# Patient Record
Sex: MALE | Race: OTHER | NOT HISPANIC OR LATINO
[De-identification: names, ages, dates, MRNs, and addresses within clinical notes are randomized per-mention and may not be internally consistent; named-entity substitution may affect disease eponyms.]

---

## 2017-06-14 ENCOUNTER — APPOINTMENT (OUTPATIENT)
Dept: NEPHROLOGY | Facility: CLINIC | Age: 82
End: 2017-06-14

## 2017-06-14 VITALS — HEART RATE: 80 BPM | DIASTOLIC BLOOD PRESSURE: 66 MMHG | SYSTOLIC BLOOD PRESSURE: 104 MMHG

## 2017-06-14 RX ORDER — OMEPRAZOLE 20 MG/1
20 CAPSULE, DELAYED RELEASE ORAL
Refills: 0 | Status: ACTIVE | COMMUNITY

## 2017-12-13 ENCOUNTER — APPOINTMENT (OUTPATIENT)
Dept: NEPHROLOGY | Facility: CLINIC | Age: 82
End: 2017-12-13
Payer: MEDICARE

## 2017-12-13 VITALS — DIASTOLIC BLOOD PRESSURE: 80 MMHG | HEART RATE: 80 BPM | SYSTOLIC BLOOD PRESSURE: 120 MMHG

## 2017-12-13 DIAGNOSIS — C61 MALIGNANT NEOPLASM OF PROSTATE: ICD-10-CM

## 2017-12-13 PROCEDURE — 99214 OFFICE O/P EST MOD 30 MIN: CPT

## 2018-01-30 ENCOUNTER — INPATIENT (INPATIENT)
Facility: HOSPITAL | Age: 83
LOS: 0 days | Discharge: HOME | DRG: 291 | End: 2018-01-31
Attending: SPECIALIST | Admitting: SPECIALIST
Payer: MEDICARE

## 2018-01-30 VITALS
WEIGHT: 151.9 LBS | HEIGHT: 63 IN | HEART RATE: 82 BPM | SYSTOLIC BLOOD PRESSURE: 142 MMHG | DIASTOLIC BLOOD PRESSURE: 80 MMHG | TEMPERATURE: 98 F | RESPIRATION RATE: 22 BRPM | OXYGEN SATURATION: 94 %

## 2018-01-30 DIAGNOSIS — Z79.01 LONG TERM (CURRENT) USE OF ANTICOAGULANTS: ICD-10-CM

## 2018-01-30 DIAGNOSIS — K64.8 OTHER HEMORRHOIDS: ICD-10-CM

## 2018-01-30 DIAGNOSIS — K45.8 OTHER SPECIFIED ABDOMINAL HERNIA WITHOUT OBSTRUCTION OR GANGRENE: ICD-10-CM

## 2018-01-30 DIAGNOSIS — I50.9 HEART FAILURE, UNSPECIFIED: ICD-10-CM

## 2018-01-30 DIAGNOSIS — Z98.890 OTHER SPECIFIED POSTPROCEDURAL STATES: Chronic | ICD-10-CM

## 2018-01-30 DIAGNOSIS — C61 MALIGNANT NEOPLASM OF PROSTATE: ICD-10-CM

## 2018-01-30 DIAGNOSIS — I13.0 HYPERTENSIVE HEART AND CHRONIC KIDNEY DISEASE WITH HEART FAILURE AND STAGE 1 THROUGH STAGE 4 CHRONIC KIDNEY DISEASE, OR UNSPECIFIED CHRONIC KIDNEY DISEASE: ICD-10-CM

## 2018-01-30 DIAGNOSIS — I48.91 UNSPECIFIED ATRIAL FIBRILLATION: ICD-10-CM

## 2018-01-30 DIAGNOSIS — E78.5 HYPERLIPIDEMIA, UNSPECIFIED: ICD-10-CM

## 2018-01-30 DIAGNOSIS — I50.33 ACUTE ON CHRONIC DIASTOLIC (CONGESTIVE) HEART FAILURE: ICD-10-CM

## 2018-01-30 DIAGNOSIS — Z45.018 ENCOUNTER FOR ADJUSTMENT AND MANAGEMENT OF OTHER PART OF CARDIAC PACEMAKER: ICD-10-CM

## 2018-01-30 DIAGNOSIS — I25.10 ATHEROSCLEROTIC HEART DISEASE OF NATIVE CORONARY ARTERY WITHOUT ANGINA PECTORIS: ICD-10-CM

## 2018-01-30 DIAGNOSIS — F03.90 UNSPECIFIED DEMENTIA WITHOUT BEHAVIORAL DISTURBANCE: ICD-10-CM

## 2018-01-30 DIAGNOSIS — Z98.89 OTHER SPECIFIED POSTPROCEDURAL STATES: Chronic | ICD-10-CM

## 2018-01-30 DIAGNOSIS — D50.9 IRON DEFICIENCY ANEMIA, UNSPECIFIED: ICD-10-CM

## 2018-01-30 DIAGNOSIS — D64.9 ANEMIA, UNSPECIFIED: ICD-10-CM

## 2018-01-30 DIAGNOSIS — R06.02 SHORTNESS OF BREATH: ICD-10-CM

## 2018-01-30 DIAGNOSIS — I11.9 HYPERTENSIVE HEART DISEASE WITHOUT HEART FAILURE: ICD-10-CM

## 2018-01-30 DIAGNOSIS — Z95.0 PRESENCE OF CARDIAC PACEMAKER: Chronic | ICD-10-CM

## 2018-01-30 DIAGNOSIS — N18.4 CHRONIC KIDNEY DISEASE, STAGE 4 (SEVERE): ICD-10-CM

## 2018-01-30 DIAGNOSIS — Z85.038 PERSONAL HISTORY OF OTHER MALIGNANT NEOPLASM OF LARGE INTESTINE: ICD-10-CM

## 2018-01-30 DIAGNOSIS — Z95.2 PRESENCE OF PROSTHETIC HEART VALVE: Chronic | ICD-10-CM

## 2018-01-30 DIAGNOSIS — K59.09 OTHER CONSTIPATION: ICD-10-CM

## 2018-01-30 DIAGNOSIS — F32.9 MAJOR DEPRESSIVE DISORDER, SINGLE EPISODE, UNSPECIFIED: ICD-10-CM

## 2018-01-30 LAB
ANION GAP SERPL CALC-SCNC: 16 MMOL/L — SIGNIFICANT CHANGE UP (ref 5–17)
APTT BLD: 43.6 SEC — HIGH (ref 27.5–37.4)
BASOPHILS NFR BLD AUTO: 0.4 % — SIGNIFICANT CHANGE UP (ref 0–2)
BUN SERPL-MCNC: 37 MG/DL — HIGH (ref 7–23)
CALCIUM SERPL-MCNC: 9.1 MG/DL — SIGNIFICANT CHANGE UP (ref 8.4–10.5)
CHLORIDE SERPL-SCNC: 104 MMOL/L — SIGNIFICANT CHANGE UP (ref 96–108)
CK MB CFR SERPL CALC: 2.7 NG/ML — SIGNIFICANT CHANGE UP (ref 0–6.7)
CK SERPL-CCNC: 49 U/L — SIGNIFICANT CHANGE UP (ref 30–200)
CO2 SERPL-SCNC: 24 MMOL/L — SIGNIFICANT CHANGE UP (ref 22–31)
CREAT SERPL-MCNC: 2.05 MG/DL — HIGH (ref 0.5–1.3)
EOSINOPHIL NFR BLD AUTO: 1.2 % — SIGNIFICANT CHANGE UP (ref 0–6)
GLUCOSE SERPL-MCNC: 198 MG/DL — HIGH (ref 70–99)
HCT VFR BLD CALC: 37.8 % — LOW (ref 39–50)
HGB BLD-MCNC: 11.9 G/DL — LOW (ref 13–17)
INR BLD: 3.8 — HIGH (ref 0.88–1.16)
LYMPHOCYTES # BLD AUTO: 13.7 % — SIGNIFICANT CHANGE UP (ref 13–44)
MCHC RBC-ENTMCNC: 31.5 G/DL — LOW (ref 32–36)
MCHC RBC-ENTMCNC: 31.8 PG — SIGNIFICANT CHANGE UP (ref 27–34)
MCV RBC AUTO: 101.1 FL — HIGH (ref 80–100)
MONOCYTES NFR BLD AUTO: 8.3 % — SIGNIFICANT CHANGE UP (ref 2–14)
NEUTROPHILS NFR BLD AUTO: 76.4 % — SIGNIFICANT CHANGE UP (ref 43–77)
NT-PROBNP SERPL-SCNC: HIGH PG/ML (ref 0–300)
PLATELET # BLD AUTO: 150 K/UL — SIGNIFICANT CHANGE UP (ref 150–400)
POTASSIUM SERPL-MCNC: 4.2 MMOL/L — SIGNIFICANT CHANGE UP (ref 3.5–5.3)
POTASSIUM SERPL-SCNC: 4.2 MMOL/L — SIGNIFICANT CHANGE UP (ref 3.5–5.3)
PROTHROM AB SERPL-ACNC: 43.4 SEC — HIGH (ref 9.8–12.7)
RAPID RVP RESULT: SIGNIFICANT CHANGE UP
RBC # BLD: 3.74 M/UL — LOW (ref 4.2–5.8)
RBC # FLD: 13.2 % — SIGNIFICANT CHANGE UP (ref 10.3–16.9)
SODIUM SERPL-SCNC: 144 MMOL/L — SIGNIFICANT CHANGE UP (ref 135–145)
TROPONIN T SERPL-MCNC: <0.01 NG/ML — SIGNIFICANT CHANGE UP (ref 0–0.01)
WBC # BLD: 5.6 K/UL — SIGNIFICANT CHANGE UP (ref 3.8–10.5)
WBC # FLD AUTO: 5.6 K/UL — SIGNIFICANT CHANGE UP (ref 3.8–10.5)

## 2018-01-30 PROCEDURE — 71046 X-RAY EXAM CHEST 2 VIEWS: CPT | Mod: 26

## 2018-01-30 PROCEDURE — 99285 EMERGENCY DEPT VISIT HI MDM: CPT | Mod: 25

## 2018-01-30 PROCEDURE — 93010 ELECTROCARDIOGRAM REPORT: CPT

## 2018-01-30 RX ORDER — FUROSEMIDE 40 MG
40 TABLET ORAL ONCE
Qty: 0 | Refills: 0 | Status: COMPLETED | OUTPATIENT
Start: 2018-01-30 | End: 2018-01-30

## 2018-01-30 RX ORDER — NITROGLYCERIN 6.5 MG
1 CAPSULE, EXTENDED RELEASE ORAL ONCE
Qty: 0 | Refills: 0 | Status: COMPLETED | OUTPATIENT
Start: 2018-01-30 | End: 2018-01-30

## 2018-01-30 RX ORDER — ALBUTEROL 90 UG/1
2.5 AEROSOL, METERED ORAL ONCE
Qty: 0 | Refills: 0 | Status: COMPLETED | OUTPATIENT
Start: 2018-01-30 | End: 2018-01-30

## 2018-01-30 RX ADMIN — Medication 1 INCH(S): at 23:20

## 2018-01-30 RX ADMIN — ALBUTEROL 2.5 MILLIGRAM(S): 90 AEROSOL, METERED ORAL at 22:05

## 2018-01-30 RX ADMIN — Medication 40 MILLIGRAM(S): at 23:20

## 2018-01-30 NOTE — ED PROVIDER NOTE - DIAGNOSTIC INTERPRETATION
ER Physician:  Cassidy Director  CHEST XRAY INTERPRETATION: mild chf, cardiomegaly similar to prev, clips R shoulder, pacer L chest, bony structures intact

## 2018-01-30 NOTE — ED ADULT NURSE NOTE - PMH
Aortic valve replaced    Atrial fibrillation    CAD (coronary artery disease)    Carotid arterial disease    Colon cancer    Constipation    Diverticulosis    High cholesterol    HTN (hypertension)    Internal hemorrhoids    Iron deficiency anemia    PUD (peptic ulcer disease) Aortic valve replaced    Atrial fibrillation    CAD (coronary artery disease)    Carotid arterial disease    Colon cancer    Colon cancer    Constipation    Diverticulosis    Endocarditis    High cholesterol    HTN (hypertension)    Internal hemorrhoids    Iron deficiency anemia    PUD (peptic ulcer disease)

## 2018-01-30 NOTE — ED PROVIDER NOTE - CONSTITUTIONAL, MLM
normal... Well appearing, well nourished, awake, alert, oriented to person, place, time/situation and mild tachypnea, speaking full sentences

## 2018-01-30 NOTE — ED ADULT NURSE NOTE - PSH
Cardiac pacemaker  3yrs ago  H/O inguinal hernia repair    History of bowel resection    S/P AVR  2013 @ [facility]  by Dr. Young

## 2018-01-30 NOTE — ED PROVIDER NOTE - PROGRESS NOTE DETAILS
Hgb stable, trop neg, bnp elevated, inr therapeutic, cr similar to prev, cxr w mild chf - lasix ordered.  PMD paged to discuss admit. Discussed w Dr Saxena - will admit to him on tele svc.  RVP neg.

## 2018-01-30 NOTE — ED ADULT TRIAGE NOTE - ARRIVAL INFO ADDITIONAL COMMENTS
Pt c/o sob since Sunday and scant rectal bleeding for 1 week. Pt states blood noticed when he wiped stool only. Pt coughing in triage. Pt denies cp.

## 2018-01-30 NOTE — ED ADULT NURSE NOTE - OBJECTIVE STATEMENT
88y M, A&Ox3, presents to ED c/o sob x1 week. Pt reports "I was taking the trash out dragging it and I get short of breath" Pt noted to take breaths to complete sentences. Denies cp, n/v, fever, chills, leg swelling. Pt reports intermittent rhinorrhea and dry cough. EKG performed, paced rhythm noted. Lungs clear bilateral, no jvd, no edema. PT also reports episode of elena red blood after cleaning after bm this week. Will continue to monitor.

## 2018-01-30 NOTE — ED PROVIDER NOTE - OBJECTIVE STATEMENT
87 yo male h/o avr complicated by endocarditis w revision, cad, s/p pacer, thoracic aneurysm, afib on coumadin, anemia, colon ca s/p resection, hemorrhoids, cri, hld, htn, gout, prostate ca, copd c/o sob x 1 d.  + mild, non productive cough x 1-2 d, rhinorrhea x several wks.  No fever, other uri sx.  No cp, palpitations, le edema, orthopnea (sleeps on hospital bed w/o change in position), + sanford w minimal exertion, no exertional cp.  No h/o pe/dvt.  Pt noted bright red blood on tissue w/o abd pain, rectal pain x several days.  No other bleeding.

## 2018-01-30 NOTE — ED PROVIDER NOTE - PSH
Cardiac pacemaker  3yrs ago  H/O inguinal hernia repair    History of bowel resection    S/P AVR  2013 @ [facility]  by Dr. Young

## 2018-01-30 NOTE — ED PROVIDER NOTE - MEDICAL DECISION MAKING DETAILS
Pt c/o sob.  Suspect cardiac > pulm etiology.  ? acs, chf, valve d/o, pna, irma, no sig concern for pe. Pt notes several days of minimal rectal bleeding w h/o hemorrhoids - doubt anemia or severe lgi bleed.  Plan labs, cxr, rvp, reassess.  Poss admit for further eval/rx.

## 2018-01-30 NOTE — ED PROVIDER NOTE - PMH
Aortic valve replaced    Atrial fibrillation    CAD (coronary artery disease)    Carotid arterial disease    Colon cancer    Constipation    Diverticulosis    High cholesterol    HTN (hypertension)    Internal hemorrhoids    Iron deficiency anemia    PUD (peptic ulcer disease)

## 2018-01-30 NOTE — ED ADULT NURSE NOTE - NSSISCREENINGQ1_ED_A_ED
Notification of Approved Cardiac Clearance    A surgical clearance request was initiated for this patient for the following procedure: Total Knee    After considering the patient's currently state of cardiac health, this patient has been Risk to move forward with the surgical procedure indicated. Please note a letter has been forwarded to the office of [address] [place] [address] [place] [address] [place] [address] to make them aware. No

## 2018-01-31 ENCOUNTER — TRANSCRIPTION ENCOUNTER (OUTPATIENT)
Age: 83
End: 2018-01-31

## 2018-01-31 VITALS — TEMPERATURE: 98 F

## 2018-01-31 DIAGNOSIS — I25.10 ATHEROSCLEROTIC HEART DISEASE OF NATIVE CORONARY ARTERY WITHOUT ANGINA PECTORIS: ICD-10-CM

## 2018-01-31 DIAGNOSIS — I50.9 HEART FAILURE, UNSPECIFIED: ICD-10-CM

## 2018-01-31 DIAGNOSIS — R63.8 OTHER SYMPTOMS AND SIGNS CONCERNING FOOD AND FLUID INTAKE: ICD-10-CM

## 2018-01-31 DIAGNOSIS — Z95.2 PRESENCE OF PROSTHETIC HEART VALVE: ICD-10-CM

## 2018-01-31 DIAGNOSIS — N18.4 CHRONIC KIDNEY DISEASE, STAGE 4 (SEVERE): ICD-10-CM

## 2018-01-31 DIAGNOSIS — I10 ESSENTIAL (PRIMARY) HYPERTENSION: ICD-10-CM

## 2018-01-31 DIAGNOSIS — I48.91 UNSPECIFIED ATRIAL FIBRILLATION: ICD-10-CM

## 2018-01-31 DIAGNOSIS — D64.9 ANEMIA, UNSPECIFIED: ICD-10-CM

## 2018-01-31 DIAGNOSIS — E03.9 HYPOTHYROIDISM, UNSPECIFIED: ICD-10-CM

## 2018-01-31 LAB
ANION GAP SERPL CALC-SCNC: 13 MMOL/L — SIGNIFICANT CHANGE UP (ref 5–17)
APPEARANCE UR: CLEAR — SIGNIFICANT CHANGE UP
BILIRUB UR-MCNC: NEGATIVE — SIGNIFICANT CHANGE UP
BUN SERPL-MCNC: 37 MG/DL — HIGH (ref 7–23)
CALCIUM SERPL-MCNC: 9 MG/DL — SIGNIFICANT CHANGE UP (ref 8.4–10.5)
CHLORIDE SERPL-SCNC: 104 MMOL/L — SIGNIFICANT CHANGE UP (ref 96–108)
CHOLEST SERPL-MCNC: 135 MG/DL — SIGNIFICANT CHANGE UP (ref 10–199)
CK MB CFR SERPL CALC: 2.8 NG/ML — SIGNIFICANT CHANGE UP (ref 0–6.7)
CK MB CFR SERPL CALC: 2.9 NG/ML — SIGNIFICANT CHANGE UP (ref 0–6.7)
CK SERPL-CCNC: 51 U/L — SIGNIFICANT CHANGE UP (ref 30–200)
CK SERPL-CCNC: 52 U/L — SIGNIFICANT CHANGE UP (ref 30–200)
CO2 SERPL-SCNC: 27 MMOL/L — SIGNIFICANT CHANGE UP (ref 22–31)
COLOR SPEC: YELLOW — SIGNIFICANT CHANGE UP
CREAT SERPL-MCNC: 1.96 MG/DL — HIGH (ref 0.5–1.3)
DIFF PNL FLD: NEGATIVE — SIGNIFICANT CHANGE UP
FOLATE SERPL-MCNC: >20 NG/ML — SIGNIFICANT CHANGE UP (ref 4.8–24.2)
GLUCOSE SERPL-MCNC: 104 MG/DL — HIGH (ref 70–99)
GLUCOSE UR QL: NEGATIVE — SIGNIFICANT CHANGE UP
HBA1C BLD-MCNC: 5.4 % — SIGNIFICANT CHANGE UP (ref 4–5.6)
HCT VFR BLD CALC: 36.9 % — LOW (ref 39–50)
HDLC SERPL-MCNC: 45 MG/DL — SIGNIFICANT CHANGE UP (ref 40–125)
HGB BLD-MCNC: 11.4 G/DL — LOW (ref 13–17)
INR BLD: 3.25 — HIGH (ref 0.88–1.16)
KETONES UR-MCNC: NEGATIVE — SIGNIFICANT CHANGE UP
LEUKOCYTE ESTERASE UR-ACNC: NEGATIVE — SIGNIFICANT CHANGE UP
LIPID PNL WITH DIRECT LDL SERPL: 66 MG/DL — SIGNIFICANT CHANGE UP
MAGNESIUM SERPL-MCNC: 1.8 MG/DL — SIGNIFICANT CHANGE UP (ref 1.6–2.6)
MCHC RBC-ENTMCNC: 30.9 G/DL — LOW (ref 32–36)
MCHC RBC-ENTMCNC: 31.3 PG — SIGNIFICANT CHANGE UP (ref 27–34)
MCV RBC AUTO: 101.4 FL — HIGH (ref 80–100)
NITRITE UR-MCNC: NEGATIVE — SIGNIFICANT CHANGE UP
PH UR: 5.5 — SIGNIFICANT CHANGE UP (ref 5–8)
PLATELET # BLD AUTO: 144 K/UL — LOW (ref 150–400)
POTASSIUM SERPL-MCNC: 4 MMOL/L — SIGNIFICANT CHANGE UP (ref 3.5–5.3)
POTASSIUM SERPL-SCNC: 4 MMOL/L — SIGNIFICANT CHANGE UP (ref 3.5–5.3)
PROT UR-MCNC: NEGATIVE MG/DL — SIGNIFICANT CHANGE UP
PROTHROM AB SERPL-ACNC: 37 SEC — HIGH (ref 9.8–12.7)
RBC # BLD: 3.64 M/UL — LOW (ref 4.2–5.8)
RBC # FLD: 13.4 % — SIGNIFICANT CHANGE UP (ref 10.3–16.9)
SODIUM SERPL-SCNC: 144 MMOL/L — SIGNIFICANT CHANGE UP (ref 135–145)
SP GR SPEC: 1.01 — SIGNIFICANT CHANGE UP (ref 1–1.03)
TOTAL CHOLESTEROL/HDL RATIO MEASUREMENT: 3 RATIO — LOW (ref 3.4–9.6)
TRIGL SERPL-MCNC: 122 MG/DL — SIGNIFICANT CHANGE UP (ref 10–149)
TROPONIN T SERPL-MCNC: 0.01 NG/ML — SIGNIFICANT CHANGE UP (ref 0–0.01)
TROPONIN T SERPL-MCNC: <0.01 NG/ML — SIGNIFICANT CHANGE UP (ref 0–0.01)
TSH SERPL-MCNC: 3.08 UIU/ML — SIGNIFICANT CHANGE UP (ref 0.35–4.94)
UROBILINOGEN FLD QL: 0.2 E.U./DL — SIGNIFICANT CHANGE UP
VIT B12 SERPL-MCNC: 590 PG/ML — SIGNIFICANT CHANGE UP (ref 232–1245)
WBC # BLD: 5.6 K/UL — SIGNIFICANT CHANGE UP (ref 3.8–10.5)
WBC # FLD AUTO: 5.6 K/UL — SIGNIFICANT CHANGE UP (ref 3.8–10.5)

## 2018-01-31 PROCEDURE — 71046 X-RAY EXAM CHEST 2 VIEWS: CPT

## 2018-01-31 PROCEDURE — 36415 COLL VENOUS BLD VENIPUNCTURE: CPT

## 2018-01-31 PROCEDURE — 87633 RESP VIRUS 12-25 TARGETS: CPT

## 2018-01-31 PROCEDURE — 85027 COMPLETE CBC AUTOMATED: CPT

## 2018-01-31 PROCEDURE — 82607 VITAMIN B-12: CPT

## 2018-01-31 PROCEDURE — 93306 TTE W/DOPPLER COMPLETE: CPT | Mod: 26

## 2018-01-31 PROCEDURE — 83036 HEMOGLOBIN GLYCOSYLATED A1C: CPT

## 2018-01-31 PROCEDURE — 84443 ASSAY THYROID STIM HORMONE: CPT

## 2018-01-31 PROCEDURE — 82553 CREATINE MB FRACTION: CPT

## 2018-01-31 PROCEDURE — 93005 ELECTROCARDIOGRAM TRACING: CPT

## 2018-01-31 PROCEDURE — 84484 ASSAY OF TROPONIN QUANT: CPT

## 2018-01-31 PROCEDURE — 93280 PM DEVICE PROGR EVAL DUAL: CPT | Mod: 26

## 2018-01-31 PROCEDURE — 87798 DETECT AGENT NOS DNA AMP: CPT

## 2018-01-31 PROCEDURE — 81003 URINALYSIS AUTO W/O SCOPE: CPT

## 2018-01-31 PROCEDURE — 85025 COMPLETE CBC W/AUTO DIFF WBC: CPT

## 2018-01-31 PROCEDURE — 85610 PROTHROMBIN TIME: CPT

## 2018-01-31 PROCEDURE — 87581 M.PNEUMON DNA AMP PROBE: CPT

## 2018-01-31 PROCEDURE — 82550 ASSAY OF CK (CPK): CPT

## 2018-01-31 PROCEDURE — 87486 CHLMYD PNEUM DNA AMP PROBE: CPT

## 2018-01-31 PROCEDURE — 83735 ASSAY OF MAGNESIUM: CPT

## 2018-01-31 PROCEDURE — 82746 ASSAY OF FOLIC ACID SERUM: CPT

## 2018-01-31 PROCEDURE — 85730 THROMBOPLASTIN TIME PARTIAL: CPT

## 2018-01-31 PROCEDURE — 93306 TTE W/DOPPLER COMPLETE: CPT

## 2018-01-31 PROCEDURE — 80048 BASIC METABOLIC PNL TOTAL CA: CPT

## 2018-01-31 PROCEDURE — 80061 LIPID PANEL: CPT

## 2018-01-31 PROCEDURE — 94640 AIRWAY INHALATION TREATMENT: CPT

## 2018-01-31 PROCEDURE — 96374 THER/PROPH/DIAG INJ IV PUSH: CPT

## 2018-01-31 PROCEDURE — 99285 EMERGENCY DEPT VISIT HI MDM: CPT | Mod: 25

## 2018-01-31 PROCEDURE — 83880 ASSAY OF NATRIURETIC PEPTIDE: CPT

## 2018-01-31 RX ORDER — ALLOPURINOL 300 MG
100 TABLET ORAL DAILY
Qty: 0 | Refills: 0 | Status: DISCONTINUED | OUTPATIENT
Start: 2018-01-31 | End: 2018-01-31

## 2018-01-31 RX ORDER — DOCUSATE SODIUM 100 MG
100 CAPSULE ORAL
Qty: 0 | Refills: 0 | Status: DISCONTINUED | OUTPATIENT
Start: 2018-01-31 | End: 2018-01-31

## 2018-01-31 RX ORDER — TRAZODONE HCL 50 MG
0 TABLET ORAL
Qty: 0 | Refills: 0 | COMMUNITY

## 2018-01-31 RX ORDER — TRAZODONE HCL 50 MG
25 TABLET ORAL DAILY
Qty: 0 | Refills: 0 | Status: DISCONTINUED | OUTPATIENT
Start: 2018-01-31 | End: 2018-01-31

## 2018-01-31 RX ORDER — LANOLIN ALCOHOL/MO/W.PET/CERES
3 CREAM (GRAM) TOPICAL AT BEDTIME
Qty: 0 | Refills: 0 | Status: DISCONTINUED | OUTPATIENT
Start: 2018-01-31 | End: 2018-01-31

## 2018-01-31 RX ORDER — ATORVASTATIN CALCIUM 80 MG/1
20 TABLET, FILM COATED ORAL AT BEDTIME
Qty: 0 | Refills: 0 | Status: DISCONTINUED | OUTPATIENT
Start: 2018-01-31 | End: 2018-01-31

## 2018-01-31 RX ORDER — METHYLCELLULOSE 500 MG
0 TABLET ORAL
Qty: 0 | Refills: 0 | COMMUNITY

## 2018-01-31 RX ORDER — MAGNESIUM SULFATE 500 MG/ML
1 VIAL (ML) INJECTION ONCE
Qty: 0 | Refills: 0 | Status: COMPLETED | OUTPATIENT
Start: 2018-01-31 | End: 2018-01-31

## 2018-01-31 RX ORDER — FOLIC ACID 0.8 MG
1 TABLET ORAL DAILY
Qty: 0 | Refills: 0 | Status: DISCONTINUED | OUTPATIENT
Start: 2018-01-31 | End: 2018-01-31

## 2018-01-31 RX ORDER — FUROSEMIDE 40 MG
40 TABLET ORAL DAILY
Qty: 0 | Refills: 0 | Status: DISCONTINUED | OUTPATIENT
Start: 2018-01-31 | End: 2018-01-31

## 2018-01-31 RX ORDER — OMEPRAZOLE 10 MG/1
0 CAPSULE, DELAYED RELEASE ORAL
Qty: 0 | Refills: 0 | COMMUNITY

## 2018-01-31 RX ORDER — METOPROLOL TARTRATE 50 MG
50 TABLET ORAL DAILY
Qty: 0 | Refills: 0 | Status: DISCONTINUED | OUTPATIENT
Start: 2018-01-31 | End: 2018-01-31

## 2018-01-31 RX ORDER — ASPIRIN/CALCIUM CARB/MAGNESIUM 324 MG
81 TABLET ORAL DAILY
Qty: 0 | Refills: 0 | Status: DISCONTINUED | OUTPATIENT
Start: 2018-01-31 | End: 2018-01-31

## 2018-01-31 RX ORDER — WARFARIN SODIUM 2.5 MG/1
1 TABLET ORAL
Qty: 0 | Refills: 0 | COMMUNITY

## 2018-01-31 RX ORDER — LEVOTHYROXINE SODIUM 125 MCG
25 TABLET ORAL DAILY
Qty: 0 | Refills: 0 | Status: DISCONTINUED | OUTPATIENT
Start: 2018-01-31 | End: 2018-01-31

## 2018-01-31 RX ORDER — ATORVASTATIN CALCIUM 80 MG/1
1 TABLET, FILM COATED ORAL
Qty: 0 | Refills: 0 | COMMUNITY

## 2018-01-31 RX ORDER — FUROSEMIDE 40 MG
1 TABLET ORAL
Qty: 30 | Refills: 0
Start: 2018-01-31 | End: 2018-03-01

## 2018-01-31 RX ORDER — PANTOPRAZOLE SODIUM 20 MG/1
40 TABLET, DELAYED RELEASE ORAL
Qty: 0 | Refills: 0 | Status: DISCONTINUED | OUTPATIENT
Start: 2018-01-31 | End: 2018-01-31

## 2018-01-31 RX ORDER — DONEPEZIL HYDROCHLORIDE 10 MG/1
5 TABLET, FILM COATED ORAL AT BEDTIME
Qty: 0 | Refills: 0 | Status: DISCONTINUED | OUTPATIENT
Start: 2018-01-31 | End: 2018-01-31

## 2018-01-31 RX ORDER — TAMSULOSIN HYDROCHLORIDE 0.4 MG/1
0.4 CAPSULE ORAL
Qty: 0 | Refills: 0 | Status: DISCONTINUED | OUTPATIENT
Start: 2018-01-31 | End: 2018-01-31

## 2018-01-31 RX ORDER — HEPARIN SODIUM 5000 [USP'U]/ML
5000 INJECTION INTRAVENOUS; SUBCUTANEOUS EVERY 8 HOURS
Qty: 0 | Refills: 0 | Status: DISCONTINUED | OUTPATIENT
Start: 2018-01-31 | End: 2018-01-31

## 2018-01-31 RX ADMIN — Medication 100 MILLIGRAM(S): at 10:04

## 2018-01-31 RX ADMIN — Medication 100 GRAM(S): at 10:03

## 2018-01-31 RX ADMIN — TAMSULOSIN HYDROCHLORIDE 0.4 MILLIGRAM(S): 0.4 CAPSULE ORAL at 06:28

## 2018-01-31 RX ADMIN — Medication 25 MILLIGRAM(S): at 10:03

## 2018-01-31 RX ADMIN — Medication 81 MILLIGRAM(S): at 10:04

## 2018-01-31 RX ADMIN — Medication 1 TABLET(S): at 10:03

## 2018-01-31 RX ADMIN — Medication 40 MILLIGRAM(S): at 14:00

## 2018-01-31 RX ADMIN — PANTOPRAZOLE SODIUM 40 MILLIGRAM(S): 20 TABLET, DELAYED RELEASE ORAL at 06:28

## 2018-01-31 RX ADMIN — Medication 50 MILLIGRAM(S): at 08:51

## 2018-01-31 RX ADMIN — Medication 25 MICROGRAM(S): at 06:28

## 2018-01-31 RX ADMIN — Medication 100 MILLIGRAM(S): at 06:28

## 2018-01-31 RX ADMIN — Medication 3 MILLIGRAM(S): at 02:30

## 2018-01-31 RX ADMIN — Medication 1 MILLIGRAM(S): at 10:04

## 2018-01-31 RX ADMIN — Medication 1 INCH(S): at 10:06

## 2018-01-31 NOTE — H&P ADULT - PMH
Aortic valve replaced    Atrial fibrillation    CAD (coronary artery disease)    Carotid arterial disease    Colon cancer    Constipation    Diverticulosis    Endocarditis    High cholesterol    HTN (hypertension)    Internal hemorrhoids    Iron deficiency anemia    PUD (peptic ulcer disease) Aortic valve replaced    Atrial fibrillation    CAD (coronary artery disease)    Carotid arterial disease    Colon cancer    Constipation    Diverticulosis    Endocarditis    High cholesterol    HTN (hypertension)    Hypothyroidism    Internal hemorrhoids    Iron deficiency anemia    PUD (peptic ulcer disease)

## 2018-01-31 NOTE — DISCHARGE NOTE ADULT - CARE PROVIDER_API CALL
Jaskaran Saxena), Cardiovascular Disease; Internal Medicine  [address] [place] [address]  Phone: [phone]  Fax: [phone]

## 2018-01-31 NOTE — DISCHARGE NOTE ADULT - MEDICATION SUMMARY - MEDICATIONS TO TAKE
I will START or STAY ON the medications listed below when I get home from the hospital:    aspirin 81 mg oral tablet  -- 1 tab(s) by mouth once a day  -- Indication: For CAD (coronary artery disease)    tamsulosin 0.4 mg oral capsule  -- 1 tab(s) by mouth 2 times a day  -- Indication: For BPH    traZODone 50 mg oral tablet  -- 0.5 tab(s) by mouth once a day  -- Indication: For Insomnia    allopurinol 100 mg oral tablet  -- 1 tab(s) by mouth once a day  -- Indication: For GOUT    atorvastatin 20 mg oral tablet  -- 1 tab(s) by mouth once a day (at bedtime)  -- Indication: For Hyperlipidemia    metoprolol succinate 50 mg oral tablet, extended release  -- 1 tab(s) by mouth once a day  -- Indication: For Atrial fibrillation    donepezil 5 mg oral tablet  -- 1 tab(s) by mouth once a day (at bedtime)  -- Indication: For Dementia    Lasix 40 mg oral tablet  -- 1 tab(s) by mouth once a day  -- Indication: For CHF (congestive heart failure)    ferrous sulfate 325 mg (65 mg elemental iron) oral tablet  -- 1 tab(s) by mouth once a day  -- Indication: For Iron deficiency anemia    docusate sodium 100 mg oral capsule  -- 1 cap(s) by mouth 2 times a day  -- Indication: For CONstipation    Citrucel 2 g/19 g oral powder for reconstitution  -- 1 packet(s) by mouth once a day  -- Indication: For CONstipation    omeprazole 40 mg oral delayed release capsule  -- 1 cap(s) by mouth once a day  -- Indication: For GERD    levothyroxine 25 mcg (0.025 mg) oral tablet  -- 1 tab(s) by mouth once a day  -- Indication: For Hypothyroidism    B Complex 50 oral tablet, extended release  -- 1 tab(s) by mouth once a day  -- Indication: For Vitamin supplementation    Vitamin D3 1000 intl units oral tablet  -- 1 tab(s) by mouth once a day  -- Indication: For Vitamin supplementation    folic acid 1 mg oral tablet  -- 1 tab(s) by mouth once a day  -- Indication: For Vitamin supplementation

## 2018-01-31 NOTE — H&P ADULT - PROBLEM SELECTOR PLAN 4
Hx of CAD with PCI and stent placement to RCA in 2007. Currently, without chest pain.   - continue home ASA 81mg QD and Lipitor 20mg QHS

## 2018-01-31 NOTE — PHYSICAL THERAPY INITIAL EVALUATION ADULT - ADDITIONAL COMMENTS
Patient reports that he lives in a 2 level home with a chair lift. Reports being functionally independent prior to admission, including, dressing, showering, meal prep.

## 2018-01-31 NOTE — H&P ADULT - NSHPPHYSICALEXAM_GEN_ALL_CORE
Vital Signs Last 24 Hrs  T(C): 36.4 (31 Jan 2018 05:18), Max: 36.7 (30 Jan 2018 21:07)  T(F): 97.6 (31 Jan 2018 05:18), Max: 98.1 (30 Jan 2018 23:29)  HR: 80 (31 Jan 2018 05:22) (80 - 82)  BP: 136/72 (31 Jan 2018 05:22) (136/72 - 169/60)  BP(mean): 96 (31 Jan 2018 05:22) (92 - 101)  RR: 18 (31 Jan 2018 01:28) (18 - 22)  SpO2: 98% (31 Jan 2018 05:22) (92% - 99%)    PHYSICAL EXAM:  Constitutional: elderly male, WDWN, NAD   HEENT: NCAT, EOMI, PERRL, MMM  Neck: supple, no JVD, no LAD   Pulm: clear to auscultation bilaterally, no wheezing/rales/rhonchi  CV: RRR, +S1S2, no murmurs appreciated   Abdomen: soft, NTND, +BS   Ext: WWP, no LE edema, DP 2+ bilaterally   Neuro: AAOx3, CNII-XII grossly intact, no focal neuro deficits

## 2018-01-31 NOTE — H&P ADULT - PROBLEM SELECTOR PLAN 2
Patient reports BPs in the 110s-120s/80s in outpatient setting. On arrival to ED, patient hypertensive with BPs in the 160s, normalized without intervention.   - restart home Toprol XL 50mg QD   - consider ACEi/ARB vs Norvasc if pressures become elevated

## 2018-01-31 NOTE — DISCHARGE NOTE ADULT - ADDITIONAL INSTRUCTIONS
Dr. Christian - 2/8@ 10:00 AM  Please have you INR checked with your PCP Dr. Christian - 2/8@ 10:00 AM  Dr. Barroso - Please have you INR checked tomorrow in your PCP's office Dr. Saxena - 2/8/18@ 10:00 AM  Dr. Barroso - Please have you INR checked tomorrow in your PCP's office.  You have a previously scheduled appointment on 2/8/18, please attend this appointment

## 2018-01-31 NOTE — H&P ADULT - PROBLEM SELECTOR PLAN 5
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   - no active issues

## 2018-01-31 NOTE — DISCHARGE NOTE ADULT - CARE PLAN
Principal Discharge DX:	CHF (congestive heart failure)  Secondary Diagnosis:	Atrial fibrillation Principal Discharge DX:	CHF (congestive heart failure)  Goal:	.  Assessment and plan of treatment:	You came to the hospital with shortness of breath while walking, and your presentation was consistent with congestive heart failure.  You received IV medications to remove excess fluid from your lungs, and you received an echocardiogram, which demonstrated ***.  When you leave the hospital, please continue taking Furosemide 40mg PO daily to facilitate further excess fluid removal, and please follow-up with your outpatient cardiologist for further work-up and management of your  Secondary Diagnosis:	Atrial fibrillation Principal Discharge DX:	CHF (congestive heart failure)  Goal:	.  Assessment and plan of treatment:	You came to the hospital with shortness of breath while walking, and your presentation was consistent with congestive heart failure.  You received IV medications to remove excess fluid from your lungs, and you received an echocardiogram, which demonstrated ***.  When you leave the hospital, please continue taking Furosemide 40mg PO daily to facilitate further excess fluid removal, and please follow-up with your outpatient cardiologist for further work-up and management of your  Secondary Diagnosis:	Atrial fibrillation  Assessment and plan of treatment:	Your heart rate was well controlled with your home Toprol XL.  Your INR on admission was elevated to 3.80, and your Coumadin was held.  Your INR was checked on the day of your discharge, and was ***.  Please go to your primary care doctor's office tomorrow, 2/1/18, to have your INR checked again. Principal Discharge DX:	CHF (congestive heart failure)  Goal:	.  Assessment and plan of treatment:	You came to the hospital with shortness of breath while walking, and your presentation was consistent with congestive heart failure.  You received IV medications to remove excess fluid from your lungs, and you received an echocardiogram, which demonstrated ***.  When you leave the hospital, please continue taking Furosemide 40mg PO daily to facilitate further excess fluid removal, and please follow-up with your outpatient cardiologist for further work-up and management of your  Secondary Diagnosis:	Atrial fibrillation  Assessment and plan of treatment:	Your heart rate was well controlled with your home Toprol XL.  Your INR on admission was elevated to 3.80, and your Coumadin was held.  Your INR was checked on the day of your discharge, and was 3.25.  Please do NOT take your home Coumadin tonight.  Please go to your primary care doctor's office tomorrow, 2/1/18, to have your INR checked again. Principal Discharge DX:	CHF (congestive heart failure)  Goal:	.  Assessment and plan of treatment:	You came to the hospital with shortness of breath while walking, and your presentation was consistent with congestive heart failure.  You received IV medications to remove excess fluid from your lungs, and you received an echocardiogram, which demonstrated reduced pumping function of your heart, with an LVEF of 35-40%.  When you leave the hospital, please continue taking Furosemide 40mg PO daily to facilitate further excess fluid removal, and please follow-up with your outpatient cardiologist for further work-up and management of your  Secondary Diagnosis:	Atrial fibrillation  Assessment and plan of treatment:	Your heart rate was well controlled with your home Toprol XL.  Your INR on admission was elevated to 3.80, and your Coumadin was held.  Your INR was checked on the day of your discharge, and was 3.25.  Please do NOT take your home Coumadin tonight.  Please go to your primary care doctor's office tomorrow, 2/1/18, to have your INR checked again.

## 2018-01-31 NOTE — H&P ADULT - PROBLEM SELECTOR PLAN 6
Follows with Dr. Cueto outpatient. Baseline creatinine 1.7-2, currently at baseline.   - continue to monitor Cr   - renal consult in AM    #Hyperuricemia: - continue home Allopurinol 100mg QD

## 2018-01-31 NOTE — H&P ADULT - PROBLEM SELECTOR PLAN 8
- continue home Synthroid 25mcg QD   - f/u TSH - continue home Synthroid 25mcg QD   - f/u TSH    #Prostate cancer: Active, on Lupron Q3mo.  - continue home Flomax 0.4mg BID     #Depression/Dementia:  - continue home Trazodone 25mg QD and Donepezil 5mg QD

## 2018-01-31 NOTE — H&P ADULT - NSHPLABSRESULTS_GEN_ALL_CORE
11.9   5.6   )-----------( 150      ( 30 Jan 2018 22:03 )             37.8     144  |  104  |  37<H>  ----------------------------<  198<H>  4.2   |  24  |  2.05<H>    Ca    9.1          Troponin T, Serum (01.31.18 @ 02:42)    Troponin T, Serum: 0.01: Reference interval for troponin T is </= 0.01 ng/mL which includes the  99th percentile of a healthy population. Troponin T results are not  interchangeable with troponin I results. ng/mL    Troponin T, Serum (01.30.18 @ 22:03)    Troponin T, Serum: <0.01: Reference interval for troponin T is </= 0.01 ng/mL which includes the  99th percentile of a healthy population. Troponin T results are not  interchangeable with troponin I results. ng/mL      EKG: V paced     CXR: mild congestion

## 2018-01-31 NOTE — H&P ADULT - PROBLEM SELECTOR PLAN 3
Hx of Afib with PPM placement in 2013. On Coumadin at home, alternates 1mg T/Th/Sun and 1.5mg M/W/F/Sat. On admission, INR supratherapeutic to 3.8.   - f/u AM INR   - dose Coumadin as indicated, goal INR 2-3  - EP consult in AM for interrogation

## 2018-01-31 NOTE — H&P ADULT - PROBLEM SELECTOR PLAN 7
Macrocytic anemia. Outpatient records document Hgb >10.5 at baseline. Currently, Hgb 11.9.  - continue to monitor H/H  - continue home MV QD and Folic acid 1mg QD

## 2018-01-31 NOTE — H&P ADULT - HISTORY OF PRESENT ILLNESS
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 Per history, patient was taking out his garbage and had a difficult time catching his breath after rolling the trash can to the corner. At baseline, patient reports being able to ambulate without assistance for 2-3 blocks. Patient denies any recent weight gain. No new orthopnea (sleeps with 1 pillow under head every night). Endorses medication compliance. Last outpatient echo reported a few months ago with Dr. Saxena, reportedly "unremarkable".   On ROS, patient denies recent fevers, chills, lightheadedness/dizziness, URI symptoms, CP, palpitations, abdominal pain, nausea/vomiting, diarrhea, dysuria. No recent travel. No sick contacts.     In the ED, patient afebrile, HR 80-82, -167/65-80, RR 22, SpO2 94% on RA --> 98% on NC2L. EKG Vpaced. CXR with mild pulmonary congestion, no gross infiltrates. Labs remarkable for elevated BNP (82081). Trops neg x1. Patient given Lasix 40mg IVP x1 and Nitro ointment. Admitted for management of CHF exacerbation. 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 Per history, patient was taking out his garbage and had a difficult time catching his breath after rolling the trash can to the corner. At baseline, patient reports being able to ambulate without assistance for 2-3 blocks. Patient denies any recent weight gain. No new orthopnea (sleeps with 1 pillow under head every night). Endorses medication compliance. Last outpatient echo reported a few months ago with Dr. Saxena, reportedly "unremarkable".   On ROS, patient denies recent fevers, chills, lightheadedness/dizziness, URI symptoms, CP, palpitations, abdominal pain, nausea/vomiting, diarrhea, dysuria. No recent travel. No sick contacts.     In the ED, patient afebrile, HR 80-82, -167/65-80, RR 22, SpO2 94% on RA --> 98% on NC2L. EKG Vpaced. CXR with mild pulmonary congestion, no gross infiltrates. Labs remarkable for elevated BNP (61301). Trops neg x1. Patient given Lasix 40mg IVP x1 and Nitro ointment. Admitted for management of CHF exacerbation.

## 2018-01-31 NOTE — DISCHARGE NOTE ADULT - PATIENT PORTAL LINK FT
“You can access the [facility] Patient Portal, offered by [facility], by registering with the following website: [email or web address]”

## 2018-01-31 NOTE — H&P ADULT - NSHPOUTPATIENTPROVIDERS_GEN_ALL_CORE
Dr. Saxena (cardiology), Dr. Cueto (renal), Dr. Bowman (PMD), Dr. Waters (PMD) Dr. Saxena (cardiology), Dr. Cueto (renal), Dr. Bowman (PMD), Dr. Everett Barroso (PMD)

## 2018-01-31 NOTE — PHYSICAL THERAPY INITIAL EVALUATION ADULT - IMPAIRMENTS CONTRIBUTING TO GAIT DEVIATIONS, PT EVAL
SP02 assessed throughout ambulation on room air. At baseline/rest on room air: 95%. After ambulating 50 feet 94%. After ambulating an additional 50 feet: 92%. Reapplied 2L NC 02, SP02 increased to 95% in ~10 seconds. Minimal to no SOB noted throughout session./decreased strength

## 2018-01-31 NOTE — DISCHARGE NOTE ADULT - MEDICATION SUMMARY - MEDICATIONS TO STOP TAKING
I will STOP taking the medications listed below when I get home from the hospital:    warfarin 1 mg oral tablet  -- 1 tab(s) by mouth once a day

## 2018-01-31 NOTE — DISCHARGE NOTE ADULT - HOSPITAL COURSE
Wilber Kyle is an 87yo M with PMHx of HTN, HLD, prostate cancer (on Lupron Q3mo), colon cancer s/p R hemicolectomy (2016), hx of AS s/p AVR (with porcine valve in 2012) complicated by prosthetic valve endocarditis and revision/replacement, Afib (on Coumadin) s/p PPM (2013), CAD s/p PCI with RCA stent placement in 2007, CKDIV (baseline Cr 1.7-2) with anemia and hyperuricemia, hypothyroidism who presents with complaints of dyspnea on exertion x 2d, admitted for management of CHF exacerbation.  He was treated with IV Lasix 40mg, with appropriate diuresis, and his symptoms improved.  He received an echocardiogram to evaluate this new diagnosis of CHF, which demonstrated ***.  His EKG and cardiac enzymes were not consistent with an ischemic cause of his new CHF.  He will follow-up with his cardiologist Dr. Saxena for further work-up.  His INR was elevated to 3.80 on admission, and his Coumadin was held.  His INR was measured at *** on day of discharge, and he will follow-up at his PCP's office on 2/1/18 for an INR check.  He was medically optimized, and discharged home with instructions to follow-up with his cardiologist and PCP. Wilber Kyle is an 87yo M with PMHx of HTN, HLD, prostate cancer (on Lupron Q3mo), colon cancer s/p R hemicolectomy (2016), hx of AS s/p AVR (with porcine valve in 2012) complicated by prosthetic valve endocarditis and revision/replacement, Afib (on Coumadin) s/p PPM (2013), CAD s/p PCI with RCA stent placement in 2007, CKDIV (baseline Cr 1.7-2) with anemia and hyperuricemia, hypothyroidism who presents with complaints of dyspnea on exertion x 2d, admitted for management of CHF exacerbation.  He was treated with IV Lasix 40mg, with appropriate diuresis, and his symptoms improved.  He received an echocardiogram to evaluate this new diagnosis of CHF, which demonstrated ***.  His EKG and cardiac enzymes were not consistent with an ischemic cause of his new CHF.  He will follow-up with his cardiologist Dr. Saxena for further work-up.  His INR was elevated to 3.80 on admission, and his Coumadin was held.  His INR was measured at 3.25 on day of discharge, he was instructed to not take his Coumadin that night, and he will follow-up at his PCP's office on 2/1/18 for an INR check.  He was medically optimized, and discharged home with instructions to follow-up with his cardiologist and PCP. Wilber Kyle is an 87yo M with PMHx of HTN, HLD, prostate cancer (on Lupron Q3mo), colon cancer s/p R hemicolectomy (2016), hx of AS s/p AVR (with porcine valve in 2012) complicated by prosthetic valve endocarditis and revision/replacement, Afib (on Coumadin) s/p PPM (2013), CAD s/p PCI with RCA stent placement in 2007, CKDIV (baseline Cr 1.7-2) with anemia and hyperuricemia, hypothyroidism who presents with complaints of dyspnea on exertion x 2d, admitted for management of CHF exacerbation.  He was treated with IV Lasix 40mg, with appropriate diuresis, and his symptoms improved.  He received an echocardiogram to evaluate this new diagnosis of CHF, which demonstrated ***.  His EKG and cardiac enzymes were not consistent with an ischemic cause of his new CHF.  His pacemaker was interrogated and was found to be functioning normally, with increased frequency and length of atrial fibrillation episodes since January.  He will follow-up with his cardiologist Dr. Saxena for further work-up.  His INR was elevated to 3.80 on admission, and his Coumadin was held.  His INR was measured at 3.25 on day of discharge, he was instructed to not take his Coumadin that night, and he will follow-up at his PCP's office on 2/1/18 for an INR check.  He was medically optimized, and discharged home with instructions to follow-up with his cardiologist and PCP. Wilber Kyle is an 89yo M with PMHx of HTN, HLD, prostate cancer (on Lupron Q3mo), colon cancer s/p R hemicolectomy (2016), hx of AS s/p AVR (with porcine valve in 2012) complicated by prosthetic valve endocarditis and revision/replacement, Afib (on Coumadin) s/p PPM (2013), CAD s/p PCI with RCA stent placement in 2007, CKDIV (baseline Cr 1.7-2) with anemia and hyperuricemia, hypothyroidism who presents with complaints of dyspnea on exertion x 2d, admitted for management of CHF exacerbation.  He was treated with IV Lasix 40mg, with appropriate diuresis, and his symptoms improved.  He received an echocardiogram to evaluate this new diagnosis of CHF, which demonstrated LVEF 35-40%, basal inferior wall akinesis and apical septal wall hypokinesis.  His EKG and cardiac enzymes were not consistent with an ischemic cause of his new CHF.  His pacemaker was interrogated and was found to be functioning normally, with increased frequency and length of atrial fibrillation episodes since January.  He will follow-up with his cardiologist Dr. Saxena for further work-up.  His INR was elevated to 3.80 on admission, and his Coumadin was held.  His INR was measured at 3.25 on day of discharge, he was instructed to not take his Coumadin that night, and he will follow-up at his PCP's office on 2/1/18 for an INR check.  He was medically optimized, and discharged home with instructions to follow-up with his cardiologist and PCP. Wilber Kyle is an 89yo M with PMHx of HTN, HLD, prostate cancer (on Lupron Q3mo), colon cancer s/p R hemicolectomy (2016), hx of AS s/p AVR (with porcine valve in 2012) complicated by prosthetic valve endocarditis and revision/replacement, Afib (on Coumadin) s/p PPM (2013), CAD s/p PCI with RCA stent placement in 2007, CKDIV (baseline Cr 1.7-2) with anemia and hyperuricemia, hypothyroidism who presents with complaints of dyspnea on exertion x 2d, admitted for management of CHF exacerbation.  He was treated with IV Lasix 40mg, with appropriate diuresis, and his symptoms improved.  He received an echocardiogram to evaluate this new diagnosis of CHF, which demonstrated LVEF 35-40%, basal inferior wall akinesis and apical septal wall hypokinesis.  There was an echogenicity in his right atrium that likely represented his pacemaker leads, but could have represented a vegetation.  Considering that the patient was non-toxic, afebrile, and not clinically infected, this finding will be followed up as an outpatient.  His EKG and cardiac enzymes were not consistent with an ischemic cause of his new CHF.  His pacemaker was interrogated and was found to be functioning normally, with increased frequency and length of atrial fibrillation episodes since January.  He will follow-up with his cardiologist Dr. Saxena for further work-up.  His INR was elevated to 3.80 on admission, and his Coumadin was held.  His INR was measured at 3.25 on day of discharge, he was instructed to not take his Coumadin that night, and he will follow-up at his PCP's office on 2/1/18 for an INR check.  He was medically optimized, and discharged home with instructions to follow-up with his cardiologist and PCP.

## 2018-01-31 NOTE — PHYSICAL THERAPY INITIAL EVALUATION ADULT - MODALITIES TREATMENT COMMENTS
Demonstrated functional independence with all mobility including bed mobility, transfers, and ambulation. Patient is CLEARED from PT. Recommend home, no skilled PT needs upon discharge.

## 2018-01-31 NOTE — H&P ADULT - ASSESSMENT
86M with PMHx of HTN, HLD, prostate cancer (on Lupron Q3mo), colon cancer s/p R hemicolectomy (2016), hx of AS s/p AVR (with porcine valve in 2012) complicated by prosthetic valve endocarditis and revision/replacement, Afib (on Coumadin) s/p PPM (2013), CAD s/p PCI with RCA stent placement in 2007, CKDIV (baseline Cr 1.7-2) with anemia and hyperuricemia, hypothyroidism who presents with complaints of dyspnea on exertion x 2d, admitted for management of CHF exacerbation.

## 2018-01-31 NOTE — H&P ADULT - PSH
Cardiac pacemaker  3yrs ago  H/O inguinal hernia repair    History of bowel resection    S/P AVR  2013 @ [facility]  by Dr. Young

## 2018-01-31 NOTE — PROCEDURE NOTE - ADDITIONAL PROCEDURE DETAILS
Mr. Kyle is in atrial fibrillation with V-pacing at 80 bpm. He is A-pacing 97.3% '    He is atrial fibrillation 12% of the time. His frequency and length of episodes have increased since January. During his episodes of Afib, he is V-pacing 99.9% of the time. His average rate during these Mr. Kyle is in atrial fibrillation with V-pacing at 80 bpm. He is A-pacing 97.3% and V-pacing 99.9% of the time. He has a ventricular escape at around 30 bpm. His device check was normal. His measured atrial sensing threshold is small relative to the set sensitivity, but because the patient is in paroxysmal Afib, and the atrial sensitivity is set to 0.25mV, it was not lowered any further to avoid oversensing when the patient is in sinus.       He is in atrial fibrillation 12% of the time. His frequency and length of episodes have increased since January. During his episodes of Afib, he is V-pacing 99.9% of the time. His average rate during these episodes is 80 bpm. Long term histogram of ventricular rates show that the patient's max ventricular rate during Afib is above 100 bpm (sensing vs pacing) <5% of the time.

## 2018-01-31 NOTE — DISCHARGE NOTE ADULT - PLAN OF CARE
. You came to the hospital with shortness of breath while walking, and your presentation was consistent with congestive heart failure.  You received IV medications to remove excess fluid from your lungs, and you received an echocardiogram, which demonstrated ***.  When you leave the hospital, please continue taking Furosemide 40mg PO daily to facilitate further excess fluid removal, and please follow-up with your outpatient cardiologist for further work-up and management of your Your heart rate was well controlled with your home Toprol XL.  Your INR on admission was elevated to 3.80, and your Coumadin was held.  Your INR was checked on the day of your discharge, and was ***.  Please go to your primary care doctor's office tomorrow, 2/1/18, to have your INR checked again. Your heart rate was well controlled with your home Toprol XL.  Your INR on admission was elevated to 3.80, and your Coumadin was held.  Your INR was checked on the day of your discharge, and was 3.25.  Please do NOT take your home Coumadin tonight.  Please go to your primary care doctor's office tomorrow, 2/1/18, to have your INR checked again. You came to the hospital with shortness of breath while walking, and your presentation was consistent with congestive heart failure.  You received IV medications to remove excess fluid from your lungs, and you received an echocardiogram, which demonstrated reduced pumping function of your heart, with an LVEF of 35-40%.  When you leave the hospital, please continue taking Furosemide 40mg PO daily to facilitate further excess fluid removal, and please follow-up with your outpatient cardiologist for further work-up and management of your

## 2018-01-31 NOTE — H&P ADULT - PROBLEM SELECTOR PLAN 1
Patient presents with complaints of RIOS x2d. Reports no previous hx of CHF. On admission, BNP elevated to >01360 and CXR consistent with mild pulmonary congetion. Etiology unclear, but includes hypertensive cardiomyopathy (as patient additionally hypertensive in the ED) vs arrythmia-induced. Less likely infectious as no fever, no leukocytosis Patient presents with complaints of RIOS x2d. Reports no previous hx of CHF. On admission, BNP elevated to >20387 and CXR consistent with mild pulmonary congetion. Etiology unclear, but includes hypertensive cardiomyopathy (as patient additionally hypertensive in the ED) vs arrythmia-induced. Less likely infectious as no fever, no leukocytosis, RVP negative, UA negative, CXR without infiltrate. Less likely ACS as no chest pain, trops neg x2, EKG without ischemic changes.   - s/p Lasix 40mg IVP x1 in ED, will continue Lasix 40mg IVP QD   - monitor strict I/Os, reliable to use urinal so will hold on placing jerez   - monitor daily weights   - blood pressure control as below  - f/u echo  - EP consult in AM for PPM interrogation

## 2018-06-13 ENCOUNTER — APPOINTMENT (OUTPATIENT)
Dept: NEPHROLOGY | Facility: CLINIC | Age: 83
End: 2018-06-13
Payer: MEDICARE

## 2018-06-13 VITALS — HEART RATE: 80 BPM | SYSTOLIC BLOOD PRESSURE: 116 MMHG | DIASTOLIC BLOOD PRESSURE: 60 MMHG

## 2018-06-13 DIAGNOSIS — C18.9 MALIGNANT NEOPLASM OF COLON, UNSPECIFIED: ICD-10-CM

## 2018-06-13 PROBLEM — I38 ENDOCARDITIS, VALVE UNSPECIFIED: Chronic | Status: ACTIVE | Noted: 2018-01-30

## 2018-06-13 PROCEDURE — 99214 OFFICE O/P EST MOD 30 MIN: CPT

## 2018-10-09 ENCOUNTER — APPOINTMENT (OUTPATIENT)
Dept: NEPHROLOGY | Facility: CLINIC | Age: 83
End: 2018-10-09
Payer: MEDICARE

## 2018-10-09 VITALS — DIASTOLIC BLOOD PRESSURE: 82 MMHG | HEART RATE: 80 BPM | SYSTOLIC BLOOD PRESSURE: 120 MMHG

## 2018-10-09 DIAGNOSIS — R06.00 DYSPNEA, UNSPECIFIED: ICD-10-CM

## 2018-10-09 PROBLEM — E03.9 HYPOTHYROIDISM, UNSPECIFIED: Chronic | Status: ACTIVE | Noted: 2018-01-31

## 2018-10-09 PROCEDURE — 99214 OFFICE O/P EST MOD 30 MIN: CPT

## 2019-01-21 NOTE — PATIENT PROFILE ADULT. - HARM RISK FACTORS
Physical Therapy Evaluation Appointment Late Cancel   Evaluation late canceled due to not having paper order at time of evaluation. Patient to reschedule upon paper ordering being received. No therapy services provided this date.    yes

## 2019-03-26 ENCOUNTER — APPOINTMENT (OUTPATIENT)
Dept: NEPHROLOGY | Facility: CLINIC | Age: 84
End: 2019-03-26
Payer: MEDICARE

## 2019-03-26 VITALS
DIASTOLIC BLOOD PRESSURE: 80 MMHG | HEART RATE: 70 BPM | SYSTOLIC BLOOD PRESSURE: 126 MMHG | BODY MASS INDEX: 22.24 KG/M2 | WEIGHT: 142 LBS

## 2019-03-26 PROCEDURE — 99214 OFFICE O/P EST MOD 30 MIN: CPT

## 2019-03-26 RX ORDER — METHYLCELLULOSE 2 G/10.2G
POWDER, FOR SOLUTION ORAL
Refills: 0 | Status: ACTIVE | COMMUNITY

## 2019-03-26 RX ORDER — BIFIDOBACTERIUM LONGUM 10MM CELL
CAPSULE ORAL
Refills: 0 | Status: ACTIVE | COMMUNITY

## 2019-03-26 RX ORDER — MEMANTINE HYDROCHLORIDE 5 MG/1
5 TABLET, FILM COATED ORAL
Refills: 0 | Status: ACTIVE | COMMUNITY

## 2019-03-26 RX ORDER — FUROSEMIDE 20 MG/1
20 TABLET ORAL
Refills: 0 | Status: ACTIVE | COMMUNITY

## 2019-03-26 NOTE — ASSESSMENT
[FreeTextEntry1] : all lab data was reviewed with patient in detail from 3/12/2019\par 89 year-old man with CKD 4, HTN anemia, AFib\par -CKD 4: creat 2.32 stable-  electrolytes- WNL;\par  Not uremic- appetite, energy are good-\par - would probably not proceed with RRT- if necessity arose.\par --HTN- BP  good-  c/w metoprolol- \par -CHF- volume status good- c/w lasix\par AFib- rate controlled with metoprolol- continues on Coumadin-\par -Anemia: Hgb stable - no indication for SHASHA\par -Secondary hyperparathyroidism- needs repeat PTH- to d/w PCP \par -hyperuricemia: good- c/w allopurinol.\par hyperlipidemia: stable c/w statin\par  coronary artery disease and aortic valve replacement stable \par \par f/u 4-6 months-sooner as needed- seeing PCP every 2-3 months- \par

## 2019-03-26 NOTE — HISTORY OF PRESENT ILLNESS
[FreeTextEntry1] : 89-year-old man here for f/u evaluation of CKD 4, hypertension, anemia and hyperuricemia \par feels okay today- no new medical history\par difficulty walking DJD\par continues on low lasix 20 mg\par denies dysuria, flank pain, hematuria or frothy urine. \par \par h/o  atrial fibrillation, aortic valve replacement in 2012- then infected and replaced again in 2013\par coronary artery disease s/p 3 stents in 2007\par s/p partial colon resection for colon cancer ( 2016)\par + glucose intolerance- diet controlled\par \par

## 2019-03-26 NOTE — REASON FOR VISIT
[Follow-Up] : a follow-up visit [Other: _____] : [unfilled] [FreeTextEntry1] : for CKD 4, HTN, anemia and hyperuricemia

## 2019-03-26 NOTE — PHYSICAL EXAM
[General Appearance - Alert] : alert [General Appearance - In No Acute Distress] : in no acute distress [Sclera] : the sclera and conjunctiva were normal [Extraocular Movements] : extraocular movements were intact [Outer Ear] : the ears and nose were normal in appearance [Examination Of The Oral Cavity] : the lips and gums were normal [Neck Appearance] : the appearance of the neck was normal [Neck Cervical Mass (___cm)] : no neck mass was observed [Jugular Venous Distention Increased] : there was no jugular-venous distention [] : no respiratory distress [Auscultation Breath Sounds / Voice Sounds] : lungs were clear to auscultation bilaterally [Heart Rate And Rhythm] : heart rate was normal and rhythm regular [Heart Sounds] : normal S1 and S2 [Heart Sounds Gallop] : no gallops [Murmurs] : no murmurs [Heart Sounds Pericardial Friction Rub] : no pericardial rub [Edema] : there was no peripheral edema [Cervical Lymph Nodes Enlarged Anterior Bilaterally] : anterior cervical [Supraclavicular Lymph Nodes Enlarged Bilaterally] : supraclavicular [No CVA Tenderness] : no ~M costovertebral angle tenderness [No Spinal Tenderness] : no spinal tenderness [Abnormal Walk] : normal gait [Oriented To Time, Place, And Person] : oriented to person, place, and time [Impaired Insight] : insight and judgment were intact [Affect] : the affect was normal [FreeTextEntry1] : ecchymoses both arms

## 2019-10-30 NOTE — ED PROVIDER NOTE - DATE/TIME 2
Vaccine Information Statement(s) was given today. This has been reviewed, questions answered, and verbal consent given by Patient for injection(s) and administration of Influenza (Inactivated).    1. Does the patient have a moderate to severe fever?  No  2. Has the patient had a serious reaction to a flu shot before?   No  3. Has the patient ever had [place] Syndrome within 6 weeks of a previous flu shot?  No  4. Is the patient less than 6 months of age?  No    Patient is eligible to receive the vaccine based on all questions being answered as 'No'.    Patient tolerated without incident. See immunization grid for documentation.         30-Jan-2018 23:23

## 2019-11-05 ENCOUNTER — APPOINTMENT (OUTPATIENT)
Dept: NEPHROLOGY | Facility: CLINIC | Age: 84
End: 2019-11-05

## 2019-12-02 ENCOUNTER — INPATIENT (INPATIENT)
Facility: HOSPITAL | Age: 84
LOS: 1 days | Discharge: HOME | DRG: 378 | End: 2019-12-04
Attending: STUDENT IN AN ORGANIZED HEALTH CARE EDUCATION/TRAINING PROGRAM | Admitting: STUDENT IN AN ORGANIZED HEALTH CARE EDUCATION/TRAINING PROGRAM
Payer: MEDICARE

## 2019-12-02 VITALS
TEMPERATURE: 98 F | OXYGEN SATURATION: 100 % | WEIGHT: 141.98 LBS | HEIGHT: 63 IN | RESPIRATION RATE: 16 BRPM | HEART RATE: 83 BPM | SYSTOLIC BLOOD PRESSURE: 117 MMHG | DIASTOLIC BLOOD PRESSURE: 73 MMHG

## 2019-12-02 DIAGNOSIS — I25.10 ATHEROSCLEROTIC HEART DISEASE OF NATIVE CORONARY ARTERY WITHOUT ANGINA PECTORIS: ICD-10-CM

## 2019-12-02 DIAGNOSIS — R79.1 ABNORMAL COAGULATION PROFILE: ICD-10-CM

## 2019-12-02 DIAGNOSIS — K92.1 MELENA: ICD-10-CM

## 2019-12-02 DIAGNOSIS — Z98.89 OTHER SPECIFIED POSTPROCEDURAL STATES: Chronic | ICD-10-CM

## 2019-12-02 DIAGNOSIS — D50.9 IRON DEFICIENCY ANEMIA, UNSPECIFIED: ICD-10-CM

## 2019-12-02 DIAGNOSIS — Z95.0 PRESENCE OF CARDIAC PACEMAKER: Chronic | ICD-10-CM

## 2019-12-02 DIAGNOSIS — R63.8 OTHER SYMPTOMS AND SIGNS CONCERNING FOOD AND FLUID INTAKE: ICD-10-CM

## 2019-12-02 DIAGNOSIS — K27.9 PEPTIC ULCER, SITE UNSPECIFIED, UNSPECIFIED AS ACUTE OR CHRONIC, WITHOUT HEMORRHAGE OR PERFORATION: ICD-10-CM

## 2019-12-02 DIAGNOSIS — E78.5 HYPERLIPIDEMIA, UNSPECIFIED: ICD-10-CM

## 2019-12-02 DIAGNOSIS — I10 ESSENTIAL (PRIMARY) HYPERTENSION: ICD-10-CM

## 2019-12-02 DIAGNOSIS — I48.91 UNSPECIFIED ATRIAL FIBRILLATION: ICD-10-CM

## 2019-12-02 DIAGNOSIS — Z95.2 PRESENCE OF PROSTHETIC HEART VALVE: Chronic | ICD-10-CM

## 2019-12-02 DIAGNOSIS — E03.9 HYPOTHYROIDISM, UNSPECIFIED: ICD-10-CM

## 2019-12-02 DIAGNOSIS — Z98.890 OTHER SPECIFIED POSTPROCEDURAL STATES: Chronic | ICD-10-CM

## 2019-12-02 LAB
ALBUMIN SERPL ELPH-MCNC: 4.2 G/DL — SIGNIFICANT CHANGE UP (ref 3.3–5)
ALP SERPL-CCNC: 70 U/L — SIGNIFICANT CHANGE UP (ref 40–120)
ALT FLD-CCNC: 18 U/L — SIGNIFICANT CHANGE UP (ref 10–45)
ANION GAP SERPL CALC-SCNC: 11 MMOL/L — SIGNIFICANT CHANGE UP (ref 5–17)
APTT BLD: 39.5 SEC — HIGH (ref 27.5–36.3)
AST SERPL-CCNC: 26 U/L — SIGNIFICANT CHANGE UP (ref 10–40)
BASOPHILS # BLD AUTO: 0.02 K/UL — SIGNIFICANT CHANGE UP (ref 0–0.2)
BASOPHILS NFR BLD AUTO: 0.3 % — SIGNIFICANT CHANGE UP (ref 0–2)
BILIRUB SERPL-MCNC: 0.5 MG/DL — SIGNIFICANT CHANGE UP (ref 0.2–1.2)
BLD GP AB SCN SERPL QL: NEGATIVE — SIGNIFICANT CHANGE UP
BUN SERPL-MCNC: 49 MG/DL — HIGH (ref 7–23)
CALCIUM SERPL-MCNC: 9.3 MG/DL — SIGNIFICANT CHANGE UP (ref 8.4–10.5)
CHLORIDE SERPL-SCNC: 108 MMOL/L — SIGNIFICANT CHANGE UP (ref 96–108)
CO2 SERPL-SCNC: 21 MMOL/L — LOW (ref 22–31)
CREAT SERPL-MCNC: 2.2 MG/DL — HIGH (ref 0.5–1.3)
EOSINOPHIL # BLD AUTO: 0.28 K/UL — SIGNIFICANT CHANGE UP (ref 0–0.5)
EOSINOPHIL NFR BLD AUTO: 4.7 % — SIGNIFICANT CHANGE UP (ref 0–6)
GLUCOSE SERPL-MCNC: 104 MG/DL — HIGH (ref 70–99)
HCT VFR BLD CALC: 41.2 % — SIGNIFICANT CHANGE UP (ref 39–50)
HGB BLD-MCNC: 12.4 G/DL — LOW (ref 13–17)
IMM GRANULOCYTES NFR BLD AUTO: 0.2 % — SIGNIFICANT CHANGE UP (ref 0–1.5)
INR BLD: 4.52 — HIGH (ref 0.88–1.16)
LYMPHOCYTES # BLD AUTO: 1.41 K/UL — SIGNIFICANT CHANGE UP (ref 1–3.3)
LYMPHOCYTES # BLD AUTO: 23.5 % — SIGNIFICANT CHANGE UP (ref 13–44)
MCHC RBC-ENTMCNC: 30.1 GM/DL — LOW (ref 32–36)
MCHC RBC-ENTMCNC: 32.3 PG — SIGNIFICANT CHANGE UP (ref 27–34)
MCV RBC AUTO: 107.3 FL — HIGH (ref 80–100)
MONOCYTES # BLD AUTO: 0.51 K/UL — SIGNIFICANT CHANGE UP (ref 0–0.9)
MONOCYTES NFR BLD AUTO: 8.5 % — SIGNIFICANT CHANGE UP (ref 2–14)
NEUTROPHILS # BLD AUTO: 3.76 K/UL — SIGNIFICANT CHANGE UP (ref 1.8–7.4)
NEUTROPHILS NFR BLD AUTO: 62.8 % — SIGNIFICANT CHANGE UP (ref 43–77)
NRBC # BLD: 0 /100 WBCS — SIGNIFICANT CHANGE UP (ref 0–0)
PLATELET # BLD AUTO: 132 K/UL — LOW (ref 150–400)
POTASSIUM SERPL-MCNC: 4.6 MMOL/L — SIGNIFICANT CHANGE UP (ref 3.5–5.3)
POTASSIUM SERPL-SCNC: 4.6 MMOL/L — SIGNIFICANT CHANGE UP (ref 3.5–5.3)
PROT SERPL-MCNC: 6.6 G/DL — SIGNIFICANT CHANGE UP (ref 6–8.3)
PROTHROM AB SERPL-ACNC: 53.5 SEC — HIGH (ref 10–12.9)
RBC # BLD: 3.84 M/UL — LOW (ref 4.2–5.8)
RBC # FLD: 13.5 % — SIGNIFICANT CHANGE UP (ref 10.3–14.5)
RH IG SCN BLD-IMP: POSITIVE — SIGNIFICANT CHANGE UP
SODIUM SERPL-SCNC: 140 MMOL/L — SIGNIFICANT CHANGE UP (ref 135–145)
WBC # BLD: 5.99 K/UL — SIGNIFICANT CHANGE UP (ref 3.8–10.5)
WBC # FLD AUTO: 5.99 K/UL — SIGNIFICANT CHANGE UP (ref 3.8–10.5)

## 2019-12-02 PROCEDURE — 99223 1ST HOSP IP/OBS HIGH 75: CPT | Mod: GC

## 2019-12-02 PROCEDURE — 93010 ELECTROCARDIOGRAM REPORT: CPT

## 2019-12-02 PROCEDURE — 99285 EMERGENCY DEPT VISIT HI MDM: CPT

## 2019-12-02 PROCEDURE — 99222 1ST HOSP IP/OBS MODERATE 55: CPT

## 2019-12-02 RX ORDER — TAMSULOSIN HYDROCHLORIDE 0.4 MG/1
0.4 CAPSULE ORAL AT BEDTIME
Refills: 0 | Status: DISCONTINUED | OUTPATIENT
Start: 2019-12-02 | End: 2019-12-04

## 2019-12-02 RX ORDER — DONEPEZIL HYDROCHLORIDE 10 MG/1
5 TABLET, FILM COATED ORAL AT BEDTIME
Refills: 0 | Status: DISCONTINUED | OUTPATIENT
Start: 2019-12-02 | End: 2019-12-04

## 2019-12-02 RX ORDER — ATORVASTATIN CALCIUM 80 MG/1
20 TABLET, FILM COATED ORAL AT BEDTIME
Refills: 0 | Status: DISCONTINUED | OUTPATIENT
Start: 2019-12-02 | End: 2019-12-04

## 2019-12-02 RX ORDER — OMEPRAZOLE 10 MG/1
1 CAPSULE, DELAYED RELEASE ORAL
Qty: 0 | Refills: 0 | DISCHARGE

## 2019-12-02 RX ORDER — MEMANTINE HYDROCHLORIDE 10 MG/1
5 TABLET ORAL DAILY
Refills: 0 | Status: DISCONTINUED | OUTPATIENT
Start: 2019-12-02 | End: 2019-12-04

## 2019-12-02 RX ORDER — LANOLIN ALCOHOL/MO/W.PET/CERES
3 CREAM (GRAM) TOPICAL ONCE
Refills: 0 | Status: COMPLETED | OUTPATIENT
Start: 2019-12-02 | End: 2019-12-02

## 2019-12-02 RX ORDER — PHYTONADIONE (VIT K1) 5 MG
5 TABLET ORAL ONCE
Refills: 0 | Status: COMPLETED | OUTPATIENT
Start: 2019-12-02 | End: 2019-12-02

## 2019-12-02 RX ORDER — PANTOPRAZOLE SODIUM 20 MG/1
80 TABLET, DELAYED RELEASE ORAL ONCE
Refills: 0 | Status: COMPLETED | OUTPATIENT
Start: 2019-12-02 | End: 2019-12-02

## 2019-12-02 RX ORDER — PANTOPRAZOLE SODIUM 20 MG/1
40 TABLET, DELAYED RELEASE ORAL EVERY 12 HOURS
Refills: 0 | Status: DISCONTINUED | OUTPATIENT
Start: 2019-12-03 | End: 2019-12-04

## 2019-12-02 RX ORDER — ALLOPURINOL 300 MG
100 TABLET ORAL DAILY
Refills: 0 | Status: DISCONTINUED | OUTPATIENT
Start: 2019-12-02 | End: 2019-12-04

## 2019-12-02 RX ORDER — LEVOTHYROXINE SODIUM 125 MCG
25 TABLET ORAL DAILY
Refills: 0 | Status: DISCONTINUED | OUTPATIENT
Start: 2019-12-02 | End: 2019-12-04

## 2019-12-02 RX ADMIN — TAMSULOSIN HYDROCHLORIDE 0.4 MILLIGRAM(S): 0.4 CAPSULE ORAL at 22:05

## 2019-12-02 RX ADMIN — Medication 1 TABLET(S): at 15:34

## 2019-12-02 RX ADMIN — Medication 25 MICROGRAM(S): at 15:34

## 2019-12-02 RX ADMIN — Medication 101 MILLIGRAM(S): at 15:06

## 2019-12-02 RX ADMIN — DONEPEZIL HYDROCHLORIDE 5 MILLIGRAM(S): 10 TABLET, FILM COATED ORAL at 22:05

## 2019-12-02 RX ADMIN — ATORVASTATIN CALCIUM 20 MILLIGRAM(S): 80 TABLET, FILM COATED ORAL at 22:05

## 2019-12-02 RX ADMIN — MEMANTINE HYDROCHLORIDE 5 MILLIGRAM(S): 10 TABLET ORAL at 15:34

## 2019-12-02 RX ADMIN — PANTOPRAZOLE SODIUM 80 MILLIGRAM(S): 20 TABLET, DELAYED RELEASE ORAL at 13:26

## 2019-12-02 RX ADMIN — Medication 3 MILLIGRAM(S): at 23:07

## 2019-12-02 RX ADMIN — Medication 100 MILLIGRAM(S): at 15:34

## 2019-12-02 NOTE — H&P ADULT - NSICDXPASTSURGICALHX_GEN_ALL_CORE_FT
PAST SURGICAL HISTORY:  Cardiac pacemaker 3yrs ago    H/O inguinal hernia repair     History of bowel resection     S/P AVR 2013 @ [facility]  by Dr. Young

## 2019-12-02 NOTE — H&P ADULT - PROBLEM SELECTOR PLAN 6
- Pt with known hx HTN  - Currently normotensive  - Hold home med lasix 20mg, metoprolol 50mg in the setting of suspected GIB  - Restart home meds as appropriate

## 2019-12-02 NOTE — H&P ADULT - ASSESSMENT
Pt is a 89 yo M with PMH HTN, HLD, prostate cancer, hx colon cancer, GIB, PUD, aortic stenosis, a-fib (coumadin), pacemaker, CAD, CKD4, ATIF, and hypothyroidism who presents with complaints of black loose stools x4 d. Admitted to [facility] for further observation and management.

## 2019-12-02 NOTE — ED PROVIDER NOTE - CLINICAL SUMMARY MEDICAL DECISION MAKING FREE TEXT BOX
89 y/o M with likely resolving upper GI bleed. Suspect earlier in week, pt likely had INR even higher than today as pt reports skipping a dosage over last few days. INR today 4.5. Will hold Coumadin dose, give protonics, and likely admit for observation and GI evaluation. Will discuss with GI team. Pt is HDS, and does not appear to be actively bleeding today. Does not warrant telemetry monitored admission at this time.

## 2019-12-02 NOTE — H&P ADULT - NSHPPHYSICALEXAM_GEN_ALL_CORE
VITAL SIGNS:  T(C): 36.6 (12-02-19 @ 10:57), Max: 36.6 (12-02-19 @ 10:57)  T(F): 97.8 (12-02-19 @ 10:57), Max: 97.8 (12-02-19 @ 10:57)  HR: 80 (12-02-19 @ 13:44) (80 - 83)  BP: 109/66 (12-02-19 @ 13:44) (109/66 - 117/73)  BP(mean): --  RR: 16 (12-02-19 @ 13:44) (16 - 16)  SpO2: 94% (12-02-19 @ 13:44) (94% - 100%)  Wt(kg): --    PHYSICAL EXAM:  Constitutional: WDWN resting comfortably in bed; NAD  Head: NC/AT  Eyes: PERRL, EOMI, anicteric sclera  ENT: no nasal discharge; MMM  Neck: supple; no JVD  Respiratory: CTA B/L; no W/R/R, no retractions  Cardiac: +S1/S2; RRR; no M/R/G  Gastrointestinal: soft, NT/ND; no rebound or guarding; +BSx4  Extremities: WWP, no clubbing or cyanosis; no peripheral edema  Vascular: 2+ radial, PT pulses B/L  Dermatologic: skin warm, dry and intact; multiple bruises BL UE  Neurologic: AAOx3; CNII-XII grossly intact; no focal deficits; mm strength 5/5 UE LE BL  Psychiatric: affect and characteristics of appearance, verbalizations, behaviors are appropriate

## 2019-12-02 NOTE — H&P ADULT - PROBLEM SELECTOR PLAN 5
- Pt with known hx ATIF  - On arrival, Hb 12.4 (baseline 11-12)  - .3 likely mixed picture with B12/folate  - f/u AM B12/folate

## 2019-12-02 NOTE — H&P ADULT - PROBLEM SELECTOR PLAN 3
- Pt with known hx a-fib s/p pacemaker 2013  - Currently rate controlled  - Holding metoprolol 50mg in the setting of suspected GIB, restart as appropriate - Pt with known hx a-fib s/p pacemaker 2013  - Currently rate controlled  - Holding metoprolol 50mg in the setting of suspected GIB, restart as appropriate  - Home med coumadin 1mg daily, holding in the setting of elevated INR and suspected GIB  - Will restart coumadin as appropriate    #CKD4  - On arrival Cr 2.2, baseline 2  - Follows with Dr. Cueto   - Continue to monitor and avoid nephrotoxic agents

## 2019-12-02 NOTE — CONSULT NOTE ADULT - ASSESSMENT
89 yo M with hx of hx colon cancer s/p R hemicolectomy '16, GIB, PUD, HTN, HLD, prostate cancer, aortic stenosis (s/p porcine valve replacement 2012 c/b prosthetic valve endocarditis and replaced with bovine valve 2016), afib on coumadin, s/p pacemaker '13, CAD s/p PCI '07, CKD4, ATIF, and hypothyroidism p/w 4d of melena.    #Melena  Suspect patient with UGIB in the setting of supratherapeutic INR.  Likely from PUD given hx of finding on endoscopy.  Low suspicion for active bleeding and hgb otherwise stable. Would further clarify outpatient cardiac workup in the setting of TTE 1/19 with RWMA and significant cardiac hx.   -Keep NPO at MN  -Trend CBC and maintain active T&S  -Transfusion goal per primary team  -Tentative plan for EGD tomorrow if patient is optimized for procedure  -Please obtain collateral from outpatient cardiology office  -Please discuss with outpatient cardiology regarding the risk/benefit of A/C in the setting of supratherapeutic INR in multiple prior lab    Recommendation d/w primary team  Case d/w svc attg 89 yo M with hx of hx colon cancer s/p R hemicolectomy '16, GIB, PUD, HTN, HLD, prostate cancer, aortic stenosis (s/p porcine valve replacement 2012 c/b prosthetic valve endocarditis and replaced with bovine valve 2016), afib on coumadin, s/p pacemaker '13, CAD s/p PCI '07, CKD4, ATIF, and hypothyroidism p/w 4d of melena.    #Melena  Suspect patient with UGIB in the setting of supratherapeutic INR.  Likely from PUD given hx of finding on endoscopy.  Low suspicion for active bleeding and hgb otherwise stable. Would further clarify outpatient cardiac workup in the setting of TTE 1/18 with RWMA and significant cardiac hx.   -Keep NPO at MN  -Trend CBC and maintain active T&S  -Transfusion goal per primary team  -Tentative plan for EGD tomorrow if patient is optimized for procedure  -Please obtain collateral from outpatient cardiology office  -Please discuss with outpatient cardiology regarding the risk/benefit of A/C in the setting of supratherapeutic INR in multiple prior lab    Recommendation d/w primary team  Case d/w svc attg

## 2019-12-02 NOTE — H&P ADULT - PROBLEM SELECTOR PLAN 10
- F: none, euvolemic and tolerating PO  - E: replete K<4, Mg<2  - N: DASH/TLC  - D: none, GIB suspected  - GI ppx: protonix 40mg daily    Dispo: [facility]  Code: full - F: none, euvolemic and tolerating PO  - E: replete K<4, Mg<2  - N: DASH/TLC  - D: none, GIB suspected  - GI ppx: protonix 40mg BID    Dispo: [facility]  Code: full

## 2019-12-02 NOTE — ED PROVIDER NOTE - QUALITY
Principal Discharge DX:	Complete heart block  Goal:	remain in normal rhythm  Instructions for follow-up, activity and diet:	avoid foods high in potassium  Secondary Diagnosis:	Essential hypertension  Instructions for follow-up, activity and diet:	Low salt diet  Activity as tolerated.  Take all medication as prescribed.  Follow up with your medical doctor for routine blood pressure monitoring at your next visit.  Notify your doctor if you have any of the following symptoms:   Dizziness, Lightheadedness, Blurry vision, Headache, Chest pain, Shortness of breath  Your medications changed for blood pressure - you were started on hydralazine, c/w hydralazine. reji Principal Discharge DX:	Complete heart block  Goal:	remain in normal rhythm  Instructions for follow-up, activity and diet:	avoid foods high in potassium  Secondary Diagnosis:	Essential hypertension  Instructions for follow-up, activity and diet:	Low salt diet. Activity as tolerated. Take all medication as prescribed.  Follow up with your medical doctor for routine blood pressure monitoring at your next visit.  Notify your doctor if you have any of the following symptoms:   Dizziness, Lightheadedness, Blurry vision, Headache, Chest pain, Shortness of breath  Your medications changed for blood pressure - you were started on hydralazine, c/w hydralazine.  Secondary Diagnosis:	ESRD (end stage renal disease) on dialysis  Instructions for follow-up, activity and diet:	Followup with your Kidney doctor  Secondary Diagnosis:	Leukocytosis  Instructions for follow-up, activity and diet:	Followup with your doctor for repeat cbc next week Principal Discharge DX:	Complete heart block  Goal:	remain in normal rhythm  Instructions for follow-up, activity and diet:	avoid foods high in potassium  Secondary Diagnosis:	Essential hypertension  Instructions for follow-up, activity and diet:	Low salt diet. Activity as tolerated. Take all medication as prescribed.  Follow up with your medical doctor for routine blood pressure monitoring at your next visit.  Notify your doctor if you have any of the following symptoms:   Dizziness, Lightheadedness, Blurry vision, Headache, Chest pain, Shortness of breath  Your medications changed for blood pressure - you were started on hydralazine, c/w hydralazine.  Secondary Diagnosis:	ESRD (end stage renal disease) on dialysis  Instructions for follow-up, activity and diet:	Followup with your Kidney doctor  Last dialysis session was on 7/20. Your dialysis days will be on Tues/Thur/Sat  Secondary Diagnosis:	Leukocytosis  Instructions for follow-up, activity and diet:	Followup with your doctor for repeat cbc next week Principal Discharge DX:	Complete heart block  Goal:	remain in normal rhythm  Instructions for follow-up, activity and diet:	avoid foods high in potassium  Had pacemaker placed on 7/19  Secondary Diagnosis:	Essential hypertension  Instructions for follow-up, activity and diet:	Low salt diet. Activity as tolerated. Take all medication as prescribed.  Follow up with your medical doctor for routine blood pressure monitoring at your next visit.  Notify your doctor if you have any of the following symptoms:   Dizziness, Lightheadedness, Blurry vision, Headache, Chest pain, Shortness of breath  Your medications changed for blood pressure - you were started on hydralazine, c/w hydralazine.  Secondary Diagnosis:	ESRD (end stage renal disease) on dialysis  Instructions for follow-up, activity and diet:	Followup with your Kidney doctor  Last dialysis session was on 7/20. Your dialysis days will be on Tues/Thur/Sat  Secondary Diagnosis:	Leukocytosis  Instructions for follow-up, activity and diet:	Followup with your doctor for repeat cbc next week

## 2019-12-02 NOTE — H&P ADULT - NSHPLABSRESULTS_GEN_ALL_CORE
LABS:                        12.4   5.99  )-----------( 132      ( 02 Dec 2019 11:43 )             41.2     12-02    140  |  108  |  49<H>  ----------------------------<  104<H>  4.6   |  21<L>  |  2.20<H>    Ca    9.3      02 Dec 2019 11:43    TPro  6.6  /  Alb  4.2  /  TBili  0.5  /  DBili  x   /  AST  26  /  ALT  18  /  AlkPhos  70  12-02    PT/INR - ( 02 Dec 2019 11:43 )   PT: 53.5 sec;   INR: 4.52          PTT - ( 02 Dec 2019 11:43 )  PTT:39.5 sec    CAPILLARY BLOOD GLUCOSE          RADIOLOGY & ADDITIONAL TESTS: Reviewed.

## 2019-12-02 NOTE — H&P ADULT - PROBLEM SELECTOR PLAN 9
- Pt with known hx hypothyroidism  - Home med synthroid 25mcg  - Continue home med    #Gout  - Pt with known hx gout  - Home med allopurinol 100mg daily  - Continue home med

## 2019-12-02 NOTE — ED PROVIDER NOTE - OBJECTIVE STATEMENT
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 Pt reports that on Thursday, he sustained 3 episodes of black emesis and 3 episodes of black diarrhea. Pt has had several stool episodes a day since, but denies persistent vomiting. Pt has not had a BM today, nausea or vomiting. Pt was seen at [facility] yesterday where he had a grossly positive rectal exam and was told to come into the ER, but waited until today. Denies any alcohol abuse or usage, abdominal pain or fever. 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 ho of prostate CA current, remote colon CA w resection, Pt reports that on Thursday, he sustained 3 episodes of black emesis and 3 episodes of black diarrhea. Pt has had several stool episodes a day since, but denies persistent vomiting. Pt has not had a BM today, nausea or vomiting. skipped a few coumadin doses over the last few days, last dose last night,  Pt was seen at [facility] yesterday where he had a grossly positive rectal exam and was told to come into the ER, but waited until today. Denies any alcohol abuse or usage, abdominal pain or fever.

## 2019-12-02 NOTE — ED ADULT NURSE NOTE - OBJECTIVE STATEMENT
Pt is a 89 y/o male who came in for evaluation of "black stool" Pt reports taking coumadin. Pt denies any SOB, chest pain or weakness. Pt reports last INR was "Normal"

## 2019-12-02 NOTE — ED ADULT NURSE NOTE - PSH
Cardiac pacemaker  3yrs ago  H/O inguinal hernia repair    History of bowel resection    S/P AVR  2013 @ [facility]  by Dr. Young

## 2019-12-02 NOTE — ED ADULT NURSE NOTE - NS ED NURSE PATIENT LEFT UNIT TIME
Returned call to pt.   Informed her rx sent to [facility] on [address] in [place].   Pt verbalized understanding and stated she would call them again.         ----- Message from Iona Last sent at 2/21/2017  3:41 PM CST -----  Patient is calling about her antibiotics and Lasix medication. Says it should be called to [facility]. She can be reached at [phone].    
15:19

## 2019-12-02 NOTE — H&P ADULT - PROBLEM SELECTOR PLAN 4
- Pt with known hx PUD and GIB in the past  - Outpt home med omeprazole 20mg daily  - s/p IV protonix 80mg today  - Will restart protonix 40mg daily tomorrow - Pt with known hx PUD and GIB in the past  - Outpt home med omeprazole 20mg daily  - s/p IV protonix 80mg today  - Will restart protonix 40mg daily tomorrow    #Hx Colon Cancer  - Pt with known hx colon cancer s/p R hemicolectomy 2016  - Per pt, has not had colonoscopy "in many years"    #Prostate cancer  - Pt with current active prostate cancer  - Home med bicalutamide 50mg daily  - Notified heme-onc fellow of medication requirement - Pt with known hx PUD and GIB in the past  - Outpt home med omeprazole 20mg daily  - s/p IV protonix 80mg today  - Will restart protonix 40mg BID tomorrow    #Hx Colon Cancer  - Pt with known hx colon cancer s/p R hemicolectomy 2016  - Per pt, has not had colonoscopy "in many years"    #Prostate cancer  - Pt with current active prostate cancer  - Home med bicalutamide 50mg daily  - Notified heme-onc fellow of medication requirement

## 2019-12-02 NOTE — H&P ADULT - PROBLEM SELECTOR PLAN 2
- On arrival, INR 4.52  - Will give 5mg vit K today   - Re-dose vit K based on AM coags with goal INR >2

## 2019-12-02 NOTE — H&P ADULT - PROBLEM SELECTOR PLAN 8
- Pt with known hx CAD s/p PCI and stent to RCA 2007  - Home meds ASA 81 and lipitor 20mg daily  - Hold ASA in the setting of suspected GIB, restart as appropriate

## 2019-12-02 NOTE — H&P ADULT - PROBLEM SELECTOR PLAN 1
- Pt with 4 days black loose stools on daily coumadin for a-fib and decreased PO intake 2/2 N/V  - Last BM 24h ago and currently asymptomatic  - On arrival, PT/INR 53.5/4.52  - Last dose coumadin 12/1 evening (1mg)  - Rectal exam in ED neg for melena  - GI consulted in ED, plan to take pt for EGD 12/3, will repeat coags and T&S in AM, will give 5mg vit K today and will dose tomorrow based on AM coags   - Keep active T&S with goal Hb > 7  - f/u AM coags, goal INR >2  - NPO midnight - Pt with 4 days black loose stools on daily coumadin for a-fib and decreased PO intake 2/2 N/V  - Last BM 24h ago and currently asymptomatic  - On arrival, PT/INR 53.5/4.52  - Last dose coumadin 12/1 evening (1mg)  - Rectal exam in ED neg for melena   - In the setting of known hx GERD/PUD and hx GIB, suspect upper GI bleed  - GI consulted in ED, plan to take pt for EGD 12/3, will repeat coags and T&S in AM, will give 5mg vit K today and will dose tomorrow based on AM coags   - Keep active T&S with goal Hb > 7  - f/u AM coags, goal INR >2  - NPO midnight

## 2019-12-02 NOTE — ED PROVIDER NOTE - PSH
Cardiac pacemaker  3yrs ago  H/O inguinal hernia repair    History of bowel resection    S/P AVR  2013 @ [facility]  by Dr. Young

## 2019-12-02 NOTE — ED ADULT NURSE NOTE - PMH
Aortic valve replaced    Atrial fibrillation    CAD (coronary artery disease)    Carotid arterial disease    Colon cancer    Constipation    Diverticulosis    Endocarditis    High cholesterol    HTN (hypertension)    Hypothyroidism    Internal hemorrhoids    Iron deficiency anemia    PUD (peptic ulcer disease)

## 2019-12-02 NOTE — H&P ADULT - NSICDXPASTMEDICALHX_GEN_ALL_CORE_FT
PAST MEDICAL HISTORY:  Aortic valve replaced     Atrial fibrillation     CAD (coronary artery disease)     Carotid arterial disease     Colon cancer     Constipation     Diverticulosis     Endocarditis     High cholesterol     HTN (hypertension)     Hypothyroidism     Internal hemorrhoids     Iron deficiency anemia     PUD (peptic ulcer disease)

## 2019-12-02 NOTE — CONSULT NOTE ADULT - SUBJECTIVE AND OBJECTIVE BOX
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 Pt started feeling nauseous 11/28 evening and had 3x episodes NB/NB emesis and loose black stool.  Continues to have black stool for the next 2 days, but noted lightening of the stool color. No further melena since 11/30/19 and started having less formed brown stool, last one today.  Was evaluated at urgent care and found to have +FOBT and referred to the ED the day befoe admission.  Patient reports that he had been on coumadin and asa.  Takes coumadin daily without any issue.  Reports feels well otherwise and denies f/c, cp, sob, abd pain, n/v/d.  No change in exercise tolerance and report possible recent TTE and evaluation by his cardiologist.  INR at presentation 4.5.      Patient is on ASA and coumadin, denies use of other NSAID or AC.  Prior hx of CRC s/p right hemicolectomy but denies fam hx of stomach ca.  Patient reports no recent endoscopic evaluation following right hemicolectomy.  Last EGD in endopro in 2003 with PUD.    Allergies    No Known Allergies    Intolerances      Home Medications:  Align 4 mg oral capsule: 1 cap(s) orally once a day (02 Dec 2019 15:01)  allopurinol 100 mg oral tablet: 1 tab(s) orally once a day (31 Jan 2018 02:21)  aspirin 81 mg oral tablet: 1 tab(s) orally once a day (31 Jan 2018 02:21)  atorvastatin 20 mg oral tablet: 1 tab(s) orally once a day (at bedtime) (31 Jan 2018 02:21)  B Complex 50 oral tablet, extended release: 1 tab(s) orally once a day (31 Jan 2018 02:21)  bicalutamide 50 mg oral tablet: 1 tab(s) orally every 24 hours (02 Dec 2019 15:02)  Citrucel 2 g/19 g oral powder for reconstitution: 1 packet(s) orally once a day (31 Jan 2018 05:22)  CoQ10 300 mg oral capsule: 1 cap(s) orally once a day (02 Dec 2019 15:01)  docusate sodium 100 mg oral capsule: 1 cap(s) orally 2 times a day (31 Jan 2018 02:21)  donepezil 5 mg oral tablet: 1 tab(s) orally once a day (at bedtime) (31 Jan 2018 02:21)  ferrous sulfate 325 mg (65 mg elemental iron) oral tablet: 1 tab(s) orally once a day (31 Jan 2018 05:25)  folic acid 1 mg oral tablet: 1 tab(s) orally once a day (31 Jan 2018 02:21)  Lasix 20 mg oral tablet: 1 tab(s) orally once a day (02 Dec 2019 14:57)  levothyroxine 25 mcg (0.025 mg) oral tablet: 1 tab(s) orally once a day (31 Jan 2018 02:21)  memantine 5 mg oral tablet: 1 tab(s) orally once a day (02 Dec 2019 15:03)  metoprolol succinate 50 mg oral tablet, extended release: 1 tab(s) orally once a day (31 Jan 2018 02:21)  omeprazole 20 mg oral delayed release capsule: 1 cap(s) orally once a day (02 Dec 2019 14:59)  tamsulosin 0.4 mg oral capsule: 1 tab(s) orally 2 times a day (31 Jan 2018 02:21)  traZODone 50 mg oral tablet: 0.5 tab(s) orally once a day (31 Jan 2018 05:19)  Vitamin D3 1000 intl units oral tablet: 1 tab(s) orally once a day (31 Jan 2018 02:21)  warfarin 1 mg oral tablet: 1 tab(s) orally once a day (02 Dec 2019 15:01)    MEDICATIONS:  MEDICATIONS  (STANDING):  allopurinol 100 milliGRAM(s) Oral daily  atorvastatin 20 milliGRAM(s) Oral at bedtime  donepezil 5 milliGRAM(s) Oral at bedtime  levothyroxine 25 MICROGram(s) Oral daily  memantine 5 milliGRAM(s) Oral daily  multivitamin 1 Tablet(s) Oral daily  tamsulosin 0.4 milliGRAM(s) Oral at bedtime    MEDICATIONS  (PRN):    PAST MEDICAL & SURGICAL HISTORY:  Hypothyroidism  Colon cancer  Endocarditis  Internal hemorrhoids  Carotid arterial disease  High cholesterol  Diverticulosis  Iron deficiency anemia  Constipation  PUD (peptic ulcer disease)  HTN (hypertension)  CAD (coronary artery disease)  Atrial fibrillation  Aortic valve replaced  History of bowel resection  H/O inguinal hernia repair  Cardiac pacemaker: 3yrs ago  S/P AVR: 2013 @ [facility]  by Dr. Young    FAMILY HISTORY:  No pertinent family history in first degree relatives  denies fam hx of stomach ca    SOCIAL HISTORY:  Tobacco: denies  Alcohol:denies  Illicit Drugs:denies    REVIEW OF SYSTEMS:  All other 10 review of systems is negative except as indicated in HPI    Vital Signs Last 24 Hrs  T(C): 36.6 (02 Dec 2019 19:38), Max: 36.8 (02 Dec 2019 17:42)  T(F): 97.9 (02 Dec 2019 19:38), Max: 98.2 (02 Dec 2019 17:42)  HR: 80 (02 Dec 2019 19:38) (80 - 83)  BP: 111/71 (02 Dec 2019 19:38) (109/66 - 117/73)  BP(mean): --  RR: 18 (02 Dec 2019 19:38) (16 - 18)  SpO2: 95% (02 Dec 2019 19:38) (94% - 100%)      PHYSICAL EXAM:    General: Well developed; well nourished; in no acute distress  Eyes: moist conjunctivae, sclerae anicteric  HENT: Moist mucous membranes, tongue midline  Neck: Trachea midline, supple  Lungs: Normal respiratory effort and no intercostal retractions  Cardiovascular: RRR, S1S2  Abdomen: Soft, non-tender non-distended; well healed midline scar, Normal bowel sounds; No rebound or guarding  Rectal Exam: External hemorrhoid, normal rectal tone, speck of yellow fecal matter in rectal vault, no evidence of melena or hematochezia  Extremities: Normal range of motion, no pitting edema  Neurological: Alert and oriented x3, nonfocal exam  Skin: Warm and dry. No obvious rash    LABS:                        12.4   5.99  )-----------( 132      ( 02 Dec 2019 11:43 )             41.2     12-02    140  |  108  |  49<H>  ----------------------------<  104<H>  4.6   |  21<L>  |  2.20<H>    Ca    9.3      02 Dec 2019 11:43    TPro  6.6  /  Alb  4.2  /  TBili  0.5  /  DBili  x   /  AST  26  /  ALT  18  /  AlkPhos  70  12-02        PT/INR - ( 02 Dec 2019 11:43 )   PT: 53.5 sec;   INR: 4.52          PTT - ( 02 Dec 2019 11:43 )  PTT:39.5 sec

## 2019-12-02 NOTE — H&P ADULT - NSHPREVIEWOFSYSTEMS_GEN_ALL_CORE
REVIEW OF SYSTEMS:    CONSTITUTIONAL: No weakness, fevers or chills.   EYES/ENT: No visual changes;  No vertigo or throat pain   NECK: No pain or stiffness  RESPIRATORY: No cough, wheezing, hemoptysis; No shortness of breath  CARDIOVASCULAR: No chest pain or palpitations  GASTROINTESTINAL: No abdominal or epigastric pain. No nausea, vomiting, or hematemesis; No diarrhea or constipation. No melena or hematochezia.  GENITOURINARY: No dysuria, frequency or hematuria  NEUROLOGICAL: No numbness or weakness  SKIN: No itching, burning, rashes, or lesions   All other review of systems is negative unless indicated above. CONSTITUTIONAL: No weakness, fevers or chills.   EYES/ENT: No visual changes;  No vertigo or throat pain   NECK: No pain or stiffness  RESPIRATORY: No cough, wheezing, hemoptysis; No shortness of breath  CARDIOVASCULAR: No chest pain or palpitations  GASTROINTESTINAL: No abdominal or epigastric pain. No nausea, vomiting, or hematemesis; No diarrhea or constipation. +melena last BM 24h ago  GENITOURINARY: No dysuria, frequency or hematuria  NEUROLOGICAL: No numbness or weakness  SKIN: No itching, burning, rashes, or lesions   All other review of systems is negative unless indicated above.

## 2019-12-02 NOTE — H&P ADULT - HISTORY OF PRESENT ILLNESS
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 Pt started feeling nauseous 11/28 evening and had 3x episodes NB/NB emesis and 3x black loose stools, never experienced this before. Since then has had decreased appetite and has not eaten much for fear of further episodes of N/V/D. Has not had a BM in 24h. Pt was seen by city MD 12/1 and was told that his guaiac test was positive and to go to the ED. He did not want to go yesterday so came today. No longer feels N/V and is now hungry. Takes coumadin 1mg daily for a-fib but skipped a few doses in the last few days, last dose was 12/1 evening. Denies dizziness, changes to vision, N/V, URI symptoms, CP, palpitations, SOB, abd pain, hematuria or dysuria. In the ED, pt afebrile, HR 83, /73, RR 16 O2sat 100% on RA. Labs with Hb 12.4 (baseline 11-12), .3, platelets 132, BUN 49, Cr 2.2 (baseline 2), PT 53.5, INR 4.52, PTT 39.5. Rectal exam neg. Pt given protonix 80mg IV x1 and GI consulted. GI recs vit K for INR reversal and plans to take pt for EGD 12/3. Pt admitted to [facility] for further observation and management.

## 2019-12-03 ENCOUNTER — TRANSCRIPTION ENCOUNTER (OUTPATIENT)
Age: 84
End: 2019-12-03

## 2019-12-03 DIAGNOSIS — N18.4 CHRONIC KIDNEY DISEASE, STAGE 4 (SEVERE): ICD-10-CM

## 2019-12-03 DIAGNOSIS — Z29.9 ENCOUNTER FOR PROPHYLACTIC MEASURES, UNSPECIFIED: ICD-10-CM

## 2019-12-03 DIAGNOSIS — K92.2 GASTROINTESTINAL HEMORRHAGE, UNSPECIFIED: ICD-10-CM

## 2019-12-03 DIAGNOSIS — Z91.89 OTHER SPECIFIED PERSONAL RISK FACTORS, NOT ELSEWHERE CLASSIFIED: ICD-10-CM

## 2019-12-03 LAB
ANION GAP SERPL CALC-SCNC: 13 MMOL/L — SIGNIFICANT CHANGE UP (ref 5–17)
APTT BLD: 30.8 SEC — SIGNIFICANT CHANGE UP (ref 27.5–36.3)
BUN SERPL-MCNC: 42 MG/DL — HIGH (ref 7–23)
CALCIUM SERPL-MCNC: 9.3 MG/DL — SIGNIFICANT CHANGE UP (ref 8.4–10.5)
CHLORIDE SERPL-SCNC: 110 MMOL/L — HIGH (ref 96–108)
CO2 SERPL-SCNC: 20 MMOL/L — LOW (ref 22–31)
CREAT SERPL-MCNC: 1.99 MG/DL — HIGH (ref 0.5–1.3)
FOLATE SERPL-MCNC: >20 NG/ML — SIGNIFICANT CHANGE UP
GLUCOSE SERPL-MCNC: 104 MG/DL — HIGH (ref 70–99)
HCT VFR BLD CALC: 37.3 % — LOW (ref 39–50)
HGB BLD-MCNC: 11.5 G/DL — LOW (ref 13–17)
INR BLD: 1.35 — HIGH (ref 0.88–1.16)
MAGNESIUM SERPL-MCNC: 2 MG/DL — SIGNIFICANT CHANGE UP (ref 1.6–2.6)
MCHC RBC-ENTMCNC: 30.8 GM/DL — LOW (ref 32–36)
MCHC RBC-ENTMCNC: 32.5 PG — SIGNIFICANT CHANGE UP (ref 27–34)
MCV RBC AUTO: 105.4 FL — HIGH (ref 80–100)
NRBC # BLD: 0 /100 WBCS — SIGNIFICANT CHANGE UP (ref 0–0)
PHOSPHATE SERPL-MCNC: 3.8 MG/DL — SIGNIFICANT CHANGE UP (ref 2.5–4.5)
PLATELET # BLD AUTO: 117 K/UL — LOW (ref 150–400)
POTASSIUM SERPL-MCNC: 4.3 MMOL/L — SIGNIFICANT CHANGE UP (ref 3.5–5.3)
POTASSIUM SERPL-SCNC: 4.3 MMOL/L — SIGNIFICANT CHANGE UP (ref 3.5–5.3)
PROTHROM AB SERPL-ACNC: 15.4 SEC — HIGH (ref 10–12.9)
RBC # BLD: 3.54 M/UL — LOW (ref 4.2–5.8)
RBC # FLD: 13.4 % — SIGNIFICANT CHANGE UP (ref 10.3–14.5)
SODIUM SERPL-SCNC: 143 MMOL/L — SIGNIFICANT CHANGE UP (ref 135–145)
VIT B12 SERPL-MCNC: 765 PG/ML — SIGNIFICANT CHANGE UP (ref 232–1245)
WBC # BLD: 6.4 K/UL — SIGNIFICANT CHANGE UP (ref 3.8–10.5)
WBC # FLD AUTO: 6.4 K/UL — SIGNIFICANT CHANGE UP (ref 3.8–10.5)

## 2019-12-03 PROCEDURE — 99232 SBSQ HOSP IP/OBS MODERATE 35: CPT

## 2019-12-03 PROCEDURE — 99232 SBSQ HOSP IP/OBS MODERATE 35: CPT | Mod: GC

## 2019-12-03 PROCEDURE — 99233 SBSQ HOSP IP/OBS HIGH 50: CPT | Mod: GC

## 2019-12-03 RX ADMIN — TAMSULOSIN HYDROCHLORIDE 0.4 MILLIGRAM(S): 0.4 CAPSULE ORAL at 21:34

## 2019-12-03 RX ADMIN — DONEPEZIL HYDROCHLORIDE 5 MILLIGRAM(S): 10 TABLET, FILM COATED ORAL at 21:34

## 2019-12-03 RX ADMIN — PANTOPRAZOLE SODIUM 40 MILLIGRAM(S): 20 TABLET, DELAYED RELEASE ORAL at 18:00

## 2019-12-03 RX ADMIN — ATORVASTATIN CALCIUM 20 MILLIGRAM(S): 80 TABLET, FILM COATED ORAL at 21:34

## 2019-12-03 RX ADMIN — Medication 25 MICROGRAM(S): at 06:03

## 2019-12-03 RX ADMIN — PANTOPRAZOLE SODIUM 40 MILLIGRAM(S): 20 TABLET, DELAYED RELEASE ORAL at 06:02

## 2019-12-03 NOTE — CONSULT NOTE ADULT - SUBJECTIVE AND OBJECTIVE BOX
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 Pt started feeling nauseous 11/28 evening and had 3x episodes NB/NB emesis and 3x black loose stools, never experienced this before. Since then has had decreased appetite and has not eaten much for fear of further episodes of N/V/D. Has not had a BM in 24h. Pt was seen by city MD 12/1 and was told that his guaiac test was positive and to go to the ED. He did not want to go yesterday so came today. No longer feels N/V and is now hungry. Takes coumadin 1mg daily for a-fib but skipped a few doses in the last few days, last dose was 12/1 evening. Denies dizziness, changes to vision, N/V, URI symptoms, CP, palpitations, SOB, abd pain, hematuria or dysuria. In the ED, pt afebrile, HR 83, /73, RR 16 O2sat 100% on RA. Labs with Hb 12.4 (baseline 11-12), .3, platelets 132, BUN 49, Cr 2.2 (baseline 2), PT 53.5, INR 4.52, PTT 39.5. Rectal exam neg. Pt given protonix 80mg IV x1 and GI consulted. GI recs vit K for INR reversal and plans to take pt for EGD 12/3. Pt admitted to [facility] for further observation and management.     Per records, patient has known depressed EF. Patient    PAST MEDICAL & SURGICAL HISTORY:  Hypothyroidism  Colon cancer  Endocarditis  Internal hemorrhoids  Carotid arterial disease  High cholesterol  Diverticulosis  Iron deficiency anemia  Constipation  PUD (peptic ulcer disease)  HTN (hypertension)  CAD (coronary artery disease)  Atrial fibrillation  Aortic valve replaced  History of bowel resection  H/O inguinal hernia repair  Cardiac pacemaker: 3yrs ago  S/P AVR: 2013 @ [facility]  by Dr. Parikh      SOCIAL HISTORY:  FAMILY HISTORY:  No pertinent family history in first degree relatives      ALLERGIES: 	  No Known Allergies            MEDICATIONS:  allopurinol 100 milliGRAM(s) Oral daily  atorvastatin 20 milliGRAM(s) Oral at bedtime  donepezil 5 milliGRAM(s) Oral at bedtime  levothyroxine 25 MICROGram(s) Oral daily  memantine 5 milliGRAM(s) Oral daily  multivitamin 1 Tablet(s) Oral daily  pantoprazole  Injectable 40 milliGRAM(s) IV Push every 12 hours  tamsulosin 0.4 milliGRAM(s) Oral at bedtime      PHYSICAL EXAM:  T(C): 36.3 (12-03-19 @ 08:45), Max: 36.8 (12-02-19 @ 17:42)  HR: 81 (12-03-19 @ 08:45) (79 - 81)  BP: 111/67 (12-03-19 @ 08:45) (107/56 - 115/73)  RR: 18 (12-03-19 @ 08:45) (16 - 18)  SpO2: 99% (12-03-19 @ 08:45) (94% - 99%)  Wt(kg): --    GEN: Awake, comfortable. NAD.   HEENT: NCAT, PERRL, EOMI. Mucosa moist. No JVD.   RESP: CTA b/l  CV: RRR, normal s1/s2. No m/r/g.  ABD: Soft, NTND. BS+  EXT: Warm. No edema, clubbing, or cyanosis.   NEURO: AAOx3. No focal deficits.    I&O's Summary      	  LABS:	 	    CARDIAC MARKERS:                                  11.5   6.40  )-----------( 117      ( 03 Dec 2019 07:32 )             37.3     12-03    143  |  110<H>  |  42<H>  ----------------------------<  104<H>  4.3   |  20<L>  |  1.99<H>    Ca    9.3      03 Dec 2019 07:32  Phos  3.8     12-03  Mg     2.0     12-03    TPro  6.6  /  Alb  4.2  /  TBili  0.5  /  DBili  x   /  AST  26  /  ALT  18  /  AlkPhos  70  12-02    proBNP:   Lipid Profile:   HgA1c:   TSH:     TELEMETRY: 	    ECG:  	  RADIOLOGY:   ECHO:  STRESS:  CATH: HPI:  89 yo M with PMH HTN, HLD, prostate cancer, hx colon cancer (s/p R hemicolectomy 2016), GIB, PUD, aortic stenosis (s/p 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 Pt started feeling nauseous 11/28 evening and had 3x episodes NB/NB emesis and 3x black loose stools, never experienced this before. Since then has had decreased appetite and has not eaten much for fear of further episodes of N/V/D. Has not had a BM in 24h. Pt was seen by city MD 12/1 and was told that his guaiac test was positive and to go to the ED. He did not want to go yesterday so came today. No longer feels N/V and is now hungry. Takes coumadin 1mg daily for a-fib but skipped a few doses in the last few days, last dose was 12/1 evening. Denies dizziness, changes to vision, N/V, URI symptoms, CP, palpitations, SOB, abd pain, hematuria or dysuria. In the ED, pt afebrile, HR 83, /73, RR 16 O2sat 100% on RA. Labs with Hb 12.4 (baseline 11-12), .3, platelets 132, BUN 49, Cr 2.2 (baseline 2), PT 53.5, INR 4.52, PTT 39.5. Rectal exam neg. Pt given protonix 80mg IV x1 and GI consulted. GI recs vit K for INR reversal and plans to take pt for EGD 12/3. Pt admitted to [facility] for further observation and management.     Per records, patient has known depressed EF. Patient denies any CP, SOB, palpitations, abdominal pain, BRBPR, orthopnea, PND, LE edema. Patient says he is able to walk about 2 blocks without any symptoms. He follows with Dr. Saxena who checks his PPM and follows cardiac issues.     PAST MEDICAL & SURGICAL HISTORY:  Hypothyroidism  Colon cancer  Endocarditis  Internal hemorrhoids  Carotid arterial disease  High cholesterol  Diverticulosis  Iron deficiency anemia  Constipation  PUD (peptic ulcer disease)  HTN (hypertension)  CAD (coronary artery disease)  Atrial fibrillation  Aortic valve replaced  History of bowel resection  H/O inguinal hernia repair  Cardiac pacemaker: 3yrs ago  S/P AVR: 2013 @ [facility]  by Dr. Parikh      SOCIAL HISTORY:  FAMILY HISTORY:  No pertinent family history in first degree relatives      ALLERGIES: 	  No Known Allergies            MEDICATIONS:  allopurinol 100 milliGRAM(s) Oral daily  atorvastatin 20 milliGRAM(s) Oral at bedtime  donepezil 5 milliGRAM(s) Oral at bedtime  levothyroxine 25 MICROGram(s) Oral daily  memantine 5 milliGRAM(s) Oral daily  multivitamin 1 Tablet(s) Oral daily  pantoprazole  Injectable 40 milliGRAM(s) IV Push every 12 hours  tamsulosin 0.4 milliGRAM(s) Oral at bedtime      PHYSICAL EXAM:  T(C): 36.3 (12-03-19 @ 08:45), Max: 36.8 (12-02-19 @ 17:42)  HR: 81 (12-03-19 @ 08:45) (79 - 81)  BP: 111/67 (12-03-19 @ 08:45) (107/56 - 115/73)  RR: 18 (12-03-19 @ 08:45) (16 - 18)  SpO2: 99% (12-03-19 @ 08:45) (94% - 99%)  Wt(kg): --    GEN: Awake, comfortable. NAD.   HEENT: NCAT, PERRL, EOMI. Mucosa moist. No JVD.   RESP: CTA b/l  CV: RRR, normal s1/s2. No m/r/g.  ABD: Soft, NTND. BS+  EXT: Warm. No edema, clubbing, or cyanosis.   NEURO: AAOx3. No focal deficits.    I&O's Summary      	  LABS:	 	    CARDIAC MARKERS:                                  11.5   6.40  )-----------( 117      ( 03 Dec 2019 07:32 )             37.3     12-03    143  |  110<H>  |  42<H>  ----------------------------<  104<H>  4.3   |  20<L>  |  1.99<H>    Ca    9.3      03 Dec 2019 07:32  Phos  3.8     12-03  Mg     2.0     12-03    TPro  6.6  /  Alb  4.2  /  TBili  0.5  /  DBili  x   /  AST  26  /  ALT  18  /  AlkPhos  70  12-02    proBNP:   Lipid Profile:   HgA1c:   TSH:     TELEMETRY: 	    ECG:  Paced in 80s.   RADIOLOGY:   ECHO: Outpatient 9/2019-Mild LVH. EF 30-35% with paradoxical septal motion. Apical septum akinetic. Mild aortic sclerosis with trace AI. Mild to mod MR. Mild to mod TR. Mild to mod OH.

## 2019-12-03 NOTE — DISCHARGE NOTE PROVIDER - NSDCFUADDAPPT_GEN_ALL_CORE_FT
Attached is the contact information for Dr. Kirsty Lott, the gastroenterologist who performed the endoscopy during your hospital stay. Please schedule an appointment with her, or call your insurance company to find a GI doctor near [facility] in [place].    In addition, please follow up with your primary care doctor to reevaluate your PT/INR within 1 week of your discharge.

## 2019-12-03 NOTE — DISCHARGE NOTE PROVIDER - HOSPITAL COURSE
89M with past medical history of HTN, HLD, prostate ca, colon ca (s/p r. hemicolectomy in 2016), GIB, PUD, aortic stenosis (bovine valve replacment 2016), atrial fibrilation on coumadin, pacemaker (2013), HFrEF (EF 30-35%), CAD (s/p PCI w. RCA stent in 2007), CKD4 (baseline Cr 2) and hypothyroidism.    Presented with dark stool, found to have upper gastrointestinal bleeding    Problem List/Main Diagnoses (system-based):     1. UGIB - Patient was admitted following 2 days of black stool which ceased 2 days prior to admission. At time of admission, the patient was hemodynamically stable with stable hemoglobin. A rectal exam was performed which revealed no blood in the rectal vault. GI was consulted and an EGD was performed.        2. HFrEF - Cardiology consulted due to poor cardiac health in the pre operative setting. As per recs, warfarin and ASA were held for the procedure, and were then restarted prior to discharge.         New medications: None    Labs to be followed outpatient: None    Exam to be followed outpatient: None 89M with past medical history of HTN, HLD, prostate ca, colon ca (s/p r. hemicolectomy in 2016), GIB, PUD, aortic stenosis (bovine valve replacment 2016), atrial fibrilation on coumadin, pacemaker (2013), HFrEF (EF 30-35%), CAD (s/p PCI w. RCA stent in 2007), CKD4 (baseline Cr 2) and hypothyroidism.    Presented with dark stool, found to have upper gastrointestinal bleeding    Problem List/Main Diagnoses (system-based):     1. UGIB - Patient was admitted following 2 days of black stool which ceased 2 days prior to admission. At time of admission, the patient was hemodynamically stable with stable hemoglobin. A rectal exam was performed which revealed no blood in the rectal vault. GI was consulted and an EGD was performed which revealed fundic and body polyposis with biopsies were taken. As per GI recs, patient is to follow up as an outpatient        2. HFrEF - Cardiology consulted due to poor cardiac health in the pre operative setting. As per recs, warfarin and ASA were held for the procedure, and were then restarted prior to discharge.         New medications: None    Labs to be followed outpatient: INR within 1 week    Exam to be followed outpatient: None

## 2019-12-03 NOTE — PROGRESS NOTE ADULT - SUBJECTIVE AND OBJECTIVE BOX
Pt seen and examined at bedside.  NPO overnight for procedure.  However, patient had desaturation overnight and was on 2L NC.  Denies fever, chill, chest pain, shortness of breath, abdominal or epigastric pain, nausea, vomiting, hematemesis.    Allergies    No Known Allergies    Intolerances      MEDICATIONS:  MEDICATIONS  (STANDING):  allopurinol 100 milliGRAM(s) Oral daily  atorvastatin 20 milliGRAM(s) Oral at bedtime  donepezil 5 milliGRAM(s) Oral at bedtime  levothyroxine 25 MICROGram(s) Oral daily  memantine 5 milliGRAM(s) Oral daily  multivitamin 1 Tablet(s) Oral daily  pantoprazole  Injectable 40 milliGRAM(s) IV Push every 12 hours  tamsulosin 0.4 milliGRAM(s) Oral at bedtime    MEDICATIONS  (PRN):    Vital Signs Last 24 Hrs  T(C): 36.5 (03 Dec 2019 16:04), Max: 36.8 (02 Dec 2019 17:42)  T(F): 97.7 (03 Dec 2019 16:04), Max: 98.2 (02 Dec 2019 17:42)  HR: 81 (03 Dec 2019 16:04) (79 - 81)  BP: 134/69 (03 Dec 2019 16:04) (107/56 - 134/69)  BP(mean): --  RR: 18 (03 Dec 2019 16:04) (16 - 18)  SpO2: 100% (03 Dec 2019 16:04) (95% - 100%)    PHYSICAL EXAM:    General: Well developed; well nourished; in no acute distress  HEENT: MMM, conjunctiva and sclera clear  Abdomen: Soft, non-tender non-distended; well healed midline scar, Normal bowel sounds; No rebound or guarding  Skin: Warm and dry. No obvious rash    LABS:                        11.5   6.40  )-----------( 117      ( 03 Dec 2019 07:32 )             37.3     12-03    143  |  110<H>  |  42<H>  ----------------------------<  104<H>  4.3   |  20<L>  |  1.99<H>    Ca    9.3      03 Dec 2019 07:32  Phos  3.8     12-03  Mg     2.0     12-03    TPro  6.6  /  Alb  4.2  /  TBili  0.5  /  DBili  x   /  AST  26  /  ALT  18  /  AlkPhos  70  12-02    PT/INR - ( 03 Dec 2019 07:32 )   PT: 15.4 sec;   INR: 1.35          PTT - ( 03 Dec 2019 07:32 )  PTT:30.8 sec

## 2019-12-03 NOTE — PROGRESS NOTE ADULT - ASSESSMENT
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    #Melena  Suspect patient with UGIB in the setting of supratherapeutic INR.  Likely from PUD given duodenal ulcer in prior endoscopy.  Low suspicion for active bleeding and hgb otherwise stable. Patient with signficant prior hx but now evaluated by cardiology and ready to proceed.    -Appreciate cardiology input  -Keep NPO at MN  -Trend CBC and maintain active T&S  -Transfusion goal per primary team  -Tentative plan for EGD tomorrow    Recommendation d/w primary team  Case d/w svc attg

## 2019-12-03 NOTE — PROGRESS NOTE ADULT - PROBLEM SELECTOR PLAN 10
1) PCP Contacted on Admission: (Y) --> Dr. Saxena [phone]  2) Date of Contact with PCP: 12/3  3) PCP Contacted at Discharge: TBD  4) Summary of Handoff Given to PCP: TBD   5) Post-Discharge Appointment Date: TBD

## 2019-12-03 NOTE — DISCHARGE NOTE PROVIDER - NSDCCPCAREPLAN_GEN_ALL_CORE_FT
PRINCIPAL DISCHARGE DIAGNOSIS  Diagnosis: GI bleed  Assessment and Plan of Treatment: At the time of admission, you were found to have an upper GI bleed. In this condition, a small lesion in your esophagus, stomach, or the upper part of your small intestine bleeds. This may result in stool which is darker than normal, and often has an abnormal consistancy, which is most commonly described as tarry. At home, you take a medication called pantoprazole. This is a proton pump inhibitor or PPI, which decreases the acid production in your stomach. This medication is often benificial in individuals who have GI bleeds, and was continued in your case. The gastroenterology team was consulted, and they performed an EGD, a procedure in which a small camera is inserted into your mouth to look at your esophagus and stomach.   If you have additional episodes of dark stools or diarrhea, have vomiting of blood or material which looks like coffee grounds,, feel weak or faint, or lose consiousness at any point, please return to the emergency room at once for further evaluation and treatment, as this may indicate a recurrance or worsening of your gastrointestinal bleeding. PRINCIPAL DISCHARGE DIAGNOSIS  Diagnosis: GI bleed  Assessment and Plan of Treatment: At the time of admission, you were found to have an upper GI bleed. In this condition, a small lesion in your esophagus, stomach, or the upper part of your small intestine bleeds. This may result in stool which is darker than normal, and often has an abnormal consistancy, which is most commonly described as tarry. At home, you take a medication called pantoprazole. This is a proton pump inhibitor or PPI, which decreases the acid production in your stomach. This medication is often benificial in individuals who have GI bleeds, and was continued in your case. The gastroenterology team was consulted, and they performed an EGD, a procedure in which a small camera is inserted into your mouth to look at your esophagus and stomach. This revealed multiple growths of the stomach, known as polyps. Multiple samples were taken from your stomach and were sent to the lab for further evaluation. Please follow up with your gastroenterologist or with the [facility] gastroenterologoy team for further evaluation and treatment.  If you have additional episodes of dark stools or diarrhea, have vomiting of blood or material which looks like coffee grounds,, feel weak or faint, or lose consiousness at any point, please return to the emergency room at once for further evaluation and treatment, as this may indicate a recurrance or worsening of your gastrointestinal bleeding.

## 2019-12-03 NOTE — PROGRESS NOTE ADULT - ASSESSMENT
89M with PMH of HTN, HLD, prostate CA, colon CA, prior GIB, PUD, aortic stenosis, afib (on Coumadin), pacemaker, CAD, CDK4, ATIF, and hypothyroidism presenting with complaints of black loose stools x 4 days. Admitted to [facility] for further observation and management. GI consulted for possible EGD.

## 2019-12-03 NOTE — PROGRESS NOTE ADULT - PROBLEM SELECTOR PLAN 3
Pt with known hx of a-fib on Coumadin, s/p pacemaker placement 2013. CHADSVASc score 4.  - currently rate-controlled, holding metoprolol in setting of suspected GIB, restart after EGD.  - Home med Coumadin 1mg daily, holding in the setting of suspected GIB, restart as condition improves

## 2019-12-03 NOTE — PROGRESS NOTE ADULT - SUBJECTIVE AND OBJECTIVE BOX
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 Pt states he started feeling nauseous 11/28 evening and had 3 episodes of black loose stools. Pt reports he continued to have black loose stools for 2 days, which gradually became lighter and more brown in color. Pt last had a BM 2 days ago. Pt avoided eating much over the past 4 days due to fear of n/v, though he had no vomiting since day of onset. Pt admits to missing 2-3 days of Coumadin, last dose taken 12/1 evening. Pt denies any recent weight changes, dizziness, vision changes, URI symptoms, CP, palpitations, SOB, abd pain, hematuria, or dysuria. No recent travel. Pt denies any hx of blood transfusions in the past. In the ED, pt was afebrile, vital signs stable. Labs showed Hgb of 12.4 (baseline 11-12), .3, platelets 132, BUN 49, Cr 2.2 (baseline 2), INR 4.52, PT 53.5, aPTT 39.5. Rectal exam negative. Pt given Protonix 80mg IV x 1 and GI consulted. Pt admitted for further observation and management. Vit K 5mg given for elevated INR, goal INR 2. GI to take pt for EGD 12/3.    SUBJECTIVE / INTERVAL HPI:Pt was admitted overnight. He was seen and examined at bedside this morning. Pt denies any complaints today. He denies any n/v, abd pain, CP, or SOB.    VITAL SIGNS:  Vital Signs Last 24 Hrs  T(C): 36.3 (03 Dec 2019 08:45), Max: 36.8 (02 Dec 2019 17:42)  T(F): 97.3 (03 Dec 2019 08:45), Max: 98.2 (02 Dec 2019 17:42)  HR: 81 (03 Dec 2019 08:45) (79 - 81)  BP: 111/67 (03 Dec 2019 08:45) (107/56 - 115/73)  RR: 18 (03 Dec 2019 08:45) (16 - 18)  SpO2: 99% (03 Dec 2019 08:45) (94% - 99%)    Physical examination:  General: WDWN male appearing his stated age, resting comfortably in bed. In NAD.  HEENT: EOMI, sclera anicteric. Conjunctiva pink. No nasal discharge. MMM. No cervical or submandibular lymphadenopathy.  Neck: supple, no JVD  Cardiovascular: RRR, significant systolic murmur noted  Respiratory: Breath sounds CTAB, no w/r/r.  Abdomen: no rashes, no signs of trauma, no bruising. Soft, NT, ND. Tympanic to percussion. Bowel sounds present x4. Rectal examination negative for gross hematochezia   Extremities: bruises in various stages of healing noted to BL UE. No BLE edema.  Skin: No bruising to abd or back.  Neuro: alert and oriented x3, CN II-XII grossly intact.  Psychiatric: pleasant, calm, cooperative. Affect, speech, and behavior are appropriate.    MEDICATIONS:  MEDICATIONS  (STANDING):  allopurinol 100 milliGRAM(s) Oral daily  atorvastatin 20 milliGRAM(s) Oral at bedtime  donepezil 5 milliGRAM(s) Oral at bedtime  levothyroxine 25 MICROGram(s) Oral daily  memantine 5 milliGRAM(s) Oral daily  multivitamin 1 Tablet(s) Oral daily  pantoprazole  Injectable 40 milliGRAM(s) IV Push every 12 hours  tamsulosin 0.4 milliGRAM(s) Oral at bedtime    ALLERGIES: NKDA    LABS:             11.5   6.40  )-----------( 117      ( 03 Dec 2019 07:32 )             37.3     143  |  110<H>  |  42<H>  ----------------------------<  104<H>  4.3   |  20<L>  |  1.99<H>    Ca    9.3      03 Dec 2019 07:32  Phos  3.8     12-03  Mg     2.0     12-03    TPro  6.6  /  Alb  4.2  /  TBili  0.5  /  DBili  x   /  AST  26  /  ALT  18  /  AlkPhos  70  12-02  PT/INR - ( 03 Dec 2019 07:32 )   PT: 15.4 sec;   INR: 1.35     PTT - ( 03 Dec 2019 07:32 )  PTT:30.8 sec    RADIOLOGY & ADDITIONAL TESTS: Reviewed.

## 2019-12-03 NOTE — PROGRESS NOTE ADULT - PROBLEM SELECTOR PLAN 8
DVT ppx - admitted with supratheraputic INR, holding AC in the presence of suspected GIB  GI ppx - pantoprazole 40 mg IV BID

## 2019-12-03 NOTE — CONSULT NOTE ADULT - ATTENDING COMMENTS
I have personally seen, examined, and participated in the care of this patient. I have reviewed all pertinent clinical information, including history, physical exam, plan and the resident's note and agree with the above.    -Assessment and plan discussed and formulated as noted.

## 2019-12-03 NOTE — PROGRESS NOTE ADULT - PROBLEM SELECTOR PLAN 5
On arrival, Cr 2.2, baseline 2.  - follows with Dr. Cueto as an outpatient, no intervention at this time  - continue to monitor, avoid nephrotoxic agents

## 2019-12-03 NOTE — PROGRESS NOTE ADULT - PROBLEM SELECTOR PLAN 2
On arrival, INR 4.52. Given 5mg vit K on admission. Target INR 2.   - INR today 1.35, may consider restarting warfarin, however holding for now given suspected GIB

## 2019-12-03 NOTE — CONSULT NOTE ADULT - ASSESSMENT
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 Patient without active ACS symptoms. METS... RCRI Class IV risk with 15% 30 day MACE. Faith score 6.1% risk of MI or cardiac arrest in 30 day periop period. Patient is high risk for low risk procedure.     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 Patient without active ACS symptoms. METS >4. RCRI Class IV risk with 15% 30 day MACE. Faith score 6.1% risk of MI or cardiac arrest in 30 day periop period. Patient is high risk for low risk procedure.     PreOp Cardiac Risk assessment: Patient with multiple cardiac comorbidities but currently without ACS. See above for risk stratification.  -No further cardiac testing prior to endoscopy.  -Be cautious with IVF and blood products in setting of depressed EF.  -Can diurese PRN.  -Hold ASA and Warfarin.  -Restart when appropriate.   -Can hold BB if patient becomes HD unstable.   -Continue with statin.     CAD: Pt without current ACS.  -Restart ASA when stable.  -Continue with Atorvastatin and BB.     AFib: Rate controlled and paced.  -Hold Coumadin now.  -May consider NOAC with elevated bleeding risk. But patient now with 2 bleeding events.   -Metoprolol for rate control.     Discussed with attending and team.

## 2019-12-03 NOTE — DISCHARGE NOTE PROVIDER - NSDCMRMEDTOKEN_GEN_ALL_CORE_FT
Align 4 mg oral capsule: 1 cap(s) orally once a day  allopurinol 100 mg oral tablet: 1 tab(s) orally once a day  aspirin 81 mg oral tablet: 1 tab(s) orally once a day  atorvastatin 20 mg oral tablet: 1 tab(s) orally once a day (at bedtime)  B Complex 50 oral tablet, extended release: 1 tab(s) orally once a day  bicalutamide 50 mg oral tablet: 1 tab(s) orally every 24 hours  Citrucel 2 g/19 g oral powder for reconstitution: 1 packet(s) orally once a day  CoQ10 300 mg oral capsule: 1 cap(s) orally once a day  docusate sodium 100 mg oral capsule: 1 cap(s) orally 2 times a day  donepezil 5 mg oral tablet: 1 tab(s) orally once a day (at bedtime)  ferrous sulfate 325 mg (65 mg elemental iron) oral tablet: 1 tab(s) orally once a day  folic acid 1 mg oral tablet: 1 tab(s) orally once a day  Lasix 20 mg oral tablet: 1 tab(s) orally once a day  levothyroxine 25 mcg (0.025 mg) oral tablet: 1 tab(s) orally once a day  memantine 5 mg oral tablet: 1 tab(s) orally once a day  metoprolol succinate 50 mg oral tablet, extended release: 1 tab(s) orally once a day  omeprazole 20 mg oral delayed release capsule: 1 cap(s) orally once a day  tamsulosin 0.4 mg oral capsule: 1 tab(s) orally 2 times a day  traZODone 50 mg oral tablet: 0.5 tab(s) orally once a day  Vitamin D3 1000 intl units oral tablet: 1 tab(s) orally once a day  warfarin 1 mg oral tablet: 1 tab(s) orally once a day Align 4 mg oral capsule: 1 cap(s) orally once a day  aspirin 81 mg oral tablet: 1 tab(s) orally once a day  atorvastatin 20 mg oral tablet: 1 tab(s) orally once a day (at bedtime)  B Complex 50 oral tablet, extended release: 1 tab(s) orally once a day  bicalutamide 50 mg oral tablet: 1 tab(s) orally every 24 hours  Citrucel 2 g/19 g oral powder for reconstitution: 1 packet(s) orally once a day  CoQ10 300 mg oral capsule: 1 cap(s) orally once a day  docusate sodium 100 mg oral capsule: 1 cap(s) orally 2 times a day  donepezil 5 mg oral tablet: 1 tab(s) orally once a day (at bedtime)  ferrous sulfate 325 mg (65 mg elemental iron) oral tablet: 1 tab(s) orally once a day  folic acid 1 mg oral tablet: 1 tab(s) orally once a day  Lasix 20 mg oral tablet: 1 tab(s) orally once a day  levothyroxine 25 mcg (0.025 mg) oral tablet: 1 tab(s) orally once a day  memantine 5 mg oral tablet: 1 tab(s) orally once a day  metoprolol succinate 50 mg oral tablet, extended release: 1 tab(s) orally once a day  omeprazole 20 mg oral delayed release capsule: 1 cap(s) orally once a day  tamsulosin 0.4 mg oral capsule: 1 tab(s) orally 2 times a day  traZODone 50 mg oral tablet: 0.5 tab(s) orally once a day  Vitamin D3 1000 intl units oral tablet: 1 tab(s) orally once a day  warfarin 1 mg oral tablet: 1 tab(s) orally once a day

## 2019-12-03 NOTE — PROGRESS NOTE ADULT - PROBLEM SELECTOR PLAN 6
Pt with known history of ATIF, on ferrous sulfate home meds.  - On arrival Hgb 12.4 (baseline 11-12), subsequent reduction in Hgb is concordant with noted reduction in all cell lines, suggesting dilation rather than active bleeding  - .3 likely due to B12/folate deficiency  - B12/folate levels pending, supplement as needed, will consider adding MMA.

## 2019-12-03 NOTE — DISCHARGE NOTE PROVIDER - CARE PROVIDER_API CALL
Kirsty Lott (DO)  Internal Medicine; Preventive Medicine  [address], [address] [place] [address]  Phone: [phone]  Fax: [phone]  Follow Up Time: 2 weeks

## 2019-12-03 NOTE — PROGRESS NOTE ADULT - PROBLEM SELECTOR PLAN 1
Pt with 4 days of black loose stools on daily coumadin for a-fib and decreased PO intake 2/2 n/v. Last BM 2 days ago, currently asymptomatic. Elevated INR on admission of 4.52, PT of 53.5. In the setting of known hx of prior GIB and PUD, suspect upper GI bleed.  - rectal examination negative for gross blood in ED and on floor  - GI consulted in ED, plan to take pt for EGD after better cardiac collateral obtained  - Keep active type and screen, transfuse if Hgb < 7  - C/w PPI

## 2019-12-03 NOTE — PROGRESS NOTE ADULT - PROBLEM SELECTOR PLAN 9
F - not indicated  E - Replete if K < 4 or Mg < 2  N - NPO for possible EGD, then DASH/TLC diet  A - RMF

## 2019-12-03 NOTE — PROGRESS NOTE ADULT - ATTENDING COMMENTS
Pt seen and examined by me with HS this AM at bedside. Agree with above with additions,   As per GI, needs cardiac clearance for EGD. hgb currently stable, last melanotic stool 3 days ago.   coumadin held, s/p Vitk 5mg yesterday, now INR 1.35; ASA held.

## 2019-12-04 ENCOUNTER — RESULT REVIEW (OUTPATIENT)
Age: 84
End: 2019-12-04

## 2019-12-04 ENCOUNTER — TRANSCRIPTION ENCOUNTER (OUTPATIENT)
Age: 84
End: 2019-12-04

## 2019-12-04 VITALS
DIASTOLIC BLOOD PRESSURE: 71 MMHG | RESPIRATION RATE: 18 BRPM | TEMPERATURE: 97 F | SYSTOLIC BLOOD PRESSURE: 111 MMHG | OXYGEN SATURATION: 97 % | HEART RATE: 79 BPM

## 2019-12-04 LAB
ANION GAP SERPL CALC-SCNC: 12 MMOL/L — SIGNIFICANT CHANGE UP (ref 5–17)
BUN SERPL-MCNC: 38 MG/DL — HIGH (ref 7–23)
CALCIUM SERPL-MCNC: 8.9 MG/DL — SIGNIFICANT CHANGE UP (ref 8.4–10.5)
CHLORIDE SERPL-SCNC: 108 MMOL/L — SIGNIFICANT CHANGE UP (ref 96–108)
CO2 SERPL-SCNC: 19 MMOL/L — LOW (ref 22–31)
CREAT SERPL-MCNC: 1.84 MG/DL — HIGH (ref 0.5–1.3)
GLUCOSE SERPL-MCNC: 113 MG/DL — HIGH (ref 70–99)
HCT VFR BLD CALC: 34.8 % — LOW (ref 39–50)
HGB BLD-MCNC: 10.9 G/DL — LOW (ref 13–17)
MAGNESIUM SERPL-MCNC: 2 MG/DL — SIGNIFICANT CHANGE UP (ref 1.6–2.6)
MCHC RBC-ENTMCNC: 31.3 GM/DL — LOW (ref 32–36)
MCHC RBC-ENTMCNC: 32.9 PG — SIGNIFICANT CHANGE UP (ref 27–34)
MCV RBC AUTO: 105.1 FL — HIGH (ref 80–100)
NRBC # BLD: 0 /100 WBCS — SIGNIFICANT CHANGE UP (ref 0–0)
PLATELET # BLD AUTO: 113 K/UL — LOW (ref 150–400)
POTASSIUM SERPL-MCNC: 4.7 MMOL/L — SIGNIFICANT CHANGE UP (ref 3.5–5.3)
POTASSIUM SERPL-SCNC: 4.7 MMOL/L — SIGNIFICANT CHANGE UP (ref 3.5–5.3)
RBC # BLD: 3.31 M/UL — LOW (ref 4.2–5.8)
RBC # FLD: 13.2 % — SIGNIFICANT CHANGE UP (ref 10.3–14.5)
SODIUM SERPL-SCNC: 139 MMOL/L — SIGNIFICANT CHANGE UP (ref 135–145)
WBC # BLD: 4.64 K/UL — SIGNIFICANT CHANGE UP (ref 3.8–10.5)
WBC # FLD AUTO: 4.64 K/UL — SIGNIFICANT CHANGE UP (ref 3.8–10.5)

## 2019-12-04 PROCEDURE — 99239 HOSP IP/OBS DSCHRG MGMT >30: CPT

## 2019-12-04 PROCEDURE — 85610 PROTHROMBIN TIME: CPT

## 2019-12-04 PROCEDURE — 86901 BLOOD TYPING SEROLOGIC RH(D): CPT

## 2019-12-04 PROCEDURE — 88305 TISSUE EXAM BY PATHOLOGIST: CPT

## 2019-12-04 PROCEDURE — 43239 EGD BIOPSY SINGLE/MULTIPLE: CPT

## 2019-12-04 PROCEDURE — 96374 THER/PROPH/DIAG INJ IV PUSH: CPT

## 2019-12-04 PROCEDURE — 82607 VITAMIN B-12: CPT

## 2019-12-04 PROCEDURE — 85025 COMPLETE CBC W/AUTO DIFF WBC: CPT

## 2019-12-04 PROCEDURE — 85730 THROMBOPLASTIN TIME PARTIAL: CPT

## 2019-12-04 PROCEDURE — 83735 ASSAY OF MAGNESIUM: CPT

## 2019-12-04 PROCEDURE — 84100 ASSAY OF PHOSPHORUS: CPT

## 2019-12-04 PROCEDURE — 88305 TISSUE EXAM BY PATHOLOGIST: CPT | Mod: 26

## 2019-12-04 PROCEDURE — 99285 EMERGENCY DEPT VISIT HI MDM: CPT | Mod: 25

## 2019-12-04 PROCEDURE — 85027 COMPLETE CBC AUTOMATED: CPT

## 2019-12-04 PROCEDURE — 80053 COMPREHEN METABOLIC PANEL: CPT

## 2019-12-04 PROCEDURE — 80048 BASIC METABOLIC PNL TOTAL CA: CPT

## 2019-12-04 PROCEDURE — 82746 ASSAY OF FOLIC ACID SERUM: CPT

## 2019-12-04 PROCEDURE — 86900 BLOOD TYPING SEROLOGIC ABO: CPT

## 2019-12-04 PROCEDURE — 86850 RBC ANTIBODY SCREEN: CPT

## 2019-12-04 PROCEDURE — 36415 COLL VENOUS BLD VENIPUNCTURE: CPT

## 2019-12-04 PROCEDURE — 83921 ORGANIC ACID SINGLE QUANT: CPT

## 2019-12-04 PROCEDURE — 93005 ELECTROCARDIOGRAM TRACING: CPT

## 2019-12-04 RX ORDER — IOHEXOL 300 MG/ML
30 INJECTION, SOLUTION INTRAVENOUS ONCE
Refills: 0 | Status: DISCONTINUED | OUTPATIENT
Start: 2019-12-04 | End: 2019-12-04

## 2019-12-04 RX ADMIN — Medication 1 TABLET(S): at 12:33

## 2019-12-04 RX ADMIN — Medication 100 MILLIGRAM(S): at 12:33

## 2019-12-04 RX ADMIN — PANTOPRAZOLE SODIUM 40 MILLIGRAM(S): 20 TABLET, DELAYED RELEASE ORAL at 06:34

## 2019-12-04 RX ADMIN — MEMANTINE HYDROCHLORIDE 5 MILLIGRAM(S): 10 TABLET ORAL at 12:33

## 2019-12-04 RX ADMIN — Medication 25 MICROGRAM(S): at 06:34

## 2019-12-04 NOTE — PROGRESS NOTE ADULT - SUBJECTIVE AND OBJECTIVE BOX
OVERNIGHT EVENTS: No acute events reported overnight.    SUBJECTIVE / INTERVAL HPI: Patient seen and examined at bedside in no acute distress. Denies acute events overnight. Denies nausea or vomiting. Denies constipation or diarrhea. Reports he had one bowel movement yesterday, well formed, of normal consistency not melanotic. Denies chest pain, shortness of breath or dyspnea on exertion.    VITAL SIGNS:  Vital Signs Last 24 Hrs  T(C): 36.8 (04 Dec 2019 05:02), Max: 36.8 (04 Dec 2019 00:03)  T(F): 98.2 (04 Dec 2019 05:02), Max: 98.3 (04 Dec 2019 00:03)  HR: 59 (04 Dec 2019 05:02) (59 - 81)  BP: 114/69 (04 Dec 2019 05:02) (99/62 - 134/69)  RR: 17 (04 Dec 2019 05:02) (17 - 18)  SpO2: 98% (04 Dec 2019 05:02) (98% - 100%)    PHYSICAL EXAM:    General: WDWN, NAD  HEENT: NC/AT; PERRL, MMM, EOMI  Neck: supple, no JVD  Cardiovascular: Significant systolic murmur consistant with AS  Respiratory: CTA B/L; no W/R/R, no accessory muscle use  Gastrointestinal: soft, nontender, nondistended, no masses appreciated, normal bowel sounds in all fields  Extremities: WWP; no edema or cyanosis noted  Vascular: 2+ radial pulses  Neurological: AAOx3, CN2-12 grossly intact    MEDICATIONS:  MEDICATIONS  (STANDING):  allopurinol 100 milliGRAM(s) Oral daily  atorvastatin 20 milliGRAM(s) Oral at bedtime  donepezil 5 milliGRAM(s) Oral at bedtime  levothyroxine 25 MICROGram(s) Oral daily  memantine 5 milliGRAM(s) Oral daily  multivitamin 1 Tablet(s) Oral daily  pantoprazole  Injectable 40 milliGRAM(s) IV Push every 12 hours  tamsulosin 0.4 milliGRAM(s) Oral at bedtime    ALLERGIES: NKDA    LABS:                      11.5   6.40  )-----------( 117      ( 03 Dec 2019 07:32 )             37.3     143  |  110<H>  |  42<H>  ----------------------------<  104<H>  4.3   |  20<L>  |  1.99<H>    Ca    9.3      03 Dec 2019 07:32  Phos  3.8     12-03  Mg     2.0     12-03    TPro  6.6  /  Alb  4.2  /  TBili  0.5  /  DBili  x   /  AST  26  /  ALT  18  /  AlkPhos  70  12-02    PT/INR - ( 03 Dec 2019 07:32 )   PT: 15.4 sec;   INR: 1.35     PTT - ( 03 Dec 2019 07:32 )  PTT:30.8 sec    RADIOLOGY & ADDITIONAL TESTS: Reviewed.

## 2019-12-04 NOTE — PROGRESS NOTE ADULT - PROBLEM SELECTOR PLAN 6
Pt with known history of ATIF, on ferrous sulfate home meds.  - On arrival Hgb 12.4 (baseline 11-12), subsequent reduction in Hgb is concordant with noted reduction in all cell lines, suggesting dilation rather than active bleeding  - .3 likely due to B12/folate deficiency  - B12/folate levels pending, supplement as needed, will add MMA level to todays labs

## 2019-12-04 NOTE — PROGRESS NOTE ADULT - PROBLEM SELECTOR PLAN 3
Pt with known hx of a-fib on Coumadin, s/p pacemaker placement 2013. CHADSVASc score 4.  - currently rate-controlled, holding metoprolol in setting of suspected GIB, restart after EGD, currently hemodynamically stable  - Home med Coumadin 1mg daily, holding in the setting of suspected GIB

## 2019-12-04 NOTE — PROGRESS NOTE ADULT - PROBLEM SELECTOR PLAN 7
Normotensive at time of exam  - hold home med Lasix 20mg, metoprolol 50mg in setting of suspected GIB  - restart home meds as appropriate  #HLD  - continue home med Lipitor 20mg daily  #CAD  - home meds ASA 81, Lipitor 20mg daily.  - hold ASA in the setting of suspected GIB, restart as appropriate

## 2019-12-04 NOTE — DISCHARGE NOTE NURSING/CASE MANAGEMENT/SOCIAL WORK - PATIENT PORTAL LINK FT
You can access the FollowMyHealth Patient Portal offered by [facility] by registering at the following website: [email or web address]. By joining [facility]’s FollowMyHealth portal, you will also be able to view your health information using other applications (apps) compatible with our system.

## 2019-12-04 NOTE — DISCHARGE NOTE NURSING/CASE MANAGEMENT/SOCIAL WORK - NSDCFUADDAPPT_GEN_ALL_CORE_FT
Attached is the contact information for Dr. Kirsty Lott, the gastroenterologist who performed the endoscopy during your hospital stay. Please schedule an appointment with her, or call your insurance company to find a GI doctor near [facility] in [place].    In addition, please follow up with your primary care doctor to reevaluate your PT/INR within 1 week of your discharge.

## 2019-12-04 NOTE — PROGRESS NOTE ADULT - PROBLEM SELECTOR PLAN 10
1) PCP Contacted on Admission: (Y) --> Dr. Saxena [phone]  2) Date of Contact with PCP: 12/3  3) PCP Contacted at Discharge: TBD  4) Summary of Handoff Given to PCP: TBD   5) Post-Discharge Appointment Date: TBD

## 2019-12-04 NOTE — PROGRESS NOTE ADULT - PROBLEM SELECTOR PLAN 1
Pt with 4 days of black loose stools on daily coumadin for a-fib and decreased PO intake 2/2 n/v. Last BM 2 days ago, currently asymptomatic. Elevated INR on admission of 4.52, PT of 53.5. In the setting of known hx of prior GIB and PUD, suspect upper GI bleed.  - rectal examination negative for gross blood in ED and on floor yesterday. Pt defecating normal consistency stool without blood or melanotic stool noted  - NPO for EGD today at 2 PM  - Keep active type and screen, transfuse if Hgb < 7  - C/w PPI Pt with 4 days of black loose stools on daily coumadin for a-fib and decreased PO intake 2/2 n/v. Last BM 2 days ago, currently asymptomatic. Elevated INR on admission of 4.52, PT of 53.5. In the setting of known hx of prior GIB and PUD, suspect upper GI bleed. As per EGD report, fundic and body polyposis s/p biopsy, 9 cm hiatal hernia, and acute gastritis. Will follow up as an outpatient.  - rectal examination negative for gross blood in ED and on floor yesterday. Pt defecating normal consistency stool without blood or melanotic stool noted  - Keep active type and screen, transfuse if Hgb < 7  - C/w PPI as an outpatient

## 2019-12-04 NOTE — PROGRESS NOTE ADULT - PROBLEM SELECTOR PLAN 2
On arrival, INR 4.52. Given 5mg vit K on admission. Target INR 2.   - Cardiology consulted, holding warfarin for the time being, will follow up with INR with AM labs On arrival, INR 4.52. Given 5mg vit K on admission. Target INR 2.   - Cardiology consulted, EGD performed, plan to discharge warfarin as an outpatient

## 2019-12-04 NOTE — PROGRESS NOTE ADULT - ASSESSMENT
89M with PMH of HTN, HLD, prostate CA, colon CA, prior GIB, PUD, aortic stenosis, afib (on Coumadin), pacemaker, CAD, CDK4, ATIF, and hypothyroidism presenting with complaints of black loose stools x 4 days. Admitted to [facility] for further observation and management. NPO today EGD scheduled for 2 PM today.

## 2019-12-05 LAB — SURGICAL PATHOLOGY STUDY: SIGNIFICANT CHANGE UP

## 2019-12-06 NOTE — CHART NOTE - NSCHARTNOTEFT_GEN_A_CORE
Called patient to discuss pathology results of fundic gland polyp on biopsy.  Suspect this may be fundic gland polyposis but would continue PPI at this time in the setting of recent GI bleeding.  No hx of GI malignancy in the family.  Discuss outpatient followup for evaluation of hiatal hernia.  Patient to follow up with his gastroenterologist.  All questions answered.    Surgical Pathology Report (12.04.19 @ 09:55)    Surgical Pathology Report:   ACCESSION No:  [id number]    CONNIE FREEMAN                        1        Surgical Final Report          Final Diagnosis    Gastric body polyp, excision:  Fundic gland polyp    Verified by: Lillie Hernandez M.D.  (Electronic Signature)  Reported on: 12/05/19 18:08 [facility], [facility] [address] [place] [address]  Phone: [phone]   Fax: [phone]  _________________________________________________________________    Clinical History  H/O melena    Specimen(s) Submitted  1     Body polyps    Gross Description  The specimen is received in formalin, labeled with the patient's  identification and "body polyp."  It consists of one irregular  fragment of tan-pink soft tissue measuring 0.2 cm in greatest  dimension.  The specimen is entirely submitted in one cassette,  1A.    MALINA Abernathy ASCP 12/04/2019 14:52

## 2019-12-09 ENCOUNTER — MOBILE ON CALL (OUTPATIENT)
Age: 84
End: 2019-12-09

## 2019-12-09 LAB — METHYLMALONATE SERPL-SCNC: 325 NMOL/L — SIGNIFICANT CHANGE UP (ref 0–378)

## 2019-12-10 DIAGNOSIS — K29.70 GASTRITIS, UNSPECIFIED, WITHOUT BLEEDING: ICD-10-CM

## 2019-12-10 DIAGNOSIS — I25.10 ATHEROSCLEROTIC HEART DISEASE OF NATIVE CORONARY ARTERY WITHOUT ANGINA PECTORIS: ICD-10-CM

## 2019-12-10 DIAGNOSIS — I48.91 UNSPECIFIED ATRIAL FIBRILLATION: ICD-10-CM

## 2019-12-10 DIAGNOSIS — K31.7 POLYP OF STOMACH AND DUODENUM: ICD-10-CM

## 2019-12-10 DIAGNOSIS — Z85.46 PERSONAL HISTORY OF MALIGNANT NEOPLASM OF PROSTATE: ICD-10-CM

## 2019-12-10 DIAGNOSIS — E78.5 HYPERLIPIDEMIA, UNSPECIFIED: ICD-10-CM

## 2019-12-10 DIAGNOSIS — E03.9 HYPOTHYROIDISM, UNSPECIFIED: ICD-10-CM

## 2019-12-10 DIAGNOSIS — Z92.3 PERSONAL HISTORY OF IRRADIATION: ICD-10-CM

## 2019-12-10 DIAGNOSIS — I50.22 CHRONIC SYSTOLIC (CONGESTIVE) HEART FAILURE: ICD-10-CM

## 2019-12-10 DIAGNOSIS — K92.2 GASTROINTESTINAL HEMORRHAGE, UNSPECIFIED: ICD-10-CM

## 2019-12-10 DIAGNOSIS — Z95.3 PRESENCE OF XENOGENIC HEART VALVE: ICD-10-CM

## 2019-12-10 DIAGNOSIS — I13.0 HYPERTENSIVE HEART AND CHRONIC KIDNEY DISEASE WITH HEART FAILURE AND STAGE 1 THROUGH STAGE 4 CHRONIC KIDNEY DISEASE, OR UNSPECIFIED CHRONIC KIDNEY DISEASE: ICD-10-CM

## 2019-12-10 DIAGNOSIS — K44.9 DIAPHRAGMATIC HERNIA WITHOUT OBSTRUCTION OR GANGRENE: ICD-10-CM

## 2019-12-10 DIAGNOSIS — D50.9 IRON DEFICIENCY ANEMIA, UNSPECIFIED: ICD-10-CM

## 2019-12-10 DIAGNOSIS — N18.4 CHRONIC KIDNEY DISEASE, STAGE 4 (SEVERE): ICD-10-CM

## 2019-12-18 RX ORDER — DOCUSATE SODIUM 100 MG/1
CAPSULE ORAL TWICE DAILY
Refills: 0 | Status: ACTIVE | COMMUNITY

## 2019-12-18 RX ORDER — TAMSULOSIN HYDROCHLORIDE 0.4 MG/1
0.4 CAPSULE ORAL
Refills: 0 | Status: ACTIVE | COMMUNITY

## 2019-12-18 RX ORDER — VITAMIN B COMPLEX
CAPSULE ORAL
Refills: 0 | Status: ACTIVE | COMMUNITY

## 2019-12-18 RX ORDER — MEMANTINE HYDROCHLORIDE 28 MG/1
28 CAPSULE, EXTENDED RELEASE ORAL
Refills: 0 | Status: DISCONTINUED | COMMUNITY
End: 2019-12-18

## 2019-12-18 RX ORDER — TRAZODONE HYDROCHLORIDE 50 MG/1
50 TABLET ORAL
Refills: 0 | Status: ACTIVE | COMMUNITY

## 2019-12-18 RX ORDER — GINSENG 100 MG
CAPSULE ORAL
Refills: 0 | Status: ACTIVE | COMMUNITY

## 2019-12-18 RX ORDER — CHOLECALCIFEROL (VITAMIN D3) 25 MCG
TABLET ORAL
Refills: 0 | Status: ACTIVE | COMMUNITY

## 2019-12-18 RX ORDER — BICALUTAMIDE 50 MG/1
50 TABLET ORAL
Refills: 0 | Status: ACTIVE | COMMUNITY

## 2020-06-11 NOTE — ED ADULT TRIAGE NOTE - CCCP TRG CHIEF CMPLNT
Ramon Carlos was seen for a 2 week hearing aid follow up.  He reported great sound quality except when he is connected to his iphone via bluetooth.  He reported that the left hearing aid has a rattling sound quality when listening to music and on a phone call.  He also complained of pain in the left ear and feels the  is going too deep into his left ear.  I changed his  on both hearing aids to a length 1 and he reported the pain is no longer there and the sound quality for the bluetooth was better but not 100%.  I spoke with [facility] and she suggested switching from fixed to adaptive bandwidth because he has an iPhone 8, change the connection from the right to the left hearing aid, and reboot the iPhone.  I did all suggestions and he reported the rattle was still there, slightly better but still present.  I explained that it might be the quality of the phone and he will pair his hearing aids to other phones he has at home and let me know if the sound quality changes.     rectal bleeding

## 2020-09-11 ENCOUNTER — APPOINTMENT (OUTPATIENT)
Dept: NEPHROLOGY | Facility: CLINIC | Age: 85
End: 2020-09-11
Payer: MEDICARE

## 2020-09-11 VITALS — SYSTOLIC BLOOD PRESSURE: 110 MMHG | DIASTOLIC BLOOD PRESSURE: 60 MMHG | HEART RATE: 84 BPM

## 2020-09-11 DIAGNOSIS — N18.4 CHRONIC KIDNEY DISEASE, STAGE 4 (SEVERE): ICD-10-CM

## 2020-09-11 PROCEDURE — 99214 OFFICE O/P EST MOD 30 MIN: CPT

## 2020-09-11 RX ORDER — ALLOPURINOL 100 MG/1
100 TABLET ORAL
Refills: 0 | Status: ACTIVE | COMMUNITY

## 2020-09-11 RX ORDER — ATORVASTATIN CALCIUM 20 MG/1
20 TABLET, FILM COATED ORAL
Refills: 0 | Status: ACTIVE | COMMUNITY

## 2020-09-11 NOTE — ED ADULT NURSE NOTE - RESPIRATORY RATE (BREATHS/MIN)
Christine is a 50 year old  female who presents for annual exam.     Besides routine health maintenance, she has no other health concerns today .    HPI:  Here today for yearly exam --doing well.  S/p LASH in  with Dr. Crandall.  No vb/spotting.  Had L/S BSO with me in July for left ovarian cyst --had some incisional drainage until last week --never any redness or signs of infection.  Now feeling well --no more drainage or pain.  Has noted occ hot flush/night sweat --tolerable.  +SA --no issues.  Denies bowel/bladder issues.  Had AP repair and sling in  --denies leakage or incontinence issues.    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 Conde --follows bloodwork, meds, etc.  Also manages thyroid --no longer seeing Dr. Ayala  -has first colonoscopy set up with [facility] in November  -agrees to flu shot today      GYNECOLOGIC HISTORY:    Patient's last menstrual period was 2014.    Her current contraception method is: hysterectomy.  She  reports that she has never smoked. She has never used smokeless tobacco.    Patient is sexually active.  STD testing offered?  Declined  Last PHQ-9 score on record =   PHQ-9 SCORE 2020   PHQ-9 Total Score -   PHQ-9 Total Score 5     Last GAD7 score on record =   JAYDE-7 SCORE 2020   Total Score -   Total Score 0     Alcohol Score = 1    HEALTH MAINTENANCE:  Cholesterol:   Cholesterol   Date Value Ref Range Status   2019 199 <200 mg/dL Final   2018 211 (H) <200 mg/dL Final     Comment:     Desirable:       <200 mg/dl      Last Mammo: 2020, Result: Normal, Next Mammo: Next year   Pap:   Lab Results   Component Value  Date    PAP NIL 2017 WNL HPV (-)neg  Colonoscopy:  Scheduled in November at [facility], Result: Not applicable  Dexa:  NA    Health maintenance updated:  yes    HISTORY:  OB History    Para Term  AB Living   5 4 3 1 1 4   SAB TAB Ectopic Multiple Live Births   1 0 0 0 4      # Outcome Date GA Lbr Amol/2nd Weight Sex Delivery Anes PTL Lv   5 Term 07 37w0d  3.118 kg (6 lb 14 oz) M    SHAN      Birth Comments: was on tegretol during preg for seizure disorder      Name: Garrick   4 Term 02 37w0d  2.892 kg (6 lb 6 oz) M    SHAN      Name: Tone   3 Term 99 40w0d  3.714 kg (8 lb 3 oz) F    SHAN   2  97 35w0d   M -SEC   SHAN      Birth Comments: breech      Name: Giancarlo Vizcarra SAB                Patient Active Problem List   Diagnosis     CARDIOVASCULAR SCREENING; LDL GOAL LESS THAN 160     Hypertension goal BP (blood pressure) < 130/80     Migraine     Osteoarthritis     Knee joint replacement by other means     Abnormal gait     Low back pain     Synovial cyst     History of arthroplasty of left knee     Posttraumatic stress disorder     Hypothyroidism, unspecified type     Arthritis of right hand     Arthralgia of right acromioclavicular joint     Morbid obesity (H)     Anxiety     Left ovarian cyst     Cyst of left ovary     Past Surgical History:   Procedure Laterality Date     ARTHROPLASTY KNEE UNICOMPARTMENT Right 2015    Procedure: ARTHROPLASTY KNEE UNICOMPARTMENT;  Surgeon: Pablo Lara MD;  Location:  OR     ARTHROPLASTY KNEE UNICOMPARTMENT Left 10/27/2015    Procedure: ARTHROPLASTY KNEE UNICOMPARTMENT;  Surgeon: Pablo Lara MD;  Location:  OR      SECTION  1997    for breech     CYCTOCELE REPAIR       CYSTOSCOPY  2014    Procedure: CYSTOSCOPY;;  Surgeon: Candie Crandall MD;  Location: [facility]     DECOMPRESSION LUMBAR ONE LEVEL Right 2015    Procedure: DECOMPRESSION LUMBAR ONE LEVEL;  Surgeon:  Elio Starr MD;  Location:  OR     DILATION AND CURETTAGE, OPERATIVE HYSTEROSCOPY, COMBINED  3/8/2012    Procedure:COMBINED DILATION AND CURETTAGE, OPERATIVE HYSTEROSCOPY; OPERATIVE HYSTEROSCOPY, DILATION AND CURETTAGE, POSSIBLE RESECTION OF POLYP; Surgeon:CANDIE CRANDALL; Location:[facility]     ENT SURGERY      TONSILLECTOMY     GENITOURINARY SURGERY      BLADDER SLING     LAPAROSCOPIC HYSTERECTOMY SUPRACERVICAL  4/9/2014    Procedure: LAPAROSCOPIC HYSTERECTOMY SUPRACERVICAL;  LAPAROSCOPIC ASSISTED SUPRACERVICAL HYSTERECTOMY, cystoscopy;  Surgeon: Candie Crandall MD;  Location: [facility]     LAPAROSCOPIC OOPHORECTOMY Bilateral 7/23/2020    Procedure: LAPAROSCOPIC BILATERAL OOPHORECTOMY;  Surgeon: Christiana Andersen MD;  Location:  OR     STRIP VEIN  11/2010     TONSILLECTOMY  1974      Social History     Tobacco Use     Smoking status: Never Smoker     Smokeless tobacco: Never Used   Substance Use Topics     Alcohol use: Yes     Alcohol/week: 0.0 standard drinks     Comment: Socially      Problem (# of Occurrences) Relation (Name,Age of Onset)    Cerebrovascular Disease (1) Paternal Grandmother: TIA    Diabetes (4) Maternal Grandmother (Amy), Maternal Grandfather (Willis), Paternal Grandmother, Paternal Grandfather    Fractures (1) Maternal Grandmother (Amy): Hip    Hyperlipidemia (2) Mother (Raiza): high triglycerides, Father (Remy): not on meds    Hypertension (4) Mother (Raiza), Father (Remy), Maternal Grandmother (Amy), Paternal Grandmother    Hypothyroidism (1) Daughter    Lung Cancer (1) Maternal Grandfather (Willis, 68): smoker    No Known Problems (3) Son, Son, Son    Osteoporosis (1) Maternal Grandmother (Amy)    Stomach Cancer (1) Paternal Grandfather (68)    Thyroid Disease (1) Mother (Raiza)       Negative family history of: Coronary Artery Disease, Breast Cancer, Colon Cancer            Current Outpatient Medications   Medication Sig      "Calcium-Magnesium-Zinc 333-133-5 MG TABS Take 1 tablet by mouth At Bedtime     escitalopram (LEXAPRO) 20 MG tablet TAKE 1/2 TO 1 TABLET(10 TO 20 MG) BY MOUTH EVERY EVENING     IRON-FOLIC ACID PO      KRILL OIL PO Take 1 capsule by mouth every evening      levothyroxine (SYNTHROID/LEVOTHROID) 137 MCG tablet TAKE 1 TABLET BY MOUTH DAILY     lisinopril (ZESTRIL) 10 MG tablet TAKE 1 TABLET BY MOUTH DAILY     meloxicam (MOBIC) 15 MG tablet      NIFEdipine ER OSMOTIC (PROCARDIA XL) 60 MG 24 hr tablet TAKE 1 TABLET BY MOUTH DAILY     olopatadine (PATANOL) 0.1 % ophthalmic solution PLACE 1 DROP INTO BOTH EYES TWICE DAILY     Pediatric Multivit-Minerals-C (MULTIVITAMIN GUMMIES CHILDRENS PO) Take 2 chew tab by mouth daily as needed     No current facility-administered medications for this visit.      Allergies   Allergen Reactions     Imitrex [Sumatriptan] Other (See Comments)     Throat tightness       Past medical, surgical, social and family histories were reviewed and updated in EPIC.    ROS:   12 point review of systems negative other than symptoms noted below or in the HPI.  No urinary frequency or dysuria, bladder or kidney problems    EXAM:  /86   Ht 1.676 m (5' 6\")   Wt 107 kg (236 lb)   LMP 03/17/2014   BMI 38.09 kg/m     BMI: Body mass index is 38.09 kg/m .    PHYSICAL EXAM:  Constitutional:   Appearance: Well nourished, well developed, alert, in no acute distress  Neck:  Lymph Nodes:  No lymphadenopathy present    Thyroid:  Gland size normal, nontender, no nodules or masses present  on palpation  Chest:  Respiratory Effort:  Breathing unlabored  Cardiovascular:    Heart: Auscultation:  Regular rate, normal rhythm, no murmurs present  Breasts: Inspection of Breasts:  No lymphadenopathy present., Palpation of Breasts and Axillae:  No masses present on palpation, no breast tenderness., Axillary Lymph Nodes:  No lymphadenopathy present. and No nodularity, asymmetry or nipple discharge " bilaterally.  Gastrointestinal:   Abdominal Examination:  Abdomen nontender to palpation, tone normal without rigidity or guarding, no masses present, umbilicus without lesions   Liver and Spleen:  No hepatomegaly present, liver nontender to palpation    Hernias:  No hernias present  Lymphatic: Lymph Nodes:  No other lymphadenopathy present  Skin:  General Inspection:  No rashes present, no lesions present, no areas of  discoloration  Neurologic:    Mental Status:  Oriented X3.  Normal strength and tone, sensory exam                grossly normal, mentation intact and speech normal.    Psychiatric:   Mentation appears normal and affect normal/bright.         Pelvic Exam:  External Genitalia:     Normal appearance for age, no discharge present, no tenderness present, no inflammatory lesions present, color normal  Vagina:     Normal vaginal vault without central or paravaginal defects, no discharge present, no inflammatory lesions present, no masses present  Bladder:     Nontender to palpation  Urethra:   Urethral Body:  Urethra palpation normal, urethra structural support normal   Urethral Meatus:  No erythema or lesions present  Cervix:    Appearance healthy, no lesions present, nontender to palpation, no bleeding present  Uterus:     Surgically absent  Adnexa:     Surgically absent  Perineum:     Perineum within normal limits, no evidence of trauma, no rashes or skin lesions present  Anus:     Anus within normal limits, no hemorrhoids present  Inguinal Lymph Nodes:     No lymphadenopathy present  Pubic Hair:     Normal pubic hair distribution for age  Genitalia and Groin:     No rashes present, no lesions present, no areas of discoloration, no masses present      COUNSELING:   Reviewed preventive health counseling, as reflected in patient instructions  Special attention given to:        Regular exercise       Healthy diet/nutrition       Colon cancer screening       (Amanda)menopause management    BMI: Body mass  index is 38.09 kg/m .  Weight management plan: Discussed healthy diet and exercise guidelines Patient was referred to their PCP to discuss a diet and exercise plan.    ASSESSMENT:  50 year old female with satisfactory annual exam.    ICD-10-CM    1. Encounter for gynecological examination without abnormal finding  Z01.419        PLAN:  Patient Instructions   Follow up with your primary care provider for your other medical problems.  Continue self breast exam.  Increase physical activity and exercise.  Usual safety and preventative measures counseling done.  BMI >25  Weight loss encouraged.  Flu Shot today.  Last pap smear (2017) was normal and negative for the DNA of high risk HPV subtypes.  No pap was obtained this year.  This was discussed with the patient and she agrees with the plan.  Will have first colonoscopy results sent to our office in November.      Christiana Andersen MD   22

## 2020-09-13 PROBLEM — N18.4 STAGE 4 CHRONIC KIDNEY DISEASE: Status: ACTIVE | Noted: 2020-09-13

## 2020-09-13 NOTE — HISTORY OF PRESENT ILLNESS
[FreeTextEntry1] : 89 year-old man with CKD 4, hypertension, anemia and hyperuricemia, here for f/u evaluation.\par son passed away several months ago after sustaining head trauma from a fall- \par Reports that 1 month ago, was acutely SOB-  PCP increased furosemide to 2 tabs daily from 1/2 tab daily\par feeling much better. \par also since last visit, allopurinol added for elevated uric acid\par and now on atorvastatin.\par denies dysuria, flank pain, hematuria or frothy urine. 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

## 2020-09-13 NOTE — PHYSICAL EXAM
[General Appearance - Alert] : alert [Extraocular Movements] : extraocular movements were intact [Sclera] : the sclera and conjunctiva were normal [General Appearance - In No Acute Distress] : in no acute distress [Outer Ear] : the ears and nose were normal in appearance [Examination Of The Oral Cavity] : the lips and gums were normal [Neck Appearance] : the appearance of the neck was normal [Jugular Venous Distention Increased] : there was no jugular-venous distention [Neck Cervical Mass (___cm)] : no neck mass was observed [] : no respiratory distress [Auscultation Breath Sounds / Voice Sounds] : lungs were clear to auscultation bilaterally [Heart Rate And Rhythm] : heart rate was normal and rhythm regular [Heart Sounds] : normal S1 and S2 [Heart Sounds Gallop] : no gallops [Murmurs] : no murmurs [Heart Sounds Pericardial Friction Rub] : no pericardial rub [Edema] : there was no peripheral edema [Cervical Lymph Nodes Enlarged Anterior Bilaterally] : anterior cervical [Supraclavicular Lymph Nodes Enlarged Bilaterally] : supraclavicular [No CVA Tenderness] : no ~M costovertebral angle tenderness [Abnormal Walk] : normal gait [No Spinal Tenderness] : no spinal tenderness [Oriented To Time, Place, And Person] : oriented to person, place, and time [Affect] : the affect was normal [Impaired Insight] : insight and judgment were intact [FreeTextEntry1] : ecchymoses both arms

## 2020-09-13 NOTE — ASSESSMENT
[FreeTextEntry1] : all lab data was reviewed with patient in detail from  \par 89 year-old man with CKD 4, HTN anemia, AFib\par -CKD 4: creat  good- 1.93;  electrolytes normal range\par egfr 28-30\par -verbalizes that he probably would  not proceed with RRT if necessity arose, especially now that son - "no one left"\par --HTN- BP controlled c/w present regimen \par -CHF- s/p recent exacerbation- now compensated, c/w present  lasix\par AFib- rate controlled with metoprolol- continues on Coumadin- stable renal function- afib can increase progression of CKD\par -Anemia: Hgb within range - no indication for SHASHA\par -Secondary hyperparathyroidism- reminded that we need to follow PTH- to d/w PCP \par -hyperuricemia: good- c/w allopurinol.\par hyperlipidemia: stable c/w statin\par \par \par f/u 6 months-sooner as needed- seeing PCP every 2-3 months- \par

## 2021-01-01 ENCOUNTER — TRANSCRIPTION ENCOUNTER (OUTPATIENT)
Age: 86
End: 2021-01-01

## 2021-01-01 ENCOUNTER — INPATIENT (INPATIENT)
Facility: HOSPITAL | Age: 86
LOS: 11 days | Discharge: NURSING, REHAB OR LONG-TERM CARE | DRG: 813 | End: 2022-01-05
Attending: INTERNAL MEDICINE | Admitting: STUDENT IN AN ORGANIZED HEALTH CARE EDUCATION/TRAINING PROGRAM
Payer: MEDICARE

## 2021-01-01 ENCOUNTER — APPOINTMENT (OUTPATIENT)
Dept: NEPHROLOGY | Facility: CLINIC | Age: 86
End: 2021-01-01

## 2021-01-01 VITALS
OXYGEN SATURATION: 96 % | HEIGHT: 63 IN | WEIGHT: 138.01 LBS | HEART RATE: 81 BPM | DIASTOLIC BLOOD PRESSURE: 45 MMHG | SYSTOLIC BLOOD PRESSURE: 111 MMHG | TEMPERATURE: 98 F | RESPIRATION RATE: 18 BRPM

## 2021-01-01 DIAGNOSIS — Z98.890 OTHER SPECIFIED POSTPROCEDURAL STATES: Chronic | ICD-10-CM

## 2021-01-01 DIAGNOSIS — Z90.49 ACQUIRED ABSENCE OF OTHER SPECIFIED PARTS OF DIGESTIVE TRACT: Chronic | ICD-10-CM

## 2021-01-01 DIAGNOSIS — Z95.0 PRESENCE OF CARDIAC PACEMAKER: Chronic | ICD-10-CM

## 2021-01-01 DIAGNOSIS — Z98.89 OTHER SPECIFIED POSTPROCEDURAL STATES: Chronic | ICD-10-CM

## 2021-01-01 DIAGNOSIS — Z95.2 PRESENCE OF PROSTHETIC HEART VALVE: Chronic | ICD-10-CM

## 2021-01-01 LAB
A1C WITH ESTIMATED AVERAGE GLUCOSE RESULT: 5.6 % — SIGNIFICANT CHANGE UP (ref 4–5.6)
ALBUMIN SERPL ELPH-MCNC: 3.2 G/DL — LOW (ref 3.3–5)
ALBUMIN SERPL ELPH-MCNC: 3.3 G/DL — SIGNIFICANT CHANGE UP (ref 3.3–5)
ALBUMIN SERPL ELPH-MCNC: 3.4 G/DL — SIGNIFICANT CHANGE UP (ref 3.3–5)
ALBUMIN SERPL ELPH-MCNC: 3.4 G/DL — SIGNIFICANT CHANGE UP (ref 3.3–5)
ALP SERPL-CCNC: 54 U/L — SIGNIFICANT CHANGE UP (ref 40–120)
ALP SERPL-CCNC: 55 U/L — SIGNIFICANT CHANGE UP (ref 40–120)
ALP SERPL-CCNC: 56 U/L — SIGNIFICANT CHANGE UP (ref 40–120)
ALP SERPL-CCNC: 57 U/L — SIGNIFICANT CHANGE UP (ref 40–120)
ALP SERPL-CCNC: 59 U/L — SIGNIFICANT CHANGE UP (ref 40–120)
ALP SERPL-CCNC: 59 U/L — SIGNIFICANT CHANGE UP (ref 40–120)
ALT FLD-CCNC: 11 U/L — SIGNIFICANT CHANGE UP (ref 10–45)
ALT FLD-CCNC: 11 U/L — SIGNIFICANT CHANGE UP (ref 10–45)
ALT FLD-CCNC: 12 U/L — SIGNIFICANT CHANGE UP (ref 10–45)
ALT FLD-CCNC: 12 U/L — SIGNIFICANT CHANGE UP (ref 10–45)
ALT FLD-CCNC: 14 U/L — SIGNIFICANT CHANGE UP (ref 10–45)
ALT FLD-CCNC: 31 U/L — SIGNIFICANT CHANGE UP (ref 10–45)
ANION GAP SERPL CALC-SCNC: 10 MMOL/L — SIGNIFICANT CHANGE UP (ref 5–17)
ANION GAP SERPL CALC-SCNC: 11 MMOL/L — SIGNIFICANT CHANGE UP (ref 5–17)
ANION GAP SERPL CALC-SCNC: 8 MMOL/L — SIGNIFICANT CHANGE UP (ref 5–17)
ANION GAP SERPL CALC-SCNC: 8 MMOL/L — SIGNIFICANT CHANGE UP (ref 5–17)
ANISOCYTOSIS BLD QL: SLIGHT — SIGNIFICANT CHANGE UP
APTT 50/50 2HOUR INCUB: 70.2 SEC — HIGH (ref 27.5–36.5)
APTT 50/50 MIX COMMENT: SIGNIFICANT CHANGE UP
APTT BLD: 38.5 SECS — HIGH (ref 27.5–36.5)
APTT BLD: 57.1 SEC — HIGH (ref 27.5–35.5)
APTT BLD: 65.8 SEC — HIGH (ref 27.5–35.5)
APTT BLD: 69 SEC — HIGH (ref 27.5–35.5)
APTT BLD: 70.2 SEC — HIGH (ref 27.5–35.5)
APTT BLD: 71.2 SEC — HIGH (ref 27.5–35.5)
APTT BLD: 72.3 SEC — HIGH (ref 27.5–35.5)
APTT BLD: 73.6 SEC — HIGH (ref 27.5–35.5)
APTT BLD: 78.1 SEC — HIGH (ref 27.5–35.5)
APTT BLD: 80.9 SEC — HIGH (ref 27.5–35.5)
APTT BLD: 83.1 SEC — HIGH (ref 27.5–35.5)
AST SERPL-CCNC: 18 U/L — SIGNIFICANT CHANGE UP (ref 10–40)
AST SERPL-CCNC: 18 U/L — SIGNIFICANT CHANGE UP (ref 10–40)
AST SERPL-CCNC: 20 U/L — SIGNIFICANT CHANGE UP (ref 10–40)
AST SERPL-CCNC: 21 U/L — SIGNIFICANT CHANGE UP (ref 10–40)
AST SERPL-CCNC: 22 U/L — SIGNIFICANT CHANGE UP (ref 10–40)
AST SERPL-CCNC: 29 U/L — SIGNIFICANT CHANGE UP (ref 10–40)
BASOPHILS # BLD AUTO: 0 K/UL — SIGNIFICANT CHANGE UP (ref 0–0.2)
BASOPHILS # BLD AUTO: 0.01 K/UL — SIGNIFICANT CHANGE UP (ref 0–0.2)
BASOPHILS # BLD AUTO: 0.02 K/UL — SIGNIFICANT CHANGE UP (ref 0–0.2)
BASOPHILS # BLD AUTO: 0.02 K/UL — SIGNIFICANT CHANGE UP (ref 0–0.2)
BASOPHILS NFR BLD AUTO: 0 % — SIGNIFICANT CHANGE UP (ref 0–2)
BASOPHILS NFR BLD AUTO: 0.1 % — SIGNIFICANT CHANGE UP (ref 0–2)
BASOPHILS NFR BLD AUTO: 0.2 % — SIGNIFICANT CHANGE UP (ref 0–2)
BASOPHILS NFR BLD AUTO: 0.2 % — SIGNIFICANT CHANGE UP (ref 0–2)
BASOPHILS NFR BLD AUTO: 0.3 % — SIGNIFICANT CHANGE UP (ref 0–2)
BASOPHILS NFR BLD AUTO: 0.3 % — SIGNIFICANT CHANGE UP (ref 0–2)
BILIRUB DIRECT SERPL-MCNC: 0.3 MG/DL — SIGNIFICANT CHANGE UP (ref 0–0.3)
BILIRUB INDIRECT FLD-MCNC: 0.7 MG/DL — SIGNIFICANT CHANGE UP (ref 0.2–1)
BILIRUB SERPL-MCNC: 0.8 MG/DL — SIGNIFICANT CHANGE UP (ref 0.2–1.2)
BILIRUB SERPL-MCNC: 0.9 MG/DL — SIGNIFICANT CHANGE UP (ref 0.2–1.2)
BILIRUB SERPL-MCNC: 0.9 MG/DL — SIGNIFICANT CHANGE UP (ref 0.2–1.2)
BILIRUB SERPL-MCNC: 1 MG/DL — SIGNIFICANT CHANGE UP (ref 0.2–1.2)
BLD GP AB SCN SERPL QL: NEGATIVE — SIGNIFICANT CHANGE UP
BUN SERPL-MCNC: 34 MG/DL — HIGH (ref 7–23)
BUN SERPL-MCNC: 37 MG/DL — HIGH (ref 7–23)
BUN SERPL-MCNC: 41 MG/DL — HIGH (ref 7–23)
BUN SERPL-MCNC: 41 MG/DL — HIGH (ref 7–23)
BUN SERPL-MCNC: 43 MG/DL — HIGH (ref 7–23)
BUN SERPL-MCNC: 49 MG/DL — HIGH (ref 7–23)
BUN SERPL-MCNC: 66 MG/DL — HIGH (ref 7–23)
BUN SERPL-MCNC: 70 MG/DL — HIGH (ref 7–23)
BUN SERPL-MCNC: 72 MG/DL — HIGH (ref 7–23)
BUN SERPL-MCNC: 73 MG/DL — HIGH (ref 7–23)
CALCIUM SERPL-MCNC: 8 MG/DL — LOW (ref 8.4–10.5)
CALCIUM SERPL-MCNC: 8.2 MG/DL — LOW (ref 8.4–10.5)
CALCIUM SERPL-MCNC: 8.4 MG/DL — SIGNIFICANT CHANGE UP (ref 8.4–10.5)
CALCIUM SERPL-MCNC: 8.5 MG/DL — SIGNIFICANT CHANGE UP (ref 8.4–10.5)
CALCIUM SERPL-MCNC: 8.6 MG/DL — SIGNIFICANT CHANGE UP (ref 8.4–10.5)
CALCIUM SERPL-MCNC: 8.6 MG/DL — SIGNIFICANT CHANGE UP (ref 8.4–10.5)
CALCIUM SERPL-MCNC: 8.7 MG/DL — SIGNIFICANT CHANGE UP (ref 8.4–10.5)
CALCIUM SERPL-MCNC: 8.8 MG/DL — SIGNIFICANT CHANGE UP (ref 8.4–10.5)
CHLORIDE SERPL-SCNC: 100 MMOL/L — SIGNIFICANT CHANGE UP (ref 96–108)
CHLORIDE SERPL-SCNC: 100 MMOL/L — SIGNIFICANT CHANGE UP (ref 96–108)
CHLORIDE SERPL-SCNC: 101 MMOL/L — SIGNIFICANT CHANGE UP (ref 96–108)
CHLORIDE SERPL-SCNC: 102 MMOL/L — SIGNIFICANT CHANGE UP (ref 96–108)
CHLORIDE SERPL-SCNC: 102 MMOL/L — SIGNIFICANT CHANGE UP (ref 96–108)
CHLORIDE SERPL-SCNC: 103 MMOL/L — SIGNIFICANT CHANGE UP (ref 96–108)
CHLORIDE SERPL-SCNC: 104 MMOL/L — SIGNIFICANT CHANGE UP (ref 96–108)
CHLORIDE SERPL-SCNC: 106 MMOL/L — SIGNIFICANT CHANGE UP (ref 96–108)
CHLORIDE SERPL-SCNC: 108 MMOL/L — SIGNIFICANT CHANGE UP (ref 96–108)
CHLORIDE SERPL-SCNC: 108 MMOL/L — SIGNIFICANT CHANGE UP (ref 96–108)
CO2 SERPL-SCNC: 23 MMOL/L — SIGNIFICANT CHANGE UP (ref 22–31)
CO2 SERPL-SCNC: 24 MMOL/L — SIGNIFICANT CHANGE UP (ref 22–31)
CO2 SERPL-SCNC: 26 MMOL/L — SIGNIFICANT CHANGE UP (ref 22–31)
CREAT SERPL-MCNC: 2.02 MG/DL — HIGH (ref 0.5–1.3)
CREAT SERPL-MCNC: 2.13 MG/DL — HIGH (ref 0.5–1.3)
CREAT SERPL-MCNC: 2.2 MG/DL — HIGH (ref 0.5–1.3)
CREAT SERPL-MCNC: 2.24 MG/DL — HIGH (ref 0.5–1.3)
CREAT SERPL-MCNC: 2.24 MG/DL — HIGH (ref 0.5–1.3)
CREAT SERPL-MCNC: 2.25 MG/DL — HIGH (ref 0.5–1.3)
CREAT SERPL-MCNC: 2.29 MG/DL — HIGH (ref 0.5–1.3)
CREAT SERPL-MCNC: 2.42 MG/DL — HIGH (ref 0.5–1.3)
CREAT SERPL-MCNC: 2.56 MG/DL — HIGH (ref 0.5–1.3)
CREAT SERPL-MCNC: 2.56 MG/DL — HIGH (ref 0.5–1.3)
D DIMER BLD IA.RAPID-MCNC: 375 NG/ML DDU — HIGH
EOSINOPHIL # BLD AUTO: 0 K/UL — SIGNIFICANT CHANGE UP (ref 0–0.5)
EOSINOPHIL # BLD AUTO: 0.25 K/UL — SIGNIFICANT CHANGE UP (ref 0–0.5)
EOSINOPHIL # BLD AUTO: 0.27 K/UL — SIGNIFICANT CHANGE UP (ref 0–0.5)
EOSINOPHIL # BLD AUTO: 0.35 K/UL — SIGNIFICANT CHANGE UP (ref 0–0.5)
EOSINOPHIL NFR BLD AUTO: 0 % — SIGNIFICANT CHANGE UP (ref 0–6)
EOSINOPHIL NFR BLD AUTO: 3.7 % — SIGNIFICANT CHANGE UP (ref 0–6)
EOSINOPHIL NFR BLD AUTO: 3.8 % — SIGNIFICANT CHANGE UP (ref 0–6)
EOSINOPHIL NFR BLD AUTO: 4.5 % — SIGNIFICANT CHANGE UP (ref 0–6)
ESTIMATED AVERAGE GLUCOSE: 114 MG/DL — SIGNIFICANT CHANGE UP (ref 68–114)
FACT II INHIB PPP-ACNC: 85 % — SIGNIFICANT CHANGE UP (ref 74–142)
FACT IX PPP CHRO-ACNC: 109 % — SIGNIFICANT CHANGE UP (ref 69–167)
FACT V ACT/NOR PPP: 102 % — SIGNIFICANT CHANGE UP (ref 70–143)
FACT VII ACT/NOR PPP: 49 % — LOW (ref 53–149)
FACT VIII ACT/NOR PPP: 1 % — LOW (ref 51–138)
FACT VIII ACT/NOR PPP: 17 BU — HIGH (ref 0–0.5)
FACT X ACT/NOR PPP: 71 % — SIGNIFICANT CHANGE UP (ref 68–149)
FACT XII ACT/NOR PPP: 87 % — SIGNIFICANT CHANGE UP (ref 73–148)
FACT XIIA PPP-ACNC: 63 % — SIGNIFICANT CHANGE UP (ref 51–180)
FACTOR TESTED: SIGNIFICANT CHANGE UP
FERRITIN SERPL-MCNC: 163 NG/ML — SIGNIFICANT CHANGE UP (ref 30–400)
FIBRINOGEN PPP-MCNC: 387 MG/DL — SIGNIFICANT CHANGE UP (ref 258–438)
FIBRINOGEN PPP-MCNC: 478 MG/DL — HIGH (ref 258–438)
FOLATE SERPL-MCNC: >20 NG/ML — SIGNIFICANT CHANGE UP
GIANT PLATELETS BLD QL SMEAR: PRESENT — SIGNIFICANT CHANGE UP
GLUCOSE BLDC GLUCOMTR-MCNC: 113 MG/DL — HIGH (ref 70–99)
GLUCOSE BLDC GLUCOMTR-MCNC: 125 MG/DL — HIGH (ref 70–99)
GLUCOSE BLDC GLUCOMTR-MCNC: 127 MG/DL — HIGH (ref 70–99)
GLUCOSE BLDC GLUCOMTR-MCNC: 133 MG/DL — HIGH (ref 70–99)
GLUCOSE BLDC GLUCOMTR-MCNC: 143 MG/DL — HIGH (ref 70–99)
GLUCOSE BLDC GLUCOMTR-MCNC: 159 MG/DL — HIGH (ref 70–99)
GLUCOSE BLDC GLUCOMTR-MCNC: 173 MG/DL — HIGH (ref 70–99)
GLUCOSE BLDC GLUCOMTR-MCNC: 173 MG/DL — HIGH (ref 70–99)
GLUCOSE BLDC GLUCOMTR-MCNC: 182 MG/DL — HIGH (ref 70–99)
GLUCOSE BLDC GLUCOMTR-MCNC: 199 MG/DL — HIGH (ref 70–99)
GLUCOSE BLDC GLUCOMTR-MCNC: 200 MG/DL — HIGH (ref 70–99)
GLUCOSE BLDC GLUCOMTR-MCNC: 265 MG/DL — HIGH (ref 70–99)
GLUCOSE BLDC GLUCOMTR-MCNC: 318 MG/DL — HIGH (ref 70–99)
GLUCOSE SERPL-MCNC: 107 MG/DL — HIGH (ref 70–99)
GLUCOSE SERPL-MCNC: 114 MG/DL — HIGH (ref 70–99)
GLUCOSE SERPL-MCNC: 116 MG/DL — HIGH (ref 70–99)
GLUCOSE SERPL-MCNC: 132 MG/DL — HIGH (ref 70–99)
GLUCOSE SERPL-MCNC: 143 MG/DL — HIGH (ref 70–99)
GLUCOSE SERPL-MCNC: 143 MG/DL — HIGH (ref 70–99)
GLUCOSE SERPL-MCNC: 148 MG/DL — HIGH (ref 70–99)
GLUCOSE SERPL-MCNC: 151 MG/DL — HIGH (ref 70–99)
GLUCOSE SERPL-MCNC: 168 MG/DL — HIGH (ref 70–99)
GLUCOSE SERPL-MCNC: 200 MG/DL — HIGH (ref 70–99)
HAPTOGLOB SERPL-MCNC: 187 MG/DL — SIGNIFICANT CHANGE UP (ref 34–200)
HAV IGM SER-ACNC: SIGNIFICANT CHANGE UP
HBV CORE IGM SER-ACNC: SIGNIFICANT CHANGE UP
HBV SURFACE AG SER-ACNC: SIGNIFICANT CHANGE UP
HCT VFR BLD CALC: 20.6 % — CRITICAL LOW (ref 39–50)
HCT VFR BLD CALC: 21.1 % — LOW (ref 39–50)
HCT VFR BLD CALC: 21.8 % — LOW (ref 39–50)
HCT VFR BLD CALC: 23.5 % — LOW (ref 39–50)
HCT VFR BLD CALC: 24.5 % — LOW (ref 39–50)
HCT VFR BLD CALC: 24.5 % — LOW (ref 39–50)
HCT VFR BLD CALC: 24.9 % — LOW (ref 39–50)
HCT VFR BLD CALC: 25.1 % — LOW (ref 39–50)
HCT VFR BLD CALC: 25.1 % — LOW (ref 39–50)
HCT VFR BLD CALC: 25.2 % — LOW (ref 39–50)
HCT VFR BLD CALC: 25.4 % — LOW (ref 39–50)
HCT VFR BLD CALC: 25.8 % — LOW (ref 39–50)
HCT VFR BLD CALC: 25.9 % — LOW (ref 39–50)
HCT VFR BLD CALC: 26.7 % — LOW (ref 39–50)
HCT VFR BLD CALC: 27.1 % — LOW (ref 39–50)
HCT VFR BLD CALC: 29.2 % — LOW (ref 39–50)
HCV AB S/CO SERPL IA: 0.05 S/CO — SIGNIFICANT CHANGE UP
HCV AB SERPL-IMP: SIGNIFICANT CHANGE UP
HGB BLD-MCNC: 6.2 G/DL — CRITICAL LOW (ref 13–17)
HGB BLD-MCNC: 6.3 G/DL — CRITICAL LOW (ref 13–17)
HGB BLD-MCNC: 6.5 G/DL — CRITICAL LOW (ref 13–17)
HGB BLD-MCNC: 7 G/DL — CRITICAL LOW (ref 13–17)
HGB BLD-MCNC: 7.4 G/DL — LOW (ref 13–17)
HGB BLD-MCNC: 7.6 G/DL — LOW (ref 13–17)
HGB BLD-MCNC: 7.7 G/DL — LOW (ref 13–17)
HGB BLD-MCNC: 7.8 G/DL — LOW (ref 13–17)
HGB BLD-MCNC: 8 G/DL — LOW (ref 13–17)
HGB BLD-MCNC: 8 G/DL — LOW (ref 13–17)
HGB BLD-MCNC: 8.1 G/DL — LOW (ref 13–17)
HGB BLD-MCNC: 8.8 G/DL — LOW (ref 13–17)
HIV 1+2 AB+HIV1 P24 AG SERPL QL IA: SIGNIFICANT CHANGE UP
HYPOCHROMIA BLD QL: SLIGHT — SIGNIFICANT CHANGE UP
IMM GRANULOCYTES NFR BLD AUTO: 0.3 % — SIGNIFICANT CHANGE UP (ref 0–1.5)
IMM GRANULOCYTES NFR BLD AUTO: 0.4 % — SIGNIFICANT CHANGE UP (ref 0–1.5)
IMM GRANULOCYTES NFR BLD AUTO: 0.5 % — SIGNIFICANT CHANGE UP (ref 0–1.5)
IMM GRANULOCYTES NFR BLD AUTO: 0.5 % — SIGNIFICANT CHANGE UP (ref 0–1.5)
IMM GRANULOCYTES NFR BLD AUTO: 0.6 % — SIGNIFICANT CHANGE UP (ref 0–1.5)
IMM GRANULOCYTES NFR BLD AUTO: 0.6 % — SIGNIFICANT CHANGE UP (ref 0–1.5)
IMM GRANULOCYTES NFR BLD AUTO: 0.8 % — SIGNIFICANT CHANGE UP (ref 0–1.5)
IMM GRANULOCYTES NFR BLD AUTO: 1 % — SIGNIFICANT CHANGE UP (ref 0–1.5)
INR BLD: 1.09 — SIGNIFICANT CHANGE UP (ref 0.88–1.16)
INR BLD: 1.12 — SIGNIFICANT CHANGE UP (ref 0.88–1.16)
INR BLD: 1.14 — SIGNIFICANT CHANGE UP (ref 0.88–1.16)
INR BLD: 1.18 — HIGH (ref 0.88–1.16)
INR BLD: 1.19 — HIGH (ref 0.88–1.16)
INR BLD: 1.26 — HIGH (ref 0.88–1.16)
IRON SATN MFR SERPL: 239 UG/DL — HIGH (ref 45–165)
IRON SATN MFR SERPL: 97 % — HIGH (ref 16–55)
LDH SERPL L TO P-CCNC: 211 U/L — SIGNIFICANT CHANGE UP (ref 50–242)
LYMPHOCYTES # BLD AUTO: 0.67 K/UL — LOW (ref 1–3.3)
LYMPHOCYTES # BLD AUTO: 0.77 K/UL — LOW (ref 1–3.3)
LYMPHOCYTES # BLD AUTO: 0.87 K/UL — LOW (ref 1–3.3)
LYMPHOCYTES # BLD AUTO: 0.88 K/UL — LOW (ref 1–3.3)
LYMPHOCYTES # BLD AUTO: 0.89 K/UL — LOW (ref 1–3.3)
LYMPHOCYTES # BLD AUTO: 0.94 K/UL — LOW (ref 1–3.3)
LYMPHOCYTES # BLD AUTO: 0.99 K/UL — LOW (ref 1–3.3)
LYMPHOCYTES # BLD AUTO: 1.21 K/UL — SIGNIFICANT CHANGE UP (ref 1–3.3)
LYMPHOCYTES # BLD AUTO: 1.77 K/UL — SIGNIFICANT CHANGE UP (ref 1–3.3)
LYMPHOCYTES # BLD AUTO: 10.3 % — LOW (ref 13–44)
LYMPHOCYTES # BLD AUTO: 11.9 % — LOW (ref 13–44)
LYMPHOCYTES # BLD AUTO: 12.6 % — LOW (ref 13–44)
LYMPHOCYTES # BLD AUTO: 12.7 % — LOW (ref 13–44)
LYMPHOCYTES # BLD AUTO: 14.1 % — SIGNIFICANT CHANGE UP (ref 13–44)
LYMPHOCYTES # BLD AUTO: 14.8 % — SIGNIFICANT CHANGE UP (ref 13–44)
LYMPHOCYTES # BLD AUTO: 15.5 % — SIGNIFICANT CHANGE UP (ref 13–44)
LYMPHOCYTES # BLD AUTO: 15.6 % — SIGNIFICANT CHANGE UP (ref 13–44)
LYMPHOCYTES # BLD AUTO: 21.9 % — SIGNIFICANT CHANGE UP (ref 13–44)
MACROCYTES BLD QL: SLIGHT — SIGNIFICANT CHANGE UP
MAGNESIUM SERPL-MCNC: 2 MG/DL — SIGNIFICANT CHANGE UP (ref 1.6–2.6)
MAGNESIUM SERPL-MCNC: 2.1 MG/DL — SIGNIFICANT CHANGE UP (ref 1.6–2.6)
MAGNESIUM SERPL-MCNC: 2.1 MG/DL — SIGNIFICANT CHANGE UP (ref 1.6–2.6)
MAGNESIUM SERPL-MCNC: 2.2 MG/DL — SIGNIFICANT CHANGE UP (ref 1.6–2.6)
MAGNESIUM SERPL-MCNC: 2.2 MG/DL — SIGNIFICANT CHANGE UP (ref 1.6–2.6)
MAGNESIUM SERPL-MCNC: 2.3 MG/DL — SIGNIFICANT CHANGE UP (ref 1.6–2.6)
MAGNESIUM SERPL-MCNC: 2.3 MG/DL — SIGNIFICANT CHANGE UP (ref 1.6–2.6)
MAGNESIUM SERPL-MCNC: 2.4 MG/DL — SIGNIFICANT CHANGE UP (ref 1.6–2.6)
MANUAL SMEAR VERIFICATION: SIGNIFICANT CHANGE UP
MCHC RBC-ENTMCNC: 29.5 GM/DL — LOW (ref 32–36)
MCHC RBC-ENTMCNC: 29.8 GM/DL — LOW (ref 32–36)
MCHC RBC-ENTMCNC: 29.9 GM/DL — LOW (ref 32–36)
MCHC RBC-ENTMCNC: 30 GM/DL — LOW (ref 32–36)
MCHC RBC-ENTMCNC: 30.1 GM/DL — LOW (ref 32–36)
MCHC RBC-ENTMCNC: 30.1 GM/DL — LOW (ref 32–36)
MCHC RBC-ENTMCNC: 30.1 PG — SIGNIFICANT CHANGE UP (ref 27–34)
MCHC RBC-ENTMCNC: 30.2 GM/DL — LOW (ref 32–36)
MCHC RBC-ENTMCNC: 30.2 GM/DL — LOW (ref 32–36)
MCHC RBC-ENTMCNC: 30.3 GM/DL — LOW (ref 32–36)
MCHC RBC-ENTMCNC: 30.3 PG — SIGNIFICANT CHANGE UP (ref 27–34)
MCHC RBC-ENTMCNC: 30.3 PG — SIGNIFICANT CHANGE UP (ref 27–34)
MCHC RBC-ENTMCNC: 30.4 PG — SIGNIFICANT CHANGE UP (ref 27–34)
MCHC RBC-ENTMCNC: 30.5 GM/DL — LOW (ref 32–36)
MCHC RBC-ENTMCNC: 30.6 PG — SIGNIFICANT CHANGE UP (ref 27–34)
MCHC RBC-ENTMCNC: 30.6 PG — SIGNIFICANT CHANGE UP (ref 27–34)
MCHC RBC-ENTMCNC: 30.7 GM/DL — LOW (ref 32–36)
MCHC RBC-ENTMCNC: 30.7 PG — SIGNIFICANT CHANGE UP (ref 27–34)
MCHC RBC-ENTMCNC: 30.8 PG — SIGNIFICANT CHANGE UP (ref 27–34)
MCHC RBC-ENTMCNC: 30.9 PG — SIGNIFICANT CHANGE UP (ref 27–34)
MCHC RBC-ENTMCNC: 31 GM/DL — LOW (ref 32–36)
MCHC RBC-ENTMCNC: 31.1 GM/DL — LOW (ref 32–36)
MCHC RBC-ENTMCNC: 31.3 GM/DL — LOW (ref 32–36)
MCHC RBC-ENTMCNC: 31.4 PG — SIGNIFICANT CHANGE UP (ref 27–34)
MCHC RBC-ENTMCNC: 31.4 PG — SIGNIFICANT CHANGE UP (ref 27–34)
MCHC RBC-ENTMCNC: 31.5 PG — SIGNIFICANT CHANGE UP (ref 27–34)
MCHC RBC-ENTMCNC: 31.5 PG — SIGNIFICANT CHANGE UP (ref 27–34)
MCHC RBC-ENTMCNC: 31.7 PG — SIGNIFICANT CHANGE UP (ref 27–34)
MCHC RBC-ENTMCNC: 31.7 PG — SIGNIFICANT CHANGE UP (ref 27–34)
MCHC RBC-ENTMCNC: 32.6 PG — SIGNIFICANT CHANGE UP (ref 27–34)
MCV RBC AUTO: 100 FL — SIGNIFICANT CHANGE UP (ref 80–100)
MCV RBC AUTO: 100.4 FL — HIGH (ref 80–100)
MCV RBC AUTO: 101.2 FL — HIGH (ref 80–100)
MCV RBC AUTO: 101.2 FL — HIGH (ref 80–100)
MCV RBC AUTO: 101.5 FL — HIGH (ref 80–100)
MCV RBC AUTO: 101.6 FL — HIGH (ref 80–100)
MCV RBC AUTO: 101.7 FL — HIGH (ref 80–100)
MCV RBC AUTO: 101.9 FL — HIGH (ref 80–100)
MCV RBC AUTO: 102 FL — HIGH (ref 80–100)
MCV RBC AUTO: 102.5 FL — HIGH (ref 80–100)
MCV RBC AUTO: 102.6 FL — HIGH (ref 80–100)
MCV RBC AUTO: 103.7 FL — HIGH (ref 80–100)
MCV RBC AUTO: 106 FL — HIGH (ref 80–100)
MCV RBC AUTO: 106.3 FL — HIGH (ref 80–100)
MCV RBC AUTO: 108.4 FL — HIGH (ref 80–100)
MCV RBC AUTO: 99.2 FL — SIGNIFICANT CHANGE UP (ref 80–100)
MICROCYTES BLD QL: SLIGHT — SIGNIFICANT CHANGE UP
MONOCYTES # BLD AUTO: 0.11 K/UL — SIGNIFICANT CHANGE UP (ref 0–0.9)
MONOCYTES # BLD AUTO: 0.25 K/UL — SIGNIFICANT CHANGE UP (ref 0–0.9)
MONOCYTES # BLD AUTO: 0.43 K/UL — SIGNIFICANT CHANGE UP (ref 0–0.9)
MONOCYTES # BLD AUTO: 0.47 K/UL — SIGNIFICANT CHANGE UP (ref 0–0.9)
MONOCYTES # BLD AUTO: 0.57 K/UL — SIGNIFICANT CHANGE UP (ref 0–0.9)
MONOCYTES # BLD AUTO: 0.58 K/UL — SIGNIFICANT CHANGE UP (ref 0–0.9)
MONOCYTES # BLD AUTO: 0.58 K/UL — SIGNIFICANT CHANGE UP (ref 0–0.9)
MONOCYTES # BLD AUTO: 0.75 K/UL — SIGNIFICANT CHANGE UP (ref 0–0.9)
MONOCYTES # BLD AUTO: 0.91 K/UL — HIGH (ref 0–0.9)
MONOCYTES NFR BLD AUTO: 11.7 % — SIGNIFICANT CHANGE UP (ref 2–14)
MONOCYTES NFR BLD AUTO: 2.1 % — SIGNIFICANT CHANGE UP (ref 2–14)
MONOCYTES NFR BLD AUTO: 5 % — SIGNIFICANT CHANGE UP (ref 2–14)
MONOCYTES NFR BLD AUTO: 5.3 % — SIGNIFICANT CHANGE UP (ref 2–14)
MONOCYTES NFR BLD AUTO: 7.1 % — SIGNIFICANT CHANGE UP (ref 2–14)
MONOCYTES NFR BLD AUTO: 7.9 % — SIGNIFICANT CHANGE UP (ref 2–14)
MONOCYTES NFR BLD AUTO: 8.2 % — SIGNIFICANT CHANGE UP (ref 2–14)
MONOCYTES NFR BLD AUTO: 8.5 % — SIGNIFICANT CHANGE UP (ref 2–14)
MONOCYTES NFR BLD AUTO: 8.8 % — SIGNIFICANT CHANGE UP (ref 2–14)
NEUTROPHILS # BLD AUTO: 3.89 K/UL — SIGNIFICANT CHANGE UP (ref 1.8–7.4)
NEUTROPHILS # BLD AUTO: 4.45 K/UL — SIGNIFICANT CHANGE UP (ref 1.8–7.4)
NEUTROPHILS # BLD AUTO: 4.83 K/UL — SIGNIFICANT CHANGE UP (ref 1.8–7.4)
NEUTROPHILS # BLD AUTO: 5.2 K/UL — SIGNIFICANT CHANGE UP (ref 1.8–7.4)
NEUTROPHILS # BLD AUTO: 5.26 K/UL — SIGNIFICANT CHANGE UP (ref 1.8–7.4)
NEUTROPHILS # BLD AUTO: 5.27 K/UL — SIGNIFICANT CHANGE UP (ref 1.8–7.4)
NEUTROPHILS # BLD AUTO: 5.84 K/UL — SIGNIFICANT CHANGE UP (ref 1.8–7.4)
NEUTROPHILS # BLD AUTO: 5.87 K/UL — SIGNIFICANT CHANGE UP (ref 1.8–7.4)
NEUTROPHILS # BLD AUTO: 6.87 K/UL — SIGNIFICANT CHANGE UP (ref 1.8–7.4)
NEUTROPHILS NFR BLD AUTO: 67.5 % — SIGNIFICANT CHANGE UP (ref 43–77)
NEUTROPHILS NFR BLD AUTO: 72.6 % — SIGNIFICANT CHANGE UP (ref 43–77)
NEUTROPHILS NFR BLD AUTO: 72.8 % — SIGNIFICANT CHANGE UP (ref 43–77)
NEUTROPHILS NFR BLD AUTO: 74.3 % — SIGNIFICANT CHANGE UP (ref 43–77)
NEUTROPHILS NFR BLD AUTO: 78.2 % — HIGH (ref 43–77)
NEUTROPHILS NFR BLD AUTO: 78.3 % — HIGH (ref 43–77)
NEUTROPHILS NFR BLD AUTO: 79.7 % — HIGH (ref 43–77)
NEUTROPHILS NFR BLD AUTO: 80.4 % — HIGH (ref 43–77)
NEUTROPHILS NFR BLD AUTO: 84.4 % — HIGH (ref 43–77)
NRBC # BLD: 0 /100 WBCS — SIGNIFICANT CHANGE UP (ref 0–0)
OB PNL STL: NEGATIVE — SIGNIFICANT CHANGE UP
OVALOCYTES BLD QL SMEAR: SLIGHT — SIGNIFICANT CHANGE UP
PHOSPHATE SERPL-MCNC: 3.7 MG/DL — SIGNIFICANT CHANGE UP (ref 2.5–4.5)
PHOSPHATE SERPL-MCNC: 4 MG/DL — SIGNIFICANT CHANGE UP (ref 2.5–4.5)
PHOSPHATE SERPL-MCNC: 4.3 MG/DL — SIGNIFICANT CHANGE UP (ref 2.5–4.5)
PHOSPHATE SERPL-MCNC: 4.4 MG/DL — SIGNIFICANT CHANGE UP (ref 2.5–4.5)
PHOSPHATE SERPL-MCNC: 4.5 MG/DL — SIGNIFICANT CHANGE UP (ref 2.5–4.5)
PHOSPHATE SERPL-MCNC: 5.2 MG/DL — HIGH (ref 2.5–4.5)
PLAT MORPH BLD: ABNORMAL
PLATELET # BLD AUTO: 134 K/UL — LOW (ref 150–400)
PLATELET # BLD AUTO: 135 K/UL — LOW (ref 150–400)
PLATELET # BLD AUTO: 138 K/UL — LOW (ref 150–400)
PLATELET # BLD AUTO: 144 K/UL — LOW (ref 150–400)
PLATELET # BLD AUTO: 145 K/UL — LOW (ref 150–400)
PLATELET # BLD AUTO: 146 K/UL — LOW (ref 150–400)
PLATELET # BLD AUTO: 148 K/UL — LOW (ref 150–400)
PLATELET # BLD AUTO: 154 K/UL — SIGNIFICANT CHANGE UP (ref 150–400)
PLATELET # BLD AUTO: 159 K/UL — SIGNIFICANT CHANGE UP (ref 150–400)
PLATELET # BLD AUTO: 175 K/UL — SIGNIFICANT CHANGE UP (ref 150–400)
PLATELET # BLD AUTO: 188 K/UL — SIGNIFICANT CHANGE UP (ref 150–400)
PLATELET # BLD AUTO: 189 K/UL — SIGNIFICANT CHANGE UP (ref 150–400)
PLATELET # BLD AUTO: 194 K/UL — SIGNIFICANT CHANGE UP (ref 150–400)
PLATELET # BLD AUTO: 194 K/UL — SIGNIFICANT CHANGE UP (ref 150–400)
PLATELET # BLD AUTO: 200 K/UL — SIGNIFICANT CHANGE UP (ref 150–400)
PLATELET # BLD AUTO: 227 K/UL — SIGNIFICANT CHANGE UP (ref 150–400)
POIKILOCYTOSIS BLD QL AUTO: SLIGHT — SIGNIFICANT CHANGE UP
POLYCHROMASIA BLD QL SMEAR: SLIGHT — SIGNIFICANT CHANGE UP
POTASSIUM SERPL-MCNC: 4.6 MMOL/L — SIGNIFICANT CHANGE UP (ref 3.5–5.3)
POTASSIUM SERPL-MCNC: 4.8 MMOL/L — SIGNIFICANT CHANGE UP (ref 3.5–5.3)
POTASSIUM SERPL-MCNC: 4.9 MMOL/L — SIGNIFICANT CHANGE UP (ref 3.5–5.3)
POTASSIUM SERPL-MCNC: 5 MMOL/L — SIGNIFICANT CHANGE UP (ref 3.5–5.3)
POTASSIUM SERPL-MCNC: 5.1 MMOL/L — SIGNIFICANT CHANGE UP (ref 3.5–5.3)
POTASSIUM SERPL-MCNC: 5.1 MMOL/L — SIGNIFICANT CHANGE UP (ref 3.5–5.3)
POTASSIUM SERPL-MCNC: 5.2 MMOL/L — SIGNIFICANT CHANGE UP (ref 3.5–5.3)
POTASSIUM SERPL-MCNC: 5.3 MMOL/L — SIGNIFICANT CHANGE UP (ref 3.5–5.3)
POTASSIUM SERPL-MCNC: 5.5 MMOL/L — HIGH (ref 3.5–5.3)
POTASSIUM SERPL-MCNC: 5.7 MMOL/L — HIGH (ref 3.5–5.3)
POTASSIUM SERPL-SCNC: 4.6 MMOL/L — SIGNIFICANT CHANGE UP (ref 3.5–5.3)
POTASSIUM SERPL-SCNC: 4.8 MMOL/L — SIGNIFICANT CHANGE UP (ref 3.5–5.3)
POTASSIUM SERPL-SCNC: 4.9 MMOL/L — SIGNIFICANT CHANGE UP (ref 3.5–5.3)
POTASSIUM SERPL-SCNC: 5 MMOL/L — SIGNIFICANT CHANGE UP (ref 3.5–5.3)
POTASSIUM SERPL-SCNC: 5.1 MMOL/L — SIGNIFICANT CHANGE UP (ref 3.5–5.3)
POTASSIUM SERPL-SCNC: 5.1 MMOL/L — SIGNIFICANT CHANGE UP (ref 3.5–5.3)
POTASSIUM SERPL-SCNC: 5.2 MMOL/L — SIGNIFICANT CHANGE UP (ref 3.5–5.3)
POTASSIUM SERPL-SCNC: 5.3 MMOL/L — SIGNIFICANT CHANGE UP (ref 3.5–5.3)
POTASSIUM SERPL-SCNC: 5.5 MMOL/L — HIGH (ref 3.5–5.3)
POTASSIUM SERPL-SCNC: 5.7 MMOL/L — HIGH (ref 3.5–5.3)
PROT SERPL-MCNC: 5.4 G/DL — LOW (ref 6–8.3)
PROT SERPL-MCNC: 5.4 G/DL — LOW (ref 6–8.3)
PROT SERPL-MCNC: 5.5 G/DL — LOW (ref 6–8.3)
PROT SERPL-MCNC: 5.6 G/DL — LOW (ref 6–8.3)
PROT SERPL-MCNC: 5.6 G/DL — LOW (ref 6–8.3)
PROT SERPL-MCNC: 5.7 G/DL — LOW (ref 6–8.3)
PROTHROM AB SERPL-ACNC: 13 SEC — SIGNIFICANT CHANGE UP (ref 10.6–13.6)
PROTHROM AB SERPL-ACNC: 13.4 SEC — SIGNIFICANT CHANGE UP (ref 10.6–13.6)
PROTHROM AB SERPL-ACNC: 13.6 SEC — SIGNIFICANT CHANGE UP (ref 10.6–13.6)
PROTHROM AB SERPL-ACNC: 14.1 SEC — HIGH (ref 10.6–13.6)
PROTHROM AB SERPL-ACNC: 14.2 SEC — HIGH (ref 10.6–13.6)
PROTHROM AB SERPL-ACNC: 14.9 SEC — HIGH (ref 10.6–13.6)
PT 50/50: 12.9 SECS — SIGNIFICANT CHANGE UP (ref 10.6–14.7)
RBC # BLD: 1.9 M/UL — LOW (ref 4.2–5.8)
RBC # BLD: 1.99 M/UL — LOW (ref 4.2–5.8)
RBC # BLD: 2.05 M/UL — LOW (ref 4.2–5.8)
RBC # BLD: 2.29 M/UL — LOW (ref 4.2–5.8)
RBC # BLD: 2.41 M/UL — LOW (ref 4.2–5.8)
RBC # BLD: 2.42 M/UL — LOW (ref 4.2–5.8)
RBC # BLD: 2.45 M/UL — LOW (ref 4.2–5.8)
RBC # BLD: 2.47 M/UL — LOW (ref 4.2–5.8)
RBC # BLD: 2.48 M/UL — LOW (ref 4.2–5.8)
RBC # BLD: 2.51 M/UL — LOW (ref 4.2–5.8)
RBC # BLD: 2.51 M/UL — LOW (ref 4.2–5.8)
RBC # BLD: 2.54 M/UL — LOW (ref 4.2–5.8)
RBC # BLD: 2.58 M/UL — LOW (ref 4.2–5.8)
RBC # BLD: 2.58 M/UL — LOW (ref 4.2–5.8)
RBC # BLD: 2.63 M/UL — LOW (ref 4.2–5.8)
RBC # BLD: 2.66 M/UL — LOW (ref 4.2–5.8)
RBC # BLD: 2.85 M/UL — LOW (ref 4.2–5.8)
RBC # FLD: 15.1 % — HIGH (ref 10.3–14.5)
RBC # FLD: 16.2 % — HIGH (ref 10.3–14.5)
RBC # FLD: 16.3 % — HIGH (ref 10.3–14.5)
RBC # FLD: 16.3 % — HIGH (ref 10.3–14.5)
RBC # FLD: 16.5 % — HIGH (ref 10.3–14.5)
RBC # FLD: 16.8 % — HIGH (ref 10.3–14.5)
RBC # FLD: 17.1 % — HIGH (ref 10.3–14.5)
RBC # FLD: 17.3 % — HIGH (ref 10.3–14.5)
RBC # FLD: 17.4 % — HIGH (ref 10.3–14.5)
RBC # FLD: 17.8 % — HIGH (ref 10.3–14.5)
RBC # FLD: 18.2 % — HIGH (ref 10.3–14.5)
RBC # FLD: 18.4 % — HIGH (ref 10.3–14.5)
RBC # FLD: 18.6 % — HIGH (ref 10.3–14.5)
RBC # FLD: 18.7 % — HIGH (ref 10.3–14.5)
RBC # FLD: 18.9 % — HIGH (ref 10.3–14.5)
RBC # FLD: 19.1 % — HIGH (ref 10.3–14.5)
RBC BLD AUTO: ABNORMAL
RETICS #: 79.5 K/UL — SIGNIFICANT CHANGE UP (ref 25–125)
RETICS/RBC NFR: 3.1 % — HIGH (ref 0.5–2.5)
RH IG SCN BLD-IMP: POSITIVE — SIGNIFICANT CHANGE UP
SARS-COV-2 RNA SPEC QL NAA+PROBE: NEGATIVE — SIGNIFICANT CHANGE UP
SODIUM SERPL-SCNC: 134 MMOL/L — LOW (ref 135–145)
SODIUM SERPL-SCNC: 135 MMOL/L — SIGNIFICANT CHANGE UP (ref 135–145)
SODIUM SERPL-SCNC: 137 MMOL/L — SIGNIFICANT CHANGE UP (ref 135–145)
SODIUM SERPL-SCNC: 137 MMOL/L — SIGNIFICANT CHANGE UP (ref 135–145)
SODIUM SERPL-SCNC: 138 MMOL/L — SIGNIFICANT CHANGE UP (ref 135–145)
SODIUM SERPL-SCNC: 140 MMOL/L — SIGNIFICANT CHANGE UP (ref 135–145)
SODIUM SERPL-SCNC: 142 MMOL/L — SIGNIFICANT CHANGE UP (ref 135–145)
SODIUM SERPL-SCNC: 142 MMOL/L — SIGNIFICANT CHANGE UP (ref 135–145)
THROMBIN TIME: 22.3 SEC — SIGNIFICANT CHANGE UP (ref 17.6–24)
TIBC SERPL-MCNC: 246 UG/DL — SIGNIFICANT CHANGE UP (ref 220–430)
TRANSFERRIN SERPL-MCNC: 193 MG/DL — LOW (ref 200–360)
VIT B12 SERPL-MCNC: 694 PG/ML — SIGNIFICANT CHANGE UP (ref 232–1245)
VWF:RCO ACT/NOR PPP PL AGG: 291 % — HIGH (ref 45–133)
WBC # BLD: 4.97 K/UL — SIGNIFICANT CHANGE UP (ref 3.8–10.5)
WBC # BLD: 5.27 K/UL — SIGNIFICANT CHANGE UP (ref 3.8–10.5)
WBC # BLD: 6.65 K/UL — SIGNIFICANT CHANGE UP (ref 3.8–10.5)
WBC # BLD: 6.67 K/UL — SIGNIFICANT CHANGE UP (ref 3.8–10.5)
WBC # BLD: 6.77 K/UL — SIGNIFICANT CHANGE UP (ref 3.8–10.5)
WBC # BLD: 7.09 K/UL — SIGNIFICANT CHANGE UP (ref 3.8–10.5)
WBC # BLD: 7.33 K/UL — SIGNIFICANT CHANGE UP (ref 3.8–10.5)
WBC # BLD: 7.78 K/UL — SIGNIFICANT CHANGE UP (ref 3.8–10.5)
WBC # BLD: 7.85 K/UL — SIGNIFICANT CHANGE UP (ref 3.8–10.5)
WBC # BLD: 7.88 K/UL — SIGNIFICANT CHANGE UP (ref 3.8–10.5)
WBC # BLD: 8.06 K/UL — SIGNIFICANT CHANGE UP (ref 3.8–10.5)
WBC # BLD: 8.09 K/UL — SIGNIFICANT CHANGE UP (ref 3.8–10.5)
WBC # BLD: 8.27 K/UL — SIGNIFICANT CHANGE UP (ref 3.8–10.5)
WBC # BLD: 8.54 K/UL — SIGNIFICANT CHANGE UP (ref 3.8–10.5)
WBC # BLD: 8.66 K/UL — SIGNIFICANT CHANGE UP (ref 3.8–10.5)
WBC # BLD: 8.75 K/UL — SIGNIFICANT CHANGE UP (ref 3.8–10.5)
WBC # FLD AUTO: 4.97 K/UL — SIGNIFICANT CHANGE UP (ref 3.8–10.5)
WBC # FLD AUTO: 5.27 K/UL — SIGNIFICANT CHANGE UP (ref 3.8–10.5)
WBC # FLD AUTO: 6.65 K/UL — SIGNIFICANT CHANGE UP (ref 3.8–10.5)
WBC # FLD AUTO: 6.67 K/UL — SIGNIFICANT CHANGE UP (ref 3.8–10.5)
WBC # FLD AUTO: 6.77 K/UL — SIGNIFICANT CHANGE UP (ref 3.8–10.5)
WBC # FLD AUTO: 7.09 K/UL — SIGNIFICANT CHANGE UP (ref 3.8–10.5)
WBC # FLD AUTO: 7.33 K/UL — SIGNIFICANT CHANGE UP (ref 3.8–10.5)
WBC # FLD AUTO: 7.78 K/UL — SIGNIFICANT CHANGE UP (ref 3.8–10.5)
WBC # FLD AUTO: 7.85 K/UL — SIGNIFICANT CHANGE UP (ref 3.8–10.5)
WBC # FLD AUTO: 7.88 K/UL — SIGNIFICANT CHANGE UP (ref 3.8–10.5)
WBC # FLD AUTO: 8.06 K/UL — SIGNIFICANT CHANGE UP (ref 3.8–10.5)
WBC # FLD AUTO: 8.09 K/UL — SIGNIFICANT CHANGE UP (ref 3.8–10.5)
WBC # FLD AUTO: 8.27 K/UL — SIGNIFICANT CHANGE UP (ref 3.8–10.5)
WBC # FLD AUTO: 8.54 K/UL — SIGNIFICANT CHANGE UP (ref 3.8–10.5)
WBC # FLD AUTO: 8.66 K/UL — SIGNIFICANT CHANGE UP (ref 3.8–10.5)
WBC # FLD AUTO: 8.75 K/UL — SIGNIFICANT CHANGE UP (ref 3.8–10.5)

## 2021-01-01 PROCEDURE — 99233 SBSQ HOSP IP/OBS HIGH 50: CPT | Mod: GC

## 2021-01-01 PROCEDURE — 73706 CT ANGIO LWR EXTR W/O&W/DYE: CPT | Mod: 26,LT,MA

## 2021-01-01 PROCEDURE — 93971 EXTREMITY STUDY: CPT | Mod: 26,LT

## 2021-01-01 PROCEDURE — 74176 CT ABD & PELVIS W/O CONTRAST: CPT | Mod: 26

## 2021-01-01 PROCEDURE — 99233 SBSQ HOSP IP/OBS HIGH 50: CPT

## 2021-01-01 PROCEDURE — 71045 X-RAY EXAM CHEST 1 VIEW: CPT | Mod: 26

## 2021-01-01 PROCEDURE — 99285 EMERGENCY DEPT VISIT HI MDM: CPT

## 2021-01-01 PROCEDURE — 99223 1ST HOSP IP/OBS HIGH 75: CPT

## 2021-01-01 PROCEDURE — 93306 TTE W/DOPPLER COMPLETE: CPT | Mod: 26

## 2021-01-01 RX ORDER — ACETAMINOPHEN 500 MG
1000 TABLET ORAL ONCE
Refills: 0 | Status: DISCONTINUED | OUTPATIENT
Start: 2021-01-01 | End: 2021-01-01

## 2021-01-01 RX ORDER — DEXTROSE 50 % IN WATER 50 %
12.5 SYRINGE (ML) INTRAVENOUS ONCE
Refills: 0 | Status: DISCONTINUED | OUTPATIENT
Start: 2021-01-01 | End: 2021-01-01

## 2021-01-01 RX ORDER — POLYETHYLENE GLYCOL 3350 17 G/17G
17 POWDER, FOR SOLUTION ORAL DAILY
Refills: 0 | Status: DISCONTINUED | OUTPATIENT
Start: 2021-01-01 | End: 2022-01-01

## 2021-01-01 RX ORDER — DEXTROSE 50 % IN WATER 50 %
12.5 SYRINGE (ML) INTRAVENOUS ONCE
Refills: 0 | Status: DISCONTINUED | OUTPATIENT
Start: 2021-01-01 | End: 2022-01-01

## 2021-01-01 RX ORDER — SODIUM CHLORIDE 9 MG/ML
250 INJECTION, SOLUTION INTRAVENOUS ONCE
Refills: 0 | Status: COMPLETED | OUTPATIENT
Start: 2021-01-01 | End: 2021-01-01

## 2021-01-01 RX ORDER — FUROSEMIDE 40 MG
20 TABLET ORAL ONCE
Refills: 0 | Status: COMPLETED | OUTPATIENT
Start: 2021-01-01 | End: 2021-01-01

## 2021-01-01 RX ORDER — TAMSULOSIN HYDROCHLORIDE 0.4 MG/1
0.4 CAPSULE ORAL AT BEDTIME
Refills: 0 | Status: DISCONTINUED | OUTPATIENT
Start: 2021-01-01 | End: 2022-01-01

## 2021-01-01 RX ORDER — CHOLECALCIFEROL (VITAMIN D3) 125 MCG
1000 CAPSULE ORAL DAILY
Refills: 0 | Status: DISCONTINUED | OUTPATIENT
Start: 2021-01-01 | End: 2022-01-01

## 2021-01-01 RX ORDER — FERROUS SULFATE 325(65) MG
1 TABLET ORAL
Qty: 0 | Refills: 0 | DISCHARGE

## 2021-01-01 RX ORDER — OXYCODONE HYDROCHLORIDE 5 MG/1
10 TABLET ORAL EVERY 6 HOURS
Refills: 0 | Status: DISCONTINUED | OUTPATIENT
Start: 2021-01-01 | End: 2022-01-01

## 2021-01-01 RX ORDER — ATORVASTATIN CALCIUM 80 MG/1
20 TABLET, FILM COATED ORAL AT BEDTIME
Refills: 0 | Status: DISCONTINUED | OUTPATIENT
Start: 2021-01-01 | End: 2022-01-01

## 2021-01-01 RX ORDER — OXYCODONE AND ACETAMINOPHEN 5; 325 MG/1; MG/1
1 TABLET ORAL ONCE
Refills: 0 | Status: DISCONTINUED | OUTPATIENT
Start: 2021-01-01 | End: 2021-01-01

## 2021-01-01 RX ORDER — SODIUM CHLORIDE 9 MG/ML
1000 INJECTION, SOLUTION INTRAVENOUS
Refills: 0 | Status: DISCONTINUED | OUTPATIENT
Start: 2021-01-01 | End: 2022-01-01

## 2021-01-01 RX ORDER — OXYCODONE AND ACETAMINOPHEN 5; 325 MG/1; MG/1
1 TABLET ORAL EVERY 6 HOURS
Refills: 0 | Status: DISCONTINUED | OUTPATIENT
Start: 2021-01-01 | End: 2021-01-01

## 2021-01-01 RX ORDER — INSULIN LISPRO 100/ML
VIAL (ML) SUBCUTANEOUS
Refills: 0 | Status: DISCONTINUED | OUTPATIENT
Start: 2021-01-01 | End: 2021-01-01

## 2021-01-01 RX ORDER — TRAMADOL HYDROCHLORIDE 50 MG/1
25 TABLET ORAL EVERY 6 HOURS
Refills: 0 | Status: DISCONTINUED | OUTPATIENT
Start: 2021-01-01 | End: 2022-01-01

## 2021-01-01 RX ORDER — DEXTROSE 50 % IN WATER 50 %
25 SYRINGE (ML) INTRAVENOUS ONCE
Refills: 0 | Status: DISCONTINUED | OUTPATIENT
Start: 2021-01-01 | End: 2022-01-01

## 2021-01-01 RX ORDER — FOLIC ACID 0.8 MG
1 TABLET ORAL DAILY
Refills: 0 | Status: DISCONTINUED | OUTPATIENT
Start: 2021-01-01 | End: 2022-01-01

## 2021-01-01 RX ORDER — TRAMADOL HYDROCHLORIDE 50 MG/1
25 TABLET ORAL EVERY 6 HOURS
Refills: 0 | Status: DISCONTINUED | OUTPATIENT
Start: 2021-01-01 | End: 2021-01-01

## 2021-01-01 RX ORDER — ACETAMINOPHEN 500 MG
650 TABLET ORAL ONCE
Refills: 0 | Status: COMPLETED | OUTPATIENT
Start: 2021-01-01 | End: 2021-01-01

## 2021-01-01 RX ORDER — DEXTROSE 50 % IN WATER 50 %
25 SYRINGE (ML) INTRAVENOUS ONCE
Refills: 0 | Status: DISCONTINUED | OUTPATIENT
Start: 2021-01-01 | End: 2021-01-01

## 2021-01-01 RX ORDER — SODIUM POLYSTYRENE SULFONATE 4.1 MEQ/G
30 POWDER, FOR SUSPENSION ORAL ONCE
Refills: 0 | Status: DISCONTINUED | OUTPATIENT
Start: 2021-01-01 | End: 2021-01-01

## 2021-01-01 RX ORDER — ACETAMINOPHEN 500 MG
650 TABLET ORAL EVERY 6 HOURS
Refills: 0 | Status: DISCONTINUED | OUTPATIENT
Start: 2021-01-01 | End: 2022-01-01

## 2021-01-01 RX ORDER — INSULIN LISPRO 100/ML
VIAL (ML) SUBCUTANEOUS
Refills: 0 | Status: DISCONTINUED | OUTPATIENT
Start: 2021-01-01 | End: 2022-01-01

## 2021-01-01 RX ORDER — MEMANTINE HYDROCHLORIDE 10 MG/1
5 TABLET ORAL DAILY
Refills: 0 | Status: DISCONTINUED | OUTPATIENT
Start: 2021-01-01 | End: 2022-01-01

## 2021-01-01 RX ORDER — CYCLOPHOSPHAMIDE 100 MG
75 VIAL (EA) INTRAVENOUS DAILY
Refills: 0 | Status: DISCONTINUED | OUTPATIENT
Start: 2021-01-01 | End: 2022-01-01

## 2021-01-01 RX ORDER — SENNA PLUS 8.6 MG/1
1 TABLET ORAL ONCE
Refills: 0 | Status: COMPLETED | OUTPATIENT
Start: 2021-01-01 | End: 2021-01-01

## 2021-01-01 RX ORDER — DEXTROSE 50 % IN WATER 50 %
15 SYRINGE (ML) INTRAVENOUS ONCE
Refills: 0 | Status: DISCONTINUED | OUTPATIENT
Start: 2021-01-01 | End: 2021-01-01

## 2021-01-01 RX ORDER — SENNA PLUS 8.6 MG/1
2 TABLET ORAL ONCE
Refills: 0 | Status: COMPLETED | OUTPATIENT
Start: 2021-01-01 | End: 2021-01-01

## 2021-01-01 RX ORDER — DONEPEZIL HYDROCHLORIDE 10 MG/1
5 TABLET, FILM COATED ORAL AT BEDTIME
Refills: 0 | Status: DISCONTINUED | OUTPATIENT
Start: 2021-01-01 | End: 2022-01-01

## 2021-01-01 RX ORDER — HEPARIN SODIUM 5000 [USP'U]/ML
5000 INJECTION INTRAVENOUS; SUBCUTANEOUS EVERY 12 HOURS
Refills: 0 | Status: DISCONTINUED | OUTPATIENT
Start: 2021-01-01 | End: 2021-01-01

## 2021-01-01 RX ORDER — CYCLOPHOSPHAMIDE 100 MG
50 VIAL (EA) INTRAVENOUS ONCE
Refills: 0 | Status: COMPLETED | OUTPATIENT
Start: 2021-01-01 | End: 2021-01-01

## 2021-01-01 RX ORDER — ONDANSETRON 8 MG/1
12 TABLET, FILM COATED ORAL DAILY
Refills: 0 | Status: DISCONTINUED | OUTPATIENT
Start: 2021-01-01 | End: 2022-01-01

## 2021-01-01 RX ORDER — HEPARIN SODIUM 5000 [USP'U]/ML
5000 INJECTION INTRAVENOUS; SUBCUTANEOUS EVERY 8 HOURS
Refills: 0 | Status: DISCONTINUED | OUTPATIENT
Start: 2021-01-01 | End: 2021-01-01

## 2021-01-01 RX ORDER — GLUCAGON INJECTION, SOLUTION 0.5 MG/.1ML
1 INJECTION, SOLUTION SUBCUTANEOUS ONCE
Refills: 0 | Status: DISCONTINUED | OUTPATIENT
Start: 2021-01-01 | End: 2022-01-01

## 2021-01-01 RX ORDER — PANTOPRAZOLE SODIUM 20 MG/1
40 TABLET, DELAYED RELEASE ORAL
Refills: 0 | Status: DISCONTINUED | OUTPATIENT
Start: 2021-01-01 | End: 2022-01-01

## 2021-01-01 RX ORDER — TRAZODONE HCL 50 MG
25 TABLET ORAL AT BEDTIME
Refills: 0 | Status: DISCONTINUED | OUTPATIENT
Start: 2021-01-01 | End: 2022-01-01

## 2021-01-01 RX ORDER — BICALUTAMIDE 50 MG/1
1 TABLET, FILM COATED ORAL
Qty: 0 | Refills: 0 | DISCHARGE

## 2021-01-01 RX ORDER — SODIUM CHLORIDE 9 MG/ML
1000 INJECTION, SOLUTION INTRAVENOUS
Refills: 0 | Status: DISCONTINUED | OUTPATIENT
Start: 2021-01-01 | End: 2021-01-01

## 2021-01-01 RX ORDER — LEVOTHYROXINE SODIUM 125 MCG
25 TABLET ORAL DAILY
Refills: 0 | Status: DISCONTINUED | OUTPATIENT
Start: 2021-01-01 | End: 2022-01-01

## 2021-01-01 RX ORDER — SODIUM ZIRCONIUM CYCLOSILICATE 10 G/10G
10 POWDER, FOR SUSPENSION ORAL ONCE
Refills: 0 | Status: COMPLETED | OUTPATIENT
Start: 2021-01-01 | End: 2021-01-01

## 2021-01-01 RX ORDER — FUROSEMIDE 40 MG
1 TABLET ORAL
Qty: 0 | Refills: 0 | DISCHARGE

## 2021-01-01 RX ORDER — DEXTROSE 50 % IN WATER 50 %
15 SYRINGE (ML) INTRAVENOUS ONCE
Refills: 0 | Status: DISCONTINUED | OUTPATIENT
Start: 2021-01-01 | End: 2022-01-01

## 2021-01-01 RX ORDER — SODIUM ZIRCONIUM CYCLOSILICATE 10 G/10G
5 POWDER, FOR SUSPENSION ORAL ONCE
Refills: 0 | Status: COMPLETED | OUTPATIENT
Start: 2021-01-01 | End: 2021-01-01

## 2021-01-01 RX ORDER — ASPIRIN/CALCIUM CARB/MAGNESIUM 324 MG
81 TABLET ORAL DAILY
Refills: 0 | Status: DISCONTINUED | OUTPATIENT
Start: 2021-01-01 | End: 2021-01-01

## 2021-01-01 RX ORDER — METOPROLOL TARTRATE 50 MG
25 TABLET ORAL EVERY 12 HOURS
Refills: 0 | Status: DISCONTINUED | OUTPATIENT
Start: 2021-01-01 | End: 2022-01-01

## 2021-01-01 RX ORDER — DEXTROSE 50 % IN WATER 50 %
50 SYRINGE (ML) INTRAVENOUS ONCE
Refills: 0 | Status: COMPLETED | OUTPATIENT
Start: 2021-01-01 | End: 2021-01-01

## 2021-01-01 RX ORDER — METOPROLOL TARTRATE 50 MG
50 TABLET ORAL DAILY
Refills: 0 | Status: DISCONTINUED | OUTPATIENT
Start: 2021-01-01 | End: 2021-01-01

## 2021-01-01 RX ORDER — INSULIN HUMAN 100 [IU]/ML
10 INJECTION, SOLUTION SUBCUTANEOUS ONCE
Refills: 0 | Status: COMPLETED | OUTPATIENT
Start: 2021-01-01 | End: 2021-01-01

## 2021-01-01 RX ORDER — WARFARIN SODIUM 2.5 MG/1
1 TABLET ORAL
Qty: 0 | Refills: 0 | DISCHARGE

## 2021-01-01 RX ORDER — OXYCODONE AND ACETAMINOPHEN 5; 325 MG/1; MG/1
2 TABLET ORAL EVERY 6 HOURS
Refills: 0 | Status: DISCONTINUED | OUTPATIENT
Start: 2021-01-01 | End: 2021-01-01

## 2021-01-01 RX ORDER — SENNA PLUS 8.6 MG/1
1 TABLET ORAL AT BEDTIME
Refills: 0 | Status: DISCONTINUED | OUTPATIENT
Start: 2021-01-01 | End: 2022-01-01

## 2021-01-01 RX ADMIN — DONEPEZIL HYDROCHLORIDE 5 MILLIGRAM(S): 10 TABLET, FILM COATED ORAL at 22:50

## 2021-01-01 RX ADMIN — SODIUM CHLORIDE 125 MILLILITER(S): 9 INJECTION, SOLUTION INTRAVENOUS at 18:20

## 2021-01-01 RX ADMIN — HEPARIN SODIUM 5000 UNIT(S): 5000 INJECTION INTRAVENOUS; SUBCUTANEOUS at 22:05

## 2021-01-01 RX ADMIN — Medication 650 MILLIGRAM(S): at 06:33

## 2021-01-01 RX ADMIN — Medication 1 MILLIGRAM(S): at 11:08

## 2021-01-01 RX ADMIN — Medication 1 MILLIGRAM(S): at 11:59

## 2021-01-01 RX ADMIN — Medication 75 MILLIGRAM(S): at 13:45

## 2021-01-01 RX ADMIN — Medication 1 TABLET(S): at 11:08

## 2021-01-01 RX ADMIN — Medication 1000 UNIT(S): at 13:15

## 2021-01-01 RX ADMIN — Medication 60 MILLIGRAM(S): at 13:25

## 2021-01-01 RX ADMIN — TRAMADOL HYDROCHLORIDE 25 MILLIGRAM(S): 50 TABLET ORAL at 19:00

## 2021-01-01 RX ADMIN — TAMSULOSIN HYDROCHLORIDE 0.4 MILLIGRAM(S): 0.4 CAPSULE ORAL at 21:32

## 2021-01-01 RX ADMIN — Medication 25 MICROGRAM(S): at 06:53

## 2021-01-01 RX ADMIN — ATORVASTATIN CALCIUM 20 MILLIGRAM(S): 80 TABLET, FILM COATED ORAL at 21:33

## 2021-01-01 RX ADMIN — MEMANTINE HYDROCHLORIDE 5 MILLIGRAM(S): 10 TABLET ORAL at 13:15

## 2021-01-01 RX ADMIN — OXYCODONE HYDROCHLORIDE 10 MILLIGRAM(S): 5 TABLET ORAL at 08:41

## 2021-01-01 RX ADMIN — Medication 1000 UNIT(S): at 13:25

## 2021-01-01 RX ADMIN — Medication 2: at 21:47

## 2021-01-01 RX ADMIN — OXYCODONE HYDROCHLORIDE 10 MILLIGRAM(S): 5 TABLET ORAL at 09:46

## 2021-01-01 RX ADMIN — PANTOPRAZOLE SODIUM 40 MILLIGRAM(S): 20 TABLET, DELAYED RELEASE ORAL at 05:49

## 2021-01-01 RX ADMIN — Medication 1 TABLET(S): at 11:44

## 2021-01-01 RX ADMIN — ONDANSETRON 224 MILLIGRAM(S): 8 TABLET, FILM COATED ORAL at 12:36

## 2021-01-01 RX ADMIN — Medication 50 MILLILITER(S): at 11:22

## 2021-01-01 RX ADMIN — ATORVASTATIN CALCIUM 20 MILLIGRAM(S): 80 TABLET, FILM COATED ORAL at 21:06

## 2021-01-01 RX ADMIN — Medication 2: at 22:20

## 2021-01-01 RX ADMIN — Medication 2: at 17:41

## 2021-01-01 RX ADMIN — DONEPEZIL HYDROCHLORIDE 5 MILLIGRAM(S): 10 TABLET, FILM COATED ORAL at 21:49

## 2021-01-01 RX ADMIN — Medication 8: at 12:56

## 2021-01-01 RX ADMIN — Medication 75 MILLIGRAM(S): at 13:31

## 2021-01-01 RX ADMIN — TRAMADOL HYDROCHLORIDE 25 MILLIGRAM(S): 50 TABLET ORAL at 09:26

## 2021-01-01 RX ADMIN — TAMSULOSIN HYDROCHLORIDE 0.4 MILLIGRAM(S): 0.4 CAPSULE ORAL at 22:47

## 2021-01-01 RX ADMIN — ATORVASTATIN CALCIUM 20 MILLIGRAM(S): 80 TABLET, FILM COATED ORAL at 22:52

## 2021-01-01 RX ADMIN — POLYETHYLENE GLYCOL 3350 17 GRAM(S): 17 POWDER, FOR SOLUTION ORAL at 13:27

## 2021-01-01 RX ADMIN — PANTOPRAZOLE SODIUM 40 MILLIGRAM(S): 20 TABLET, DELAYED RELEASE ORAL at 06:46

## 2021-01-01 RX ADMIN — SENNA PLUS 1 TABLET(S): 8.6 TABLET ORAL at 13:24

## 2021-01-01 RX ADMIN — TAMSULOSIN HYDROCHLORIDE 0.4 MILLIGRAM(S): 0.4 CAPSULE ORAL at 22:35

## 2021-01-01 RX ADMIN — Medication 25 MILLIGRAM(S): at 17:43

## 2021-01-01 RX ADMIN — TRAMADOL HYDROCHLORIDE 25 MILLIGRAM(S): 50 TABLET ORAL at 23:53

## 2021-01-01 RX ADMIN — MEMANTINE HYDROCHLORIDE 5 MILLIGRAM(S): 10 TABLET ORAL at 11:25

## 2021-01-01 RX ADMIN — PANTOPRAZOLE SODIUM 40 MILLIGRAM(S): 20 TABLET, DELAYED RELEASE ORAL at 06:53

## 2021-01-01 RX ADMIN — Medication 1000 UNIT(S): at 12:20

## 2021-01-01 RX ADMIN — HEPARIN SODIUM 5000 UNIT(S): 5000 INJECTION INTRAVENOUS; SUBCUTANEOUS at 06:24

## 2021-01-01 RX ADMIN — POLYETHYLENE GLYCOL 3350 17 GRAM(S): 17 POWDER, FOR SOLUTION ORAL at 11:45

## 2021-01-01 RX ADMIN — MEMANTINE HYDROCHLORIDE 5 MILLIGRAM(S): 10 TABLET ORAL at 11:08

## 2021-01-01 RX ADMIN — OXYCODONE HYDROCHLORIDE 10 MILLIGRAM(S): 5 TABLET ORAL at 14:57

## 2021-01-01 RX ADMIN — PANTOPRAZOLE SODIUM 40 MILLIGRAM(S): 20 TABLET, DELAYED RELEASE ORAL at 07:04

## 2021-01-01 RX ADMIN — TAMSULOSIN HYDROCHLORIDE 0.4 MILLIGRAM(S): 0.4 CAPSULE ORAL at 21:49

## 2021-01-01 RX ADMIN — SODIUM CHLORIDE 250 MILLILITER(S): 9 INJECTION, SOLUTION INTRAVENOUS at 11:15

## 2021-01-01 RX ADMIN — Medication 1000 UNIT(S): at 11:59

## 2021-01-01 RX ADMIN — INSULIN HUMAN 10 UNIT(S): 100 INJECTION, SOLUTION SUBCUTANEOUS at 11:22

## 2021-01-01 RX ADMIN — TRAMADOL HYDROCHLORIDE 25 MILLIGRAM(S): 50 TABLET ORAL at 18:24

## 2021-01-01 RX ADMIN — Medication 50 MILLIGRAM(S): at 06:23

## 2021-01-01 RX ADMIN — SENNA PLUS 1 TABLET(S): 8.6 TABLET ORAL at 21:48

## 2021-01-01 RX ADMIN — ATORVASTATIN CALCIUM 20 MILLIGRAM(S): 80 TABLET, FILM COATED ORAL at 22:35

## 2021-01-01 RX ADMIN — MEMANTINE HYDROCHLORIDE 5 MILLIGRAM(S): 10 TABLET ORAL at 14:16

## 2021-01-01 RX ADMIN — Medication 1 TABLET(S): at 13:15

## 2021-01-01 RX ADMIN — Medication 650 MILLIGRAM(S): at 12:54

## 2021-01-01 RX ADMIN — OXYCODONE AND ACETAMINOPHEN 1 TABLET(S): 5; 325 TABLET ORAL at 21:10

## 2021-01-01 RX ADMIN — TAMSULOSIN HYDROCHLORIDE 0.4 MILLIGRAM(S): 0.4 CAPSULE ORAL at 21:06

## 2021-01-01 RX ADMIN — OXYCODONE HYDROCHLORIDE 10 MILLIGRAM(S): 5 TABLET ORAL at 01:00

## 2021-01-01 RX ADMIN — Medication 25 MILLIGRAM(S): at 05:59

## 2021-01-01 RX ADMIN — TRAMADOL HYDROCHLORIDE 25 MILLIGRAM(S): 50 TABLET ORAL at 10:26

## 2021-01-01 RX ADMIN — Medication 25 MILLIGRAM(S): at 21:33

## 2021-01-01 RX ADMIN — PANTOPRAZOLE SODIUM 40 MILLIGRAM(S): 20 TABLET, DELAYED RELEASE ORAL at 06:23

## 2021-01-01 RX ADMIN — OXYCODONE HYDROCHLORIDE 10 MILLIGRAM(S): 5 TABLET ORAL at 15:57

## 2021-01-01 RX ADMIN — Medication 1 MILLIGRAM(S): at 13:15

## 2021-01-01 RX ADMIN — ATORVASTATIN CALCIUM 20 MILLIGRAM(S): 80 TABLET, FILM COATED ORAL at 22:06

## 2021-01-01 RX ADMIN — ATORVASTATIN CALCIUM 20 MILLIGRAM(S): 80 TABLET, FILM COATED ORAL at 22:45

## 2021-01-01 RX ADMIN — SODIUM ZIRCONIUM CYCLOSILICATE 10 GRAM(S): 10 POWDER, FOR SUSPENSION ORAL at 11:26

## 2021-01-01 RX ADMIN — Medication 60 MILLIGRAM(S): at 12:10

## 2021-01-01 RX ADMIN — ATORVASTATIN CALCIUM 20 MILLIGRAM(S): 80 TABLET, FILM COATED ORAL at 22:21

## 2021-01-01 RX ADMIN — OXYCODONE AND ACETAMINOPHEN 1 TABLET(S): 5; 325 TABLET ORAL at 20:31

## 2021-01-01 RX ADMIN — Medication 25 MILLIGRAM(S): at 22:21

## 2021-01-01 RX ADMIN — SENNA PLUS 1 TABLET(S): 8.6 TABLET ORAL at 21:32

## 2021-01-01 RX ADMIN — TAMSULOSIN HYDROCHLORIDE 0.4 MILLIGRAM(S): 0.4 CAPSULE ORAL at 22:21

## 2021-01-01 RX ADMIN — Medication 1000 UNIT(S): at 11:08

## 2021-01-01 RX ADMIN — Medication 50 MILLIGRAM(S): at 06:33

## 2021-01-01 RX ADMIN — DONEPEZIL HYDROCHLORIDE 5 MILLIGRAM(S): 10 TABLET, FILM COATED ORAL at 21:06

## 2021-01-01 RX ADMIN — POLYETHYLENE GLYCOL 3350 17 GRAM(S): 17 POWDER, FOR SOLUTION ORAL at 12:19

## 2021-01-01 RX ADMIN — OXYCODONE HYDROCHLORIDE 10 MILLIGRAM(S): 5 TABLET ORAL at 08:59

## 2021-01-01 RX ADMIN — Medication 25 MILLIGRAM(S): at 19:16

## 2021-01-01 RX ADMIN — DONEPEZIL HYDROCHLORIDE 5 MILLIGRAM(S): 10 TABLET, FILM COATED ORAL at 22:21

## 2021-01-01 RX ADMIN — SODIUM CHLORIDE 100 MILLILITER(S): 9 INJECTION, SOLUTION INTRAVENOUS at 14:16

## 2021-01-01 RX ADMIN — OXYCODONE AND ACETAMINOPHEN 1 TABLET(S): 5; 325 TABLET ORAL at 01:16

## 2021-01-01 RX ADMIN — MEMANTINE HYDROCHLORIDE 5 MILLIGRAM(S): 10 TABLET ORAL at 11:59

## 2021-01-01 RX ADMIN — SENNA PLUS 2 TABLET(S): 8.6 TABLET ORAL at 07:11

## 2021-01-01 RX ADMIN — Medication 1000 UNIT(S): at 11:44

## 2021-01-01 RX ADMIN — ONDANSETRON 224 MILLIGRAM(S): 8 TABLET, FILM COATED ORAL at 14:26

## 2021-01-01 RX ADMIN — DONEPEZIL HYDROCHLORIDE 5 MILLIGRAM(S): 10 TABLET, FILM COATED ORAL at 21:33

## 2021-01-01 RX ADMIN — TRAMADOL HYDROCHLORIDE 25 MILLIGRAM(S): 50 TABLET ORAL at 19:06

## 2021-01-01 RX ADMIN — OXYCODONE AND ACETAMINOPHEN 2 TABLET(S): 5; 325 TABLET ORAL at 13:15

## 2021-01-01 RX ADMIN — Medication 25 MILLIGRAM(S): at 22:44

## 2021-01-01 RX ADMIN — DONEPEZIL HYDROCHLORIDE 5 MILLIGRAM(S): 10 TABLET, FILM COATED ORAL at 22:06

## 2021-01-01 RX ADMIN — Medication 25 MICROGRAM(S): at 06:07

## 2021-01-01 RX ADMIN — Medication 25 MILLIGRAM(S): at 21:06

## 2021-01-01 RX ADMIN — Medication 25 MICROGRAM(S): at 07:05

## 2021-01-01 RX ADMIN — DONEPEZIL HYDROCHLORIDE 5 MILLIGRAM(S): 10 TABLET, FILM COATED ORAL at 23:54

## 2021-01-01 RX ADMIN — Medication 650 MILLIGRAM(S): at 22:50

## 2021-01-01 RX ADMIN — TAMSULOSIN HYDROCHLORIDE 0.4 MILLIGRAM(S): 0.4 CAPSULE ORAL at 22:45

## 2021-01-01 RX ADMIN — MEMANTINE HYDROCHLORIDE 5 MILLIGRAM(S): 10 TABLET ORAL at 13:26

## 2021-01-01 RX ADMIN — TRAMADOL HYDROCHLORIDE 25 MILLIGRAM(S): 50 TABLET ORAL at 20:00

## 2021-01-01 RX ADMIN — Medication 25 MICROGRAM(S): at 06:33

## 2021-01-01 RX ADMIN — Medication 1 TABLET(S): at 11:59

## 2021-01-01 RX ADMIN — PANTOPRAZOLE SODIUM 40 MILLIGRAM(S): 20 TABLET, DELAYED RELEASE ORAL at 06:06

## 2021-01-01 RX ADMIN — OXYCODONE HYDROCHLORIDE 10 MILLIGRAM(S): 5 TABLET ORAL at 17:43

## 2021-01-01 RX ADMIN — SENNA PLUS 1 TABLET(S): 8.6 TABLET ORAL at 22:20

## 2021-01-01 RX ADMIN — Medication 25 MILLIGRAM(S): at 07:05

## 2021-01-01 RX ADMIN — Medication 1 TABLET(S): at 12:20

## 2021-01-01 RX ADMIN — Medication 25 MILLIGRAM(S): at 22:46

## 2021-01-01 RX ADMIN — Medication 1 MILLIGRAM(S): at 13:20

## 2021-01-01 RX ADMIN — PANTOPRAZOLE SODIUM 40 MILLIGRAM(S): 20 TABLET, DELAYED RELEASE ORAL at 06:42

## 2021-01-01 RX ADMIN — Medication 60 MILLIGRAM(S): at 13:59

## 2021-01-01 RX ADMIN — OXYCODONE AND ACETAMINOPHEN 1 TABLET(S): 5; 325 TABLET ORAL at 11:26

## 2021-01-01 RX ADMIN — Medication 25 MICROGRAM(S): at 05:58

## 2021-01-01 RX ADMIN — Medication 1 MILLIGRAM(S): at 12:21

## 2021-01-01 RX ADMIN — SODIUM ZIRCONIUM CYCLOSILICATE 5 GRAM(S): 10 POWDER, FOR SUSPENSION ORAL at 11:45

## 2021-01-01 RX ADMIN — Medication 25 MILLIGRAM(S): at 22:06

## 2021-01-01 RX ADMIN — Medication 2: at 12:19

## 2021-01-01 RX ADMIN — OXYCODONE AND ACETAMINOPHEN 2 TABLET(S): 5; 325 TABLET ORAL at 14:15

## 2021-01-01 RX ADMIN — Medication 1000 UNIT(S): at 11:25

## 2021-01-01 RX ADMIN — Medication 1 TABLET(S): at 13:26

## 2021-01-01 RX ADMIN — Medication 25 MILLIGRAM(S): at 07:52

## 2021-01-01 RX ADMIN — OXYCODONE AND ACETAMINOPHEN 1 TABLET(S): 5; 325 TABLET ORAL at 12:26

## 2021-01-01 RX ADMIN — Medication 50 MILLIGRAM(S): at 05:49

## 2021-01-01 RX ADMIN — MEMANTINE HYDROCHLORIDE 5 MILLIGRAM(S): 10 TABLET ORAL at 11:45

## 2021-01-01 RX ADMIN — Medication 25 MILLIGRAM(S): at 21:49

## 2021-01-01 RX ADMIN — Medication 25 MILLIGRAM(S): at 06:07

## 2021-01-01 RX ADMIN — Medication 650 MILLIGRAM(S): at 13:48

## 2021-01-01 RX ADMIN — Medication 1 MILLIGRAM(S): at 11:45

## 2021-01-01 RX ADMIN — DONEPEZIL HYDROCHLORIDE 5 MILLIGRAM(S): 10 TABLET, FILM COATED ORAL at 22:35

## 2021-01-01 RX ADMIN — ONDANSETRON 224 MILLIGRAM(S): 8 TABLET, FILM COATED ORAL at 13:19

## 2021-01-01 RX ADMIN — TAMSULOSIN HYDROCHLORIDE 0.4 MILLIGRAM(S): 0.4 CAPSULE ORAL at 22:05

## 2021-01-01 RX ADMIN — Medication 650 MILLIGRAM(S): at 23:00

## 2021-01-01 RX ADMIN — TRAMADOL HYDROCHLORIDE 25 MILLIGRAM(S): 50 TABLET ORAL at 00:25

## 2021-01-01 RX ADMIN — OXYCODONE HYDROCHLORIDE 10 MILLIGRAM(S): 5 TABLET ORAL at 18:10

## 2021-01-01 RX ADMIN — OXYCODONE HYDROCHLORIDE 10 MILLIGRAM(S): 5 TABLET ORAL at 00:34

## 2021-01-01 RX ADMIN — Medication 2: at 21:31

## 2021-01-01 RX ADMIN — Medication 1 TABLET(S): at 11:30

## 2021-01-01 RX ADMIN — Medication 20 MILLIGRAM(S): at 23:54

## 2021-01-01 RX ADMIN — Medication 25 MICROGRAM(S): at 05:49

## 2021-01-01 RX ADMIN — Medication 1 MILLIGRAM(S): at 11:25

## 2021-01-01 RX ADMIN — Medication 10 MILLIGRAM(S): at 11:30

## 2021-01-01 RX ADMIN — Medication 60 MILLIGRAM(S): at 12:21

## 2021-01-01 RX ADMIN — Medication 25 MICROGRAM(S): at 06:23

## 2021-01-01 RX ADMIN — OXYCODONE HYDROCHLORIDE 10 MILLIGRAM(S): 5 TABLET ORAL at 10:51

## 2021-01-01 RX ADMIN — Medication 650 MILLIGRAM(S): at 07:01

## 2021-01-01 RX ADMIN — OXYCODONE HYDROCHLORIDE 10 MILLIGRAM(S): 5 TABLET ORAL at 09:10

## 2021-01-01 RX ADMIN — ATORVASTATIN CALCIUM 20 MILLIGRAM(S): 80 TABLET, FILM COATED ORAL at 21:49

## 2021-01-01 RX ADMIN — OXYCODONE HYDROCHLORIDE 10 MILLIGRAM(S): 5 TABLET ORAL at 09:51

## 2021-01-01 RX ADMIN — Medication 60 MILLIGRAM(S): at 12:36

## 2021-01-01 RX ADMIN — OXYCODONE AND ACETAMINOPHEN 1 TABLET(S): 5; 325 TABLET ORAL at 00:16

## 2021-01-01 RX ADMIN — Medication 50 MILLIGRAM(S): at 14:47

## 2021-03-10 ENCOUNTER — APPOINTMENT (OUTPATIENT)
Dept: NEPHROLOGY | Facility: CLINIC | Age: 86
End: 2021-03-10
Payer: MEDICARE

## 2021-03-10 VITALS — DIASTOLIC BLOOD PRESSURE: 68 MMHG | SYSTOLIC BLOOD PRESSURE: 118 MMHG | HEART RATE: 80 BPM

## 2021-03-10 DIAGNOSIS — N18.4 CHRONIC KIDNEY DISEASE, STAGE 4 (SEVERE): ICD-10-CM

## 2021-03-10 DIAGNOSIS — E79.0 HYPERURICEMIA W/OUT SIGNS OF INFLAMMATORY ARTHRITIS AND TOPHACEOUS DISEASE: ICD-10-CM

## 2021-03-10 DIAGNOSIS — I48.91 UNSPECIFIED ATRIAL FIBRILLATION: ICD-10-CM

## 2021-03-10 DIAGNOSIS — N25.81 SECONDARY HYPERPARATHYROIDISM OF RENAL ORIGIN: ICD-10-CM

## 2021-03-10 DIAGNOSIS — D63.1 CHRONIC KIDNEY DISEASE, STAGE 4 (SEVERE): ICD-10-CM

## 2021-03-10 DIAGNOSIS — I10 ESSENTIAL (PRIMARY) HYPERTENSION: ICD-10-CM

## 2021-03-10 PROCEDURE — 99214 OFFICE O/P EST MOD 30 MIN: CPT

## 2021-03-11 NOTE — ED ADULT NURSE NOTE - NS_SISCREENINGSR_GEN_ALL_ED
Sodium Chloride mix,  1000 NaCl,  25 ml Nitro (50mg/250ml),  5 mg Verapamil,  3 mg adenosine,  20 ml ViperSlide Negative

## 2021-03-14 NOTE — PHYSICAL EXAM
[General Appearance - Alert] : alert [General Appearance - In No Acute Distress] : in no acute distress [] : no respiratory distress [Auscultation Breath Sounds / Voice Sounds] : lungs were clear to auscultation bilaterally [Heart Rate And Rhythm] : heart rate was normal and rhythm regular [Heart Sounds] : normal S1 and S2 [Heart Sounds Gallop] : no gallops [Murmurs] : no murmurs [Heart Sounds Pericardial Friction Rub] : no pericardial rub [Edema] : there was no peripheral edema [Cervical Lymph Nodes Enlarged Anterior Bilaterally] : anterior cervical [Supraclavicular Lymph Nodes Enlarged Bilaterally] : supraclavicular [Oriented To Time, Place, And Person] : oriented to person, place, and time [Impaired Insight] : insight and judgment were intact [Affect] : the affect was normal [FreeTextEntry1] : valve click

## 2021-03-14 NOTE — HISTORY OF PRESENT ILLNESS
[FreeTextEntry1] : 89 year-old man here for f/u evaluation of CKD 4, hypertension, anemia and hyperuricemia.\par feels 'pretty good"\par continues on allopurinol and atorvastatin.\par + glucose intolerance- diet controlled\par denies dysuria, flank pain, hematuria or frothy urine. \par h/o  atrial fibrillation, aortic valve replacement in 2012- then infected and replaced again in 2013\par coronary artery disease s/p 3 stents in 2007\par s/p partial colon resection for colon cancer ( 2016)\par Denies flank pain, dysuria, hematuria or frothy urine \par \par \par

## 2021-03-14 NOTE — ASSESSMENT
[FreeTextEntry1] : all lab data was reviewed with patient in detail from 1/29/2021\par 89 year-old man with CKD 4, HTN anemia, AFib\par -CKD 4: creat  good- mild trend up to 2.03 from 1.93;  electrolytes normal range\par egfr 28-30\par reinforces that he would not proceed with RRT if necessity arose.\par --HTN- BP acceptable- dips low at times- asymptomatic- reminded to keep me posted\par -CHF- now compensated, c/w present  lasix\par AFib- rate controlled with metoprolol- continues on Coumadin- stable renal function- afib can increase progression of CKD\par -Anemia: Hgb good; no need SHASHA\par -Secondary hyperparathyroidism- clinically stable\par -hyperuricemia: acceptable - c/w allopurinol.\par hyperlipidemia: stable c/w statin\par \par \par f/u 6 months-  seeing PCP every 2-3 months- \par

## 2021-08-09 NOTE — PATIENT PROFILE ADULT - MEDICATIONS/VISITS
Condition:: Spot check Please Describe Your Condition:: (+) Hx NMSC \\n\\nPT presents with concerns of an itchy , raised spot on the back. Lesion present for months. no

## 2021-12-24 NOTE — H&P ADULT - NSICDXPASTSURGICALHX_GEN_ALL_CORE_FT
PAST SURGICAL HISTORY:  Cardiac pacemaker 3yrs ago    H/O inguinal hernia repair     H/O right hemicolectomy     History of appendectomy     S/P AVR 2013 @ [facility]  by Dr. Young

## 2021-12-24 NOTE — ED PROVIDER NOTE - PHYSICAL EXAMINATION
CONSTITUTIONAL: Awake, alert and in no apparent distress.  HEENT: Head is atraumatic. Eyes clear bilaterally, normal EOMI. Airway patent.  CARDIAC: Normal rate, regular rhythm.  Heart sounds S1, S2.   RESPIRATORY: Breath sounds clear and equal bilaterally. no tachypnea, respiratory distress.   GASTROINTESTINAL: Abdomen soft, non-tender, no guarding, distension.  MUSCULOSKELETAL: Spine appears normal, no midline spinal tenderness, range of motion is not limited, no muscle or joint tenderness. no bony tenderness. LLE ecchymoses on inner thigh and lateral to L calf, swelling to foot and ecchymoses to foot. DP+2  NEUROLOGICAL: Alert, no focal deficits, no motor or sensory deficits.  SKIN: Skin normal color for race, warm, dry and intact. No evidence of rash.  PSYCHIATRIC: Normal mood and affect. no apparent risk to self or others. CONSTITUTIONAL: Awake, alert and in no apparent distress.  HEENT: Head is atraumatic. Eyes clear bilaterally, normal EOMI. Airway patent.  CARDIAC: Normal rate, regular rhythm.  Heart sounds S1, S2.   RESPIRATORY: Breath sounds clear and equal bilaterally. no tachypnea, respiratory distress.   GASTROINTESTINAL: Abdomen soft, non-tender, no guarding, distension.  MUSCULOSKELETAL: Spine appears normal, no midline spinal tenderness, range of motion is not limited, no muscle or joint tenderness. no bony tenderness. LLE ecchymoses on inner thigh and lateral to L calf, swelling to foot and ecchymoses to foot. DP+2 Right leg wnl, no swelling or ecchymoses   NEUROLOGICAL: Alert, no focal deficits, no motor or sensory deficits.  SKIN: Skin normal color for race, warm, dry and intact. No evidence of rash.  PSYCHIATRIC: Normal mood and affect. no apparent risk to self or others.

## 2021-12-24 NOTE — ED ADULT NURSE REASSESSMENT NOTE - NS ED NURSE REASSESS COMMENT FT1
Consent obtained by Dr. Okeefe and witnessed by ABE Manjarrez. PT educated on risk benefit and s/s of transfusion reactions. Pt verbalized understanding. PT pending blood from blood bank.

## 2021-12-24 NOTE — H&P ADULT - NSHPLABSRESULTS_GEN_ALL_CORE
LABS:                        6.2    8.06  )-----------( 200      ( 24 Dec 2021 13:19 )             20.6     12-24    142  |  108  |  34<H>  ----------------------------<  148<H>  5.0   |  24  |  2.02<H>    Ca    8.6      24 Dec 2021 13:20      PT/INR - ( 24 Dec 2021 13:19 )   PT: 14.9 sec;   INR: 1.26          PTT - ( 24 Dec 2021 13:19 )  PTT:65.8 sec        Radiology:  CT ANGIOGRAM LEFT LOWER EXTREMITY (12/24):  PROCEDURE:  Initially, nonenhanced CT was obtained through the calves. Then, following the rapid administration of intravenous contrast, CT angiography was performed through left hemipelvis and left lower extremities down to the toes. Delayed images through the calves were also obtained. Sagittal and coronal reformats as well as 3D reconstructions were performed.  125 mls of Omnipaque 350 was administered intravenously without complication and 25 mls were discarded.  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  LEFT LOWER EXTREMITY:  Partially thrombosed 4.5 x 2.7 cm saccular aneurysm left internal iliac artery. Diffusely atherosclerotic SFA. Moderate size intramuscular hematoma involving biceps femoris muscle. No intravenous contrast extravasation. Mildly stenotic popliteal artery. Extensive calf vessel calcifications limiting evaluation of patency. Occlusion of the posterior tibial artery with distal reconstitution. Occlusion of the distal dorsalis pedis below the ankle and distal peroneal at the ankle.  ADDITIONAL FINDINGS: None.  IMPRESSION:  Moderate to large left anterior thigh intramuscular hematoma. No active arterial bleeding.  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 Additional chronic vascular findings as above.      LE US (12/24): No evidence of DVT.

## 2021-12-24 NOTE — ED ADULT NURSE NOTE - NSIMPLEMENTINTERV_GEN_ALL_ED
Implemented All Fall with Harm Risk Interventions:  Noel to call system. Call bell, personal items and telephone within reach. Instruct patient to call for assistance. Room bathroom lighting operational. Non-slip footwear when patient is off stretcher. Physically safe environment: no spills, clutter or unnecessary equipment. Stretcher in lowest position, wheels locked, appropriate side rails in place. Provide visual cue, wrist band, yellow gown, etc. Monitor gait and stability. Monitor for mental status changes and reorient to person, place, and time. Review medications for side effects contributing to fall risk. Reinforce activity limits and safety measures with patient and family. Provide visual clues: red socks.

## 2021-12-24 NOTE — ED PROVIDER NOTE - NSICDXPASTSURGICALHX_GEN_ALL_CORE_FT
PAST SURGICAL HISTORY:  Cardiac pacemaker 3yrs ago    H/O inguinal hernia repair     History of bowel resection     S/P AVR 2013 @ [facility]  by Dr. Young

## 2021-12-24 NOTE — ED PROVIDER NOTE - CLINICAL SUMMARY MEDICAL DECISION MAKING FREE TEXT BOX
LLE swelling, DVT vs ecchymoses from fall LLE swelling, DVT vs ecchymoses vs hematoma from fall, no sob/cp, falls/trauma, given anemia, CTA LLE obtained to eval extravasation /hematoma in thigh addition to US (off of warfarin) to eval for DVT, vasc consult. One unit prbc given, rectal neg for GI bleed- brown stool.

## 2021-12-24 NOTE — ED ADULT NURSE NOTE - NSICDXPASTSURGICALHX_GEN_ALL_CORE_FT
PAST SURGICAL HISTORY:  Cardiac pacemaker 3yrs ago    H/O inguinal hernia repair     History of bowel resection     S/P AVR 2013 @ [facility]  by Dr. Young

## 2021-12-24 NOTE — ED ADULT NURSE NOTE - IS THE PATIENT ABLE TO BE SCREENED?
0800: Received report from Robinson, [facility]. Assumed care. 1246: TRANSFER - OUT REPORT:    Verbal report given to RHYS James(name) on Cali Colon  being transferred to Cath lab(unit) for ordered procedure       Report consisted of patients Situation, Background, Assessment and   Recommendations(SBAR). Information from the following report(s) SBAR was reviewed with the receiving nurse. Lines:   Peripheral IV 03/01/19 Posterior;Right Forearm (Active)   Site Assessment Clean, dry, & intact 3/4/2019  8:27 AM   Phlebitis Assessment 0 3/4/2019  8:27 AM   Infiltration Assessment 0 3/4/2019  8:27 AM   Dressing Status Clean, dry, & intact 3/4/2019  8:27 AM   Dressing Type Transparent;Tape 3/4/2019  8:27 AM   Hub Color/Line Status Capped;Pink 3/4/2019  8:27 AM   Action Taken Open ports on tubing capped 3/4/2019  5:25 AM   Alcohol Cap Used Yes 3/4/2019  8:27 AM       Peripheral IV 03/03/19 Posterior;Right Hand (Active)   Site Assessment Clean, dry, & intact 3/4/2019  8:27 AM   Phlebitis Assessment 0 3/4/2019  8:27 AM   Infiltration Assessment 0 3/4/2019  8:27 AM   Dressing Status Clean, dry, & intact 3/4/2019  8:27 AM   Dressing Type Transparent 3/4/2019  8:27 AM   Hub Color/Line Status Pink;Capped 3/4/2019  8:27 AM   Action Taken Open ports on tubing capped 3/3/2019  8:00 PM   Alcohol Cap Used Yes 3/3/2019  8:00 PM        Opportunity for questions and clarification was provided. Patient transported with:   Monitor  Registered Nurse    9793: TRANSFER - IN REPORT:    Verbal report received from RHYS Cummings(name) on Cali Colon  being received from Cath lab(unit) for ordered procedure      Report consisted of patients Situation, Background, Assessment and   Recommendations(SBAR). Information from the following report(s) SBAR, MAR and Recent Results was reviewed with the receiving nurse. Opportunity for questions and clarification was provided.       Assessment completed upon patients arrival to unit and care assumed. 1946: Bedside and Verbal shift change report given to [place], RN (oncoming nurse) by Hermila Goins RN (offgoing nurse). Report included the following information SBAR, MAR and Recent Results. Problem: Falls - Risk of  Goal: *Absence of Falls  Document Juany Fall Risk and appropriate interventions in the flowsheet. Outcome: Progressing Towards Goal  Fall Risk Interventions:       Mentation Interventions: Adequate sleep, hydration, pain control    Medication Interventions: Assess postural VS orthostatic hypotension, Patient to call before getting OOB, Evaluate medications/consider consulting pharmacy    Elimination Interventions: Bed/chair exit alarm, Call light in reach, Patient to call for help with toileting needs    History of Falls Interventions: Door open when patient unattended        Problem: Pressure Injury - Risk of  Goal: *Prevention of pressure injury  Document Jose Scale and appropriate interventions in the flowsheet.   Outcome: Progressing Towards Goal  Pressure Injury Interventions:       Moisture Interventions: Apply protective barrier, creams and emollients, Maintain skin hydration (lotion/cream)    Activity Interventions: Assess need for specialty bed, Increase time out of bed         Nutrition Interventions: Document food/fluid/supplement intake    Friction and Shear Interventions: Lift sheet, HOB 30 degrees or less Yes

## 2021-12-24 NOTE — CONSULT NOTE ADULT - ASSESSMENT
89yo male w PMH HTN, HLD, CKD stage 4 (Cr 2), HF (EF 30-35%), CAD (s/p PCI for RCA stenting 2007), aortic stenosis (s/p aortic valve replacement 2016), Afib, pacemaker, hypothyroidism, peptic ulcer disease, internal hemorrhoids, prostate and colon cancers (s/p R hemicolectomy 2016), on Coumadin until 6w ago presented with LLE swelling, echymoses and tenderness since 10 days ago (also endorses swelling and tenderness over RLE which gradually improved since then) as well as Hgb of 6.2.     Problem 1: PAD/LLE echymosis and swelling;     89yo male w PMH HTN, HLD, CKD stage 4 (Cr 2, GFR 28), HF (EF 30-35%), CAD (s/p PCI for RCA stenting 2007), aortic stenosis (s/p aortic valve replacement 2016), Afib, pacemaker, hypothyroidism, peptic ulcer disease, internal hemorrhoids, prostate and colon cancers (s/p R hemicolectomy 2016), on Coumadin until 6w ago presented with LLE swelling, echymoses and tenderness since 10 days ago (also endorses swelling and tenderness over RLE which gradually improved since then) as well as Hgb of 6.2. Duplex US negative for DVT. Moderate intramuscular hematoma on LLE looks stable and without any active bleeding or contrast extravasation. Incidental finding of left internal iliac artery aneurysm looks very mildly enlarged since previous imaging in 2016. Needs medical work up of hematoma and bleeding in setting of no active anti-coagulation or stated history of falls or trauma.    Please admit to vascular surgery service.    Problem 1: Iliac artery aneurysm;  - Left internal iliac artery aneurysm: Consider surgical repair after medical and cardiac risk optimization re extensive cardiac history, anemia, and bleeding  - Right common iliac artery aneurysm: No acute surgical interventions at this point, re lesion size and no change since last imaging in 2016  - Cardiology consult for surgical risk optimization  - Echocardiography    Problem 2: LLE hematoma with anemia;  - 1U pRBC transfusion  - FU AM CBC  - Fecal occult blood test  - LLE compression & elevation    Problem 3: HTN, HLD, CAD, HF, pacemaker, AVR, Afib:  - Appreciate cardiology recommendations  - c/w home meds    Problem 4: Hypothyroidism:  - c/w home meds    Problem 5: CKD:  - c/w home meds    Diet: DASH/TLC/renal       89yo male w PMH HTN, HLD, CKD stage 4 (Cr 2, GFR 28), HF (EF 30-35%), CAD (s/p PCI for RCA stenting 2007), aortic stenosis (s/p aortic valve replacement 2016), Afib, pacemaker, hypothyroidism, peptic ulcer disease, internal hemorrhoids, prostate and colon cancers (s/p R hemicolectomy 2016), on Coumadin until 6w ago presented with LLE swelling, echymoses and tenderness since 10 days ago (also endorses swelling and tenderness over RLE which gradually improved since then) as well as Hgb of 6.2. Duplex US negative for DVT. Moderate intramuscular hematoma on LLE looks stable and without any active bleeding or contrast extravasation. Incidental finding of left internal iliac artery aneurysm looks very mildly enlarged since previous imaging in 2016. Needs medical work up of hematoma and bleeding in setting of no active anti-coagulation or stated history of falls or trauma.    Please admit to vascular surgery service.    Problem 1; Iliac artery aneurysm:  - Left internal iliac artery aneurysm: Consider surgical repair after medical and cardiac risk optimization re extensive cardiac history, anemia, and bleeding  - Right common iliac artery aneurysm: No acute surgical interventions at this point, re lesion size and no change since last imaging in 2016  - Cardiology consult for surgical risk optimization  - Echocardiography    Problem 2; LLE hematoma with anemia:  - 1U pRBC transfusion  - FU AM CBC  - Fecal occult blood test  - LLE compression & elevation    Problem 3; HTN, HLD, CAD, HF, pacemaker, AVR, Afib:  - Appreciate cardiology recommendations  - c/w home meds:    -> atorvastatin 20mg    -> metoprolol 50mg BID   - Hold Lasix    Problem 4; Hypothyroidism:  - c/w home meds:    -> Levothyroxin (verify dose)    Problem 5; CKD:  - Monitor I/Os    Problem 6; PUD:  - c/w home meds:    -> omeprazole 20mg QD    Problem 7; BPH:   - c/w home meds:   -> tamsulosin 0.4mg QD    Problem 8; cognitive/sleep disorders:   - c/w home meds:   -> donepezil 5mg BID   -> memantine 5mg QD   -> trazodone 25mg QD    Diet: DASH/TLC/renal  - c/w home supplements:    -> Align 4mg QD    -> B Complex QD    -> Folic acid 1mg QD      -> Citrucel QD    -> CoQ10 300mg QD    -> Vit D 1000U QD

## 2021-12-24 NOTE — ED ADULT NURSE REASSESSMENT NOTE - NS ED NURSE REASSESS COMMENT FT1
Blood transfusion initiated at 75ml/hr with RN available for transfusion reactions. Pt verbalized understanding. VSS.

## 2021-12-24 NOTE — ED ADULT NURSE NOTE - SUICIDE SCREENING QUESTION 1
No Consent 3/Introductory Paragraph: I gave the patient a chance to ask questions they had about the procedure.  Following this I explained the Mohs procedure and consent was obtained. The risks, benefits and alternatives to therapy were discussed in detail. Specifically, the risks of infection, scarring, bleeding, prolonged wound healing, incomplete removal, allergy to anesthesia, nerve injury and recurrence were addressed. Prior to the procedure, the treatment site was clearly identified and confirmed by the patient. All components of Universal Protocol/PAUSE Rule completed.

## 2021-12-24 NOTE — H&P ADULT - HISTORY OF PRESENT ILLNESS
89yo male presented with painful swelling and bruises of LLE. States started about 10d ago by bilateral bruises in toes and swelling in calves and thighs (L>R) with the right-side gradually improving but left side worsening since then and becoming painful. Has been ambulating without difficulty and denies any recent falls, traumas or preceding events. Denies any fever, chills, dizziness, light-headedness, palpitation, coughs, shortness of breath, chest pain, or abdominal pain. PMH HTN, HLD, CKD stage 4 (Cr 2, eGFR 28), HF (EF 30-35%), CAD (s/p PCI for RCA stenting 2007), aortic stenosis (s/p aortic valve replacement 2016), Afib, hypothyroidism, peptic ulcer disease, internal hemorrhoids, prostate and colon cancers (s/p R hemicolectomy 2016). He has a pacemaker and has been on Coumadin until 6w ago (when stopped due to risks of GIB). Upon presentation, pt had a Hgb of 6.2.

## 2021-12-24 NOTE — CONSULT NOTE ADULT - SUBJECTIVE AND OBJECTIVE BOX
Vascular Attending:  Dr. Barroso    HPI: 89yo male presented with painful swelling and bruises of LLE. States started about 10d ago by bilateral bruises in toes and swelling in calves and thighs (L>R) with the right-side gradually improving but left side worsening since then and becoming painful. Has been ambulating without difficulty and denies any recent falls, traumas or preceding events. Denies any fever, chills, dizziness, light-headedness, palpitation, coughs, shortness of breath, chest pain, or abdominal pain. PMH HTN, HLD, CKD stage 4 (Cr 2), HF (EF 30-35%), CAD (s/p PCI for RCA stenting 2007), aortic stenosis (s/p aortic valve replacement 2016), Afib, hypothyroidism, peptic ulcer disease, internal hemorrhoids, prostate and colon cancers (s/p R hemicolectomy 2016). He has a pacemaker and has been on Coumadin until 6w ago (when stopped due to risks of GIB).      PAST MEDICAL & SURGICAL HISTORY:  Aortic valve replaced  Atrial fibrillation  CAD (coronary artery disease)  HTN (hypertension)  PUD (peptic ulcer disease)  Constipation  Iron deficiency anemia  Diverticulosis  High cholesterol  Carotid arterial disease  Internal hemorrhoids  Endocarditis  Colon cancer  Hypothyroidism  S/P AVR (2013 @ [facility]  by Dr. Young)  Cardiac pacemaker (3 yrs ago)  H/O inguinal hernia repair  History of bowel resection      REVIEW OF SYSTEMS: As stated above    Allergic/Immunologic:	    MEDICATIONS  (STANDING):    MEDICATIONS  (PRN):      Allergies: No Known Allergies    Intolerances        SOCIAL HISTORY:    FAMILY HISTORY:      Vital Signs Last 24 Hrs  T(C): 36.8 (24 Dec 2021 15:49), Max: 36.9 (24 Dec 2021 11:08)  T(F): 98.3 (24 Dec 2021 15:49), Max: 98.4 (24 Dec 2021 11:08)  HR: 80 (24 Dec 2021 15:49) (80 - 83)  BP: 134/67 (24 Dec 2021 15:49) (111/45 - 134/67)  BP(mean): --  RR: 18 (24 Dec 2021 15:49) (18 - 18)  SpO2: 97% (24 Dec 2021 15:49) (96% - 97%)    PHYSICAL EXAM:  GA: Awake, alert and oriented x3, resting comfortably in bed and in no apparent distress  Heart: NSR  Lungs: Breath sounds clear and equal bilaterally. no tachypnea or respiratory distress  Abdomen: Soft, no distention, non-tender, no guarding. Old scar of thoracotomy for AVR on midline chest  Extremities: Scattered echymoses over right elbow (with small bluish hematoma), right wrist, and left forearm. Radial and brachial pulses 2+ symmetric. LLE with ecchymosis ([id number]), swelling and tenderness over left lateral thigh, mildly tender calf swelling and swelling and echymoses over left forefoot and toes. TP/DP pulses 2+ bilaterally.  Neurologic: Alert, no focal deficits, no motor or sensory deficits.      LABS:                        6.2    8.06  )-----------( 200      ( 24 Dec 2021 13:19 )             20.6     12-24    142  |  108  |  34<H>  ----------------------------<  148<H>  5.0   |  24  |  2.02<H>    Ca    8.6      24 Dec 2021 13:20      PT/INR - ( 24 Dec 2021 13:19 )   PT: 14.9 sec;   INR: 1.26          PTT - ( 24 Dec 2021 13:19 )  PTT:65.8 sec      RADIOLOGY & ADDITIONAL STUDIES:  CT ANGIOGRAM LEFT LOWER EXTREMITY (12/24):  PROCEDURE:  Initially, nonenhanced CT was obtained through the calves. Then, following the rapid administration of intravenous contrast, CT angiography was performed through left hemipelvis and left lower extremities down to the toes. Delayed images through the calves were also obtained. Sagittal and coronal reformats as well as 3D reconstructions were performed.  125 mls of Omnipaque 350 was administered intravenously without complication and 25 mls were discarded.  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  LEFT LOWER EXTREMITY:  Partially thrombosed 4.5 x 2.7 cm saccular aneurysm left internal iliac artery. Diffusely atherosclerotic SFA. Moderate size intramuscular hematoma involving biceps femoris muscle. No intravenous contrast extravasation. Mildly stenotic popliteal artery. Extensive calf vessel calcifications limiting evaluation of patency. Occlusion of the posterior tibial artery with distal reconstitution. Occlusion of the distal dorsalis pedis below the ankle and distal peroneal at the ankle.  ADDITIONAL FINDINGS: None.  IMPRESSION:  Moderate to large left anterior thigh intramuscular hematoma. No active arterial bleeding.  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 Additional chronic vascular findings as above.     Vascular Attending:  Dr. Barroso    HPI: 89yo male presented with painful swelling and bruises of LLE. States started about 10d ago by bilateral bruises in toes and swelling in calves and thighs (L>R) with the right-side gradually improving but left side worsening since then and becoming painful. Has been ambulating without difficulty and denies any recent falls, traumas or preceding events. Denies any fever, chills, dizziness, light-headedness, palpitation, coughs, shortness of breath, chest pain, or abdominal pain. PMH HTN, HLD, CKD stage 4 (Cr 2), HF (EF 30-35%), CAD (s/p PCI for RCA stenting 2007), aortic stenosis (s/p aortic valve replacement 2016), Afib, hypothyroidism, peptic ulcer disease, internal hemorrhoids, prostate and colon cancers (s/p R hemicolectomy 2016). He has a pacemaker and has been on Coumadin until 6w ago (when stopped due to risks of GIB). Upon presentation, pt had a Hgb of 6.2. Vascular surgery was consulted to R/O LLE hematoma as a possible source of anemia.    PAST MEDICAL & SURGICAL HISTORY:  Aortic valve replaced  Atrial fibrillation  CAD (coronary artery disease)  HTN (hypertension)  PUD (peptic ulcer disease)  Constipation  Iron deficiency anemia  Diverticulosis  High cholesterol  Carotid arterial disease  Internal hemorrhoids  Endocarditis  Colon cancer  Hypothyroidism  S/P AVR (2013 @ [facility]  by Dr. Young)  Cardiac pacemaker (3 yrs ago)  H/O inguinal hernia repair  History of bowel resection      REVIEW OF SYSTEMS: As stated above    Allergic/Immunologic:	    MEDICATIONS  (STANDING):    MEDICATIONS  (PRN):      Allergies: No Known Allergies    Intolerances        SOCIAL HISTORY:    FAMILY HISTORY:      Vital Signs Last 24 Hrs  T(C): 36.8 (24 Dec 2021 15:49), Max: 36.9 (24 Dec 2021 11:08)  T(F): 98.3 (24 Dec 2021 15:49), Max: 98.4 (24 Dec 2021 11:08)  HR: 80 (24 Dec 2021 15:49) (80 - 83)  BP: 134/67 (24 Dec 2021 15:49) (111/45 - 134/67)  BP(mean): --  RR: 18 (24 Dec 2021 15:49) (18 - 18)  SpO2: 97% (24 Dec 2021 15:49) (96% - 97%)    PHYSICAL EXAM:  GA: Awake, alert and oriented x3, resting comfortably in bed and in no apparent distress  Heart: NSR  Lungs: Breath sounds clear and equal bilaterally. no tachypnea or respiratory distress  Abdomen: Soft, no distention, non-tender, no guarding. Old scar of thoracotomy for AVR on midline chest  Extremities: Scattered echymoses over right elbow (with small bluish hematoma), right wrist, and left forearm. Radial and brachial pulses 2+ symmetric. LLE with ecchymosis ([id number]), swelling and tenderness over left lateral thigh, mildly tender calf swelling and swelling and echymoses over left forefoot and toes. TP/DP pulses 2+ bilaterally.  Neurologic: Alert, no focal deficits, no motor or sensory deficits.      LABS:                        6.2    8.06  )-----------( 200      ( 24 Dec 2021 13:19 )             20.6     12-24    142  |  108  |  34<H>  ----------------------------<  148<H>  5.0   |  24  |  2.02<H>    Ca    8.6      24 Dec 2021 13:20      PT/INR - ( 24 Dec 2021 13:19 )   PT: 14.9 sec;   INR: 1.26          PTT - ( 24 Dec 2021 13:19 )  PTT:65.8 sec      RADIOLOGY & ADDITIONAL STUDIES:  CT ANGIOGRAM LEFT LOWER EXTREMITY (12/24):  PROCEDURE:  Initially, nonenhanced CT was obtained through the calves. Then, following the rapid administration of intravenous contrast, CT angiography was performed through left hemipelvis and left lower extremities down to the toes. Delayed images through the calves were also obtained. Sagittal and coronal reformats as well as 3D reconstructions were performed.  125 mls of Omnipaque 350 was administered intravenously without complication and 25 mls were discarded.  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  LEFT LOWER EXTREMITY:  Partially thrombosed 4.5 x 2.7 cm saccular aneurysm left internal iliac artery. Diffusely atherosclerotic SFA. Moderate size intramuscular hematoma involving biceps femoris muscle. No intravenous contrast extravasation. Mildly stenotic popliteal artery. Extensive calf vessel calcifications limiting evaluation of patency. Occlusion of the posterior tibial artery with distal reconstitution. Occlusion of the distal dorsalis pedis below the ankle and distal peroneal at the ankle.  ADDITIONAL FINDINGS: None.  IMPRESSION:  Moderate to large left anterior thigh intramuscular hematoma. No active arterial bleeding.  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 Additional chronic vascular findings as above.     Vascular Attending:  Dr. Barroso    HPI: 89yo male presented with painful swelling and bruises of LLE. States started about 10d ago by bilateral bruises in toes and swelling in calves and thighs (L>R) with the right-side gradually improving but left side worsening since then and becoming painful. Has been ambulating without difficulty and denies any recent falls, traumas or preceding events. Denies any fever, chills, dizziness, light-headedness, palpitation, coughs, shortness of breath, chest pain, or abdominal pain. PMH HTN, HLD, CKD stage 4 (Cr 2), HF (EF 30-35%), CAD (s/p PCI for RCA stenting 2007), aortic stenosis (s/p aortic valve replacement 2016), Afib, hypothyroidism, peptic ulcer disease, internal hemorrhoids, prostate and colon cancers (s/p R hemicolectomy 2016). He has a pacemaker and has been on Coumadin until 6w ago (when stopped due to risks of GIB). Upon presentation, pt had a Hgb of 6.2. Vascular surgery was consulted to R/O LLE hematoma as a possible source of anemia.   PAST MEDICAL & SURGICAL HISTORY:  Aortic valve replaced  Atrial fibrillation  CAD (coronary artery disease)  HTN (hypertension)  PUD (peptic ulcer disease)  Constipation  Iron deficiency anemia  Diverticulosis  High cholesterol  Carotid arterial disease  Internal hemorrhoids  Endocarditis  Colon cancer  Hypothyroidism  S/P AVR (2013 @ [facility]  by Dr. Young)  Cardiac pacemaker (3 yrs ago)  H/O inguinal hernia repair  History of bowel resection      REVIEW OF SYSTEMS: As stated above    Allergic/Immunologic:	    MEDICATIONS  (STANDING):    MEDICATIONS  (PRN):      Allergies: No Known Allergies    Intolerances        SOCIAL HISTORY: Smoker until 4 years ago. No history of alcohol or illicit drug abuse.    FAMILY HISTORY: No family history of vascular diseases or aneurysms      Vital Signs Last 24 Hrs  T(C): 36.8 (24 Dec 2021 15:49), Max: 36.9 (24 Dec 2021 11:08)  T(F): 98.3 (24 Dec 2021 15:49), Max: 98.4 (24 Dec 2021 11:08)  HR: 80 (24 Dec 2021 15:49) (80 - 83)  BP: 134/67 (24 Dec 2021 15:49) (111/45 - 134/67)  BP(mean): --  RR: 18 (24 Dec 2021 15:49) (18 - 18)  SpO2: 97% (24 Dec 2021 15:49) (96% - 97%)    PHYSICAL EXAM:  GA: Awake, alert and oriented x3, resting comfortably in bed and in no apparent distress  Heart: NSR  Lungs: Breath sounds clear and equal bilaterally. no tachypnea or respiratory distress  Abdomen: Soft, no distention, non-tender, no guarding. Old scar of thoracotomy for AVR on midline chest  Extremities: Scattered echymoses over right elbow (with small bluish hematoma), right wrist, and left forearm. Radial and brachial pulses 2+ symmetric. LLE with ecchymosis ([id number]), swelling and tenderness over left lateral thigh, mildly tender calf swelling and swelling and echymoses over left forefoot and toes. TP/DP pulses 2+ bilaterally.  Neurologic: Alert, no focal deficits, no motor or sensory deficits.      LABS:                        6.2    8.06  )-----------( 200      ( 24 Dec 2021 13:19 )             20.6     12-24    142  |  108  |  34<H>  ----------------------------<  148<H>  5.0   |  24  |  2.02<H>    Ca    8.6      24 Dec 2021 13:20      PT/INR - ( 24 Dec 2021 13:19 )   PT: 14.9 sec;   INR: 1.26          PTT - ( 24 Dec 2021 13:19 )  PTT:65.8 sec      RADIOLOGY & ADDITIONAL STUDIES:  CT ANGIOGRAM LEFT LOWER EXTREMITY (12/24):  PROCEDURE:  Initially, nonenhanced CT was obtained through the calves. Then, following the rapid administration of intravenous contrast, CT angiography was performed through left hemipelvis and left lower extremities down to the toes. Delayed images through the calves were also obtained. Sagittal and coronal reformats as well as 3D reconstructions were performed.  125 mls of Omnipaque 350 was administered intravenously without complication and 25 mls were discarded.  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  LEFT LOWER EXTREMITY:  Partially thrombosed 4.5 x 2.7 cm saccular aneurysm left internal iliac artery. Diffusely atherosclerotic SFA. Moderate size intramuscular hematoma involving biceps femoris muscle. No intravenous contrast extravasation. Mildly stenotic popliteal artery. Extensive calf vessel calcifications limiting evaluation of patency. Occlusion of the posterior tibial artery with distal reconstitution. Occlusion of the distal dorsalis pedis below the ankle and distal peroneal at the ankle.  ADDITIONAL FINDINGS: None.  IMPRESSION:  Moderate to large left anterior thigh intramuscular hematoma. No active arterial bleeding.  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 Additional chronic vascular findings as above.

## 2021-12-24 NOTE — ED ADULT NURSE NOTE - OBJECTIVE STATEMENT
89y M, A&ox3, presents to ed for lower leg pain for a week. Denies cp nor sob. noted +swelling to LLE with bruising to distal and proximal LLE. Denies any fall nor injury. No fevers nor chills. Pt also endorses some nausea, no abd pain, no urinary s/s.

## 2021-12-24 NOTE — ED PROVIDER NOTE - OBJECTIVE STATEMENT
89 yo of HTN, HLD, prostate ca, colon ca (s/p r. hemicolectomy in 2016), GIB, PUD, aortic stenosis (bovine valve replacment 2016), atrial fibrilation on coumadin, pacemaker (2013), HFrEF (EF 30-35%), CAD (s/p PCI w. RCA stent in 2007), CKD4 (baseline Cr 2) and hypothyroidism w complaints of LLE swelling and bruising for 10 days, denies sob, chest pain, cough, f/c. vaccinated x3. Denies falls but states may hit it while sleeping. States initially also on R but resolved. Still ambulatory. states has been on ac up until a few months ago. 89 yo of HTN, HLD, prostate ca, colon ca (s/p r. hemicolectomy in 2016), GIB, PUD, aortic stenosis (bovine valve replacment 2016), atrial fibrilation on coumadin, pacemaker (2013), HFrEF (EF 30-35%), CAD (s/p PCI w. RCA stent in 2007), CKD4 (baseline Cr 2) and hypothyroidism w complaints of LLE swelling and bruising for 10 days, denies sob, chest pain, cough, f/c. vaccinated x3. Denies falls but states may hit it while sleeping. States swelling initially also on R but then resolved. Still ambulatory.  has been on ac up until 1.5 months ago but taken off warfarin due to risks- as told by his pmd/cardiologist.

## 2021-12-24 NOTE — ED PROVIDER NOTE - CARE PLAN
Principal Discharge DX:	Intramuscular hematoma  Secondary Diagnosis:	Aneurysm of internal iliac artery  Secondary Diagnosis:	Anemia   1

## 2021-12-24 NOTE — H&P ADULT - NSHPPHYSICALEXAM_GEN_ALL_CORE
GA: Awake, alert and oriented x3, resting comfortably in bed and in no apparent distress  Heart: NSR  Lungs: Breath sounds clear and equal bilaterally. no tachypnea or respiratory distress  Abdomen: Soft, no distention, non-tender, no guarding. Old scar of thoracotomy for AVR on midline chest  Extremities: Scattered echymoses over right elbow (with small bluish hematoma), right wrist, and left forearm. Radial and brachial pulses 2+ symmetric. LLE with ecchymosis ([id number]), swelling and tenderness over left lateral thigh, mildly tender calf swelling and swelling and echymoses over left forefoot and toes. TP/DP pulses 2+ bilaterally.  Neurologic: Alert, no focal deficits, no motor or sensory deficits.

## 2021-12-24 NOTE — H&P ADULT - ASSESSMENT
89yo male w PMH HTN, HLD, CKD stage 4 (Cr 2, GFR 28), HF (EF 30-35%), CAD (s/p PCI for RCA stenting 2007), aortic stenosis (s/p aortic valve replacement 2016), Afib, pacemaker, hypothyroidism, peptic ulcer disease, internal hemorrhoids, prostate and colon cancers (s/p R hemicolectomy 2016), on Coumadin until 6w ago presented with LLE swelling, echymoses and tenderness since 10 days ago (also endorses swelling and tenderness over RLE which gradually improved since then) as well as Hgb of 6.2. Duplex US negative for DVT. Moderate intramuscular hematoma on LLE looks stable and without any active bleeding or contrast extravasation. Incidental finding of left internal iliac artery aneurysm looks very mildly enlarged since previous imaging in 2016. Needs medical work up of hematoma and bleeding in setting of no active anti-coagulation or stated history of falls or trauma.    Please admit to vascular surgery service.    Problem 1; Iliac artery aneurysm:  - Left internal iliac artery aneurysm: Consider surgical repair after medical and cardiac risk optimization re extensive cardiac history, anemia, and bleeding  - Right common iliac artery aneurysm: No acute surgical interventions at this point, re lesion size and no change since last imaging in 2016  - Cardiology consult for surgical risk optimization  - Echocardiography    Problem 2; LLE hematoma with anemia:  - 1U pRBC transfusion  - FU AM CBC  - Fecal occult blood test  - LLE compression & elevation    Problem 3; HTN, HLD, CAD, HF, pacemaker, AVR, Afib:  - Appreciate cardiology recommendations  - c/w home meds:    -> atorvastatin 20mg    -> metoprolol 50mg BID   - Hold Lasix    Problem 4; Hypothyroidism:  - c/w home meds:    -> Levothyroxin (verify dose)    Problem 5; CKD:  - Monitor I/Os    Problem 6; PUD:  - c/w home meds:    -> omeprazole 20mg QD    Problem 7; BPH:   - c/w home meds:   -> tamsulosin 0.4mg QD    Problem 8; cognitive/sleep disorders:   - c/w home meds:   -> donepezil 5mg BID   -> memantine 5mg QD   -> trazodone 25mg QD    Diet: DASH/TLC/renal  - c/w home supplements:    -> Align 4mg QD    -> B Complex QD    -> Folic acid 1mg QD      -> Citrucel QD    -> CoQ10 300mg QD    -> Vit D 1000U QD

## 2021-12-24 NOTE — PATIENT PROFILE ADULT - FALL HARM RISK - HARM RISK INTERVENTIONS

## 2021-12-25 NOTE — CONSULT NOTE ADULT - ASSESSMENT
89 yo M with PMH of HTN, HLD, CKD IV, chronic HFrEF (EF 30-35%), CAD (s/p RCA PCI 2007),AS s/p bioprostetic AVR 2013 w/ revision 2016, Afib, colon cancer (s/p R hemicolectomy 2016) admitted for severe anemia secondary to IM hematoma with inadequate response to transfusion. Hematology consulted for anemia and coagulopathy     Anemia   - secondary to left anterior thigh intramuscular hematoma, no active arterial bleeding noted on CTA LLE.   - Hemodynamically stable.   - Hb on admission 6.2, s/p 1 unit, post transfusion Hb 6.3 (inadequate response)   - Getting 2 units PRBC this AM   - Check Iron studies, B12, folate, relic count   - Check FOBT   - Maintain active T&S, Transfuse for Hb <7.0     Coagulopathy   - Elevated PT/PTT and INR   - Received heparin SQ x 2 doses  - Check Mixing study   - Check Fibrinogen and D-dimer to rule out DIC   - Check Factor XII, XI, IX, VIII, VII, II, V, and X  - Check Lupus anticoagulant       To discuss with Dr. Prather

## 2021-12-25 NOTE — PROGRESS NOTE ADULT - SUBJECTIVE AND OBJECTIVE BOX
ON: UOP not fully recorded, cards evaluated high risk for surgery, AVSS, 11 beats of asymptomatic Vtach (6am), given am metoprolol, EKG no sig change vs yesterday (afib, BBB)  12/24: Admitted for L int iliac artery aneurysm repair, cards consulted for risk optimization, Hgb 6.2 (1U pRBC), VSS        89yo male w PMH HTN, HLD, CKD stage 4 (Cr 2, GFR 28), HF (EF 30-35%), CAD (s/p PCI for RCA stenting 2007), aortic stenosis (s/p aortic valve replacement 2016), Afib, pacemaker, hypothyroidism, peptic ulcer disease, internal hemorrhoids, prostate and colon cancers (s/p R hemicolectomy 2016), on Coumadin until 6w ago presented with LLE swelling, echymoses and tenderness since 10 days ago (also endorses swelling and tenderness over RLE which gradually improved since then) as well as Hgb of 6.2. Duplex US negative for DVT. Moderate intramuscular hematoma on LLE looks stable and without any active bleeding or contrast extravasation. Incidental finding of left internal iliac artery aneurysm looks very mildly enlarged since previous imaging in 2016. Needs medical work up of hematoma and bleeding in setting of no active anti-coagulation or stated history of falls or trauma.    Please admit to vascular surgery service.    Problem 1; Iliac artery aneurysm:  - Left internal iliac artery aneurysm: Consider surgical repair after medical and cardiac risk optimization re extensive cardiac history, anemia, and bleeding  - Right common iliac artery aneurysm: No acute surgical interventions at this point, re lesion size and no change since last imaging in 2016  - Cardiology consult for surgical risk optimization  - Echocardiography    Problem 2; LLE hematoma with anemia:  - 1U pRBC transfusion  - FU AM CBC  - Fecal occult blood test  - LLE compression & elevation    Problem 3; HTN, HLD, CAD, HF, pacemaker, AVR, Afib:  - Appreciate cardiology recommendations  - c/w home meds:    -> atorvastatin 20mg    -> metoprolol 50mg BID   - Hold Lasix    Problem 4; Hypothyroidism:  - c/w home meds:    -> Levothyroxin (verify dose)    Problem 5; CKD:  - Monitor I/Os    Problem 6; PUD:  - c/w home meds:    -> omeprazole 20mg QD    Problem 7; BPH:   - c/w home meds:   -> tamsulosin 0.4mg QD    Problem 8; cognitive/sleep disorders:   - c/w home meds:   -> donepezil 5mg BID   -> memantine 5mg QD   -> trazodone 25mg QD    Diet: DASH/TLC/renal  - c/w home supplements:    -> Align 4mg QD    -> B Complex QD    -> Folic acid 1mg QD      -> Citrucel QD    -> CoQ10 300mg QD    -> Vit D 1000U QD   ON: UOP not fully recorded, cards evaluated high risk for surgery, AVSS, 11 beats of asymptomatic Vtach (6am), given am metoprolol, EKG no sig change vs yesterday (afib, BBB)  12/24: Admitted for L int iliac artery aneurysm repair, cards consulted for risk optimization, Hgb 6.2 (1U pRBC), VSS       SUBJECTIVE: Patient seen and examined bedside by vascular team. No complaints at this time. Denies any feelings of chest pain, difficulty breathing, weakness, syncope, dizziness.     metoprolol succinate ER 50 milliGRAM(s) Oral daily  tamsulosin 0.4 milliGRAM(s) Oral at bedtime      Vital Signs Last 24 Hrs  T(C): 37 (25 Dec 2021 06:32), Max: 37.2 (24 Dec 2021 18:24)  T(F): 98.6 (25 Dec 2021 06:32), Max: 98.9 (24 Dec 2021 18:24)  HR: 80 (25 Dec 2021 08:28) (80 - 83)  BP: 108/53 (25 Dec 2021 08:28) (108/53 - 154/66)  BP(mean): --  RR: 16 (25 Dec 2021 08:28) (16 - 18)  SpO2: 94% (25 Dec 2021 08:28) (94% - 99%)  I&O's Detail    24 Dec 2021 07:01  -  25 Dec 2021 07:00  --------------------------------------------------------  IN:    Oral Fluid: 360 mL  Total IN: 360 mL    OUT:    Voided (mL): 150 mL  Total OUT: 150 mL    Total NET: 210 mL      25 Dec 2021 07:01  -  25 Dec 2021 09:46  --------------------------------------------------------  IN:    PRBCs (Packed Red Blood Cells): 350 mL  Total IN: 350 mL    OUT:  Total OUT: 0 mL    Total NET: 350 mL          Physical Exam:  General: No acute distress, resting comfortably in bed  C/V: normal sinus rhythm  Pulm: Nonlabored breathing, no respiratory distress  Abd: soft, non-tender, non-distended.  Extrem: warm and well perfused, no edema, LLE ACE wrap taken down; leg remains mildly swollen with diffuse bruising across the extremities but no evidence of hematoma or firmness of the extremity.    LABS:                        6.5    8.66  )-----------( 175      ( 25 Dec 2021 08:24 )             21.8     12-25    142  |  108  |  37<H>  ----------------------------<  132<H>  5.1   |  24  |  2.13<H>    Ca    8.8      25 Dec 2021 06:54  Phos  4.5     12-25  Mg     2.1     12-25    TPro  5.7<L>  /  Alb  3.4  /  TBili  1.0  /  DBili  0.3  /  AST  22  /  ALT  14  /  AlkPhos  59  12-25    PT/INR - ( 25 Dec 2021 06:54 )   PT: 14.2 sec;   INR: 1.19          PTT - ( 25 Dec 2021 06:54 )  PTT:71.2 sec      RADIOLOGY & ADDITIONAL STUDIES:        Assessment  89yo male w PMH HTN, HLD, CKD stage 4 (Cr 2, GFR 28), HF (EF 30-35%), CAD (s/p PCI for RCA stenting 2007), aortic stenosis (s/p aortic valve replacement 2016), Afib, pacemaker, hypothyroidism, peptic ulcer disease, internal hemorrhoids, prostate and colon cancers (s/p R hemicolectomy 2016), on Coumadin until 6w ago presented with LLE swelling, echymoses and tenderness since 10 days ago (also endorses swelling and tenderness over RLE which gradually improved since then) as well as Hgb of 6.2. Duplex US negative for DVT. Moderate intramuscular hematoma on LLE looks stable and without any active bleeding or contrast extravasation. Incidental finding of left internal iliac artery aneurysm looks very mildly enlarged since previous imaging in 2016. Needs medical work up of hematoma and bleeding in setting of no active anti-coagulation or stated history of falls or trauma.      Problem 1; Iliac artery aneurysm:  - Left internal iliac artery aneurysm: Consider surgical repair after medical and cardiac risk optimization re extensive cardiac history, anemia, and bleeding  - Right common iliac artery aneurysm: No acute surgical interventions at this point, re lesion size and no change since last imaging in 2016  - Cardiology consult for surgical risk optimization for EVAR  - Echocardiogram pending    Problem 2; LLE hematoma with anemia:  - 1U pRBC transfusion 12/24  - 2U PRBC transfusion 12/25 for AM Hb 6.3 from 6.2 (unresponsive to transfusion)  - FU AM CBC  - Fecal occult blood test  - Heme/onc consulted today 12/25 to workup spontaneous bleeding off of anticoagulation unresponsive to blood transfusin  - LLE compression & elevation    Problem 3; HTN, HLD, CAD, HF, pacemaker, AVR, Afib:  - Appreciate cardiology recommendations  - c/w home meds:    -> atorvastatin 20mg    -> metoprolol 50mg BID   - Hold Lasix    Problem 4; Hypothyroidism:  - c/w home meds:    -> Levothyroxin (verify dose)    Problem 5; CKD:  - Monitor I/Os    Problem 6; PUD:  - c/w home meds:    -> omeprazole 20mg QD    Problem 7; BPH:   - c/w home meds:   -> tamsulosin 0.4mg QD    Problem 8; cognitive/sleep disorders:   - c/w home meds:   -> donepezil 5mg BID   -> memantine 5mg QD   -> trazodone 25mg QD    Diet: DASH/TLC/renal  - c/w home supplements:    -> Align 4mg QD    -> B Complex QD    -> Folic acid 1mg QD      -> Citrucel QD    -> CoQ10 300mg QD    -> Vit D 1000U QD

## 2021-12-25 NOTE — CONSULT NOTE ADULT - ASSESSMENT
89YOM with history of essential HTN, HLD, CKD4, chronic HFrEF (LVEF 35-40%, last TTE available for review 2018), CAD s/p PCI, aortic stenosis s/p AVR, chronic atrial fibrillation, hypothyroidism, recent GIB (now off warfarin x 2 months) admitted to vascular surgery service for bruising of RLE and L internal iliac artery aneurysm.    L internal iliac artery aneurysm  -management per vascular. Per my discussion with Dr. Barroso, may be repaired on non-urgent basis, possibly as outpatient  -seen by cardiology, high risk surgical patient, recommend transfusing to Hgb 8    Anemia, likely acute vs chronic blood loss anemia  LLE intramuscular hematoma  Elevated PTT  Hgb 6.2 on admission - received 1U pRBC with minimal response. Other than intramuscular hematoma, no other evidence of blood loss - recent admission at outside hospital 2 months ago for GIB at which time his warfarin was stopped though he says he has been having brown stools. Elevated PTT noted, no known history of bleeding disorder and no family history.  -per primary team, hematology has been consulted, prelim workup sent - can likely be completed/followed up as outpatient but will await formal hematology input  -agree with blood transfusion - Hgb goal 8 - getting additional pRBC today and will have post-transfusion CBC  -check B12, folate (macrocytosis)    AS s/p AVR  Chronic atrial fibrillation  Previously on warfarin but stopped due to GIB most recently about 2 months prior to admission. EKG V-paced  -continue home metoprolol  -cardiology following     Chronic HFrEF  CAD s/p PCI  LVEF 35-40% on TTE here 2018 but reportedly has a more recent one. Does not appear volume overloaded.  -continue home metoprolol succinate, lasix 10mg once daily  -continue home statin    Dementia - mild, he is independent at home, continue home donepezil  HLD - continue home statin  CKD4 - at baseline creatinine, monitor  Hypothyroidism - continue home levothyroxine 25mcg once daily  BPH - continue home tamsulosin  Gout - continue home allopurinol

## 2021-12-25 NOTE — CONSULT NOTE ADULT - ASSESSMENT
89M w/ HTN, HLD, CKD IV, chronic HFrEF (EF 30-35%), CAD (s/p RCA PCI 2007),AS s/p bioprostetic AVR 2013 w/ revision 2016, Afib, colon cancer (s/p R hemicolectomy 2016) p/w painful swelling and bruises of LLE. Pt incidentally found to have iliac aneurysm. Cardiology consulted for preoperative evaluation.      Preoperative risk assessment: No date or definitive surgery planned yet. Prior to L Leg pain, patient decently functional given age and comorbidities (~3 blocks prior to stopping 2/2 fatigue). No chest pain or shortness of breath.  Pt w/ macrocytic anemia with Hgb of 6.2 (down from 10.9 in 2019). Per vascular surgery, patient will likely have an open surgery with general anesthesia.  - No ACS at this time  - If surgery were to happen now, given his degree of anemia, CHF, and h/o CAD, he is a high risk patient for a high risk surgery at this time  - His risk of cardiac complications would be lower with a Hgb >8.0, please transfuse to this goal  - Dry on exam, hold lasix at this time, but monitor volume status closely (rosalba in setting of transfusion)  - ok to continue metoprolol and statin at this time  - Once a more definitive surgical plan is in place, and anemia is resolved/addressed, a more accurate risk assessment can be made       89M w/ HTN, HLD, CKD IV, chronic HFrEF (EF 30-35%), CAD (s/p RCA PCI 2007),AS s/p bioprostetic AVR 2013 w/ revision 2016, Afib, colon cancer (s/p R hemicolectomy 2016) p/w painful swelling and bruises of LLE. Pt incidentally found to have iliac aneurysm. Cardiology consulted for preoperative evaluation.      Preoperative risk assessment: No date or definitive surgery planned yet. Prior to L Leg pain, patient decently functional given age and comorbidities (~3 blocks prior to stopping 2/2 fatigue). No chest pain or shortness of breath.  Pt w/ macrocytic anemia with Hgb of 6.2 (down from 10.9 in 2019). Per vascular surgery, patient will likely have an open surgery with general anesthesia.  - No ACS at this time  - If surgery were to happen now, given his degree of anemia, CHF, and h/o CAD, he is a high risk patient for a high risk surgery at this time  - His risk of cardiac complications would be lower with a Hgb >8.0, please transfuse to this goal  - Dry on exam, hold lasix at this time, but monitor volume status closely (rosalba in setting of transfusion)  - ok to continue metoprolol and statin at this time  - Please obtain outpatient echo report (Dr. Saxena)  - Once a more definitive surgical plan is in place, and anemia is resolved/addressed, a more accurate risk assessment can be made

## 2021-12-25 NOTE — CONSULT NOTE ADULT - SUBJECTIVE AND OBJECTIVE BOX
Hematology Oncology Consult Note (Dr. Prather)  Discussed with Dr. Prather and recommendations reviewed with the primary team.    HPI: 89 year old male with PMH of HTN, HLD, CKD IV, chronic HFrEF (EF 30-35%), CAD (s/p RCA PCI 2007), AS s/p bioprostetic AVR 2013 w/ revision 2016, Afib, colon cancer (s/p R hemicolectomy 2016) p/w painful swelling and ecchymosis of LLE. States the swelling started about 10 days ago, initially on b/l toes and then progressed to calves and thigh, worse in LLE compared to RLE. He had been ambulating without difficulty prior to this. Denies any recent falls or trauma to the area.  Denies any fever, chills, dizziness, light-headedness, palpitation, coughs, shortness of breath, chest pain, or abdominal pain. Found to be anemic with Hgb of 6.2.  He was taking coumadin until about 6 weeks ago (due to risk of GIB).  LE ultrasound negative for DVT.  Found to have a moderate intramuscular hematoma w/out concern for active bleeding or extravasation. However, he was found to have an incidental L internal iliac aneurysm that is enlarged since previous imaging in 2016. He is to be admitted for possible surgical intervention. No surgery date planned as of yet. He is s/p 1 unit PRBC for Hb 6.2 with inadequate response, post transfusion CBC with Hb 6.3. Getting another 2 units PRBC. Hematology consulted for anemia and coagulopathy. He denies any history of epistaxis, gum bleeding, hematuria, joint bleeding.     PAST MEDICAL & SURGICAL HISTORY:  Aortic valve replaced  Atrial fibrillation  CAD (coronary artery disease)  HTN (hypertension)  PUD (peptic ulcer disease)  Constipation  Iron deficiency anemia  Diverticulosis  High cholesterol  Carotid arterial disease  Internal hemorrhoids  Endocarditis  Colon cancer  Hypothyroidism  S/P AVR 2013 @ [facility]  by Dr. Parikh  Cardiac pacemaker 3yrs ago  H/O inguinal hernia repair  H/O right hemicolectomy  History of appendectomy    Allergies: NKDA       Medications:  Home Medications:  Align 4 mg oral capsule: 1 cap(s) orally once a day (24 Dec 2021 21:23)  allopurinol 100 mg oral tablet: 1 tab(s) orally once a day (24 Dec 2021 21:23)  atorvastatin 20 mg oral tablet: 1 tab(s) orally once a day (at bedtime) (24 Dec 2021 21:23)  B Complex 50 oral tablet, extended release: 1 tab(s) orally once a day (24 Dec 2021 21:23)  Citrucel 2 g/19 g oral powder for reconstitution: 1 packet(s) orally once a day (24 Dec 2021 21:23)  CoQ10 300 mg oral capsule: 1 cap(s) orally once a day (24 Dec 2021 21:23)  donepezil 5 mg oral tablet: 1 tab(s) orally 2 times a day (24 Dec 2021 21:23)  folic acid 1 mg oral tablet: 1 tab(s) orally once a day (24 Dec 2021 21:23)  Lasix 20 mg oral tablet: 0.5 tab(s) orally once a day (24 Dec 2021 21:23)  levothyroxine 25 mcg (0.025 mg) oral tablet: 1 tab(s) orally once a day (24 Dec 2021 21:23)  memantine 5 mg oral tablet: 1 tab(s) orally once a day (24 Dec 2021 21:23)  metoprolol succinate 50 mg oral tablet, extended release: 1 tab(s) orally 2 times a day (24 Dec 2021 21:23)  omeprazole 20 mg oral delayed release capsule: 1 cap(s) orally once a day (24 Dec 2021 21:23)  tamsulosin 0.4 mg oral capsule: 1 tab(s) orally once a day (24 Dec 2021 21:23)  traZODone 50 mg oral tablet: 0.5 tab(s) orally once a day (24 Dec 2021 21:23)  Vitamin D3 1000 intl units oral tablet: 1 tab(s) orally once a day (24 Dec 2021 21:23)      Social History: Former smoker, quit 30 years ago. Used to smoke 0.5 ppd x 25 years. Denies any alcohol use.     FAMILY HISTORY: No family history of bleeding disorder.       PHYSICAL EXAM:  Vital Signs Last 24 Hrs  T(C): 37.4 (12-25-21 @ 13:40), Max: 37.4 (12-25-21 @ 13:40)  T(F): 99.3 (12-25-21 @ 13:40), Max: 99.3 (12-25-21 @ 13:40)  HR: 86 (12-25-21 @ 12:49) (80 - 86)  BP: 121/59 (12-25-21 @ 12:49) (108/53 - 154/66)  RR: 17 (12-25-21 @ 12:49) (16 - 18)  SpO2: 96% (12-25-21 @ 12:49) (94% - 99%)    Gen: in NAD  HEENT: normocephalic/atraumatic  Neck: supple  Cardiovascular: RR, nl S1S2, murmur noted   Respiratory: clear air entry b/l  Gastrointestinal: BS+, soft, NT/ND  Extremities: LLE in ace wrap, no edema, no calf tenderness, ecchymosis noted. Bruising noted over b/l UE.   Neurological: AAOx3, no focal deficits  Skin: no rash on visible skin  Lymph Nodes:  no cervical/supraclavicular LAD, no axillary/groin LAD  Musculoskeletal:  full ROM  Psychiatric:  mood stable    MEDICATIONS  (STANDING):  atorvastatin 20 milliGRAM(s) Oral at bedtime  cholecalciferol 1000 Unit(s) Oral daily  donepezil 5 milliGRAM(s) Oral at bedtime  folic acid 1 milliGRAM(s) Oral daily  levothyroxine 25 MICROGram(s) Oral daily  memantine 5 milliGRAM(s) Oral daily  metoprolol succinate ER 50 milliGRAM(s) Oral daily  pantoprazole    Tablet 40 milliGRAM(s) Oral before breakfast  tamsulosin 0.4 milliGRAM(s) Oral at bedtime  traZODone 25 milliGRAM(s) Oral at bedtime  vitamin B complex with vitamin C 1 Tablet(s) Oral daily    MEDICATIONS  (PRN):  acetaminophen     Tablet .. 650 milliGRAM(s) Oral every 6 hours PRN Mild Pain (1 - 3)  oxycodone    5 mG/acetaminophen 325 mG 1 Tablet(s) Oral every 6 hours PRN Moderate Pain (4 - 6)  oxycodone    5 mG/acetaminophen 325 mG 2 Tablet(s) Oral every 6 hours PRN Severe Pain (7 - 10)    Labs:                          6.5    8.66  )-----------( 175      ( 25 Dec 2021 08:24 )             21.8     CBC Full  -  ( 25 Dec 2021 08:24 )  WBC Count : 8.66 K/uL  RBC Count : 2.05 M/uL  Hemoglobin : 6.5 g/dL  Hematocrit : 21.8 %  Platelet Count - Automated : 175 K/uL  Mean Cell Volume : 106.3 fl  Mean Cell Hemoglobin : 31.7 pg  Mean Cell Hemoglobin Concentration : 29.8 gm/dL  Auto Neutrophil # : x  Auto Lymphocyte # : x  Auto Monocyte # : x  Auto Eosinophil # : x  Auto Basophil # : x  Auto Neutrophil % : x  Auto Lymphocyte % : x  Auto Monocyte % : x  Auto Eosinophil % : x  Auto Basophil % : x    PT/INR - ( 25 Dec 2021 06:54 )   PT: 14.2 sec;   INR: 1.19       PTT - ( 25 Dec 2021 06:54 )  PTT:71.2 sec    12-25    142  |  108  |  37<H>  ----------------------------<  132<H>  5.1   |  24  |  2.13<H>    Ca    8.8      25 Dec 2021 06:54  Phos  4.5     12-25  Mg     2.1     12-25    TPro  5.7<L>  /  Alb  3.4  /  TBili  1.0  /  DBili  0.3  /  AST  22  /  ALT  14  /  AlkPhos  59  12-25    Imaging Studies:    CTA LLE 12/24/21  IMPRESSION:  Moderate to large left anterior thigh intramuscular hematoma. No active arterial bleeding.  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 Additional chronic vascular findings as above.    US Doppler LLE 12/24/21  IMPRESSION:  No deep vein thrombosis seen.

## 2021-12-25 NOTE — PROVIDER CONTACT NOTE (CRITICAL VALUE NOTIFICATION) - BACKGROUND
Pt A&Ox4, presented from assisted living with LLE pain and swelling. Admitted for L iliac artery aneurysm repair. Received 1 unit PRBCs last night for hgb 6.2

## 2021-12-25 NOTE — CONSULT NOTE ADULT - SUBJECTIVE AND OBJECTIVE BOX
CC: bruising LLE    HPI:  Per admission H&P:  "89yo male presented with painful swelling and bruises of LLE. States started about 10d ago by bilateral bruises in toes and swelling in calves and thighs (L>R) with the right-side gradually improving but left side worsening since then and becoming painful. Has been ambulating without difficulty and denies any recent falls, traumas or preceding events. Denies any fever, chills, dizziness, light-headedness, palpitation, coughs, shortness of breath, chest pain, or abdominal pain. PMH HTN, HLD, CKD stage 4 (Cr 2, eGFR 28), HF (EF 30-35%), CAD (s/p PCI for RCA stenting 2007), aortic stenosis (s/p aortic valve replacement 2016), Afib, hypothyroidism, peptic ulcer disease, internal hemorrhoids, prostate and colon cancers (s/p R hemicolectomy 2016). He has a pacemaker and has been on Coumadin until 6w ago (when stopped due to risks of GIB). Upon presentation, pt had a Hgb of 6.2."    Patient admitted to vascular surgery service, given 1U pRBC. Seen by me this morning. He confirms above history. Denies any trauma to the leg. Has never had gum bleeding, joint bleeding, or any other spontaneous nontraumatic bleeding before. He reports pain in the leg related to bruising, denies dizziness, lightheadedness, SOB, chest pain, palpitations, other symptoms.    12 point ROS reviewed and negative except as otherwise stated in HPI and below    PAST MEDICAL & SURGICAL HISTORY:  Aortic valve replaced    Atrial fibrillation    CAD (coronary artery disease)    HTN (hypertension)    PUD (peptic ulcer disease)    Constipation    Iron deficiency anemia    Diverticulosis    High cholesterol    Carotid arterial disease    Internal hemorrhoids    Endocarditis    Colon cancer    Hypothyroidism    S/P AVR  2013 @ [facility]  by Dr. Young    Cardiac pacemaker  3yrs ago    H/O inguinal hernia repair    H/O right hemicolectomy    History of appendectomy      SOCIAL HISTORY:  Tobacco: denies  Alcohol: denies  Illicit Drugs: denies  ADLs: has cane but usually does not need assistive device to ambulate    FAMILY HISTORY:  denies any family history of bleeding disorders    ALLERGIES:  No Known Allergies    HOME MEDICATIONS:  Align 4 mg oral capsule: 1 cap(s) orally once a day  allopurinol 100 mg oral tablet: 1 tab(s) orally once a day  atorvastatin 20 mg oral tablet: 1 tab(s) orally once a day (at bedtime)  B Complex 50 oral tablet, extended release: 1 tab(s) orally once a day  Citrucel 2 g/19 g oral powder for reconstitution: 1 packet(s) orally once a day  CoQ10 300 mg oral capsule: 1 cap(s) orally once a day  donepezil 5 mg oral tablet: 1 tab(s) orally 2 times a day  folic acid 1 mg oral tablet: 1 tab(s) orally once a day  Lasix 20 mg oral tablet: 0.5 tab(s) orally once a day  levothyroxine 25 mcg (0.025 mg) oral tablet: 1 tab(s) orally once a day  memantine 5 mg oral tablet: 1 tab(s) orally once a day  metoprolol succinate 50 mg oral tablet, extended release: 1 tab(s) orally 2 times a day  omeprazole 20 mg oral delayed release capsule: 1 cap(s) orally once a day  tamsulosin 0.4 mg oral capsule: 1 tab(s) orally once a day  traZODone 50 mg oral tablet: 0.5 tab(s) orally once a day  Vitamin D3 1000 intl units oral tablet: 1 tab(s) orally once a day      Vital Signs Last 24 Hrs  T(F): 99.3 (25 Dec 2021 13:40), Max: 99.3 (25 Dec 2021 13:40)  HR: 86 (25 Dec 2021 12:49) (80 - 86)  BP: 121/59 (25 Dec 2021 12:49) (108/53 - 154/66)  RR: 17 (25 Dec 2021 12:49) (16 - 18)  SpO2: 96% (25 Dec 2021 12:49) (94% - 99%)    PHYSICAL EXAM:  GENERAL: pleasant, appropriate, no acute distress  HEAD:  Atraumatic, Normocephalic  EYES: EOMI, PERRLA, conjunctiva and sclera clear  ENMT: No tonsillar erythema, exudates, or enlargement; Moist mucous membranes  NECK: Supple, No JVD  CHEST/LUNG: Clear to auscultation bilaterally; No rales, rhonchi, wheezing, or rubs  HEART: regular rate and rhythm; S1/S2, 2/6 systolic murmur  ABDOMEN: Soft, Nontender, Nondistended; Bowel sounds present  VASCULAR: Normal pulses, Normal capillary refill  EXTREMITIES:  LLE with ecchymoses, wrapped in ACE bandage. RLE with minimal bruising. No joint effusion, gum bleeding, petechiae.  LYMPH: No lymphadenopathy noted  SKIN: Warm, Intact  PSYCH: Normal mood and affect  NERVOUS SYSTEM:  A/O x3, CN 2-12 intact, No focal deficits    LABS:                        6.5    8.66  )-----------( 175      ( 25 Dec 2021 08:24 )             21.8     12-25    142  |  108  |  37  ----------------------------<  132  5.1   |  24  |  2.13    Ca    8.8      25 Dec 2021 06:54  Phos  4.5     12-25  Mg     2.1     12-25    TPro  5.7  /  Alb  3.4  /  TBili  1.0  /  DBili  0.3  /  AST  22  /  ALT  14  /  AlkPhos  59  12-25    PT/INR - ( 25 Dec 2021 06:54 )   PT: 14.2 sec;   INR: 1.19          PTT - ( 25 Dec 2021 06:54 )  PTT:71.2 sec      COVID-19 PCR: Negative (12-24-21 @ 14:05)      RADIOLOGY & ADDITIONAL TESTS:  < from: CT Angio Lower Extremity w/ IV Cont, Left (12.24.21 @ 15:38) >  FINDINGS:    CENTRAL ARTERIAL SYSTEM:    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    LEFT LOWER EXTREMITY:    Partially thrombosed 4.5 x 2.7 cm saccular aneurysm left internal iliac   artery. Diffusely atherosclerotic SFA. Moderate size intramuscular   hematoma involving biceps femoris muscle. No intravenous contrast   extravasation. Mildly stenotic popliteal artery. Extensive calf vessel   calcifications limiting evaluation of patency. Occlusion of the posterior   tibial artery with distal reconstitution. Occlusion of the distal   dorsalis pedis below the ankle and distal peroneal at the ankle.    ADDITIONAL FINDINGS: None.    IMPRESSION:  Moderate to large left anterior thigh intramuscular hematoma. No active   arterial bleeding.    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 Additional chronic   vascular findings as above.    < end of copied text >

## 2021-12-25 NOTE — CONSULT NOTE ADULT - SUBJECTIVE AND OBJECTIVE BOX
HPI:  89M w/ HTN, HLD, CKD IV, chronic HFrEF (EF 30-35%), CAD (s/p RCA PCI 2007),AS s/p bioprostetic AVR 2013 w/ revision 2016, Afib, colon cancer (s/p R hemicolectomy 2016) p/w painful swelling and bruises of LLE. Found to be anemic with Hgb of 6.2.  He was taking coumadin until about 6 weeks ago.  LE ultrasound negative for DVT.  Found to have a moderate intramuscular hematoma w/out concern for active bleeding or extravasation. However, he was found to have an incidental L internal iliac aneurysm that is enlarged since previous imaging in 2016. He is to be admitted for possible surgical intervention. No surgery date planned as of yet. Cardiology has been consulted for preoperative evaluation.      Pt follows w/ Dr. Saxena. Up until his leg pain, he was able to walk ~3 blocks before having to stop for fatigue. No chest pain or shortness of breath.  He was discontinued from coumadin 6 mo ago. He states this summer he had an incision on R shoulder that was complicated by significant bleeding. Per chart review, he has also had h/o GIB. He reports that he last had an echo done 3 months ago in clinic.      ROS: A 10-point review of systems was otherwise negative.    PAST MEDICAL & SURGICAL HISTORY:  Aortic valve replaced    Atrial fibrillation    CAD (coronary artery disease)    HTN (hypertension)    PUD (peptic ulcer disease)    Constipation    Iron deficiency anemia    Diverticulosis    High cholesterol    Carotid arterial disease    Internal hemorrhoids    Endocarditis    Colon cancer    Hypothyroidism    S/P AVR  2013 @ [facility]  by Dr. Young    Cardiac pacemaker  3yrs ago    H/O inguinal hernia repair    H/O right hemicolectomy    History of appendectomy        SOCIAL HISTORY:  FAMILY HISTORY:      ALLERGIES: 	  No Known Allergies            MEDICATIONS:  atorvastatin 20 milliGRAM(s) Oral at bedtime  cholecalciferol 1000 Unit(s) Oral daily  donepezil 5 milliGRAM(s) Oral at bedtime  folic acid 1 milliGRAM(s) Oral daily  heparin   Injectable 5000 Unit(s) SubCutaneous every 8 hours  levothyroxine 25 MICROGram(s) Oral daily  memantine 5 milliGRAM(s) Oral daily  metoprolol succinate ER 50 milliGRAM(s) Oral daily  pantoprazole    Tablet 40 milliGRAM(s) Oral before breakfast  tamsulosin 0.4 milliGRAM(s) Oral at bedtime  traZODone 25 milliGRAM(s) Oral at bedtime  vitamin B complex with vitamin C 1 Tablet(s) Oral daily      PHYSICAL EXAM:  T(C): 37.2 (12-24-21 @ 22:11), Max: 37.2 (12-24-21 @ 18:24)  HR: 80 (12-25-21 @ 00:02) (80 - 83)  BP: 123/57 (12-25-21 @ 00:02) (111/45 - 154/66)  RR: 18 (12-25-21 @ 00:02) (16 - 18)  SpO2: 96% (12-25-21 @ 00:02) (95% - 99%)  Wt(kg): --    GEN: Awake, comfortable. NAD. Frail appearing, pleasant  HEENT: NCAT, , EOMI. Mucosa moist. No JVD.   RESP: CTA b/l  CV: RRR, normal s1/s2. No m/r/g.  ABD: Soft, NTND. BS+  EXT: Warm. No edema. L leg wrapped in ace bandage  NEURO: AAOx3. No focal deficits.    I&O's Summary    24 Dec 2021 07:01  -  25 Dec 2021 00:17  --------------------------------------------------------  IN: 240 mL / OUT: 0 mL / NET: 240 mL      Height (cm): 160 (12-24 @ 11:08)  Weight (kg): 62.6 (12-24 @ 11:08)  BMI (kg/m2): 24.5 (12-24 @ 11:08)  BSA (m2): 1.65 (12-24 @ 11:08)  	  LABS:	 	    CARDIAC MARKERS:                          6.2    8.06  )-----------( 200      ( 24 Dec 2021 13:19 )             20.6     12-24    142  |  108  |  34<H>  ----------------------------<  148<H>  5.0   |  24  |  2.02<H>    Ca    8.6      24 Dec 2021 13:20      TELEMETRY: V paced	    ECG: V paced  ECHO:Outpatient 9/2019-Mild LVH. EF 30-35% with paradoxical septal motion. Apical septum akinetic. Mild aortic sclerosis with trace AI. Mild to mod MR. Mild to mod TR. Mild to mod MD.     Pt reports he had one done 3 months ago.

## 2021-12-25 NOTE — CONSULT NOTE ADULT - TIME BILLING
-Pt seen and examined  -Pre-op risk Assessment: High risk patient for high risk procedure; Currently no evidence of active ACS and otherwise well compensated from CHF standpoint; Optimize Hgb to goal >8; c/w Toprol 50 daily; Lasix 20 IV w/ PRBC transfusion  -sCHF - well compensated, euvolemic and war on exam; tolerated transfusion well from volume standpoint; c/w Toprol 50 daily, hold ACE/ARB; Lasix 20 IV PRN, currently euvolemic  -Will continue to follow    Haim Junior MD  Cardiology Attending

## 2021-12-26 NOTE — PROGRESS NOTE ADULT - ASSESSMENT
89YOM with history of essential HTN, HLD, CKD4, chronic HFrEF (LVEF 35-40%, last TTE available for review 2018), CAD s/p PCI, aortic stenosis s/p AVR, chronic atrial fibrillation, hypothyroidism, recent GIB (now off warfarin x 2 months) admitted to vascular surgery service for bruising of RLE and L internal iliac artery aneurysm.    L internal iliac artery aneurysm  -management per vascular. Per my discussion with Dr. Barroso, may be repaired on non-urgent basis, possibly as outpatient  -seen by cardiology, high risk surgical patient, recommend transfusing to Hgb 8    Anemia, likely acute vs chronic blood loss anemia  LLE intramuscular hematoma  Elevated PTT  Hgb 6.2 on admission - incomplete responses to pRBC (6.2 -> 1U -> 6.5 -> 2U -> 8.1 and 7.8 today). Other than intramuscular hematoma, no other evidence of blood loss - recent admission at outside hospital 2 months ago for GIB at which time his warfarin was stopped though he says he has been having brown stools - confirmed on my personal observation today. Elevated PTT noted, no known history of bleeding disorder and no family history.  -per primary team, hematology has been consulted, workup for factor inhibitor, other coagulopathy sent  -check B12, folate (macrocytosis)  -discussed with hematology fellow - await coagulopathy workup, would like to see H/H stabilized  -recheck CBC this afternoon - if remains below 8 will transfuse to goal of 8    AS s/p AVR  Chronic atrial fibrillation  Previously on warfarin but stopped due to GIB most recently about 2 months prior to admission. EKG V-paced  -continue home metoprolol  -cardiology following    Chronic HFrEF  CAD s/p PCI  LVEF 35-40% on TTE here 2018 but reportedly has a more recent one. Does not appear volume overloaded.  -continue home metoprolol succinate, lasix 10mg once daily  -continue home statin    Dementia - mild, he is independent at home, continue home donepezil  HLD - continue home statin  CKD4 - at baseline creatinine, monitor  Hypothyroidism - continue home levothyroxine 25mcg once daily  BPH - continue home tamsulosin  Gout - continue home allopurinol    Dispo: transferring to medicine given inadequate response to blood transfusion, downtrending anemia of unknown bleeding source, ongoing coagulopathy workup 89YOM with history of essential HTN, HLD, CKD4, chronic HFrEF (LVEF 35-40%, last TTE available for review 2018), CAD s/p PCI, aortic stenosis s/p AVR, chronic atrial fibrillation, hypothyroidism, recent GIB (now off warfarin x 2 months) admitted to vascular surgery service for bruising of RLE and L internal iliac artery aneurysm.    L internal iliac artery aneurysm  -management per vascular. Per my discussion with Dr. Barroso, may be repaired on non-urgent basis, possibly as outpatient  -seen by cardiology, high risk surgical patient, recommend transfusing to Hgb 8    Anemia, likely acute vs chronic blood loss anemia  LLE intramuscular hematoma  Elevated PTT  Hgb 6.2 on admission - incomplete responses to pRBC (6.2 -> 1U -> 6.5 -> 2U -> 8.1 and 7.8 today). Other than intramuscular hematoma, no other evidence of blood loss - recent admission at outside hospital 2 months ago for GIB at which time his warfarin was stopped though he says he has been having brown stools - confirmed on my personal observation today. Elevated PTT noted, no known history of bleeding disorder and no family history.  -per primary team, hematology has been consulted, workup for factor inhibitor, other coagulopathy sent  -check B12, folate (macrocytosis)  -discussed with hematology fellow - await coagulopathy workup, would like to see H/H stabilized  -recheck CBC this afternoon - if remains below 8 will transfuse to goal of 8    AS s/p AVR  Chronic atrial fibrillation  Previously on warfarin but stopped due to GIB most recently about 2 months prior to admission. EKG V-paced  -continue home metoprolol  -cardiology following    Chronic HFrEF  CAD s/p PCI  LVEF 35-40% on TTE here 2018 but reportedly has a more recent one. Does not appear volume overloaded.  -continue home metoprolol succinate, lasix 10mg once daily  -continue home statin    Dementia - mild, he is independent at home, continue home donepezil  HLD - continue home statin  CKD4 - at baseline creatinine, monitor  Hypothyroidism - continue home levothyroxine 25mcg once daily  BPH - continue home tamsulosin  Gout - continue home allopurinol    Dispo: transferring to medicine given inadequate response to blood transfusion, downtrending anemia of unknown bleeding source, ongoing coagulopathy workup    Discussed with vascular and heme/onc teams

## 2021-12-26 NOTE — PROVIDER CONTACT NOTE (CRITICAL VALUE NOTIFICATION) - ACTION/TREATMENT ORDERED:
Awaiting further orders from vascular team.
1 unit PRBCs ordered by MD Pemberton. Will continue to assess and monitor.

## 2021-12-26 NOTE — PROGRESS NOTE ADULT - ASSESSMENT
89 yo M with PMH of HTN, HLD, CKD IV, chronic HFrEF (EF 30-35%), CAD (s/p RCA PCI 2007),AS s/p bioprostetic AVR 2013 w/ revision 2016, Afib, colon cancer (s/p R hemicolectomy 2016) admitted for severe anemia secondary to IM hematoma with inadequate response to transfusion. Hematology consulted for anemia and coagulopathy     Anemia   - secondary to left anterior thigh intramuscular hematoma, no active arterial bleeding noted on CTA LLE.   - Hemodynamically stable.  - Hb on admission 6.2, s/p 1 unit, post transfusion Hb 6.3 (inadequate response). He received 2 units on 12/25/21 for Hb 6.5, post transfusion CBC with Hb 8.1 (inadequate response) c/f ongoing bleeding.   - Iron studies may not be accurate if done after PRBC transfusion.  - FOBT negative  - Maintain active T&S, Transfuse for Hb <7.0   - Would recommend inpatient care given down trending Hb and concern for ongoing bleeding.     Coagulopathy   - Elevated PT/PTT and INR c/f factor deficiency versus factor inhibitor   - H/o aortic stenosis, check von Willebrand disease activity and antigen to rule out acquired von Willebrand disease.   - Received heparin SQ x 2 doses  - Check Mixing study   - Fibrinogen normal, no evidence of DIC   - Check Factor XII, XI, IX, VIII, VII, II, V, and X  - Check Lupus anticoagulant   - He was given 10 units of cryo on 12/25/21 given persistently elevated PTT and lack of adequate response to PRBC transfusion c/f ongoing bleeding. FFP was not given initially given concern for volume overload in the setting of low LVEF.   - PTT post cryoprecipitate was still elevated. He is going to receive 1 unit of FFP today given he is euvolemic after discussion with cardiology. Consider lasix after if evidence of volume overload given EF is 30%.        Discussed with Dr. Prather and primary team.  89 yo M with PMH of HTN, HLD, CKD IV, chronic HFrEF (EF 30-35%), CAD (s/p RCA PCI 2007),AS s/p bioprostetic AVR 2013 w/ revision 2016, Afib, colon cancer (s/p R hemicolectomy 2016) admitted for severe anemia secondary to IM hematoma with inadequate response to transfusion. Hematology consulted for anemia and coagulopathy     Anemia   - secondary to left anterior thigh intramuscular hematoma, no active arterial bleeding noted on CTA LLE.   - Hemodynamically stable.  - Hb on admission 6.2, s/p 1 unit, post transfusion Hb 6.3 (inadequate response). He received 2 units on 12/25/21 for Hb 6.5, post transfusion CBC with Hb 8.1 (inadequate response) c/f ongoing bleeding.   - Iron studies may not be accurate if done after PRBC transfusion.  - FOBT negative  - Maintain active T&S, Transfuse for Hb <7.0   - Would recommend inpatient care given down trending Hb and concern for ongoing bleeding.     Coagulopathy   - Elevated PT/PTT and INR c/f factor deficiency versus factor inhibitor   - H/o aortic stenosis, check von Willebrand disease activity and antigen to rule out acquired von Willebrand disease.   - Received heparin SQ x 2 doses  - Check Mixing study   - Fibrinogen normal, no evidence of DIC   - Check Factor XII, XI, IX, VIII, VII, II, V, and X  - Check Lupus anticoagulant   - He was given 10 units of cryo on 12/25/21 given persistently elevated PTT and lack of adequate response to PRBC transfusion c/f ongoing bleeding. FFP was not given initially given concern for volume overload in the setting of low LVEF.   - PTT post cryoprecipitate was still elevated. He is going to receive 1 unit of FFP today given he is euvolemic after discussion with cardiology. Consider lasix after if evidence of volume overload given EF is 30%.  Please check PTT after FFP.       Discussed with Dr. Prather and primary team.

## 2021-12-26 NOTE — PROGRESS NOTE ADULT - SUBJECTIVE AND OBJECTIVE BOX
O/N: FABIENNE, VSS. Got 10u cryoprecipitate per heme/onc. Heme onc wanted repeat PTT 1 hour after cryo was given, PTT was unchanged.                              Assessment  89yo male w PMH HTN, HLD, CKD stage 4 (Cr 2, GFR 28), HF (EF 30-35%), CAD (s/p PCI for RCA stenting 2007), aortic stenosis (s/p aortic valve replacement 2016), Afib, pacemaker, hypothyroidism, peptic ulcer disease, internal hemorrhoids, prostate and colon cancers (s/p R hemicolectomy 2016), on Coumadin until 6w ago presented with LLE swelling, echymoses and tenderness since 10 days ago (also endorses swelling and tenderness over RLE which gradually improved since then) as well as Hgb of 6.2. Duplex US negative for DVT. Moderate intramuscular hematoma on LLE looks stable and without any active bleeding or contrast extravasation. Incidental finding of left internal iliac artery aneurysm looks very mildly enlarged since previous imaging in 2016. Needs medical work up of hematoma and bleeding in setting of no active anti-coagulation or stated history of falls or trauma.      Problem 1; Iliac artery aneurysm:  - Left internal iliac artery aneurysm: Consider surgical repair after medical and cardiac risk optimization re extensive cardiac history, anemia, and bleeding  - Right common iliac artery aneurysm: No acute surgical interventions at this point, re lesion size and no change since last imaging in 2016  - Cardiology consulted for pre-op risk optimization for EVAR  - Echocardiogram pending    Problem 2; LLE hematoma with anemia:  - 1U pRBC transfusion 12/24  - 2U PRBC transfusion 12/25 for AM Hb 6.3 from 6.2 (unresponsive to transfusion)  - FU AM CBC  - Fecal occult blood test pending  - Heme/onc consulted today 12/25 to workup spontaneous bleeding off of anticoagulation unresponsive to blood transfusion  - S/p 10u cryoprecipitate transfusion per heme/onc for elevated baseline PTT   - LLE compression & elevation    Problem 3; HTN, HLD, CAD, HF, pacemaker, AVR, Afib:  - Appreciate cardiology recommendations  - c/w home meds:    -> atorvastatin 20mg    -> metoprolol 50mg BID   - Hold Lasix    Problem 4; Hypothyroidism:  - c/w home meds:    -> Levothyroxin (verify dose)    Problem 5; CKD:  - Monitor I/Os    Problem 6; PUD:  - c/w home meds:    -> omeprazole 20mg QD    Problem 7; BPH:   - c/w home meds:   -> tamsulosin 0.4mg QD    Problem 8; cognitive/sleep disorders:   - c/w home meds:   -> donepezil 5mg BID   -> memantine 5mg QD   -> trazodone 25mg QD    Diet: DASH/TLC/renal  - c/w home supplements:    -> Align 4mg QD    -> B Complex QD    -> Folic acid 1mg QD      -> Citrucel QD    -> CoQ10 300mg QD    -> Vit D 1000U QD     O/N: FABIENNE, VSS. Got 10u cryoprecipitate per heme/onc. Heme onc wanted repeat PTT 1 hour after cryo was given, PTT was unchanged.    SUBJECTIVE: Patient seen and examined bedside by vascular team. No complaints at this time. Denies any feelings of CP, SOB, weakness or dizziness.    Vital Signs Last 24 Hrs  T(C): 36.4 (26 Dec 2021 05:10), Max: 37.6 (25 Dec 2021 23:37)  T(F): 97.5 (26 Dec 2021 05:10), Max: 99.6 (25 Dec 2021 23:37)  HR: 79 (26 Dec 2021 05:46) (79 - 86)  BP: 122/58 (26 Dec 2021 05:46) (100/53 - 122/58)  BP(mean): --  RR: 18 (26 Dec 2021 05:46) (16 - 18)  SpO2: 94% (26 Dec 2021 05:46) (91% - 96%)    Physical Exam:  General: No acute distress, resting comfortably in bed  C/V: normal sinus rhythm  Pulm: Nonlabored breathing, no respiratory distress  Abd: soft, non-tender, non-distended.  Extrem: warm and well perfused, no edema, LLE ACE wrap taken down; leg remains mildly swollen with diffuse bruising across the extremities but no evidence of hematoma or firmness of the extremity      I&O's Summary    25 Dec 2021 07:01  -  26 Dec 2021 07:00  --------------------------------------------------------  IN: 1644 mL / OUT: 600 mL / NET: 1044 mL        LABS:                        7.8    7.85  )-----------( 145      ( 26 Dec 2021 06:28 )             25.1     12-26    140  |  106  |  41<H>  ----------------------------<  107<H>  5.2   |  23  |  2.24<H>    Ca    8.6      26 Dec 2021 06:28  Phos  3.7     12-26  Mg     2.2     12-26    TPro  5.7<L>  /  Alb  3.4  /  TBili  1.0  /  DBili  0.3  /  AST  22  /  ALT  14  /  AlkPhos  59  12-25    PT/INR - ( 26 Dec 2021 06:28 )   PT: 13.6 sec;   INR: 1.14          PTT - ( 26 Dec 2021 06:28 )  PTT:70.2 sec    CAPILLARY BLOOD GLUCOSE        LIVER FUNCTIONS - ( 25 Dec 2021 06:54 )  Alb: 3.4 g/dL / Pro: 5.7 g/dL / ALK PHOS: 59 U/L / ALT: 14 U/L / AST: 22 U/L / GGT: x             RADIOLOGY & ADDITIONAL STUDIES:                              Assessment  89yo male w PMH HTN, HLD, CKD stage 4 (Cr 2, GFR 28), HF (EF 30-35%), CAD (s/p PCI for RCA stenting 2007), aortic stenosis (s/p aortic valve replacement 2016), Afib, pacemaker, hypothyroidism, peptic ulcer disease, internal hemorrhoids, prostate and colon cancers (s/p R hemicolectomy 2016), on Coumadin until 6w ago presented with LLE swelling, echymoses and tenderness since 10 days ago (also endorses swelling and tenderness over RLE which gradually improved since then) as well as Hgb of 6.2. Duplex US negative for DVT. Moderate intramuscular hematoma on LLE looks stable and without any active bleeding or contrast extravasation. Incidental finding of left internal iliac artery aneurysm looks very mildly enlarged since previous imaging in 2016. Needs medical work up of hematoma and bleeding in setting of no active anti-coagulation or stated history of falls or trauma.      Problem 1; Iliac artery aneurysm:  - Left internal iliac artery aneurysm: Consider surgical repair after medical and cardiac risk optimization re extensive cardiac history, anemia, and bleeding  - Right common iliac artery aneurysm: No acute surgical interventions at this point, re lesion size and no change since last imaging in 2016  - Cardiology consulted for pre-op risk optimization for EVAR  - Echocardiogram pending    Problem 2; LLE hematoma with anemia:  - 1U pRBC transfusion 12/24  - 2U PRBC transfusion 12/25 for AM Hb 6.3 from 6.2 (unresponsive to transfusion)  - FU AM CBC  - Fecal occult blood test pending  - Heme/onc following  - S/p 10u cryoprecipitate transfusion per heme/onc for elevated baseline PTT, PTT this AM 70.2  - LLE compression & elevation    Problem 3; HTN, HLD, CAD, HF, pacemaker, AVR, Afib:  - Appreciate cardiology recommendations  - c/w home meds:    -> atorvastatin 20mg    -> metoprolol 50mg BID   - Hold Lasix    Problem 4; Hypothyroidism:  - c/w home meds:    -> Levothyroxin (verify dose)    Problem 5; CKD:  - Monitor I/Os    Problem 6; PUD:  - c/w home meds:    -> omeprazole 20mg QD    Problem 7; BPH:   - c/w home meds:   -> tamsulosin 0.4mg QD    Problem 8; cognitive/sleep disorders:   - c/w home meds:   -> donepezil 5mg BID   -> memantine 5mg QD   -> trazodone 25mg QD    Diet: DASH/TLC/renal  - c/w home supplements:    -> Align 4mg QD    -> B Complex QD    -> Folic acid 1mg QD      -> Citrucel QD    -> CoQ10 300mg QD    -> Vit D 1000U QD

## 2021-12-26 NOTE — PROGRESS NOTE ADULT - SUBJECTIVE AND OBJECTIVE BOX
Hematology Oncology Progress Note (Dr. Prather)  Discussed with Dr. Prather and recommendations reviewed with the primary team.    Interval HPI: Patient seen and examined. Denies any chest pain, sob, dizziness, palpitations. RLE pain improving. S/p 10 units cryo on 12/25/21    HPI: 89 year old male with PMH of HTN, HLD, CKD IV, chronic HFrEF (EF 30-35%), CAD (s/p RCA PCI 2007), AS s/p bioprostetic AVR 2013 w/ revision 2016, Afib, colon cancer (s/p R hemicolectomy 2016) p/w painful swelling and ecchymosis of LLE. States the swelling started about 10 days ago, initially on b/l toes and then progressed to calves and thigh, worse in LLE compared to RLE. He had been ambulating without difficulty prior to this. Denies any recent falls or trauma to the area.  Denies any fever, chills, dizziness, light-headedness, palpitation, coughs, shortness of breath, chest pain, or abdominal pain. Found to be anemic with Hgb of 6.2.  He was taking coumadin until about 6 weeks ago (due to risk of GIB).  LE ultrasound negative for DVT.  Found to have a moderate intramuscular hematoma w/out concern for active bleeding or extravasation. However, he was found to have an incidental L internal iliac aneurysm that is enlarged since previous imaging in 2016. He is to be admitted for possible surgical intervention. No surgery date planned as of yet. He is s/p 1 unit PRBC for Hb 6.2 with inadequate response, post transfusion CBC with Hb 6.3. Getting another 2 units PRBC. Hematology consulted for anemia and coagulopathy. He denies any history of epistaxis, gum bleeding, hematuria, joint bleeding.     PAST MEDICAL & SURGICAL HISTORY:  Aortic valve replaced  Atrial fibrillation  CAD (coronary artery disease)  HTN (hypertension)  PUD (peptic ulcer disease)  Constipation  Iron deficiency anemia  Diverticulosis  High cholesterol  Carotid arterial disease  Internal hemorrhoids  Endocarditis  Colon cancer  Hypothyroidism  S/P AVR 2013 @ [facility]  by Dr. Parikh  Cardiac pacemaker 3yrs ago  H/O inguinal hernia repair  H/O right hemicolectomy  History of appendectomy    Allergies: NKDA       Medications:  Home Medications:  Align 4 mg oral capsule: 1 cap(s) orally once a day (24 Dec 2021 21:23)  allopurinol 100 mg oral tablet: 1 tab(s) orally once a day (24 Dec 2021 21:23)  atorvastatin 20 mg oral tablet: 1 tab(s) orally once a day (at bedtime) (24 Dec 2021 21:23)  B Complex 50 oral tablet, extended release: 1 tab(s) orally once a day (24 Dec 2021 21:23)  Citrucel 2 g/19 g oral powder for reconstitution: 1 packet(s) orally once a day (24 Dec 2021 21:23)  CoQ10 300 mg oral capsule: 1 cap(s) orally once a day (24 Dec 2021 21:23)  donepezil 5 mg oral tablet: 1 tab(s) orally 2 times a day (24 Dec 2021 21:23)  folic acid 1 mg oral tablet: 1 tab(s) orally once a day (24 Dec 2021 21:23)  Lasix 20 mg oral tablet: 0.5 tab(s) orally once a day (24 Dec 2021 21:23)  levothyroxine 25 mcg (0.025 mg) oral tablet: 1 tab(s) orally once a day (24 Dec 2021 21:23)  memantine 5 mg oral tablet: 1 tab(s) orally once a day (24 Dec 2021 21:23)  metoprolol succinate 50 mg oral tablet, extended release: 1 tab(s) orally 2 times a day (24 Dec 2021 21:23)  omeprazole 20 mg oral delayed release capsule: 1 cap(s) orally once a day (24 Dec 2021 21:23)  tamsulosin 0.4 mg oral capsule: 1 tab(s) orally once a day (24 Dec 2021 21:23)  traZODone 50 mg oral tablet: 0.5 tab(s) orally once a day (24 Dec 2021 21:23)  Vitamin D3 1000 intl units oral tablet: 1 tab(s) orally once a day (24 Dec 2021 21:23)      Social History: Former smoker, quit 30 years ago. Used to smoke 0.5 ppd x 25 years. Denies any alcohol use.     FAMILY HISTORY: No family history of bleeding disorder.     MEDICATIONS  (STANDING):  atorvastatin 20 milliGRAM(s) Oral at bedtime  cholecalciferol 1000 Unit(s) Oral daily  donepezil 5 milliGRAM(s) Oral at bedtime  folic acid 1 milliGRAM(s) Oral daily  levothyroxine 25 MICROGram(s) Oral daily  memantine 5 milliGRAM(s) Oral daily  metoprolol succinate ER 50 milliGRAM(s) Oral daily  pantoprazole    Tablet 40 milliGRAM(s) Oral before breakfast  tamsulosin 0.4 milliGRAM(s) Oral at bedtime  traZODone 25 milliGRAM(s) Oral at bedtime  vitamin B complex with vitamin C 1 Tablet(s) Oral daily    MEDICATIONS  (PRN):  acetaminophen     Tablet .. 650 milliGRAM(s) Oral every 6 hours PRN Mild Pain (1 - 3)  oxycodone    5 mG/acetaminophen 325 mG 1 Tablet(s) Oral every 6 hours PRN Moderate Pain (4 - 6)  oxycodone    5 mG/acetaminophen 325 mG 2 Tablet(s) Oral every 6 hours PRN Severe Pain (7 - 10)      PHYSICAL EXAM:  Vital Signs Last 24 Hrs  T(C): 36.9 (12-26-21 @ 12:51), Max: 37.6 (12-25-21 @ 23:37)  T(F): 98.5 (12-26-21 @ 12:51), Max: 99.6 (12-25-21 @ 23:37)  HR: 89 (12-26-21 @ 11:39) (79 - 89)  BP: 107/51 (12-26-21 @ 11:39) (100/53 - 122/58)  RR: 18 (12-26-21 @ 11:39) (16 - 18)  SpO2: 97% (12-26-21 @ 11:39) (91% - 97%)    Gen: in NAD  HEENT: normocephalic/atraumatic  Neck: supple  Cardiovascular: RR, nl S1S2, murmur noted   Respiratory: clear air entry b/l  Gastrointestinal: BS+, soft, NT/ND  Extremities: LLE in ace wrap, no edema, no calf tenderness, ecchymosis noted. Bruising noted over b/l UE.   Neurological: AAOx3, no focal deficits  Musculoskeletal:  full ROM  Psychiatric:  mood stable      Labs:                        7.8    7.85  )-----------( 145      ( 26 Dec 2021 06:28 )             25.1     12-26    140  |  106  |  41<H>  ----------------------------<  107<H>  5.2   |  23  |  2.24<H>    Ca    8.6      26 Dec 2021 06:28  Phos  3.7     12-26  Mg     2.2     12-26    TPro  5.7<L>  /  Alb  3.4  /  TBili  1.0  /  DBili  0.3  /  AST  22  /  ALT  14  /  AlkPhos  59  12-25    PT/INR - ( 26 Dec 2021 06:28 )   PT: 13.6 sec;   INR: 1.14        PTT - ( 26 Dec 2021 06:28 )  PTT:70.2 sec    Imaging Studies:    CTA LLE 12/24/21  IMPRESSION:  Moderate to large left anterior thigh intramuscular hematoma. No active arterial bleeding.  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 Additional chronic vascular findings as above.    US Doppler LLE 12/24/21  IMPRESSION:  No deep vein thrombosis seen.

## 2021-12-26 NOTE — PROGRESS NOTE ADULT - SUBJECTIVE AND OBJECTIVE BOX
Subjective/Interval events:  No acute overnight events. Reports ongoing pain in his leg. No dizziness, lightheadedness, fever/chills. Had BM this afternoon, examined by myself, brown, no blood.    MEDICATIONS  (STANDING):  atorvastatin 20 milliGRAM(s) Oral at bedtime  cholecalciferol 1000 Unit(s) Oral daily  donepezil 5 milliGRAM(s) Oral at bedtime  folic acid 1 milliGRAM(s) Oral daily  levothyroxine 25 MICROGram(s) Oral daily  memantine 5 milliGRAM(s) Oral daily  metoprolol succinate ER 50 milliGRAM(s) Oral daily  pantoprazole    Tablet 40 milliGRAM(s) Oral before breakfast  tamsulosin 0.4 milliGRAM(s) Oral at bedtime  traZODone 25 milliGRAM(s) Oral at bedtime  vitamin B complex with vitamin C 1 Tablet(s) Oral daily    MEDICATIONS  (PRN):  acetaminophen     Tablet .. 650 milliGRAM(s) Oral every 6 hours PRN Mild Pain (1 - 3)  oxycodone    5 mG/acetaminophen 325 mG 1 Tablet(s) Oral every 6 hours PRN Moderate Pain (4 - 6)  oxycodone    5 mG/acetaminophen 325 mG 2 Tablet(s) Oral every 6 hours PRN Severe Pain (7 - 10)    Vital Signs Last 24 Hrs  T(C): 36.9 (26 Dec 2021 12:51), Max: 37.6 (25 Dec 2021 23:37)  T(F): 98.5 (26 Dec 2021 12:51), Max: 99.6 (25 Dec 2021 23:37)  HR: 89 (26 Dec 2021 11:39) (79 - 89)  BP: 107/51 (26 Dec 2021 11:39) (100/53 - 122/58)  BP(mean): --  RR: 18 (26 Dec 2021 11:39) (16 - 18)  SpO2: 97% (26 Dec 2021 11:39) (91% - 97%)    PHYSICAL EXAM:  GENERAL: pleasant, appropriate, no acute distress. Participating appropriately in interview  HEENT - NC/AT, EOMI, PERLLA, oropharynx clear without exudate or erythema, moist mucus membranes  NECK: Soft, supple, No JVD, no lymphadenopathy  CHEST/LUNG: Clear to auscultation bilaterally; No rales, rhonchi, wheezing. Normal work of breathing, not tachypneic  HEART: Regular rate and rhythm; No murmurs, rubs, or gallops, normal s1/s2. Warm and well-perfused  ABDOMEN: Soft, Nontender, Nondistended. Normoactive bowel sounds.  EXTREMITIES:  2+ Peripheral Pulses, No clubbing, cyanosis, or edema  NEURO:  awake, alert, oriented to person, place, time, and situation. Cranial nerves intact, no motor or sensory deficits. Moves all extremities.  SKIN: No rashes or lesions. LLE wrapped in ACE bandage, bruising at upper thigh. RLE with minimal bruising. No joint effusion, gum bleeding, petechiae.    LABS:  12-26    140  |  106  |  41  ----------------------------<  107  5.2   |  23  |  2.24    Ca    8.6      26 Dec 2021 06:28  Phos  3.7     12-26  Mg     2.2     12-26    TPro  5.7  /  Alb  3.4  /  TBili  1.0  /  DBili  0.3  /  AST  22  /  ALT  14  /  AlkPhos  59  12-25                          7.8    7.85  )-----------( 145      ( 26 Dec 2021 06:28 )             25.1       COVID-19 PCR: Negative (12-24-21 @ 14:05)      RADIOLOGY & ADDITIONAL TESTS:  < from: Xray Chest 1 View- PORTABLE-Urgent (Xray Chest 1 View- PORTABLE-Urgent .) (12.24.21 @ 18:56) >  IMPRESSION:    Cardiomegaly. Postop change. Left-sided implanted cardiac device. Lungs   clear. No infiltrate pleural effusion or pneumothorax.    < end of copied text >

## 2021-12-27 NOTE — PROGRESS NOTE ADULT - ASSESSMENT
89 yo M with PMH of HTN, HLD, CKD IV, chronic HFrEF (EF 30-35%), CAD (s/p RCA PCI 2007),AS s/p bioprostetic AVR 2013 w/ revision 2016, Afib, colon cancer (s/p R hemicolectomy 2016) admitted for severe anemia secondary to IM hematoma with inadequate response to transfusion. Hematology consulted for anemia and coagulopathy     Anemia   - secondary to left anterior thigh intramuscular hematoma, no active arterial bleeding noted on CTA LLE.   - Hemodynamically stable.  - Hb on admission 6.2, s/p 1 unit, post transfusion Hb 6.3 (inadequate response). He received 2 units on 12/25/21 for Hb 6.5, post transfusion CBC with Hb 8.1 (inadequate response) c/f ongoing bleeding. Hb continues to down trend.   - Iron studies may not be accurate if done after PRBC transfusion.  - FOBT negative  - Check LDH and Haptoglobin.   - Maintain active T&S, Transfuse for Hb <7.0   - Would recommend inpatient care given down trending Hb and concern for ongoing bleeding.       Coagulopathy   - Elevated PT/PTT and INR c/f factor deficiency versus factor inhibitor   - H/o aortic stenosis, check von Willebrand disease activity and antigen to rule out acquired von Willebrand disease.   - Received heparin SQ x 2 doses  - Check Mixing study   - Fibrinogen normal, no evidence of DIC   - Check Factor XII, XI, IX, VIII, VII, II, V, and X  - Check Lupus anticoagulant   - He was given 10 units of cryo on 12/25/21 given persistently elevated PTT and lack of adequate response to PRBC transfusion c/f ongoing bleeding. FFP was not given initially given concern for volume overload in the setting of low LVEF.   - PTT post cryoprecipitate was still elevated. He received 1 unit of FFP with Lasix on 12/26/21 after discussion with cardiology c/b worsening O2 requiring 2L NC. Doubt he will tolerate further FFP. Post FFP aPTT still elevated c/f inhibitor.    Discussed with Dr. Chandler.  89 yo M with PMH of HTN, HLD, CKD IV, chronic HFrEF (EF 30-35%), CAD (s/p RCA PCI 2007),AS s/p bioprostetic AVR 2013 w/ revision 2016, Afib, colon cancer (s/p R hemicolectomy 2016) admitted for severe anemia secondary to IM hematoma with inadequate response to transfusion. Hematology consulted for anemia and coagulopathy     Anemia   - secondary to left anterior thigh intramuscular hematoma, no active arterial bleeding noted on CTA LLE.   - Hemodynamically stable.  - Hb on admission 6.2, s/p 1 unit, post transfusion Hb 6.3 (inadequate response). He received 2 units on 12/25/21 for Hb 6.5, post transfusion CBC with Hb 8.1 (inadequate response) c/f ongoing bleeding. Hb continues to down trend.   - Iron studies may not be accurate if done after PRBC transfusion.  - FOBT negative  - Check LDH and Haptoglobin.   - Maintain active T&S, Transfuse for Hb <7.0   - Would recommend inpatient care given down trending Hb and concern for ongoing bleeding.       Coagulopathy   - Elevated PT/PTT and INR c/f factor deficiency versus factor inhibitor   - H/o aortic stenosis, check von Willebrand disease activity and antigen to rule out acquired von Willebrand disease.   - Received heparin SQ x 2 doses  - Check Mixing study   - Fibrinogen normal, no evidence of DIC   - Check Factor XII, XI, IX, VIII, VII, II, V, and X  - Check Lupus anticoagulant   - He was given 10 units of cryo on 12/25/21 given persistently elevated PTT and lack of adequate response to PRBC transfusion c/f ongoing bleeding. FFP was not given initially given concern for volume overload in the setting of low LVEF.   - PTT post cryoprecipitate was still elevated. He received 1 unit of FFP with Lasix on 12/26/21 after discussion with cardiology c/b worsening O2 requiring 2L NC. Doubt he will tolerate further FFP. Post FFP aPTT still elevated c/f inhibitor.  - Check inhibitor assay (send out test).     Discussed with Dr. Chandler.

## 2021-12-27 NOTE — PROGRESS NOTE ADULT - SUBJECTIVE AND OBJECTIVE BOX
CC: Patient is a 89y old  Male who presents with a chief complaint of LLE swelling and pain       INTERVAL EVENTS: FABIENNE    SUBJECTIVE / INTERVAL HPI: Patient seen and examined at bedside. no acute complaints this AM, denies CP/SOB    ROS: negative unless otherwise stated above.    VITAL SIGNS:  Vital Signs Last 24 Hrs  T(C): 37.1 (27 Dec 2021 06:07), Max: 37.2 (26 Dec 2021 22:20)  T(F): 98.7 (27 Dec 2021 06:07), Max: 99 (26 Dec 2021 22:20)  HR: 81 (27 Dec 2021 06:07) (78 - 89)  BP: 111/68 (27 Dec 2021 06:07) (103/58 - 115/57)  BP(mean): --  RR: 17 (27 Dec 2021 06:07) (16 - 18)  SpO2: 97% (27 Dec 2021 06:07) (92% - 97%)      12-26-21 @ 07:01  -  12-27-21 @ 07:00  --------------------------------------------------------  IN: 360 mL / OUT: 500 mL / NET: -140 mL    12-27-21 @ 07:01  -  12-27-21 @ 07:10  --------------------------------------------------------  IN: 0 mL / OUT: 600 mL / NET: -600 mL        PHYSICAL EXAM:    GENERAL: pleasant, appropriate, no acute distress. Participating appropriately in interview  HEENT - NC/AT, EOMI, PERLLA, oropharynx clear without exudate or erythema, moist mucus membranes  NECK: Soft, supple, No JVD, no lymphadenopathy  CHEST/LUNG: Clear to auscultation bilaterally; No rales, rhonchi, wheezing. Normal work of breathing, not tachypneic  HEART: Regular rate and rhythm; No murmurs, rubs, or gallops, normal s1/s2. Warm and well-perfused  ABDOMEN: Soft, Nontender, Nondistended. Normoactive bowel sounds.  EXTREMITIES:  2+ Peripheral Pulses, No clubbing, cyanosis, or edema  NEURO:  awake, alert, oriented to person, place, time, and situation. Cranial nerves intact, no motor or sensory deficits. Moves all extremities.  SKIN: No rashes or lesions. LLE wrapped in ACE bandage, upper thigh wrapped as well . RLE with minimal bruising. No joint effusion, gum bleeding, petechiae.    MEDICATIONS:  MEDICATIONS  (STANDING):  atorvastatin 20 milliGRAM(s) Oral at bedtime  cholecalciferol 1000 Unit(s) Oral daily  donepezil 5 milliGRAM(s) Oral at bedtime  folic acid 1 milliGRAM(s) Oral daily  levothyroxine 25 MICROGram(s) Oral daily  memantine 5 milliGRAM(s) Oral daily  metoprolol succinate ER 50 milliGRAM(s) Oral daily  pantoprazole    Tablet 40 milliGRAM(s) Oral before breakfast  tamsulosin 0.4 milliGRAM(s) Oral at bedtime  traZODone 25 milliGRAM(s) Oral at bedtime  vitamin B complex with vitamin C 1 Tablet(s) Oral daily    MEDICATIONS  (PRN):  acetaminophen     Tablet .. 650 milliGRAM(s) Oral every 6 hours PRN Mild Pain (1 - 3)  oxyCODONE    IR 10 milliGRAM(s) Oral every 6 hours PRN Severe Pain (7 - 10)  traMADol 25 milliGRAM(s) Oral every 6 hours PRN Moderate Pain (4 - 6)      ALLERGIES:  Allergies    No Known Allergies    Intolerances        LABS:                        7.0    8.09  )-----------( 146      ( 26 Dec 2021 17:08 )             23.5     12-26    140  |  106  |  41<H>  ----------------------------<  107<H>  5.2   |  23  |  2.24<H>    Ca    8.6      26 Dec 2021 06:28  Phos  3.7     12-26  Mg     2.2     12-26      PT/INR - ( 26 Dec 2021 06:28 )   PT: 13.6 sec;   INR: 1.14          PTT - ( 27 Dec 2021 00:07 )  PTT:69.0 sec    CAPILLARY BLOOD GLUCOSE          RADIOLOGY & ADDITIONAL TESTS: Reviewed.

## 2021-12-27 NOTE — PROGRESS NOTE ADULT - ASSESSMENT
89YOM with history of essential HTN, HLD, CKD4, chronic HFrEF (LVEF 35-40%, last TTE available for review 2018), CAD s/p PCI, aortic stenosis s/p AVR, chronic atrial fibrillation, hypothyroidism, recent GIB (now off warfarin x 2 months) admitted to vascular surgery service for bruising of RLE and L internal iliac artery aneurysm.    #L internal iliac artery aneurysm  -management per vascular. Per my discussion with Dr. Barroso, may be repaired on non-urgent basis, possibly as outpatient  -seen by cardiology, high risk surgical patient, recommend transfusing to Hgb 8    #Anemia, likely acute vs chronic blood loss anemia  LLE intramuscular hematoma  Elevated PTT  Hgb 6.2 on admission - incomplete responses to pRBC (6.2 -> 1U -> 6.5 -> 2U -> 8.1 and 7.8 today). Other than intramuscular hematoma, no other evidence of blood loss - recent admission at outside hospital 2 months ago for GIB at which time his warfarin was stopped though he says he has been having brown stools - confirmed on my personal observation today. Elevated PTT noted, no known history of bleeding disorder and no family history.  -per primary team, hematology has been consulted, workup for factor inhibitor, other coagulopathy sent  -check B12, folate (macrocytosis)  -discussed with hematology fellow - await coagulopathy workup, would like to see H/H stabilized  - repeat Hb of 7, will require another unit today     #AS s/p AVR  Chronic atrial fibrillation  Previously on warfarin but stopped due to GIB most recently about 2 months prior to admission. EKG V-paced  -continue home metoprolol  -cardiology following    #Chronic HFrEF  CAD s/p PCI  LVEF 35-40% on TTE here 2018 but reportedly has a more recent one. Does not appear volume overloaded.  -continue home metoprolol succinate, lasix 10mg once daily  -continue home statin    #Dementia - mild, he is independent at home, continue home donepezil  #HLD - continue home statin  #CKD4 - at baseline creatinine, monitor  #Hypothyroidism - continue home levothyroxine 25mcg once daily  #BPH - continue home tamsulosin  #Gout - continue home allopurinol    Dispo: transferring to medicine given inadequate response to blood transfusion, downtrending anemia of unknown bleeding source, ongoing coagulopathy workup 89YOM with history of essential HTN, HLD, CKD4, chronic HFrEF (LVEF 35-40%, last TTE available for review 2018), CAD s/p PCI, aortic stenosis s/p AVR, chronic atrial fibrillation, hypothyroidism, recent GIB (now off warfarin x 2 months) admitted to vascular surgery service for bruising of RLE and L internal iliac artery aneurysm.    #L internal iliac artery aneurysm  -management per vascular. Per my discussion with Dr. Barroso, may be repaired on non-urgent basis, possibly as outpatient  -seen by cardiology, high risk surgical patient, recommend transfusing to Hgb 8  -speak with cardiology today about potentially lowering Hb goal to 7    #Anemia, likely acute vs chronic blood loss anemia  LLE intramuscular hematoma  Elevated PTT  Hgb 6.2 on admission - incomplete responses to pRBC (6.2 -> 1U -> 6.5 -> 2U -> 8.1 and 7.8 today). Other than intramuscular hematoma, no other evidence of blood loss - recent admission at outside hospital 2 months ago for GIB at which time his warfarin was stopped though he says he has been having brown stools. Elevated PTT noted, no known history of bleeding disorder and no family history.  -per primary team, hematology has been consulted, workup for factor inhibitor, other coagulopathy sent  -discussed with hematology fellow - await coagulopathy workup, would like to see H/H stabilized  -CT abdomen/pelvis to r/o RP bleed     #AS s/p AVR  Chronic atrial fibrillation  Previously on warfarin but stopped due to GIB most recently about 2 months prior to admission. EKG V-paced  -continue home metoprolol  -cardiology following    #Chronic HFrEF  CAD s/p PCI  LVEF 35-40% on TTE here 2018 but reportedly has a more recent one. Does not appear volume overloaded.  -continue home metoprolol succinate, lasix 10mg once daily  -continue home statin    #Dementia - mild, he is independent at home, continue home donepezil  #HLD - continue home statin  #CKD4 - at baseline creatinine, monitor  #Hypothyroidism - continue home levothyroxine 25mcg once daily  #BPH - continue home tamsulosin  #Gout - continue home allopurinol    Dispo: transferred to medicine given inadequate response to blood transfusion, downtrending anemia of unknown bleeding source, ongoing coagulopathy workup

## 2021-12-27 NOTE — PROGRESS NOTE ADULT - ASSESSMENT
Plan discussed with attending and chief resident.   ____________________________________________________  KP Portelli - Resident   Surgery  89yo male w PMH HTN, HLD, CKD stage 4 (Cr 2, GFR 28), HF (EF 30-35%), CAD (s/p PCI for RCA stenting 2007), aortic stenosis (s/p aortic valve replacement 2016), Afib, pacemaker, hypothyroidism, peptic ulcer disease, internal hemorrhoids, prostate and colon cancers (s/p R hemicolectomy 2016), on Coumadin until 6w ago presented with b/l LE swelling, ecchymoses and tenderness since 10 days ago L>R and anemia. Duplex US negative for DVT. Incidental left internal iliac artery aneurysm. Initially admitted to vascular service, tx to medicine for hematoma and bleeding in setting of no active anti-coagulation or stated history of falls or trauma.    No acute vascular surgical intervention at this time  Left internal iliac artery aneurysm: Consider surgical repair outpatient after medical and cardiac risk optimization re extensive cardiac history, anemia, and bleeding  Right common iliac artery aneurysm: No acute surgical interventions at this point, lesion size and no change since last imaging in 2016  Echo reviewed  Anemia- Maintain active T+S, transfuse PRN  LLE compression & elevation, ACE wrap from foot to groin daily   CT to r/o intraabdominal and RP source of bleed, pending read   Remainder of care per primary team   Team 3c, Vascular surgery will continue to follow     Plan discussed with attending and chief resident.   ____________________________________________________  COOPER Mendez - Resident   Surgery

## 2021-12-27 NOTE — PROGRESS NOTE ADULT - SUBJECTIVE AND OBJECTIVE BOX
HX: L thigh hematoma, spontaneous possible hematologic disorder     SUBJECTIVE: Patient seen and examined bedside.  Endorses some continued leg pain. Denies CP, SOB, fevers, chills.     Hgb with continued inappropriate response to blood transfusions. Hgb 7.0 last night s/p 2U PRBCs pending repeat labs this AM.     metoprolol succinate ER 50 milliGRAM(s) Oral daily  tamsulosin 0.4 milliGRAM(s) Oral at bedtime    MEDICATIONS  (PRN):  acetaminophen     Tablet .. 650 milliGRAM(s) Oral every 6 hours PRN Mild Pain (1 - 3)  oxyCODONE    IR 10 milliGRAM(s) Oral every 6 hours PRN Severe Pain (7 - 10)  traMADol 25 milliGRAM(s) Oral every 6 hours PRN Moderate Pain (4 - 6)      I&O's Detail    25 Dec 2021 07:01  -  26 Dec 2021 07:00  --------------------------------------------------------  IN:    Cryoprecipitate: 224 mL    Oral Fluid: 720 mL    PRBCs (Packed Red Blood Cells): 700 mL  Total IN: 1644 mL    OUT:    Incontinent per Condom Catheter (mL): 50 mL    Voided (mL): 550 mL  Total OUT: 600 mL    Total NET: 1044 mL      26 Dec 2021 07:01  -  27 Dec 2021 06:56  --------------------------------------------------------  IN:    Oral Fluid: 360 mL  Total IN: 360 mL    OUT:    Incontinent per Condom Catheter (mL): 250 mL    Voided (mL): 250 mL  Total OUT: 500 mL    Total NET: -140 mL          Vital Signs Last 24 Hrs  T(C): 37.1 (27 Dec 2021 06:07), Max: 37.2 (26 Dec 2021 22:20)  T(F): 98.7 (27 Dec 2021 06:07), Max: 99 (26 Dec 2021 22:20)  HR: 81 (27 Dec 2021 06:07) (78 - 89)  BP: 111/68 (27 Dec 2021 06:07) (103/58 - 115/57)  BP(mean): --  RR: 17 (27 Dec 2021 06:07) (16 - 18)  SpO2: 97% (27 Dec 2021 06:07) (92% - 97%)    PHYSICAL EXAM:   Gen: NAD, resting comfortably in bed   Pulm: no respiratory distress on RA   Abd: soft, ND, NTTP, no rebound or guarding   Ext: WWP, L thigh hematoma, b/l DP and PT pulses palpable, wrapped LLE with ACE to groin    Neuro: motor/sensory grossly intact     LABS:                        7.0    8.09  )-----------( 146      ( 26 Dec 2021 17:08 )             23.5     12-    140  |  106  |  41<H>  ----------------------------<  107<H>  5.2   |  23  |  2.24<H>    Ca    8.6      26 Dec 2021 06:28  Phos  3.7     12-  Mg     2.2     12-26        PT/INR - ( 26 Dec 2021 06:28 )   PT: 13.6 sec;   INR: 1.14          PTT - ( 27 Dec 2021 00:07 )  PTT:69.0 sec  CAPILLARY BLOOD GLUCOSE                     RADIOLOGY & ADDITIONAL STUDIES:    < from: TTE Echo Complete w/o Contrast w/ Doppler (21 @ 08:54) >    EXAM:  ECHO TTE WO CON COMP W DOPP                          PROCEDURE DATE:  2021          INTERPRETATION:  -----------------------------------  TRANSTHORACIC ECHOCARDIOGRAM REPORT      ---------------------------------------------------------------------------  -----  Patient Name:   CONNIE FREEMAN Date of Exam:        2021  Medical Rec #:  [id number]       Height/Weight:       63.0 in / 136.68 lb  Account #:      [id number]       BSA:                 1.64 m²  YOB: 1929 BP:                  108/53 mmHg  Patient Age:    89 years      HR:                  80 bpm  Patient Gender: M             Sonographer:         JONATHON UZMA                                Referring Physician: LUCERO VIVAR      ---------------------------------------------------------------------------  -----  CPT:               ECHO TTE WO CON COMP W DOPP - 57866; - [id number]  Indication(s):     I50.9 - Heart failure, unspecified  Procedure:         A complete two-dimensional transthoracic   echocardiogram was                     performed: 2D, M-mode, spectral and color flow Doppler.  Ordering Location: [facility]        ---------------------------------------------------------------------------  -----  CONCLUSIONS:     1. Mildly dilated left ventricular size.   2. Moderately-to-severely reduced left ventricular systolic function.   3. Normal right ventricular size and systolic function.   4. Biatrial enlargement.   5. Moderate mitral regurgitation.   6. Moderate-to-severe tricuspid regurgitation.   7. Pulmonary hypertension present, pulmonary artery systolic pressure is   47 mmHg.   8. No pericardial effusion.    ---------------------------------------------------------------------------  -----  2D AND M-MODE MEASUREMENTS (normal ranges within parentheses):    Left Ventricle:         Normal - Men Normal - Women  IVSd (2D):      1.07 cm  (0.6-1.0)     (0.6-0.9)  LVPWd (2D):     1.03 cm  (0.6-1.0)     (0.6-0.9)  LVIDd (2D):     5.73 cm  (4.2-5.8)     (3.8-5.2)  LVIDs (2D):     4.37 cm  LV EF:           30 %      (>55%)  Aorta/Left Atrium:         Normal - Men Normal - Women  Aortic Root (2D):  3.14 cm  (2.4-3.7)    LA Volume A/L:    Right Ventricle:    LV DIASTOLIC FUNCTION:    MV Peak E: 113.00 cm/s MV e' lat: 13.4 cm/s MV E/e'lat ratio: 8.4  MV Peak A: 33.00 cm/s  E/A Ratio: 3.42    SPECTRAL DOPPLER ANALYSIS:    Mitral Valve:  MV Max Paolo:   0.59 m/s MV P1/2 Time:  MV Mean Grad: 0 mmHg   MV Area, PHT:      Mitral Insufficiency by PISA:  MR Volume: 228.33 ml MR Flow Rate: MR EROA: 177 mm²      Aortic Valve: AoV Max Paolo:    1.25 m/s AoV Peak P mmHg   AoV Mean   P mmHg  LVOT Vmax:      1.06 m/s LVOT VTI:      0.192 m  LVOT Diameter: 2.00 cm  AoV Area, VMax: 2.66 cm² AoV Area, VTI: 2.57 cm² AoV Area, Vmn:      Tricuspid Valve and PA/RV Systolic Pressure: TR Max Velocity: 2.83 m/s RA   Pressure: 15 mmHg RVSP/PASP: 47 mmHg  TAPSE:           19 mm    RV S':       13 cm/s      ---------------------------------------------------------------------------  -----  FINDINGS:    Left Ventricle:  The left ventricle cavity size is mildly dilated. Abnormal septal motion   seen due to abnormal conduction. Left ventricular systolic function is   moderately-to-severely reduced with a calculated ejection fraction of 30%   with global hypokinesis. There is normal left ventricular diastolic   function and filling pressure.    Right Ventricle:  The right ventricle is normal in size. Right ventricular systolic   function is normal. The tricuspid annular plane systolic excursion   (TAPSE) is 19.00 mm (normal >=17 mm). RV tissue Doppler S' is 13.00 cm/s   (normal >10 cm/s). A device lead is noted in the right heart.    Left Atrium:  The left atrium is severely dilated. Left atrial volume index (JOSE) is   96.0 ml/m².    Right Atrium:  The right atrium is severely dilated.    Aortic Valve:  The aortic valve is tricuspid with normal structure and function without   stenosis. Aortic annular calcification. There is no evidence of aortic   regurgitation.    Mitral Valve:  The mitral valve is mildly calcified. Mitral annular calcification noted.   There is moderate mitral regurgitation.    Tricuspid Valve:  Structurally normal tricuspid valve with normal leaflet excursion. There   is moderate-to-severe tricuspid regurgitation. Pulmonary artery systolic   pressure (estimated using the tricuspid regurgitant gradient and an   estimate of right atrial pressure) is 47 mmHg.    Inferior Vena Cava:  The inferior vena cava is dilated (>2.1cm) with abnormal inspiratory   collapse (<50%) consistent with elevated right atrial pressure (  15, range 10-20mmHg).    Pulmonic Valve:  Structurally normal pulmonic valve with normal leaflet excursion. There   is trace pulmonic regurgitation.    Aorta:  The aortic root is normalin size. The aortic arch is normal without   evidence of aortic coarctation.    Pericardium:  No pericardial effusion is seen.    ---------------------------------------------------------------------------  -----  Brendan Vidal MD    Electronically signed by Brendan Vidal MD  Signature Date/Time: 2021/3:45:29 PM        *** Final ***                "Thank you for the opportunity to participate in the care of this   patient."      BRENDAN VIDAL MD; Attending Cardiologist  This document has been electronically signed. Dec 25 2021  8:54AM    < end of copied text >   HX: L thigh hematoma, spontaneous possible hematologic disorder     SUBJECTIVE: Patient seen and examined bedside.  Endorses some continued leg pain improved from prior, improved swelling and bruising. Denies CP, SOB, fevers, chills, palpitations Has not been OOBA yet but denies lightheadedness/dizziness. Denies N/V, tolerating diet.     Hgb with continued inappropriate response to blood transfusions. Hgb 7.0 last night s/p 2U PRBCs pending repeat labs this AM.     metoprolol succinate ER 50 milliGRAM(s) Oral daily  tamsulosin 0.4 milliGRAM(s) Oral at bedtime    MEDICATIONS  (PRN):  acetaminophen     Tablet .. 650 milliGRAM(s) Oral every 6 hours PRN Mild Pain (1 - 3)  oxyCODONE    IR 10 milliGRAM(s) Oral every 6 hours PRN Severe Pain (7 - 10)  traMADol 25 milliGRAM(s) Oral every 6 hours PRN Moderate Pain (4 - 6)      I&O's Detail    25 Dec 2021 07:01  -  26 Dec 2021 07:00  --------------------------------------------------------  IN:    Cryoprecipitate: 224 mL    Oral Fluid: 720 mL    PRBCs (Packed Red Blood Cells): 700 mL  Total IN: 1644 mL    OUT:    Incontinent per Condom Catheter (mL): 50 mL    Voided (mL): 550 mL  Total OUT: 600 mL    Total NET: 1044 mL      26 Dec 2021 07:01  -  27 Dec 2021 06:56  --------------------------------------------------------  IN:    Oral Fluid: 360 mL  Total IN: 360 mL    OUT:    Incontinent per Condom Catheter (mL): 250 mL    Voided (mL): 250 mL  Total OUT: 500 mL    Total NET: -140 mL          Vital Signs Last 24 Hrs  T(C): 37.1 (27 Dec 2021 06:07), Max: 37.2 (26 Dec 2021 22:20)  T(F): 98.7 (27 Dec 2021 06:07), Max: 99 (26 Dec 2021 22:20)  HR: 81 (27 Dec 2021 06:07) (78 - 89)  BP: 111/68 (27 Dec 2021 06:07) (103/58 - 115/57)  BP(mean): --  RR: 17 (27 Dec 2021 06:07) (16 - 18)  SpO2: 97% (27 Dec 2021 06:07) (92% - 97%)    PHYSICAL EXAM:   Gen: NAD, resting comfortably in bed   Pulm: no respiratory distress  Abd: soft, ND, NTTP, no rebound or guarding   Ext: WWP, L thigh hematoma in L posterior thigh and buttock ecchymosis and L foot ecchymosis, b/l DP and PT pulses palpable, wrapped LLE with ACE to groin    Neuro: motor/sensory grossly intact     LABS:                        7.0    8.09  )-----------( 146      ( 26 Dec 2021 17:08 )             23.5     12-    140  |  106  |  41<H>  ----------------------------<  107<H>  5.2   |  23  |  2.24<H>    Ca    8.6      26 Dec 2021 06:28  Phos  3.7       Mg     2.2     12-26        PT/INR - ( 26 Dec 2021 06:28 )   PT: 13.6 sec;   INR: 1.14          PTT - ( 27 Dec 2021 00:07 )  PTT:69.0 sec  CAPILLARY BLOOD GLUCOSE                     RADIOLOGY & ADDITIONAL STUDIES:    < from: TTE Echo Complete w/o Contrast w/ Doppler (21 @ 08:54) >    EXAM:  ECHO TTE WO CON COMP W DOPP                          PROCEDURE DATE:  2021          INTERPRETATION:  -----------------------------------  TRANSTHORACIC ECHOCARDIOGRAM REPORT      ---------------------------------------------------------------------------  -----  Patient Name:   CONNIE FREEMAN Date of Exam:        2021  Medical Rec #:  [id number]       Height/Weight:       63.0 in / 136.68 lb  Account #:      [id number]       BSA:                 1.64 m²  YOB: 1929 BP:                  108/53 mmHg  Patient Age:    89 years      HR:                  80 bpm  Patient Gender: M             Sonographer:         JONATHON GUSMAN                                Referring Physician: LUCERO VIVAR      ---------------------------------------------------------------------------  -----  CPT:               ECHO TTE WO CON COMP W DOPP - 38950; - [id number].  Indication(s):     I50.9 - Heart failure, unspecified  Procedure:         A complete two-dimensional transthoracic   echocardiogram was                     performed: 2D, M-mode, spectral and color flow Doppler.  Ordering Location: [facility]        ---------------------------------------------------------------------------  -----  CONCLUSIONS:     1. Mildly dilated left ventricular size.   2. Moderately-to-severely reduced left ventricular systolic function.   3. Normal right ventricular size and systolic function.   4. Biatrial enlargement.   5. Moderate mitral regurgitation.   6. Moderate-to-severe tricuspid regurgitation.   7. Pulmonary hypertension present, pulmonary artery systolic pressure is   47 mmHg.   8. No pericardial effusion.    ---------------------------------------------------------------------------  -----  2D AND M-MODE MEASUREMENTS (normal ranges within parentheses):    Left Ventricle:         Normal - Men Normal - Women  IVSd (2D):      1.07 cm  (0.6-1.0)     (0.6-0.9)  LVPWd (2D):     1.03 cm  (0.6-1.0)     (0.6-0.9)  LVIDd (2D):     5.73 cm  (4.2-5.8)     (3.8-5.2)  LVIDs (2D):     4.37 cm  LV EF:           30 %      (>55%)  Aorta/Left Atrium:         Normal - Men Normal - Women  Aortic Root (2D):  3.14 cm  (2.4-3.7)    LA Volume A/L:    Right Ventricle:    LV DIASTOLIC FUNCTION:    MV Peak E: 113.00 cm/s MV e' lat: 13.4 cm/s MV E/e'lat ratio: 8.4  MV Peak A: 33.00 cm/s  E/A Ratio: 3.42    SPECTRAL DOPPLER ANALYSIS:    Mitral Valve:  MV Max Paolo:   0.59 m/s MV P1/2 Time:  MV Mean Grad: 0 mmHg   MV Area, PHT:      Mitral Insufficiency by PISA:  MR Volume: 228.33 ml MR Flow Rate: MR EROA: 177 mm²      Aortic Valve: AoV Max Paolo:    1.25 m/s AoV Peak P mmHg   AoV Mean   P mmHg  LVOT Vmax:      1.06 m/s LVOT VTI:      0.192 m  LVOT Diameter: 2.00 cm  AoV Area, VMax: 2.66 cm² AoV Area, VTI: 2.57 cm² AoV Area, Vmn:      Tricuspid Valve and PA/RV Systolic Pressure: TR Max Velocity: 2.83 m/s RA   Pressure: 15 mmHg RVSP/PASP: 47 mmHg  TAPSE:           19 mm    RV S':       13 cm/s      ---------------------------------------------------------------------------  -----  FINDINGS:    Left Ventricle:  The left ventricle cavity size is mildly dilated. Abnormal septal motion   seen due to abnormal conduction. Left ventricular systolic function is   moderately-to-severely reduced with a calculated ejection fraction of 30%   with global hypokinesis. There is normal left ventricular diastolic   function and filling pressure.    Right Ventricle:  The right ventricle is normal in size. Right ventricular systolic   function is normal. The tricuspid annular plane systolic excursion   (TAPSE) is 19.00 mm (normal >=17 mm). RV tissue Doppler S' is 13.00 cm/s   (normal >10 cm/s). A device lead is noted in the right heart.    Left Atrium:  The left atrium is severely dilated. Left atrial volume index (JOSE) is   96.0 ml/m².    Right Atrium:  The right atrium is severely dilated.    Aortic Valve:  The aortic valve is tricuspid with normal structure and function without   stenosis. Aortic annular calcification. There is no evidence of aortic   regurgitation.    Mitral Valve:  The mitral valve is mildly calcified. Mitral annular calcification noted.   There is moderate mitral regurgitation.    Tricuspid Valve:  Structurally normal tricuspid valve with normal leaflet excursion. There   is moderate-to-severe tricuspid regurgitation. Pulmonary artery systolic   pressure (estimated using the tricuspid regurgitant gradient and an   estimate of right atrial pressure) is 47 mmHg.    Inferior Vena Cava:  The inferior vena cava is dilated (>2.1cm) with abnormal inspiratory   collapse (<50%) consistent with elevated right atrial pressure (  15, range 10-20mmHg).    Pulmonic Valve:  Structurally normal pulmonic valve with normal leaflet excursion. There   is trace pulmonic regurgitation.    Aorta:  The aortic root is normalin size. The aortic arch is normal without   evidence of aortic coarctation.    Pericardium:  No pericardial effusion is seen.    ---------------------------------------------------------------------------  -----  Brendan Vidal MD    Electronically signed by Brendan Vidal MD  Signature Date/Time: 2021/3:45:29 PM        *** Final ***                "Thank you for the opportunity to participate in the care of this   patient."      BRENDAN VDIAL MD; Attending Cardiologist  This document has been electronically signed. Dec 25 2021  8:54AM    < end of copied text >

## 2021-12-27 NOTE — PROGRESS NOTE ADULT - SUBJECTIVE AND OBJECTIVE BOX
Hematology Oncology Progress Note (Dr. Chandler)  Discussed with Dr. Chandler and recommendations reviewed with the primary team.    Interval HPI: Patient seen and examined. S/p 1 unit FFP with no change in PTT. Post FFP, noted to have a drop in O2 and was given Lasix and started on 2L NC. Reports pain in the LLE, about the same as yesterday. Reports the LLE swelling has reduced. No other sites of bleeding or bruising.     HPI: 89 year old male with PMH of HTN, HLD, CKD IV, chronic HFrEF (EF 30-35%), CAD (s/p RCA PCI 2007), AS s/p bioprostetic AVR 2013 w/ revision 2016, Afib, colon cancer (s/p R hemicolectomy 2016) p/w painful swelling and ecchymosis of LLE. States the swelling started about 10 days ago, initially on b/l toes and then progressed to calves and thigh, worse in LLE compared to RLE. He had been ambulating without difficulty prior to this. Denies any recent falls or trauma to the area.  Denies any fever, chills, dizziness, light-headedness, palpitation, coughs, shortness of breath, chest pain, or abdominal pain. Found to be anemic with Hgb of 6.2.  He was taking coumadin until about 6 weeks ago (due to risk of GIB).  LE ultrasound negative for DVT.  Found to have a moderate intramuscular hematoma w/out concern for active bleeding or extravasation. However, he was found to have an incidental L internal iliac aneurysm that is enlarged since previous imaging in 2016. He is to be admitted for possible surgical intervention. No surgery date planned as of yet. He is s/p 1 unit PRBC for Hb 6.2 with inadequate response, post transfusion CBC with Hb 6.3. Getting another 2 units PRBC. Hematology consulted for anemia and coagulopathy. He denies any history of epistaxis, gum bleeding, hematuria, joint bleeding.     PAST MEDICAL & SURGICAL HISTORY:  Aortic valve replaced  Atrial fibrillation  CAD (coronary artery disease)  HTN (hypertension)  PUD (peptic ulcer disease)  Constipation  Iron deficiency anemia  Diverticulosis  High cholesterol  Carotid arterial disease  Internal hemorrhoids  Endocarditis  Colon cancer  Hypothyroidism  S/P AVR 2013 @ [facility]  by Dr. Parikh  Cardiac pacemaker 3yrs ago  H/O inguinal hernia repair  H/O right hemicolectomy  History of appendectomy    Allergies: NKDA       Medications:  Home Medications:  Align 4 mg oral capsule: 1 cap(s) orally once a day (24 Dec 2021 21:23)  allopurinol 100 mg oral tablet: 1 tab(s) orally once a day (24 Dec 2021 21:23)  atorvastatin 20 mg oral tablet: 1 tab(s) orally once a day (at bedtime) (24 Dec 2021 21:23)  B Complex 50 oral tablet, extended release: 1 tab(s) orally once a day (24 Dec 2021 21:23)  Citrucel 2 g/19 g oral powder for reconstitution: 1 packet(s) orally once a day (24 Dec 2021 21:23)  CoQ10 300 mg oral capsule: 1 cap(s) orally once a day (24 Dec 2021 21:23)  donepezil 5 mg oral tablet: 1 tab(s) orally 2 times a day (24 Dec 2021 21:23)  folic acid 1 mg oral tablet: 1 tab(s) orally once a day (24 Dec 2021 21:23)  Lasix 20 mg oral tablet: 0.5 tab(s) orally once a day (24 Dec 2021 21:23)  levothyroxine 25 mcg (0.025 mg) oral tablet: 1 tab(s) orally once a day (24 Dec 2021 21:23)  memantine 5 mg oral tablet: 1 tab(s) orally once a day (24 Dec 2021 21:23)  metoprolol succinate 50 mg oral tablet, extended release: 1 tab(s) orally 2 times a day (24 Dec 2021 21:23)  omeprazole 20 mg oral delayed release capsule: 1 cap(s) orally once a day (24 Dec 2021 21:23)  tamsulosin 0.4 mg oral capsule: 1 tab(s) orally once a day (24 Dec 2021 21:23)  traZODone 50 mg oral tablet: 0.5 tab(s) orally once a day (24 Dec 2021 21:23)  Vitamin D3 1000 intl units oral tablet: 1 tab(s) orally once a day (24 Dec 2021 21:23)      Social History: Former smoker, quit 30 years ago. Used to smoke 0.5 ppd x 25 years. Denies any alcohol use.     FAMILY HISTORY: No family history of bleeding disorder.     MEDICATIONS  (STANDING):  atorvastatin 20 milliGRAM(s) Oral at bedtime  cholecalciferol 1000 Unit(s) Oral daily  donepezil 5 milliGRAM(s) Oral at bedtime  folic acid 1 milliGRAM(s) Oral daily  levothyroxine 25 MICROGram(s) Oral daily  memantine 5 milliGRAM(s) Oral daily  metoprolol succinate ER 50 milliGRAM(s) Oral daily  pantoprazole    Tablet 40 milliGRAM(s) Oral before breakfast  tamsulosin 0.4 milliGRAM(s) Oral at bedtime  traZODone 25 milliGRAM(s) Oral at bedtime  vitamin B complex with vitamin C 1 Tablet(s) Oral daily    MEDICATIONS  (PRN):  acetaminophen     Tablet .. 650 milliGRAM(s) Oral every 6 hours PRN Mild Pain (1 - 3)  oxyCODONE    IR 10 milliGRAM(s) Oral every 6 hours PRN Severe Pain (7 - 10)  traMADol 25 milliGRAM(s) Oral every 6 hours PRN Moderate Pain (4 - 6)      PHYSICAL EXAM:  Vital Signs Last 24 Hrs  T(C): 37.1 (27 Dec 2021 06:07), Max: 37.2 (26 Dec 2021 22:20)  T(F): 98.7 (27 Dec 2021 06:07), Max: 99 (26 Dec 2021 22:20)  HR: 81 (27 Dec 2021 06:07) (78 - 89)  BP: 111/68 (27 Dec 2021 06:07) (103/58 - 115/57)  RR: 17 (27 Dec 2021 06:07) (16 - 18)  SpO2: 97% (27 Dec 2021 06:07) (92% - 97%)    Gen: in NAD  HEENT: normocephalic/atraumatic  Neck: supple  Cardiovascular: RR, nl S1S2, murmur noted   Respiratory: clear air entry b/l  Gastrointestinal: BS+, soft, NT/ND  Extremities: LLE in ace wrap, no edema, no calf tenderness, ecchymosis noted. Bruising noted over b/l UE.   Neurological: AAOx3, no focal deficits  Musculoskeletal:  full ROM  Psychiatric:  mood stable      Labs:                                   7.6    6.67  )-----------( 134      ( 27 Dec 2021 08:55 )             24.9     12-26    140  |  106  |  41<H>  ----------------------------<  107<H>  5.2   |  23  |  2.24<H>    Ca    8.6      26 Dec 2021 06:28  Phos  3.7     12-26  Mg     2.2     12-26      PT/INR - ( 26 Dec 2021 06:28 )   PT: 13.6 sec;   INR: 1.14       PTT - ( 27 Dec 2021 00:07 )  PTT:69.0 sec    Imaging Studies:    CTA LLE 12/24/21  IMPRESSION:  Moderate to large left anterior thigh intramuscular hematoma. No active arterial bleeding.  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 Additional chronic vascular findings as above.    US Doppler LLE 12/24/21  IMPRESSION:  No deep vein thrombosis seen.

## 2021-12-28 NOTE — DISCHARGE NOTE PROVIDER - CARE PROVIDERS DIRECT ADDRESSES
[email or web address]

## 2021-12-28 NOTE — DISCHARGE NOTE PROVIDER - ATTENDING DISCHARGE PHYSICAL EXAMINATION:
General: NAD  Cardiovascular: RRR, +S1/S2; NO M/R/G, no pitting edema; pedal pulses 2+, extremities warm and well-perfused.  Respiratory: no increased WOB, CTA B/L;  Gastrointestinal: soft, NT/ND; +BSx4  Skin: Ecchymosis L hip and thigh extended to posteriorly, non-tender  Neurological: AAOx3; no focal deficits

## 2021-12-28 NOTE — PROGRESS NOTE ADULT - SUBJECTIVE AND OBJECTIVE BOX
Hematology Oncology Progress Note (Dr. Chandler)  Discussed with Dr. Chandler and recommendations reviewed with the primary team.    Interval HPI: Patient seen and examined. Reports LLE pain, same as yesterday, reports improvement in swelling. No new complaints. No other sites of bleeding.     HPI: 89 year old male with PMH of HTN, HLD, CKD IV, chronic HFrEF (EF 30-35%), CAD (s/p RCA PCI 2007), AS s/p bioprostetic AVR 2013 w/ revision 2016, Afib, colon cancer (s/p R hemicolectomy 2016) p/w painful swelling and ecchymosis of LLE. States the swelling started about 10 days ago, initially on b/l toes and then progressed to calves and thigh, worse in LLE compared to RLE. He had been ambulating without difficulty prior to this. Denies any recent falls or trauma to the area.  Denies any fever, chills, dizziness, light-headedness, palpitation, coughs, shortness of breath, chest pain, or abdominal pain. Found to be anemic with Hgb of 6.2.  He was taking coumadin until about 6 weeks ago (due to risk of GIB).  LE ultrasound negative for DVT.  Found to have a moderate intramuscular hematoma w/out concern for active bleeding or extravasation. However, he was found to have an incidental L internal iliac aneurysm that is enlarged since previous imaging in 2016. He is to be admitted for possible surgical intervention. No surgery date planned as of yet. He is s/p 1 unit PRBC for Hb 6.2 with inadequate response, post transfusion CBC with Hb 6.3. Getting another 2 units PRBC. Hematology consulted for anemia and coagulopathy. He denies any history of epistaxis, gum bleeding, hematuria, joint bleeding.     PAST MEDICAL & SURGICAL HISTORY:  Aortic valve replaced  Atrial fibrillation  CAD (coronary artery disease)  HTN (hypertension)  PUD (peptic ulcer disease)  Constipation  Iron deficiency anemia  Diverticulosis  High cholesterol  Carotid arterial disease  Internal hemorrhoids  Endocarditis  Colon cancer  Hypothyroidism  S/P AVR 2013 @ [facility]  by Dr. Parikh  Cardiac pacemaker 3yrs ago  H/O inguinal hernia repair  H/O right hemicolectomy  History of appendectomy    Allergies: NKDA       Medications:  Home Medications:  Align 4 mg oral capsule: 1 cap(s) orally once a day (24 Dec 2021 21:23)  allopurinol 100 mg oral tablet: 1 tab(s) orally once a day (24 Dec 2021 21:23)  atorvastatin 20 mg oral tablet: 1 tab(s) orally once a day (at bedtime) (24 Dec 2021 21:23)  B Complex 50 oral tablet, extended release: 1 tab(s) orally once a day (24 Dec 2021 21:23)  Citrucel 2 g/19 g oral powder for reconstitution: 1 packet(s) orally once a day (24 Dec 2021 21:23)  CoQ10 300 mg oral capsule: 1 cap(s) orally once a day (24 Dec 2021 21:23)  donepezil 5 mg oral tablet: 1 tab(s) orally 2 times a day (24 Dec 2021 21:23)  folic acid 1 mg oral tablet: 1 tab(s) orally once a day (24 Dec 2021 21:23)  Lasix 20 mg oral tablet: 0.5 tab(s) orally once a day (24 Dec 2021 21:23)  levothyroxine 25 mcg (0.025 mg) oral tablet: 1 tab(s) orally once a day (24 Dec 2021 21:23)  memantine 5 mg oral tablet: 1 tab(s) orally once a day (24 Dec 2021 21:23)  metoprolol succinate 50 mg oral tablet, extended release: 1 tab(s) orally 2 times a day (24 Dec 2021 21:23)  omeprazole 20 mg oral delayed release capsule: 1 cap(s) orally once a day (24 Dec 2021 21:23)  tamsulosin 0.4 mg oral capsule: 1 tab(s) orally once a day (24 Dec 2021 21:23)  traZODone 50 mg oral tablet: 0.5 tab(s) orally once a day (24 Dec 2021 21:23)  Vitamin D3 1000 intl units oral tablet: 1 tab(s) orally once a day (24 Dec 2021 21:23)      Social History: Former smoker, quit 30 years ago. Used to smoke 0.5 ppd x 25 years. Denies any alcohol use.     FAMILY HISTORY: No family history of bleeding disorder.     MEDICATIONS  (STANDING):  atorvastatin 20 milliGRAM(s) Oral at bedtime  cholecalciferol 1000 Unit(s) Oral daily  donepezil 5 milliGRAM(s) Oral at bedtime  folic acid 1 milliGRAM(s) Oral daily  levothyroxine 25 MICROGram(s) Oral daily  memantine 5 milliGRAM(s) Oral daily  metoprolol tartrate 25 milliGRAM(s) Oral every 12 hours  pantoprazole    Tablet 40 milliGRAM(s) Oral before breakfast  predniSONE   Tablet 60 milliGRAM(s) Oral every 24 hours  tamsulosin 0.4 milliGRAM(s) Oral at bedtime  traZODone 25 milliGRAM(s) Oral at bedtime  vitamin B complex with vitamin C 1 Tablet(s) Oral daily    MEDICATIONS  (PRN):  acetaminophen     Tablet .. 650 milliGRAM(s) Oral every 6 hours PRN Mild Pain (1 - 3)  oxyCODONE    IR 10 milliGRAM(s) Oral every 6 hours PRN Severe Pain (7 - 10)  traMADol 25 milliGRAM(s) Oral every 6 hours PRN Moderate Pain (4 - 6)      PHYSICAL EXAM:  Vital Signs Last 24 Hrs  T(C): 36.6 (28 Dec 2021 06:59), Max: 37.2 (27 Dec 2021 12:05)  T(F): 97.9 (28 Dec 2021 06:59), Max: 99 (27 Dec 2021 12:05)  HR: 78 (28 Dec 2021 06:59) (78 - 86)  BP: 116/66 (28 Dec 2021 06:59) (112/63 - 139/48)  RR: 16 (28 Dec 2021 06:59) (16 - 18)  SpO2: 97% (28 Dec 2021 06:59) (95% - 97%)    Gen: in NAD  HEENT: normocephalic/atraumatic  Neck: supple  Cardiovascular: RR, nl S1S2, murmur noted   Respiratory: clear air entry b/l  Gastrointestinal: BS+, soft, NT/ND  Extremities: LLE in ace wrap, no edema, no calf tenderness, ecchymosis noted. Bruising noted over b/l UE.   Neurological: AAOx3, no focal deficits  Musculoskeletal:  full ROM  Psychiatric:  mood stable      Labs:                        8.0    4.97  )-----------( 154      ( 28 Dec 2021 07:34 )             26.7     12-28    137  |  102  |  49<H>  ----------------------------<  143<H>  5.5<H>   |  24  |  2.20<H>    Ca    8.5      28 Dec 2021 07:34  Phos  5.2     12-28  Mg     2.2     12-28    TPro  5.6<L>  /  Alb  3.2<L>  /  TBili  0.8  /  DBili  x   /  AST  18  /  ALT  12  /  AlkPhos  57  12-28    PT/INR - ( 27 Dec 2021 22:35 )   PT: 13.4 sec;   INR: 1.12       PTT - ( 27 Dec 2021 22:35 )  PTT:80.9 sec    Imaging Studies:  CT Abdomen and Pelvis No Cont (12.27.21 @ 13:41)   IMPRESSION:  No retroperitoneal hematoma or hemoperitoneum.  No significant change intramuscular hematoma anterior left thigh.    CTA LLE 12/24/21  IMPRESSION:  Moderate to large left anterior thigh intramuscular hematoma. No active arterial bleeding.  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 Additional chronic vascular findings as above.    US Doppler LLE 12/24/21  IMPRESSION:  No deep vein thrombosis seen.

## 2021-12-28 NOTE — PHYSICAL THERAPY INITIAL EVALUATION ADULT - GAIT DEVIATIONS NOTED, PT EVAL
antalgic gait pattern with decreased single limb stance on LLE/decreased rosita/increased time in double stance/decreased step length/decreased weight-shifting ability

## 2021-12-28 NOTE — PROGRESS NOTE ADULT - ASSESSMENT
89YOM with history of essential HTN, HLD, CKD4, chronic HFrEF (LVEF 35-40%, last TTE available for review 2018), CAD s/p PCI, aortic stenosis s/p AVR, chronic atrial fibrillation, hypothyroidism, recent GIB (now off warfarin x 2 months) admitted to vascular surgery service for bruising of RLE and L internal iliac artery aneurysm.    #L internal iliac artery aneurysm  -management per vascular. Per my discussion with Dr. Barroso, may be repaired on non-urgent basis, possibly as outpatient  -seen by cardiology, high risk surgical patient, recommend transfusing to Hgb 8  -speak with cardiology about potentially lowering Hb goal to 7    #Anemia, likely acute vs chronic blood loss anemia  LLE intramuscular hematoma  Elevated PTT  Hgb 6.2 on admission - incomplete responses to pRBC (6.2 -> 1U -> 6.5 -> 2U -> 8.1 and 7.8 today). Other than intramuscular hematoma, no other evidence of blood loss - recent admission at outside hospital 2 months ago for GIB at which time his warfarin was stopped though he says he has been having brown stools. Elevated PTT noted, no known history of bleeding disorder and no family history.  -per primary team, hematology has been consulted, workup for factor inhibitor, other coagulopathy sent  -discussed with hematology fellow - await coagulopathy workup, would like to see H/H stabilized  -CT abdomen/pelvis to r/o RP bleed     #AS s/p AVR  Chronic atrial fibrillation  Previously on warfarin but stopped due to GIB most recently about 2 months prior to admission. EKG V-paced  -continue home metoprolol  -cardiology following    #Chronic HFrEF  CAD s/p PCI  LVEF 35-40% on TTE here 2018 but reportedly has a more recent one. Does not appear volume overloaded.  -continue home metoprolol succinate, lasix 10mg once daily  -continue home statin    #Dementia - mild, he is independent at home, continue home donepezil  #HLD - continue home statin  #CKD4 - at baseline creatinine, monitor  #Hypothyroidism - continue home levothyroxine 25mcg once daily  #BPH - continue home tamsulosin  #Gout - continue home allopurinol    Dispo: transferred to medicine given inadequate response to blood transfusion, downtrending anemia of unknown bleeding source, ongoing coagulopathy workup 89YOM with history of essential HTN, HLD, CKD4, chronic HFrEF (LVEF 35-40%, last TTE available for review 2018), CAD s/p PCI, aortic stenosis s/p AVR, chronic atrial fibrillation, hypothyroidism, recent GIB (now off warfarin x 2 months) admitted to vascular surgery service for bruising of RLE and L internal iliac artery aneurysm.    #L internal iliac artery aneurysm  -management per vascular. Per my discussion with Dr. Barroso, may be repaired on non-urgent basis, possibly as outpatient  -seen by cardiology, high risk surgical patient, recommend transfusing to Hgb 8  -speak with cardiology about potentially lowering Hb goal to 7    #Anemia, likely acute vs chronic blood loss anemia  LLE intramuscular hematoma  Elevated PTT  Hgb 6.2 on admission - incomplete responses to pRBC (6.2 -> 1U -> 6.5 -> 2U -> 8.1 and 7.8 today). Other than intramuscular hematoma, no other evidence of blood loss - recent admission at outside hospital 2 months ago for GIB at which time his warfarin was stopped though he says he has been having brown stools. Elevated PTT noted, no known history of bleeding disorder and no family history.  -per primary team, hematology has been consulted, workup for factor inhibitor, other coagulopathy sent  -discussed with hematology fellow - await coagulopathy workup, would like to see H/H stabilized  -CT abdomen/pelvis to r/o RP bleed     #AS s/p AVR  Chronic atrial fibrillation  Previously on warfarin but stopped due to GIB most recently about 2 months prior to admission. EKG V-paced  -continue home metoprolol (switched to short acting BID inpatient)   -cardiology following    #Chronic HFrEF  CAD s/p PCI  LVEF 35-40% on TTE here 2018 but reportedly has a more recent one. Does not appear volume overloaded.  -continue home metoprolol (switched to short acting BID inpatient), lasix 10mg once daily  -continue home statin    #Dementia - mild, he is independent at home, continue home donepezil  #HLD - continue home statin  #CKD4 - at baseline creatinine, monitor  #Hypothyroidism - continue home levothyroxine 25mcg once daily  #BPH - continue home tamsulosin  #Gout - continue home allopurinol    Dispo: transferred to medicine given inadequate response to blood transfusion, downtrending anemia of unknown bleeding source, ongoing coagulopathy workup

## 2021-12-28 NOTE — PHYSICAL THERAPY INITIAL EVALUATION ADULT - ADDITIONAL COMMENTS
Pt. resides at a senior independent living facility where the facility provides meals. Pt. additionally pays for cleaning and bathing assistance services x1 /week. Pt. reports ambulating independently w/o AD prior to this admission.

## 2021-12-28 NOTE — PHYSICAL THERAPY INITIAL EVALUATION ADULT - PATIENT/FAMILY/SIGNIFICANT OTHER GOALS STATEMENT, PT EVAL
Pt. would like to be able to walk independently and to be able to return to his senior independent living facility.

## 2021-12-28 NOTE — PROGRESS NOTE ADULT - ASSESSMENT
89 year old man, w/ a PMHx of CAD (s/p RCA PCI 2007), HFrEF (EF 30% 12/25/21),  AS s/p bioprostetic AVR 2013 w/ revision 2016, Afib, HTN, HLD, CKD IV, and colon cancer (s/p R hemicolectomy 2016), p/w painful swelling and bruises of LLE. Patient found to have iliac aneurysm. Cardiology consulted for preoperative evaluation.    HFrEF  Patient w/ known HFrEF and repeat TTEon this admission w/ EF 30%.  - continue home furosemide 20mg PO QDay through discharge  - measure daily weight  - trend I/O  - trend electrolytes and replete PRN (K>4, Mg>2)   - obtain daily EKG   - continue telemetry monitoring     CAD  Patient w/ known CAD (s/p RCA PCI 2007), w/o active sx.  - continue home metoprolol tartrate 25mg PO BID through discharge  - continue home atorvastatin 20mg PO QDay through discharge    HTN  BP controlled w/o antihypertensives.  - continue close hemodynamic monitoring    HLD  - continue home atorvastatin 20mg PO QDay through discharge    AFib  - obtain daily EKG   - continue telemetry monitoring     Perioperative Cardiac Assessment  Elderly man w/ extensive cardiac hx (as above). Prior to L Leg pain, patient decently functional given age and comorbidities (~3 blocks prior to stopping 2/2 fatigue). No chest pain or shortness of breath.  Pt w/ macrocytic anemia with Hgb of 6.2 (down from 10.9 in 2019).   - Per vascular surgery documentation, no intervention planned    Attending attestation to follow. Please reconsult as necessary.

## 2021-12-28 NOTE — PROGRESS NOTE ADULT - SUBJECTIVE AND OBJECTIVE BOX
CC: Patient is a 89y old  Male who presents with a chief complaint of LLE swelling and pain       INTERVAL EVENTS: FABIENNE    SUBJECTIVE / INTERVAL HPI: Patient seen and examined at bedside. no acute complaints this AM, denies cp/sob. still some pain in the LLE    ROS: negative unless otherwise stated above.    VITAL SIGNS:  Vital Signs Last 24 Hrs  T(C): 36.6 (28 Dec 2021 06:59), Max: 37.2 (27 Dec 2021 12:05)  T(F): 97.9 (28 Dec 2021 06:59), Max: 99 (27 Dec 2021 12:05)  HR: 78 (28 Dec 2021 06:59) (78 - 86)  BP: 116/66 (28 Dec 2021 06:59) (112/63 - 139/48)  BP(mean): --  RR: 16 (28 Dec 2021 06:59) (16 - 18)  SpO2: 97% (28 Dec 2021 06:59) (95% - 97%)      12-27-21 @ 07:01  -  12-28-21 @ 07:00  --------------------------------------------------------  IN: 0 mL / OUT: 600 mL / NET: -600 mL        PHYSICAL EXAM:    GENERAL: pleasant, appropriate, no acute distress. Participating appropriately in interview  HEENT - NC/AT, EOMI, PERLLA, oropharynx clear without exudate or erythema, moist mucus membranes  NECK: Soft, supple, No JVD, no lymphadenopathy  CHEST/LUNG: Clear to auscultation bilaterally; No rales, rhonchi, wheezing. Normal work of breathing, not tachypneic  HEART: Regular rate and rhythm; No murmurs, rubs, or gallops, normal s1/s2. Warm and well-perfused  ABDOMEN: Soft, Nontender, Nondistended. Normoactive bowel sounds.  EXTREMITIES:  2+ Peripheral Pulses, No clubbing, cyanosis, or edema. slight pain of the left ventral foot   NEURO:  awake, alert, oriented to person, place, time, and situation. Cranial nerves intact, no motor or sensory deficits. Moves all extremities.  SKIN: No rashes or lesions. LLE wrapped in ACE bandage, upper thigh wrapped as well . RLE with minimal bruising. No joint effusion, gum bleeding, petechiae.    MEDICATIONS:  MEDICATIONS  (STANDING):  atorvastatin 20 milliGRAM(s) Oral at bedtime  cholecalciferol 1000 Unit(s) Oral daily  donepezil 5 milliGRAM(s) Oral at bedtime  folic acid 1 milliGRAM(s) Oral daily  levothyroxine 25 MICROGram(s) Oral daily  memantine 5 milliGRAM(s) Oral daily  metoprolol tartrate 25 milliGRAM(s) Oral every 12 hours  pantoprazole    Tablet 40 milliGRAM(s) Oral before breakfast  predniSONE   Tablet 60 milliGRAM(s) Oral every 24 hours  tamsulosin 0.4 milliGRAM(s) Oral at bedtime  traZODone 25 milliGRAM(s) Oral at bedtime  vitamin B complex with vitamin C 1 Tablet(s) Oral daily    MEDICATIONS  (PRN):  acetaminophen     Tablet .. 650 milliGRAM(s) Oral every 6 hours PRN Mild Pain (1 - 3)  oxyCODONE    IR 10 milliGRAM(s) Oral every 6 hours PRN Severe Pain (7 - 10)  traMADol 25 milliGRAM(s) Oral every 6 hours PRN Moderate Pain (4 - 6)      ALLERGIES:  Allergies    No Known Allergies    Intolerances        LABS:                        7.7    5.27  )-----------( 135      ( 27 Dec 2021 22:35 )             25.1     12-27    137  |  103  |  43<H>  ----------------------------<  168<H>  4.6   |  23  |  2.24<H>    Ca    8.2<L>      27 Dec 2021 11:17  Mg     2.1     12-27    TPro  5.4<L>  /  Alb  3.2<L>  /  TBili  0.9  /  DBili  x   /  AST  21  /  ALT  11  /  AlkPhos  56  12-27    PT/INR - ( 27 Dec 2021 22:35 )   PT: 13.4 sec;   INR: 1.12          PTT - ( 27 Dec 2021 22:35 )  PTT:80.9 sec    CAPILLARY BLOOD GLUCOSE          RADIOLOGY & ADDITIONAL TESTS: Reviewed.

## 2021-12-28 NOTE — DISCHARGE NOTE PROVIDER - DETAILS OF MALNUTRITION DIAGNOSIS/DIAGNOSES
This patient has been assessed with a concern for Malnutrition and was treated during this hospitalization for the following Nutrition diagnosis/diagnoses:     -  12/29/2021: Moderate protein-calorie malnutrition

## 2021-12-28 NOTE — PHYSICAL THERAPY INITIAL EVALUATION ADULT - PERTINENT HX OF CURRENT PROBLEM, REHAB EVAL
Pt. is a 89 y.o male presenting with LLE toes bruising and proximal edema with associated pain. Pt. initially presented with bilat LE L>R bruising, swelling with R LE subsequently improving spontaneously and LLE worsening. 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 Pt's symptoms were associated with anemia on admission with Hgb=6.2 with inadequate response to initial transfusion, requiring 2 additional I=units of PRBC.

## 2021-12-28 NOTE — DISCHARGE NOTE PROVIDER - PROVIDER TOKENS
PROVIDER:[TOKEN:[[id number]],FOLLOWUP:[2 weeks]] PROVIDER:[TOKEN:[[id number]],SCHEDULEDAPPT:[01/11/2022],SCHEDULEDAPPTTIME:[01:30 PM]] PROVIDER:[TOKEN:[[id number]],SCHEDULEDAPPT:[01/11/2022],SCHEDULEDAPPTTIME:[01:30 PM]],PROVIDER:[TOKEN:[[id number]],SCHEDULEDAPPT:[01/12/2022],SCHEDULEDAPPTTIME:[08:45 AM],ESTABLISHEDPATIENT:[T]]

## 2021-12-28 NOTE — PROGRESS NOTE ADULT - ASSESSMENT
89 yo M with PMH of HTN, HLD, CKD IV, chronic HFrEF (EF 30-35%), CAD (s/p RCA PCI 2007),AS s/p bioprostetic AVR 2013 w/ revision 2016, Afib, colon cancer (s/p R hemicolectomy 2016) admitted for severe anemia secondary to IM hematoma with inadequate response to transfusion. Hematology consulted for anemia and coagulopathy     Anemia   - secondary to left anterior thigh intramuscular hematoma, no active arterial bleeding noted on CTA LLE.   - Hemodynamically stable.  - Hb on admission 6.2, s/p 1 unit, post transfusion Hb 6.3 (inadequate response). He received 2 units on 12/25/21 for Hb 6.5, post transfusion CBC with Hb 8.1 (inadequate response) c/f ongoing bleeding. Hb stable for past 24 hours   - Iron studies may not be accurate if done after PRBC transfusion.  - FOBT negative  - No evidence of hemolysis   - Maintain active T&S, Transfuse for Hb <7.0   - Would recommend inpatient care given concern for ongoing bleeding.       Coagulopathy   - Elevated PT/PTT and INR c/f factor deficiency versus factor inhibitor   - H/o aortic stenosis, check von Willebrand disease activity and antigen to rule out acquired von Willebrand disease.   - Received heparin SQ x 2 doses  - Check Mixing study-pending   - Fibrinogen normal, no evidence of DIC   - Factor VIII 1 % and Factor VII 49%. All other factors including Factor II, V, X, IX, XI, XII WNL.    - Check Lupus anticoagulant, Anti-cardiolipin Ab and B2-glycoprotein Ab   - He was given 10 units of cryo on 12/25/21 given persistently elevated PTT and lack of adequate response to PRBC transfusion c/f ongoing bleeding. FFP was not given initially given concern for volume overload in the setting of low LVEF.   - PTT post cryoprecipitate and FFP was still elevated c/f inhibitor.   - Check inhibitor assay (send out test)- pending  - At this time, patient has a stable Hb for past 24 hours. No change in hematoma was seen on recent CT scan and no evidence of other sites of bleeding. Would hold off on further FFP as he may not be able to tolerate the volume, will consider if he has worsening bleeding and/or becomes hemodynamically unstable. He may need factor eight inhibitor bypassing agent or recombinant factor VIIa if he has a factor inhibitor, but will hold off for now until inhibitor assay has results as it has a high risk of arterial and venous thrombosis and patient is already high risk for stroke given his history of A.fib.       Discussed with Dr. Chandler and primary team.  89 yo M with PMH of HTN, HLD, CKD IV, chronic HFrEF (EF 30-35%), CAD (s/p RCA PCI 2007),AS s/p bioprostetic AVR 2013 w/ revision 2016, Afib, colon cancer (s/p R hemicolectomy 2016) admitted for severe anemia secondary to IM hematoma with inadequate response to transfusion. Hematology consulted for anemia and coagulopathy     Anemia   - secondary to left anterior thigh intramuscular hematoma, no active arterial bleeding noted on CTA LLE.   - Hemodynamically stable.  - Hb on admission 6.2, s/p 1 unit, post transfusion Hb 6.3 (inadequate response). He received 2 units on 12/25/21 for Hb 6.5, post transfusion CBC with Hb 8.1 (inadequate response) c/f ongoing bleeding. Hb stable for past 24 hours   - Iron studies may not be accurate if done after PRBC transfusion.  - FOBT negative  - No evidence of hemolysis   - Maintain active T&S, Transfuse for Hb <7.0   - Would recommend inpatient care given concern for ongoing bleeding.       Coagulopathy   - Elevated PT/PTT and INR c/f factor deficiency versus factor inhibitor   - H/o aortic stenosis, check von Willebrand disease activity and antigen to rule out acquired von Willebrand disease.   - Received heparin SQ x 2 doses  - Check Mixing study-pending   - Fibrinogen normal, no evidence of DIC   - Factor VIII 1 % and Factor VII 49%. All other factors including Factor II, V, X, IX, XI, XII WNL.    - Check Lupus anticoagulant, Anti-cardiolipin Ab and B2-glycoprotein Ab   - He was given 10 units of cryo on 12/25/21 given persistently elevated PTT and lack of adequate response to PRBC transfusion c/f ongoing bleeding. FFP was not given initially given concern for volume overload in the setting of low LVEF.   - PTT post cryoprecipitate and FFP was still elevated c/f inhibitor.   - Check inhibitor assay (send out test)- pending  - At this time, patient has a stable Hb for past 24 hours. No change in hematoma was seen on recent CT scan and no evidence of other sites of bleeding. Would hold off on further FFP as he may not be able to tolerate the volume, will consider if he has worsening bleeding and/or becomes hemodynamically unstable. He may need factor eight inhibitor bypassing agent or recombinant factor VIIa if he has a factor inhibitor, but will hold off for now until inhibitor assay has results as it has a high risk of arterial and venous thrombosis and patient is already high risk for stroke given his history of A.fib.   - Started on prednisone 1mg/kg oral to eliminate potential inhibitor if present.   - Check HIV and hepatitis panel.    Discussed with Dr. Chandler and primary team.  89 yo M with PMH of HTN, HLD, CKD IV, chronic HFrEF (EF 30-35%), CAD (s/p RCA PCI 2007),AS s/p bioprostetic AVR 2013 w/ revision 2016, Afib, colon cancer (s/p R hemicolectomy 2016) admitted for severe anemia secondary to IM hematoma with inadequate response to transfusion. Hematology consulted for anemia and coagulopathy     Anemia   - secondary to left anterior thigh intramuscular hematoma, no active arterial bleeding noted on CTA LLE.   - Hemodynamically stable.  - Hb on admission 6.2, s/p 1 unit, post transfusion Hb 6.3 (inadequate response). He received 2 units on 12/25/21 for Hb 6.5, post transfusion CBC with Hb 8.1 (inadequate response) c/f ongoing bleeding. Hb stable for past 24 hours   - Iron studies may not be accurate if done after PRBC transfusion.  - FOBT negative  - No evidence of hemolysis   - Maintain active T&S, Transfuse for Hb <7.0   - Would recommend inpatient care given concern for ongoing bleeding.       Coagulopathy   - Elevated PT/PTT and INR c/f factor deficiency versus factor inhibitor   - H/o aortic stenosis, check von Willebrand disease activity and antigen to rule out acquired von Willebrand disease.   - Received heparin SQ x 2 doses  - Check Mixing study-pending   - Fibrinogen normal, no evidence of DIC   - Factor VIII 1 % and Factor VII 49%. All other factors including Factor II, V, X, IX, XI, XII WNL.    - Check Lupus anticoagulant, Anti-cardiolipin Ab and B2-glycoprotein Ab   - He was given 10 units of cryo on 12/25/21 given persistently elevated PTT and lack of adequate response to PRBC transfusion c/f ongoing bleeding. FFP was not given initially given concern for volume overload in the setting of low LVEF.   - PTT post cryoprecipitate and FFP was still elevated c/f inhibitor.   - Burnham inhibitor assay - factor VIII inhibitor with high Burnham titer of 17.   - At this time, patient has a stable Hb for past 24 hours. No change in hematoma was seen on recent CT scan and no evidence of other sites of bleeding. Would hold off on further FFP as he may not be able to tolerate the volume, will consider if he has worsening bleeding and/or becomes hemodynamically unstable. He may need factor eight inhibitor bypassing agent or recombinant factor VIIa if he has a factor inhibitor, but will hold off for now as it has a high risk of arterial and venous thrombosis and patient is already high risk for stroke given his history of A.fib.   - Started on prednisone 1mg/kg oral to eliminate potential inhibitor if present. May need to add either cyclophosphamide or Rituximab to the steroids   - Check HIV and hepatitis panel.    Discussed with Dr. Chandler and primary team.

## 2021-12-28 NOTE — DISCHARGE NOTE PROVIDER - NSDCFUADDAPPT_GEN_ALL_CORE_FT
1. Please follow up with Dr. Chandler within 2 weeks of leaving the hospital. It is very important that you attend this appointment.

## 2021-12-28 NOTE — DISCHARGE NOTE PROVIDER - HOSPITAL COURSE
[facility] HOSPITALISTS DISCHARGE SUMMARY    Patient Identification:  Name:  Emma Ryan  Age:  79 y.o.  Sex:  female  :  1941  MRN:  [id number]  Visit Number:  [id number]    Date of Admission: 2021  Date of Discharge:  2021     PCP: Jennifer Montano MD    DISCHARGE DIAGNOSIS  Sepsis   Acute Hypoxic Respiratory Failure   PNA, Viral, treated for Covid 19  PNA, Bacterial, treated for Aspiration  Acute Uncomplicated Ecoli UTI   HTN  HLD  Non-obstructive CAD  CKD stage IIIa  GERD  Obesity    CONSULTS   Infectious Disease    PROCEDURES PERFORMED  None    HOSPITAL COURSE  79F Obese PMH significant for CKD stage IIIa, GERD, hyperlipidemia, essential hypertension, presents to [facility] Emergency Department with complaints of nausea, vomiting, and fevers, was previously diagnosed with COVID-19 on the  in the ER at this facility.    #Sepsis & Acute Hypoxic Respiratory Failure 2/2 PNA, Viral, treated for Covid 19 but also covering for PNA, Bacterial, treated for Aspiration  #Acute Uncomplicated Ecoli UTI   Patient presented with nausea, vomiting, fevers following diagnosis of covid on , had increased oxygen requirements since that time, T 101.2 on admission, HR 94, RR 20, satting 88% on room air..  Admission labs showed WBC count NML, Rerritin 374, D-dimer and lactate NML, UA showed + nitrites and Le's, 31-50 WBC's, 4+ bacteria, urine culture grew Ecoli, Bcx's NGTD. CTPE showed multifocal b/l PNA typical for Covid 19. ID consulted and followed.  Ruby has completed Remdesivir therapy and 7 days Zosyn.  She was treated with Dexamethasone and will go home on extended taper.  She will continue DVT PPx with Eliquis for 7 additional days per [facility] protocol.  She will continue to isolate at home for 1 week.  She will f/u w/ her PCP via telehealth visit in 1 week.  I will send her home w/ supplemental 02 and she is currently stable on 3LNC.      #HTN/HLD/Non-obstructive CAD  CT showed atherosclerotic disease of coronary arteries.  EKG showed NSR, low voltage and poor baseline.  Echo 9/2018 showed LVEF 56-60%, diastolic dysfunction, trace MR, trace TR; Repeat formal Echo showed LVEF 61-65%, diastolic dysfunction, mild AR, mild MR, mild TR, RVSP 35-45mmHg.  Continued home statin, deferred ASA 81 at this time as sending her home on Eliquis for DVT ppx.  Can be initiated per PCP. Continued Coreg, Losartan, Hydralazine.    #CKD stage IIIa  Patient presented w/ Cr 1.09 b/l around 0.9-1.0.  Trended Cr and UOP, avoided nephrotoxins, NSAIDs, dehydration and contrast as able.     #GERD  Continued home PPI.    #Obesity  BMI 38, complicates all aspects of care.    VITAL SIGNS:  Temp:  [97.6 °F (36.4 °C)-98.2 °F (36.8 °C)] 97.9 °F (36.6 °C)  Heart Rate:  [58-72] 58  Resp:  [18-26] 20  BP: (135-173)/(67-78) 152/76  SpO2:  [93 %-95 %] 95 %  on  Flow (L/min):  [3-5] 3;   Device (Oxygen Therapy): nasal cannula    Body mass index is 38.84 kg/m².  Wt Readings from Last 3 Encounters:   01/21/21 87.2 kg (192 lb 5 oz)   12/31/20 83.9 kg (185 lb)   12/01/20 84.4 kg (186 lb)       PHYSICAL EXAM:  This physical exam has been personally performed remotely in the unit aided by real-time audio/visual communication tools. RN present at bedside during this exam and assisted during exam. The use of a video visit has been reviewed with the patient and verbal informed consent has been obtained.      Physical Exam:  General: Patient appears awake, alert, and in no acute distress.  Head: Normocephalic, atraumatic  Eyes: EOMI. Conjunctivae and sclerae normal.  Ears: Ears appear intact with no abnormalities noted.   Neck: Trachea midline. No obvious JVD.  Heart: Tele reveals regular rate and rhythm  Lungs: Respirations improved. No audible wheezing. On 3LNC now  Abdomen: No obvious abdominal distension.  MS: Muscle tone appears normal. No gross deformities.  Extremities: No clubbing,  cyanosis or edema noted.  Skin: No visible bleeding, bruising, or rash.  Neurologic: Alert and oriented x3. No gross focal deficits.     DISCHARGE DISPOSITION   Stable    DISCHARGE MEDICATIONS:     Discharge Medications      New Medications      Instructions Start Date   apixaban 2.5 MG tablet tablet  Commonly known as: ELIQUIS   2.5 mg, Oral, Every 12 Hours Scheduled      dexamethasone 2 MG tablet  Commonly known as: DECADRON   2 mg, Oral, Daily With Breakfast   Start Date: January 22, 2021     dexamethasone 1 MG tablet  Commonly known as: DECADRON   1 mg, Oral, Daily With Breakfast   Start Date: January 25, 2021     dexamethasone 1 MG tablet  Commonly known as: DECADRON   0.5 mg, Oral, Daily With Breakfast   Start Date: January 28, 2021        Continue These Medications      Instructions Start Date   ALLEGRA PO   180 mg, Oral, Daily PRN      atorvastatin 20 MG tablet  Commonly known as: LIPITOR   Take 1 tablet by mouth once daily      carvedilol 25 MG tablet  Commonly known as: COREG   25 mg, Oral, 2 Times Daily With Meals      gabapentin 100 MG capsule  Commonly known as: NEURONTIN   Take 1 capsule by mouth twice daily      hydrALAZINE 25 MG tablet  Commonly known as: APRESOLINE   25 mg, Oral, 2 times daily      losartan 100 MG tablet  Commonly known as: COZAAR   100 mg, Oral, Daily      omeprazole OTC 20 MG EC tablet  Commonly known as: PriLOSEC OTC   20 mg, Oral, Daily      oxybutynin XL 10 MG 24 hr tablet  Commonly known as: DITROPAN-XL   10 mg, Oral, Daily      PROBIOTIC COLON SUPPORT PO   Oral, Daily      STOOL SOFTENER PO   1 tablet, Oral, Daily      VITAMIN D PO   1,000 Units, Oral, Daily               Activity Instructions     Activity as Tolerated          Additional Instructions for the Follow-ups that You Need to Schedule     Discharge Follow-up with PCP   As directed       Currently Documented PCP:    Jennifer Montano MD    PCP Phone Number:    [phone]     Follow Up Details: w/in 1 week  post hospital discharge, needs telehealth visit for covid f/u           Follow-up Information     Schedule an appointment as soon as possible for a visit  with Jennifer Montano MD.    Specialty: Cardiology  Contact information:  [address] [place] [address]  [phone]             Jennifer Montano MD .    Specialty: Cardiology  Why: w/in 1 week post hospital discharge, needs telehealth visit for covid f/u  Contact information:  [address] [place] [address]  [phone]                  TEST  RESULTS PENDING AT DISCHARGE  None     CODE STATUS  Code Status and Medical Interventions:   Ordered at: 01/08/21 2816     Level Of Support Discussed With:    Patient     Code Status:    CPR     Medical Interventions (Level of Support Prior to Arrest):    Full       Yimi Alberto MD  [facility] Hospitalist  01/21/21  10:10 EST    Please note that this discharge summary required more than 30 minutes to complete.     #Discharge: do not delete    89YOM with history of essential HTN, HLD, CKD4, chronic HFrEF (LVEF 35-40%, last TTE available for review 2018), CAD s/p PCI, aortic stenosis s/p AVR, chronic atrial fibrillation, hypothyroidism, recent GIB (now off warfarin x 2 months) admitted to vascular surgery service for bruising of RLE and L internal iliac artery aneurysm. Found to potentially have a clotting factor inhibitor present.     #L internal iliac artery aneurysm  -management per vascular. Per my discussion with Dr. Barroso, may be repaired on non-urgent basis, possibly as outpatient  -seen by cardiology, high risk surgical patient, recommend transfusing to Hgb 8 while inpatient     #Anemia, likely acute vs chronic blood loss anemia  LLE intramuscular hematoma  Elevated PTT  Hgb 6.2 on admission - incomplete responses to pRBC (6.2 -> 1U -> 6.5 -> 2U -> 8.1 and 7.8 today). Other than intramuscular hematoma, no other evidence of blood loss - recent admission at outside hospital 2 months ago for GIB at which time his warfarin was stopped though he says he has been having brown stools. Elevated PTT noted, no known history of bleeding disorder and no family history.  -CT abdomen/pelvis tdid not show RP bleed   -heme/onc studies showed ___    #AS s/p AVR  Chronic atrial fibrillation  Previously on warfarin but stopped due to GIB most recently about 2 months prior to admission. EKG V-paced  -continue home metoprolol (switched to short acting BID inpatient)     #Chronic HFrEF  CAD s/p PCI  LVEF 35-40% on TTE here 2018 but reportedly has a more recent one. Does not appear volume overloaded.  -continue home metoprolol (switched to short acting BID inpatient), lasix 10mg once daily  -continue home statin    #Dementia - mild, he is independent at home, continue home donepezil  #HLD - continue home statin  #CKD4 - at baseline creatinine, monitor  #Hypothyroidism - continue home levothyroxine 25mcg once daily  #BPH - continue home tamsulosin  #Gout - continue home allopurinol      New medications:   Labs to be followed outpatient:   Exam to be followed outpatient:    #Discharge: do not delete    89YOM with history of essential HTN, HLD, CKD4, chronic HFrEF (LVEF 35-40%, last TTE available for review 2018), CAD s/p PCI, aortic stenosis s/p AVR, chronic atrial fibrillation, hypothyroidism, recent GIB (now off warfarin x 2 months) admitted to vascular surgery service for bruising of RLE and L internal iliac artery aneurysm. Found to potentially have a clotting factor inhibitor present with improving Hb on steroid, cyclophosphamide and rituximab x1.     #Anemia with LLE intramuscular hematoma  Elevated PTT  Hgb 6.2 on admission - incomplete responses to pRBC Other than intramuscular hematoma, no other evidence of blood loss - recent admission at outside hospital 2 months ago for GIB at which time his warfarin was stopped though he says he has been having brown stools. Elevated PTT noted, no known history of bleeding disorder and no family history.   -Elevated inhibitory assay/[place] unit elevated   -Reticulocyte index 1.09  which may be i/s/o introduction of chemo  -F/u repeat inhibitor assay results outpatient  -cyclophosphamide 75mg qd  -Zofran prior to cyclophosphamide doses  -Rituximab 620mg starting 1/4   -Daily prednisone 60, PPI and insulin ss    #Constipation  -Miralax, Senna daily    #L internal iliac artery aneurysm  -outpatient repair with vascular    #AS s/p AVR  Chronic atrial fibrillation  Previously on warfarin but stopped due to GIB most recently about 2 months prior to admission. EKG V-paced  -continue home metoprolol (switched to short acting BID inpatient)     #Chronic HFrEF  CAD s/p PCI  2018 TTE LVEF 35-40%. Home metoprolol succinate. While inpatient was on Metoprolol tartrate 25mg BID  -Dc on metoprolol succinate   -Holding lasix 10mg once daily  -continue home statin    #Dementia - mild, he is independent at home, continue home donepezil  #HLD - continue home statin  #CKD4 - at baseline creatinine, monitor  #Hypothyroidism - continue home levothyroxine 25mcg once daily  #BPH - continue home tamsulosin  #Gout - continue home allopurinol      New medications: Cyclophosphamide   Labs to be followed outpatient:  CBC, [place] titer  Exam to be followed outpatient: None   #Discharge: do not delete    89YOM with history of essential HTN, HLD, CKD4, chronic HFrEF (LVEF 35-40%, last TTE available for review 2018), CAD s/p PCI, aortic stenosis s/p AVR, chronic atrial fibrillation, hypothyroidism, recent GIB (now off warfarin x 2 months) admitted to vascular surgery service for bruising of RLE and L internal iliac artery aneurysm. Found to potentially have a clotting factor inhibitor present with improving Hb on steroid, cyclophosphamide and rituximab x1.      #Anemia with LLE intramuscular hematoma  Elevated PTT  Hgb 6.2 on admission - incomplete responses to pRBC Other than intramuscular hematoma, no other evidence of blood loss - recent admission at outside hospital 2 months ago for GIB at which time his warfarin was stopped though he says he has been having brown stools. Elevated PTT noted, no known history of bleeding disorder and no family history.   -Elevated inhibitory assay/[place] unit elevated   -Reticulocyte index 1.09  which may be i/s/o introduction of chemo  -F/u repeat inhibitor assay results outpatient  -cyclophosphamide 75mg qd  -Zofran prior to cyclophosphamide doses  -Rituximab 620mg starting 1/4   -Daily prednisone 60, PPI and insulin ss    #Constipation  -Miralax, Senna daily    #L internal iliac artery aneurysm  -outpatient repair with vascular    #AS s/p AVR  Chronic atrial fibrillation  Previously on warfarin but stopped due to GIB most recently about 2 months prior to admission. EKG V-paced  -continue home metoprolol (switched to short acting BID inpatient)     #Chronic HFrEF  CAD s/p PCI  2018 TTE LVEF 35-40%. Home metoprolol succinate. While inpatient was on Metoprolol tartrate 25mg BID  -Dc on metoprolol succinate   -Holding lasix 10mg once daily  -continue home statin    #Dementia - mild, he is independent at home, continue home donepezil  #HLD - continue home statin  #CKD4 - at baseline creatinine, monitor  #Hypothyroidism - continue home levothyroxine 25mcg once daily  #BPH - continue home tamsulosin  #Gout - continue home allopurinol      New medications: Cyclophosphamide   Labs to be followed outpatient:  CBC, [place] titer  Exam to be followed outpatient: None   #Discharge: do not delete    89YOM with history of essential HTN, HLD, CKD4, chronic HFrEF (LVEF 35-40%, last TTE available for review 2018), CAD s/p PCI, aortic stenosis s/p AVR, chronic atrial fibrillation, hypothyroidism, recent GIB (now off warfarin x 2 months) admitted to vascular surgery service for bruising of RLE and L internal iliac artery aneurysm. Found to potentially have a clotting factor inhibitor present with improving Hb on steroid, cyclophosphamide and rituximab x1.      #Anemia with LLE intramuscular hematoma  Elevated PTT  Hgb 6.2 on admission - incomplete responses to pRBC Other than intramuscular hematoma, no other evidence of blood loss - recent admission at outside hospital 2 months ago for GIB at which time his warfarin was stopped though he says he has been having brown stools. Elevated PTT noted, no known history of bleeding disorder and no family history.   -Elevated inhibitory assay/[place] unit elevated   -Reticulocyte index 1.09  which may be i/s/o introduction of chemo  -F/u repeat inhibitor assay results outpatient  -cyclophosphamide 75mg qd  -Zofran prior to cyclophosphamide doses  -Rituximab 620mg starting 1/4   -Daily prednisone 60, PPI and insulin ss    #Constipation  -Miralax, Senna daily    #L internal iliac artery aneurysm  -outpatient repair with vascular    #AS s/p AVR  Chronic atrial fibrillation  Previously on warfarin but stopped due to GIB most recently about 2 months prior to admission. EKG V-paced  -continue home metoprolol (switched to short acting BID inpatient)     #Chronic HFrEF  CAD s/p PCI  2018 TTE LVEF 35-40%. Home metoprolol succinate. While inpatient was on Metoprolol tartrate 25mg BID  -Dc on metoprolol succinate   -Holding lasix 10mg once daily  -continue home statin    #Dementia - mild, he is independent at home, continue home donepezil  #HLD - continue home statin  #CKD4 - at baseline creatinine, monitor  #Hypothyroidism - continue home levothyroxine 25mcg once daily  #BPH - continue home tamsulosin  #Gout - continue home allopurinol      New medications: Cyclophosphamide 75mg, prednisone 60mg, zofran 4mg  Labs to be followed outpatient:  CBC, [place] titer  Exam to be followed outpatient: None   #Discharge: do not delete    89YOM with history of essential HTN, HLD, CKD4, chronic HFrEF (LVEF 35-40%, last TTE available for review 2018), CAD s/p PCI, aortic stenosis s/p AVR, chronic atrial fibrillation, hypothyroidism, recent GIB (now off warfarin x 2 months) admitted to vascular surgery service for bruising of RLE and L internal iliac artery aneurysm. Found to potentially have a clotting factor inhibitor present with improving Hb on steroid, cyclophosphamide and rituximab x1.      #Anemia with LLE intramuscular hematoma  Elevated PTT  Hgb 6.2 on admission - incomplete responses to pRBC Other than intramuscular hematoma, no other evidence of blood loss - recent admission at outside hospital 2 months ago for GIB at which time his warfarin was stopped though he says he has been having brown stools. Elevated PTT noted, no known history of bleeding disorder and no family history.   -Elevated inhibitory assay/[place] unit elevated   -Reticulocyte index 1.09  which may be i/s/o introduction of chemo  -F/u repeat inhibitor assay results outpatient  -cyclophosphamide 50mg qd  -Zofran prior to cyclophosphamide doses  -Rituximab 620mg starting 1/4   -Daily prednisone 60, PPI and insulin ss    #Constipation  -Miralax, Senna daily    #L internal iliac artery aneurysm  -outpatient repair with vascular    #AS s/p AVR  Chronic atrial fibrillation  Previously on warfarin but stopped due to GIB most recently about 2 months prior to admission. EKG V-paced  -continue home metoprolol (switched to short acting BID inpatient)     #Chronic HFrEF  CAD s/p PCI  2018 TTE LVEF 35-40%. Home metoprolol succinate. While inpatient was on Metoprolol tartrate 25mg BID  -Dc on metoprolol succinate   -Holding lasix 10mg once daily  -continue home statin    #Dementia - mild, he is independent at home, continue home donepezil  #HLD - continue home statin  #CKD4 - at baseline creatinine, monitor  #Hypothyroidism - continue home levothyroxine 25mcg once daily  #BPH - continue home tamsulosin  #Gout - continue home allopurinol      New medications: Cyclophosphamide 50mg, prednisone 60mg, zofran 4mg  Labs to be followed outpatient:  CBC, [place] titer  Exam to be followed outpatient: None   #Discharge: do not delete    89YOM with history of essential HTN, HLD, CKD4, chronic HFrEF (LVEF 35-40%, last TTE available for review 2018), CAD s/p PCI, aortic stenosis s/p AVR, chronic atrial fibrillation, hypothyroidism, recent GIB (now off warfarin x 2 months) admitted to vascular surgery service for bruising of RLE and L internal iliac artery aneurysm. Found to potentially have a clotting factor inhibitor present with improving Hb on steroid, cyclophosphamide and rituximab x1.      #Anemia with LLE intramuscular hematoma  Elevated PTT  Hgb 6.2 on admission - incomplete responses to pRBC Other than intramuscular hematoma, no other evidence of blood loss - recent admission at outside hospital 2 months ago for GIB at which time his warfarin was stopped though he says he has been having brown stools. Elevated PTT noted, no known history of bleeding disorder and no family history.   -Elevated inhibitory assay/[place] unit elevated   -Reticulocyte index 1.09  which may be i/s/o introduction of chemo  -F/u repeat inhibitor assay results outpatient  -cyclophosphamide 50mg qd  -Zofran prior to cyclophosphamide doses  -Rituximab 620mg starting 1/4   -Daily prednisone 60, PPI and insulin ss    #Constipation  -Miralax, Senna daily    #L internal iliac artery aneurysm  -outpatient repair with vascular    #AS s/p AVR  Chronic atrial fibrillation  Previously on warfarin but stopped due to GIB most recently about 2 months prior to admission. EKG V-paced  -Stop home metoprolol as low BPs and concern for hypotension I /s/o a future bleed    #Chronic HFrEF  CAD s/p PCI  2018 TTE LVEF 35-40%. Home metoprolol succinate. While inpatient was on Metoprolol tartrate 25mg BID  -Stop home metoprolol as low BPs and concern for hypotension I /s/o a future bleed  -Holding lasix 10mg once daily  -continue home statin    #Dementia - mild, he is independent at home, continue home donepezil  #HLD - continue home statin  #CKD4 - at baseline creatinine, monitor  #Hypothyroidism - continue home levothyroxine 25mcg once daily  #BPH - continue home tamsulosin  #Gout - continue home allopurinol      New medications: Cyclophosphamide 50mg, prednisone 60mg, zofran 4mg  Medications that were stopped: Metoprolol succinate (home)   Labs to be followed outpatient:  CBC, [place] titer  Exam to be followed outpatient: None

## 2021-12-28 NOTE — PROGRESS NOTE ADULT - SUBJECTIVE AND OBJECTIVE BOX
INCOMPLETE    INTERVAL EVENTS:    PAST MEDICAL & SURGICAL HISTORY:  Aortic valve replaced  Atrial fibrillation  CAD (coronary artery disease)  HTN (hypertension)  PUD (peptic ulcer disease)  Constipation  Iron deficiency anemia  Diverticulosis  High cholesterol  Carotid arterial disease  Internal hemorrhoids  Colon cancer  Endocarditis  Colon cancer  Hypothyroidism  S/P AVR     2013 @ [facility]  by Dr. Young  Cardiac pacemaker     3yrs ago  H/O inguinal hernia repair  History of bowel resection  H/O right hemicolectomy  History of appendectomy    Home Medications:  Align 4 mg oral capsule: 1 cap(s) orally once a day (24 Dec 2021 21:23)  allopurinol 100 mg oral tablet: 1 tab(s) orally once a day (24 Dec 2021 21:23)  atorvastatin 20 mg oral tablet: 1 tab(s) orally once a day (at bedtime) (24 Dec 2021 21:23)  B Complex 50 oral tablet, extended release: 1 tab(s) orally once a day (24 Dec 2021 21:23)  Citrucel 2 g/19 g oral powder for reconstitution: 1 packet(s) orally once a day (24 Dec 2021 21:23)  CoQ10 300 mg oral capsule: 1 cap(s) orally once a day (24 Dec 2021 21:23)  donepezil 5 mg oral tablet: 1 tab(s) orally 2 times a day (24 Dec 2021 21:23)  folic acid 1 mg oral tablet: 1 tab(s) orally once a day (24 Dec 2021 21:23)  Lasix 20 mg oral tablet: 0.5 tab(s) orally once a day (24 Dec 2021 21:23)  levothyroxine 25 mcg (0.025 mg) oral tablet: 1 tab(s) orally once a day (24 Dec 2021 21:23)  memantine 5 mg oral tablet: 1 tab(s) orally once a day (24 Dec 2021 21:23)  metoprolol succinate 50 mg oral tablet, extended release: 1 tab(s) orally 2 times a day (24 Dec 2021 21:23)  omeprazole 20 mg oral delayed release capsule: 1 cap(s) orally once a day (24 Dec 2021 21:23)  tamsulosin 0.4 mg oral capsule: 1 tab(s) orally once a day (24 Dec 2021 21:23)  traZODone 50 mg oral tablet: 0.5 tab(s) orally once a day (24 Dec 2021 21:23)  Vitamin D3 1000 intl units oral tablet: 1 tab(s) orally once a day (24 Dec 2021 21:23)    MEDICATIONS  (STANDING):  atorvastatin 20 milliGRAM(s) Oral at bedtime  cholecalciferol 1000 Unit(s) Oral daily  donepezil 5 milliGRAM(s) Oral at bedtime  folic acid 1 milliGRAM(s) Oral daily  levothyroxine 25 MICROGram(s) Oral daily  memantine 5 milliGRAM(s) Oral daily  metoprolol tartrate 25 milliGRAM(s) Oral every 12 hours  pantoprazole    Tablet 40 milliGRAM(s) Oral before breakfast  predniSONE   Tablet 60 milliGRAM(s) Oral every 24 hours  tamsulosin 0.4 milliGRAM(s) Oral at bedtime  traZODone 25 milliGRAM(s) Oral at bedtime  vitamin B complex with vitamin C 1 Tablet(s) Oral daily    MEDICATIONS  (PRN):  acetaminophen     Tablet .. 650 milliGRAM(s) Oral every 6 hours PRN Mild Pain (1 - 3)  oxyCODONE    IR 10 milliGRAM(s) Oral every 6 hours PRN Severe Pain (7 - 10)  traMADol 25 milliGRAM(s) Oral every 6 hours PRN Moderate Pain (4 - 6)    Vital Signs Last 24 Hrs  T(C): 36.6 (28 Dec 2021 06:59), Max: 37.2 (27 Dec 2021 12:05)  T(F): 97.9 (28 Dec 2021 06:59), Max: 99 (27 Dec 2021 12:05)  HR: 78 (28 Dec 2021 06:59) (78 - 86)  BP: 116/66 (28 Dec 2021 06:59) (112/63 - 139/48)  BP(mean): --  RR: 16 (28 Dec 2021 06:59) (16 - 18)  SpO2: 97% (28 Dec 2021 06:59) (95% - 97%)    PHYSICAL EXAM:  GEN: Awake, comfortable. NAD.  HEENT: No JVD.   RESP: CTA b/l  CV: RRR, normal s1/s2. No m/r/g.  ABD: Soft, NTND. BS+  EXT: Warm. No edema. L leg wrapped in ace bandage  NEURO: No focal deficits.    LABS:                        8.0    4.97  )-----------( 154      ( 28 Dec 2021 07:34 )             26.7     12-27    137  |  103  |  43<H>  ----------------------------<  168<H>  4.6   |  23  |  2.24<H>    Ca    8.2<L>      27 Dec 2021 11:17  Mg     2.1     12-27    TPro  5.4<L>  /  Alb  3.2<L>  /  TBili  0.9  /  DBili  x   /  AST  21  /  ALT  11  /  AlkPhos  56  12-27    PT/INR - ( 27 Dec 2021 22:35 )   PT: 13.4 sec;   INR: 1.12          PTT - ( 27 Dec 2021 22:35 )  PTT:80.9 sec    I&O's Summary    27 Dec 2021 07:01  -  28 Dec 2021 07:00  --------------------------------------------------------  IN: 0 mL / OUT: 600 mL / NET: -600 mL    < from: TTE Echo Complete w/o Contrast w/ Doppler (12.25.21 @ 08:54) >  1. Mildly dilated left ventricular size.   2. Moderately-to-severely reduced left ventricular systolic function.   3. Normal right ventricular size and systolic function.   4. Biatrial enlargement.   5. Moderate mitral regurgitation.   6. Moderate-to-severe tricuspid regurgitation.   7. Pulmonary hypertension present, pulmonary artery systolic pressure is   47 mmHg.   8. No pericardial effusion.  < end of copied text >       INTERVAL EVENTS:  Patient seen and examined at bedside; found resting comfortably in bed. No acute overnight events reported. Patient denies CP, SOB, palpitations, and dizziness.     PAST MEDICAL & SURGICAL HISTORY:  Aortic valve replaced  Atrial fibrillation  CAD (coronary artery disease)  HTN (hypertension)  PUD (peptic ulcer disease)  Constipation  Iron deficiency anemia  Diverticulosis  High cholesterol  Carotid arterial disease  Internal hemorrhoids  Colon cancer  Endocarditis  Colon cancer  Hypothyroidism  S/P AVR     2013 @ [facility]  by Dr. Young  Cardiac pacemaker     3yrs ago  H/O inguinal hernia repair  History of bowel resection  H/O right hemicolectomy  History of appendectomy    Home Medications:  Align 4 mg oral capsule: 1 cap(s) orally once a day (24 Dec 2021 21:23)  allopurinol 100 mg oral tablet: 1 tab(s) orally once a day (24 Dec 2021 21:23)  atorvastatin 20 mg oral tablet: 1 tab(s) orally once a day (at bedtime) (24 Dec 2021 21:23)  B Complex 50 oral tablet, extended release: 1 tab(s) orally once a day (24 Dec 2021 21:23)  Citrucel 2 g/19 g oral powder for reconstitution: 1 packet(s) orally once a day (24 Dec 2021 21:23)  CoQ10 300 mg oral capsule: 1 cap(s) orally once a day (24 Dec 2021 21:23)  donepezil 5 mg oral tablet: 1 tab(s) orally 2 times a day (24 Dec 2021 21:23)  folic acid 1 mg oral tablet: 1 tab(s) orally once a day (24 Dec 2021 21:23)  Lasix 20 mg oral tablet: 0.5 tab(s) orally once a day (24 Dec 2021 21:23)  levothyroxine 25 mcg (0.025 mg) oral tablet: 1 tab(s) orally once a day (24 Dec 2021 21:23)  memantine 5 mg oral tablet: 1 tab(s) orally once a day (24 Dec 2021 21:23)  metoprolol succinate 50 mg oral tablet, extended release: 1 tab(s) orally 2 times a day (24 Dec 2021 21:23)  omeprazole 20 mg oral delayed release capsule: 1 cap(s) orally once a day (24 Dec 2021 21:23)  tamsulosin 0.4 mg oral capsule: 1 tab(s) orally once a day (24 Dec 2021 21:23)  traZODone 50 mg oral tablet: 0.5 tab(s) orally once a day (24 Dec 2021 21:23)  Vitamin D3 1000 intl units oral tablet: 1 tab(s) orally once a day (24 Dec 2021 21:23)    MEDICATIONS  (STANDING):  atorvastatin 20 milliGRAM(s) Oral at bedtime  cholecalciferol 1000 Unit(s) Oral daily  donepezil 5 milliGRAM(s) Oral at bedtime  folic acid 1 milliGRAM(s) Oral daily  levothyroxine 25 MICROGram(s) Oral daily  memantine 5 milliGRAM(s) Oral daily  metoprolol tartrate 25 milliGRAM(s) Oral every 12 hours  pantoprazole    Tablet 40 milliGRAM(s) Oral before breakfast  predniSONE   Tablet 60 milliGRAM(s) Oral every 24 hours  tamsulosin 0.4 milliGRAM(s) Oral at bedtime  traZODone 25 milliGRAM(s) Oral at bedtime  vitamin B complex with vitamin C 1 Tablet(s) Oral daily    MEDICATIONS  (PRN):  acetaminophen     Tablet .. 650 milliGRAM(s) Oral every 6 hours PRN Mild Pain (1 - 3)  oxyCODONE    IR 10 milliGRAM(s) Oral every 6 hours PRN Severe Pain (7 - 10)  traMADol 25 milliGRAM(s) Oral every 6 hours PRN Moderate Pain (4 - 6)    Vital Signs Last 24 Hrs  T(C): 36.6 (28 Dec 2021 06:59), Max: 37.2 (27 Dec 2021 12:05)  T(F): 97.9 (28 Dec 2021 06:59), Max: 99 (27 Dec 2021 12:05)  HR: 78 (28 Dec 2021 06:59) (78 - 86)  BP: 116/66 (28 Dec 2021 06:59) (112/63 - 139/48)  BP(mean): --  RR: 16 (28 Dec 2021 06:59) (16 - 18)  SpO2: 97% (28 Dec 2021 06:59) (95% - 97%)    PHYSICAL EXAM:  GEN: NAD.  HEENT: No JVD.   RESP: CTA b/l  CV: RRR, normal s1/s2. No m/r/g.  ABD: Soft, NTND. BS+  EXT: Warm. No edema. L leg wrapped in ace bandage  NEURO: No focal deficits.    LABS:                        8.0    4.97  )-----------( 154      ( 28 Dec 2021 07:34 )             26.7     12-27    137  |  103  |  43<H>  ----------------------------<  168<H>  4.6   |  23  |  2.24<H>    Ca    8.2<L>      27 Dec 2021 11:17  Mg     2.1     12-27    TPro  5.4<L>  /  Alb  3.2<L>  /  TBili  0.9  /  DBili  x   /  AST  21  /  ALT  11  /  AlkPhos  56  12-27    PT/INR - ( 27 Dec 2021 22:35 )   PT: 13.4 sec;   INR: 1.12          PTT - ( 27 Dec 2021 22:35 )  PTT:80.9 sec    I&O's Summary    27 Dec 2021 07:01  -  28 Dec 2021 07:00  --------------------------------------------------------  IN: 0 mL / OUT: 600 mL / NET: -600 mL    < from: TTE Echo Complete w/o Contrast w/ Doppler (12.25.21 @ 08:54) >  1. Mildly dilated left ventricular size.   2. Moderately-to-severely reduced left ventricular systolic function.   3. Normal right ventricular size and systolic function.   4. Biatrial enlargement.   5. Moderate mitral regurgitation.   6. Moderate-to-severe tricuspid regurgitation.   7. Pulmonary hypertension present, pulmonary artery systolic pressure is   47 mmHg.   8. No pericardial effusion.  < end of copied text >

## 2021-12-28 NOTE — DISCHARGE NOTE PROVIDER - NSDCMRMEDTOKEN_GEN_ALL_CORE_FT
Align 4 mg oral capsule: 1 cap(s) orally once a day  allopurinol 100 mg oral tablet: 1 tab(s) orally once a day  atorvastatin 20 mg oral tablet: 1 tab(s) orally once a day (at bedtime)  B Complex 50 oral tablet, extended release: 1 tab(s) orally once a day  Citrucel 2 g/19 g oral powder for reconstitution: 1 packet(s) orally once a day  CoQ10 300 mg oral capsule: 1 cap(s) orally once a day  donepezil 5 mg oral tablet: 1 tab(s) orally 2 times a day  folic acid 1 mg oral tablet: 1 tab(s) orally once a day  Lasix 20 mg oral tablet: 0.5 tab(s) orally once a day  levothyroxine 25 mcg (0.025 mg) oral tablet: 1 tab(s) orally once a day  memantine 5 mg oral tablet: 1 tab(s) orally once a day  metoprolol succinate 50 mg oral tablet, extended release: 1 tab(s) orally 2 times a day  omeprazole 20 mg oral delayed release capsule: 1 cap(s) orally once a day  tamsulosin 0.4 mg oral capsule: 1 tab(s) orally once a day  traZODone 50 mg oral tablet: 0.5 tab(s) orally once a day  Vitamin D3 1000 intl units oral tablet: 1 tab(s) orally once a day   acetaminophen 325 mg oral tablet: 2 tab(s) orally every 6 hours, As needed, Mild Pain (1 - 3)  Align 4 mg oral capsule: 1 cap(s) orally once a day  allopurinol 100 mg oral tablet: 1 tab(s) orally once a day  atorvastatin 20 mg oral tablet: 1 tab(s) orally once a day (at bedtime)  B Complex 50 oral tablet, extended release: 1 tab(s) orally once a day  Citrucel 2 g/19 g oral powder for reconstitution: 1 packet(s) orally once a day  CoQ10 300 mg oral capsule: 1 cap(s) orally once a day  cyclophosphamide 50 mg oral tablet: 1.5 tab(s) orally once a day   donepezil 5 mg oral tablet: 1 tab(s) orally 2 times a day  folic acid 1 mg oral tablet: 1 tab(s) orally once a day  levothyroxine 25 mcg (0.025 mg) oral tablet: 1 tab(s) orally once a day  memantine 5 mg oral tablet: 1 tab(s) orally once a day  omeprazole 20 mg oral delayed release capsule: 1 cap(s) orally once a day  predniSONE 20 mg oral tablet: 3 tab(s) orally every 24 hours  tamsulosin 0.4 mg oral capsule: 1 tab(s) orally once a day  traZODone 50 mg oral tablet: 0.5 tab(s) orally once a day  Vitamin D3 1000 intl units oral tablet: 1 tab(s) orally once a day   acetaminophen 325 mg oral tablet: 2 tab(s) orally every 6 hours, As needed, Mild Pain (1 - 3)  Align 4 mg oral capsule: 1 cap(s) orally once a day  allopurinol 100 mg oral tablet: 1 tab(s) orally once a day  atorvastatin 20 mg oral tablet: 1 tab(s) orally once a day (at bedtime)  B Complex 50 oral tablet, extended release: 1 tab(s) orally once a day  Citrucel 2 g/19 g oral powder for reconstitution: 1 packet(s) orally once a day  CoQ10 300 mg oral capsule: 1 cap(s) orally once a day  cyclophosphamide 50 mg oral capsule: 1 mg/kg orally once a day   donepezil 5 mg oral tablet: 1 tab(s) orally 2 times a day  folic acid 1 mg oral tablet: 1 tab(s) orally once a day  levothyroxine 25 mcg (0.025 mg) oral tablet: 1 tab(s) orally once a day  memantine 5 mg oral tablet: 1 tab(s) orally once a day  omeprazole 20 mg oral delayed release capsule: 1 cap(s) orally once a day  predniSONE 20 mg oral tablet: 3 tab(s) orally every 24 hours  tamsulosin 0.4 mg oral capsule: 1 tab(s) orally once a day  traZODone 50 mg oral tablet: 0.5 tab(s) orally once a day  Vitamin D3 1000 intl units oral tablet: 1 tab(s) orally once a day  Zofran 4 mg oral tablet: 1 tab(s) orally once a day, As Needed -for nausea    acetaminophen 325 mg oral tablet: 2 tab(s) orally every 6 hours, As needed, Mild Pain (1 - 3)  Align 4 mg oral capsule: 1 cap(s) orally once a day  allopurinol 100 mg oral tablet: 1 tab(s) orally once a day  atorvastatin 20 mg oral tablet: 1 tab(s) orally once a day (at bedtime)  B Complex 50 oral tablet, extended release: 1 tab(s) orally once a day  Citrucel 2 g/19 g oral powder for reconstitution: 1 packet(s) orally once a day  CoQ10 300 mg oral capsule: 1 cap(s) orally once a day  cyclophosphamide 50 mg oral capsule: 1 mg/kg orally once a day   cyclophosphamide 50 mg oral capsule: 1 tab(s) orally once a day   donepezil 5 mg oral tablet: 1 tab(s) orally 2 times a day  folic acid 1 mg oral tablet: 1 tab(s) orally once a day  levothyroxine 25 mcg (0.025 mg) oral tablet: 1 tab(s) orally once a day  memantine 5 mg oral tablet: 1 tab(s) orally once a day  omeprazole 20 mg oral delayed release capsule: 1 cap(s) orally once a day  predniSONE 20 mg oral tablet: 3 tab(s) orally every 24 hours  tamsulosin 0.4 mg oral capsule: 1 tab(s) orally once a day  traZODone 50 mg oral tablet: 0.5 tab(s) orally once a day  Vitamin D3 1000 intl units oral tablet: 1 tab(s) orally once a day  Zofran 4 mg oral tablet: 1 tab(s) orally once a day, As Needed -for nausea

## 2021-12-28 NOTE — PHYSICAL THERAPY INITIAL EVALUATION ADULT - LEVEL OF INDEPENDENCE: SIT/STAND, REHAB EVAL
Additional Notes: Patient consent was obtained to proceed with the visit and recommended plan of care after discussion of all risks and benefits, including the risks of COVID-19 exposure. Detail Level: Simple moderate assist (50% patients effort)

## 2021-12-28 NOTE — DISCHARGE NOTE PROVIDER - CARE PROVIDER_API CALL
Lori Chandler)  HematologyOncology; Internal Medicine; Medical Oncology  [address] [place] [address]  Phone: [phone]  Fax: [phone]  Follow Up Time: 2 weeks   Lori Chandler)  HematologyOncology; Internal Medicine; Medical Oncology  [address] [place] [address]  Phone: [phone]  Fax: [phone]  Scheduled Appointment: 01/11/2022 01:30 PM   Lori Chandler)  HematologyOncology; Internal Medicine; Medical Oncology  [address] [place] [address]  Phone: [phone]  Fax: [phone]  Scheduled Appointment: 01/11/2022 01:30 PM    CRISTOPHER JOHNSTON  Internal Medicine  [address] [facility]  [place] [address]  Phone: [phone]  Fax: [phone]  Established Patient  Scheduled Appointment: 01/12/2022 08:45 AM

## 2021-12-28 NOTE — DISCHARGE NOTE PROVIDER - NSDCCPCAREPLAN_GEN_ALL_CORE_FT
PRINCIPAL DISCHARGE DIAGNOSIS  Diagnosis: Anemia  Assessment and Plan of Treatment: Anemia is a low number of red blood cells or a low amount of hemoglobin in your red blood cells. Hemoglobin is a protein that helps carry oxygen throughout your body. Red blood cells use iron to create hemoglobin. Anemia may develop if your body does not have enough iron. It may also develop if your body does not make enough red blood cells or they die faster than your body can make them.  We believe your anemia to be due to ___ and to treat it we are ___.  It is important that you follow up with both a heme/onc doctor as well as your PCP within 2 weeks of leaving the hospital.   While you were in the hopsital we found that you have an outpouching of one of the arteries in your pelvis, as well as some blood that has collected within the muscles of your left leg.          PRINCIPAL DISCHARGE DIAGNOSIS  Diagnosis: Anemia  Assessment and Plan of Treatment: Anemia is a low number of red blood cells or a low amount of hemoglobin in your red blood cells. Hemoglobin is a protein that helps carry oxygen throughout your body. Red blood cells use iron to create hemoglobin. Anemia may develop if your body does not have enough iron. It may also develop if your body does not make enough red blood cells or they die faster than your body can make them.  While you were in the hopsital we also found that you have an outpouching of one of the arteries in your pelvis, as well as some blood that has collected within the muscles of your left leg.   We believe your anemia to be due to a clotting factor inhibitor. This was treated with a steroid two chemotherapy agents: cyclophosphamide and rituximab. Your titers of this inhibitor were elevated. We sent for a repeat titer. You should follow up these results at your appointment with the hematologist and primary care doctor. The appointment times and locatations are in this packet.   Please take cyclophosphamide 75mg and prednisone 60mg once daily. For nausea related to the cyclophosphamide you can take one zofran tablet.           PRINCIPAL DISCHARGE DIAGNOSIS  Diagnosis: Anemia  Assessment and Plan of Treatment: Anemia is a low number of red blood cells or a low amount of hemoglobin in your red blood cells. Hemoglobin is a protein that helps carry oxygen throughout your body. Red blood cells use iron to create hemoglobin. Anemia may develop if your body does not have enough iron. It may also develop if your body does not make enough red blood cells or they die faster than your body can make them.  While you were in the hopsital we also found that you have an outpouching of one of the arteries in your pelvis, as well as some blood that has collected within the muscles of your left leg.   We believe your anemia to be due to a clotting factor inhibitor. This was treated with a steroid two chemotherapy agents: cyclophosphamide and rituximab. Your titers of this inhibitor were elevated. We sent for a repeat titer. You should follow up these results at your appointment with the hematologist and primary care doctor. The appointment times and locatations are in this packet.   Please take cyclophosphamide 50mg and prednisone 60mg once daily. For nausea related to the cyclophosphamide you can take one zofran tablet.  We sent the cyclophosphamide to your [facility]. The steroid and zofran were sent to [facility] downstairs.         PRINCIPAL DISCHARGE DIAGNOSIS  Diagnosis: Anemia  Assessment and Plan of Treatment: Anemia is a low number of red blood cells or a low amount of hemoglobin in your red blood cells. Hemoglobin is a protein that helps carry oxygen throughout your body. Red blood cells use iron to create hemoglobin. Anemia may develop if your body does not have enough iron. It may also develop if your body does not make enough red blood cells or they die faster than your body can make them.  While you were in the [facility] we also found that you have an outpouching of one of the arteries in your pelvis, as well as some blood that has collected within the muscles of your left leg.   We believe your anemia to be due to a clotting factor inhibitor. This was treated with a steroid two chemotherapy agents: cyclophosphamide and rituximab. Your titers of this inhibitor were elevated. We sent for a repeat titer. You should follow up these results at your appointment with the hematologist and primary care doctor. The appointment times and locatations are in this packet.   Please take cyclophosphamide 50mg and prednisone 60mg once daily. For nausea related to the cyclophosphamide you can take one zofran tablet.  We sent the cyclophosphamide to your [facility]. The steroid and zofran were sent to [facility] pharmacy downstairs. Please STOP taking your metoprolol as your blood pressures were low and to avoid worsened blood pressure if you develop bleeding. Please discuss this with your primary care doctor.          PRINCIPAL DISCHARGE DIAGNOSIS  Diagnosis: Anemia  Assessment and Plan of Treatment: Anemia is a low number of red blood cells or a low amount of hemoglobin in your red blood cells. Hemoglobin is a protein that helps carry oxygen throughout your body. Red blood cells use iron to create hemoglobin. Anemia may develop if your body does not have enough iron. It may also develop if your body does not make enough red blood cells or they die faster than your body can make them.  While you were in the hops[place] we also found that you have an outpouching of one of the arteries in your pelvis, as well as some blood that has collected within the muscles of your left leg.   We believe your anemia to be due to a clotting factor inhibitor. This was treated with a steroid two chemotherapy agents: cyclophosphamide and rituximab. Your titers of this inhibitor were elevated. We sent for a repeat titer. You should follow up these results at your appointment with the hematologist and primary care doctor. The appointment times and locatations are in this packet.   Please take cyclophosphamide 50mg and prednisone 60mg once daily. For nausea related to the cyclophosphamide you can take one zofran tablet.  We sent the cyclophosphamide to [address]. [facility] Please STOP taking your metoprolol as your blood pressures were low and to avoid worsened blood pressure if you develop bleeding. Please discuss this with your primary care doctor.

## 2021-12-29 NOTE — DIETITIAN INITIAL EVALUATION ADULT. - ENERGY INTAKE
March 14, 2018     Junior Gentile MD  [address] [place] [address]    Patient: Rancho Harper   YOB: 1943   Date of Visit: 3/14/2018       Dear Dr Jamie Pardo:    Thank you for referring Kim Leung to me for evaluation  Below are my notes for this consultation  If you have questions, please do not hesitate to call me  I look forward to following your patient along with you  Sincerely,        Lynette Valero MD        CC: No Recipients  Lynette Valero MD  3/14/2018  3:10 PM  Sign at close encounter  Uroflow Result    Indication:     medically refractory lower urinary tract symptoms       Procedure  [facility]  There asked to void volitionally into the uroflow apparatus  The following findings were noted:    Findings:  Voided Volume:   72  Maximum flow (Qmax):  9 ml/s  Average flow:    6 ml/s  Description of emptying curve:    Normal bell-shaped       Post Void Residual Measurement:  After voiding to completion the patient was brought to the exam room and positioned supine  Residual urine volume was measured with an ultrasound at:    Sixty-four ml      Lynette Valero MD  3/14/2018  3:09 PM  Sign at close encounter     Diagnoses and all orders for this visit:    Benign prostatic hyperplasia with urinary frequency    Hypervascular lesion of urinary bladder    Other orders  -     aspirin 81 MG tablet; Take by mouth  -     colestipol (COLESTID) 1 g tablet; Take 1 tablet by mouth 2 (two) times a day  -     allopurinol (ZYLOPRIM) 300 mg tablet; Take 1 tablet by mouth daily  -     amLODIPine (NORVASC) 5 mg tablet; Take 1 tablet by mouth daily  -     Discontinue: finasteride (PROSCAR) 5 mg tablet; Take 1 tablet by mouth daily  -     lisinopril (ZESTRIL) 20 mg tablet; Take 1 tablet by mouth daily  -     meclizine (ANTIVERT) 25 mg tablet;  Take by mouth 2 (two) times a day as needed  - simvastatin (ZOCOR) 40 mg tablet; Take 1 tablet by mouth every other day  -     Discontinue: tamsulosin (FLOMAX) 0 4 mg; Take 1 capsule by mouth daily            Assessment and plan:       1  BPH status post Uro lift  - patient with marked improvement in his daytime and nocturnal symptoms    2  Bladder lesion status post biopsy  - cystitis cystica (benign inflammatory lesion)     It is great to see Ford Cortes doing so well  He has a remarkable improvement both objectively and subjectively on his uroflow analysis  At this point we will discontinue his tamsulosin and his finasteride  I suspect his nocturia will continue to improve over the next few months or so  He will follow up in 9 months for symptom reassessment      Jeannine Lazo MD      Chief Complaint     Chief Complaint   Patient presents with    Benign Prostatic Hypertrophy     s/p urolift/BBX 2/9/18    Elevated PSA    bladder lesion         History of Present Illness     Uday Pryor is a 76 y o  male with medically refractory BPH as well as a bladder lesion status post Uro lift procedure as well as a bladder biopsy  Patient is doing very well  He has no pain  He states that his stream is remarkably improved  His nocturia has improved from 4 times per night down to 2 times per night  Daytime urgency is also improved  Detailed Urologic History     - please refer to HPI    Review of Systems     Review of Systems   Constitutional: Negative for activity change and fatigue  HENT: Negative for congestion  Eyes: Negative for visual disturbance  Respiratory: Negative for shortness of breath and wheezing  Cardiovascular: Negative for chest pain and leg swelling  Gastrointestinal: Negative for abdominal pain  Endocrine: Negative for polyuria  Genitourinary: Positive for frequency and urgency  Negative for dysuria, flank pain and hematuria  Musculoskeletal: Negative for back pain     Allergic/Immunologic: Negative for immunocompromised state  Neurological: Negative for dizziness and numbness  Psychiatric/Behavioral: Negative for dysphoric mood  All other systems reviewed and are negative  AUA SYMPTOM SCORE    Flowsheet Row Most Recent Value   AUA SYMPTOM SCORE   How often have you had a sensation of not emptying your bladder completely after you finished urinating? 0   How often have you had to urinate again less than two hours after you finished urinating? 1   How often have you found you stopped and started again several times when you urinate?  0   How often have you found it difficult to postpone urination? 0   How often have you had a weak urinary stream?  0   How often have you had to push or strain to begin urination? 0   How many times did you most typically get up to urinate from the time you went to bed at night until the time you got up in the morning? 3   Quality of Life: If you were to spend the rest of your life with your urinary condition just the way it is now, how would you feel about that?  1   AUA SYMPTOM SCORE  4            Allergies     Allergies   Allergen Reactions    Pollen Extract        Physical Exam     Physical Exam   Constitutional: He is oriented to person, place, and time  He appears well-developed and well-nourished  No distress  HENT:   Head: Normocephalic and atraumatic  Eyes: EOM are normal    Neck: Normal range of motion  Cardiovascular:   Negative lower extremity edema   Pulmonary/Chest: Effort normal and breath sounds normal    Abdominal: Soft  Genitourinary: Penis normal    Musculoskeletal: Normal range of motion  Neurological: He is alert and oriented to person, place, and time  Skin: Skin is warm  Psychiatric: He has a normal mood and affect   His behavior is normal            Vital Signs  Vitals:    03/14/18 1450   BP: 126/88   BP Location: Left arm   Patient Position: Sitting   Weight: 126 kg (278 lb)   Height: 5' 10" (1 778 m)         Current Medications Current Outpatient Prescriptions:     allopurinol (ZYLOPRIM) 300 mg tablet, Take 300 mg by mouth daily, Disp: , Rfl:     amLODIPine (NORVASC) 5 mg tablet, Take 5 mg by mouth daily, Disp: , Rfl:     aspirin (ECOTRIN LOW STRENGTH) 81 mg EC tablet, Take 81 mg by mouth daily, Disp: , Rfl:     colestipol (COLESTID) 1 g tablet, Take 1 tablet by mouth 2 (two) times a day, Disp: , Rfl:     finasteride (PROSCAR) 5 mg tablet, Take 5 mg by mouth daily, Disp: , Rfl:     lisinopril (ZESTRIL) 20 mg tablet, Take 20 mg by mouth daily, Disp: , Rfl:     meclizine (ANTIVERT) 25 mg tablet, Take by mouth 2 (two) times a day as needed, Disp: , Rfl:     simvastatin (ZOCOR) 40 mg tablet, Take 40 mg by mouth daily at bedtime, Disp: , Rfl:     tamsulosin (FLOMAX) 0 4 mg, Take 0 4 mg by mouth daily with dinner, Disp: , Rfl:     allopurinol (ZYLOPRIM) 300 mg tablet, Take 1 tablet by mouth daily, Disp: , Rfl:     amLODIPine (NORVASC) 5 mg tablet, Take 1 tablet by mouth daily, Disp: , Rfl:     aspirin 81 MG tablet, Take by mouth, Disp: , Rfl:     docusate sodium (COLACE) 100 mg capsule, Take 1 capsule (100 mg total) by mouth 2 (two) times a day for 15 days, Disp: 30 capsule, Rfl: 0    finasteride (PROSCAR) 5 mg tablet, Take 1 tablet by mouth daily, Disp: , Rfl:     ibuprofen (MOTRIN) 200 mg tablet, Take 3 tablets (600 mg total) by mouth every 6 (six) hours as needed for mild pain, Disp: , Rfl: 0    lisinopril (ZESTRIL) 20 mg tablet, Take 1 tablet by mouth daily, Disp: , Rfl:     meclizine (ANTIVERT) 32 MG tablet, Take 32 mg by mouth 3 (three) times a day as needed for dizziness, Disp: , Rfl:     simvastatin (ZOCOR) 40 mg tablet, Take 1 tablet by mouth every other day, Disp: , Rfl:     tamsulosin (FLOMAX) 0 4 mg, Take 1 capsule by mouth daily, Disp: , Rfl:       Active Problems     There is no problem list on file for this patient          Past Medical History     Past Medical History:   Diagnosis Date    Acute gouty arthropathy     Enlarged prostate     Hearing loss     History of diverticulitis of colon     History of dizziness     Hypercholesterolemia     Hyperlipidemia     Hypertension     Palpitations     Sinus bradycardia          Surgical History     Past Surgical History:   Procedure Laterality Date    ARM NEUROPLASTY Left     1977    COLONOSCOPY      CYSTOSCOPY  12/27/2017    ORCHIECTOMY      NE CYSTOURETHRO W/IMPLANT N/A 2/9/2018    Procedure: CYSTOSCOPY WITH INSERTION UROLIFT;  Surgeon: Gene Lomeli MD;  Location: [facility];  Service: Urology    NE CYSTOURETHROSCOPY,BIOPSY N/A 2/9/2018    Procedure: BLADDER BIOPSY;  Surgeon: Gene Lomeli MD;  [facility];  Service: Urology    TESTICLE SURGERY Right 1976         Family History     Family History   Problem Relation Age of Onset    Coronary artery disease Mother     Heart disease Mother     Hypertension Mother     Stroke Mother     Coronary artery disease Father          Social History     Social History     History   Smoking Status    Former Smoker    Quit date: 1971   Smokeless Tobacco    Never Used         Pertinent Lab Values     Lab Results   Component Value Date    CREATININE 1 34 (H) 01/16/2018       Lab Results   Component Value Date    PSA 2 9 10/04/2016    PSA 4 4 (H) 05/13/2016       @RESULTRCNT(1H])@      Pertinent Imaging      - n/a    Portions of the record may have been created with voice recognition software   Occasional wrong word or "sound a like" substitutions may have occurred due to the inherent limitations of voice recognition software   Read the chart carefully and recognize, using context, where substitutions have occurred  Fair (>50%)

## 2021-12-29 NOTE — DIETITIAN INITIAL EVALUATION ADULT. - ORAL INTAKE PTA/DIET HISTORY
Confirms NKFA, reports has been eating well, no noted decreased appetite however reports PO intake during hospitalization is limited due to the current diet restriction.

## 2021-12-29 NOTE — CONSULT NOTE ADULT - CONSULT REASON
LLE swelling, pain and bruising
Rehab evaluation
medical comanagement
Preoperative Risk Assessment
anemia and coagulopathy

## 2021-12-29 NOTE — CONSULT NOTE ADULT - REASON FOR ADMISSION
LLE swelling and pain
Same as above
LLE swelling and pain

## 2021-12-29 NOTE — PROGRESS NOTE ADULT - ASSESSMENT
89YOM with history of essential HTN, HLD, CKD4, chronic HFrEF (LVEF 35-40%, last TTE available for review 2018), CAD s/p PCI, aortic stenosis s/p AVR, chronic atrial fibrillation, hypothyroidism, recent GIB (now off warfarin x 2 months) admitted to vascular surgery service for bruising of RLE and L internal iliac artery aneurysm.    #Anemia with LLE intramuscular hematoma  Elevated PTT  Hgb 6.2 on admission - incomplete responses to pRBC Other than intramuscular hematoma, no other evidence of blood loss - recent admission at outside hospital 2 months ago for GIB at which time his warfarin was stopped though he says he has been having brown stools. Elevated PTT noted, no known history of bleeding disorder and no family history.   -Elevated inhibitory assay/[place] unit elevated   -Starting cyclophosphamide 50mg oral today, 75mg tomorrow  -Zofran prior to cyclophosphamide doses  -Dc steroid  -CT abdomen/pelvis without contrast (CKD) to r/o RP bleed     #Constipation  -Miralax, Senna daily    #L internal iliac artery aneurysm  -outpatient repair with vascular    #AS s/p AVR  Chronic atrial fibrillation  Previously on warfarin but stopped due to GIB most recently about 2 months prior to admission. EKG V-paced  -continue home metoprolol (switched to short acting BID inpatient)     #Chronic HFrEF  CAD s/p PCI  2018 TTE LVEF 35-40%. Home metoprolol succinate  -Metoprolol tartrate 25mg BID  -Holding lasix 10mg once daily  -continue home statin    #Dementia - mild, he is independent at home, continue home donepezil  #HLD - continue home statin  #CKD4 - at baseline creatinine, monitor  #Hypothyroidism - continue home levothyroxine 25mcg once daily  #BPH - continue home tamsulosin  #Gout - continue home allopurinol   89YOM with history of essential HTN, HLD, CKD4, chronic HFrEF (LVEF 35-40%, last TTE available for review 2018), CAD s/p PCI, aortic stenosis s/p AVR, chronic atrial fibrillation, hypothyroidism, recent GIB (now off warfarin x 2 months) admitted to vascular surgery service for bruising of RLE and L internal iliac artery aneurysm.    #Anemia with LLE intramuscular hematoma  Elevated PTT  Hgb 6.2 on admission - incomplete responses to pRBC Other than intramuscular hematoma, no other evidence of blood loss - recent admission at outside hospital 2 months ago for GIB at which time his warfarin was stopped though he says he has been having brown stools. Elevated PTT noted, no known history of bleeding disorder and no family history.   -Elevated inhibitory assay/[place] unit elevated   -Starting cyclophosphamide 50mg oral today, 75mg tomorrow  -Zofran prior to cyclophosphamide doses  -Daily prednisone 60, PPI and insulin ss  -CT abdomen/pelvis without contrast (CKD) to r/o RP bleed     #Constipation  -Miralax, Senna daily    #L internal iliac artery aneurysm  -outpatient repair with vascular    #AS s/p AVR  Chronic atrial fibrillation  Previously on warfarin but stopped due to GIB most recently about 2 months prior to admission. EKG V-paced  -continue home metoprolol (switched to short acting BID inpatient)     #Chronic HFrEF  CAD s/p PCI  2018 TTE LVEF 35-40%. Home metoprolol succinate  -Metoprolol tartrate 25mg BID  -Holding lasix 10mg once daily  -continue home statin    #Dementia - mild, he is independent at home, continue home donepezil  #HLD - continue home statin  #CKD4 - at baseline creatinine, monitor  #Hypothyroidism - continue home levothyroxine 25mcg once daily  #BPH - continue home tamsulosin  #Gout - continue home allopurinol   89YOM with history of essential HTN, HLD, CKD4, chronic HFrEF (LVEF 35-40%, last TTE available for review 2018), CAD s/p PCI, aortic stenosis s/p AVR, chronic atrial fibrillation, hypothyroidism, recent GIB (now off warfarin x 2 months) admitted to vascular surgery service for bruising of RLE and L internal iliac artery aneurysm.    #Anemia with LLE intramuscular hematoma  Elevated PTT  Hgb 6.2 on admission - incomplete responses to pRBC Other than intramuscular hematoma, no other evidence of blood loss - recent admission at outside hospital 2 months ago for GIB at which time his warfarin was stopped though he says he has been having brown stools. Elevated PTT noted, no known history of bleeding disorder and no family history.   -Elevated inhibitory assay/[place] unit elevated   -Starting cyclophosphamide 50mg oral today, 75mg tomorrow  -Zofran prior to cyclophosphamide doses  -Daily prednisone 60, PPI and insulin ss  -If Hb drops further, obtain CT abdomen/pelvis without contrast (CKD) to r/o RP bleed     #Constipation  -Miralax, Senna daily    #L internal iliac artery aneurysm  -outpatient repair with vascular    #AS s/p AVR  Chronic atrial fibrillation  Previously on warfarin but stopped due to GIB most recently about 2 months prior to admission. EKG V-paced  -continue home metoprolol (switched to short acting BID inpatient)     #Chronic HFrEF  CAD s/p PCI  2018 TTE LVEF 35-40%. Home metoprolol succinate  -Metoprolol tartrate 25mg BID  -Holding lasix 10mg once daily  -continue home statin    #Dementia - mild, he is independent at home, continue home donepezil  #HLD - continue home statin  #CKD4 - at baseline creatinine, monitor  #Hypothyroidism - continue home levothyroxine 25mcg once daily  #BPH - continue home tamsulosin  #Gout - continue home allopurinol

## 2021-12-29 NOTE — DIETITIAN INITIAL EVALUATION ADULT. - OTHER CALCULATIONS
ActualBW used for calculations as pt between % of IBW (%IBW: 111). Adjusted for age, moderate PCM, increase protein calorie for possible restarting chemo treatment. ActualBW used for calculations as pt between % of IBW (%IBW: 111). Adjusted for age, moderate PCM, possible restarting chemo treatment.

## 2021-12-29 NOTE — PROGRESS NOTE ADULT - SUBJECTIVE AND OBJECTIVE BOX
Called by primary team regarding pain in left foot.     on interview patient reports pain on dorsal aspect of left foot at the ankle and pain in left toes 2-4. He states pain is not new. Admits that pain is unchanged over he last several days. denies left thigh pain, denies left foot weakness or numbness Reports that he is able to flex and extend left ankle.     Left DP pulse palpable   Left toes warm to touch, ecchymosis of left toes 2-4, motor and sensation intact in left foot.   Left dorsa foot wound with small blister with tenderness to palpation.     -exam and patients report of pain unchanged from previous   -can leave ACE off for a short period of time   -elevate left foot above the level of the heart.     Discussed with chief resident

## 2021-12-29 NOTE — CONSULT NOTE ADULT - SUBJECTIVE AND OBJECTIVE BOX
Patient is a 89y old  Male who presents with a chief complaint of LLE swelling and pain (28 Dec 2021 11:02)       HPI:  89yo male presented with painful swelling and bruises of LLE. States started about 10d ago by bilateral bruises in toes and swelling in calves and thighs (L>R) with the right-side gradually improving but left side worsening since then and becoming painful. Has been ambulating without difficulty and denies any recent falls, traumas or preceding events. Denies any fever, chills, dizziness, light-headedness, palpitation, coughs, shortness of breath, chest pain, or abdominal pain. PMH HTN, HLD, CKD stage 4 (Cr 2, eGFR 28), HF (EF 30-35%), CAD (s/p PCI for RCA stenting 2007), aortic stenosis (s/p aortic valve replacement 2016), Afib, hypothyroidism, peptic ulcer disease, internal hemorrhoids, prostate and colon cancers (s/p R hemicolectomy 2016). He has a pacemaker and has been on Coumadin until 6w ago (when stopped due to risks of GIB). Upon presentation, pt had a Hgb of 6.2.  (24 Dec 2021 19:39)      PAST MEDICAL & SURGICAL HISTORY:  Aortic valve replaced    Atrial fibrillation    CAD (coronary artery disease)    HTN (hypertension)    PUD (peptic ulcer disease)    Constipation    Iron deficiency anemia    Diverticulosis    High cholesterol    Carotid arterial disease    Internal hemorrhoids    Endocarditis    Colon cancer    Hypothyroidism    S/P AVR  2013 @ [facility]  by Dr. Young    Cardiac pacemaker  3yrs ago    H/O inguinal hernia repair    H/O right hemicolectomy    History of appendectomy        MEDICATIONS  (STANDING):  atorvastatin 20 milliGRAM(s) Oral at bedtime  cholecalciferol 1000 Unit(s) Oral daily  dextrose 40% Gel 15 Gram(s) Oral once  dextrose 5%. 1000 milliLiter(s) (50 mL/Hr) IV Continuous <Continuous>  dextrose 5%. 1000 milliLiter(s) (100 mL/Hr) IV Continuous <Continuous>  dextrose 50% Injectable 25 Gram(s) IV Push once  dextrose 50% Injectable 12.5 Gram(s) IV Push once  dextrose 50% Injectable 25 Gram(s) IV Push once  donepezil 5 milliGRAM(s) Oral at bedtime  folic acid 1 milliGRAM(s) Oral daily  glucagon  Injectable 1 milliGRAM(s) IntraMuscular once  insulin lispro (ADMELOG) corrective regimen sliding scale   SubCutaneous Before meals and at bedtime  levothyroxine 25 MICROGram(s) Oral daily  memantine 5 milliGRAM(s) Oral daily  metoprolol tartrate 25 milliGRAM(s) Oral every 12 hours  pantoprazole    Tablet 40 milliGRAM(s) Oral before breakfast  predniSONE   Tablet 60 milliGRAM(s) Oral every 24 hours  tamsulosin 0.4 milliGRAM(s) Oral at bedtime  traZODone 25 milliGRAM(s) Oral at bedtime  vitamin B complex with vitamin C 1 Tablet(s) Oral daily    MEDICATIONS  (PRN):  acetaminophen     Tablet .. 650 milliGRAM(s) Oral every 6 hours PRN Mild Pain (1 - 3)  oxyCODONE    IR 10 milliGRAM(s) Oral every 6 hours PRN Severe Pain (7 - 10)  traMADol 25 milliGRAM(s) Oral every 6 hours PRN Moderate Pain (4 - 6)        Social History:                -  Home Living Status:  lives at [facility]          Baseline Functional Level Prior to Admission:             - ADL's/ IADL's:  independent         - ambulatory status PTA:  walked without devices    FAMILY HISTORY:      CBC Full  -  ( 28 Dec 2021 15:23 )  WBC Count : 8.27 K/uL  RBC Count : 2.85 M/uL  Hemoglobin : 8.8 g/dL  Hematocrit : 29.2 %  Platelet Count - Automated : 227 K/uL  Mean Cell Volume : 102.5 fl  Mean Cell Hemoglobin : 30.9 pg  Mean Cell Hemoglobin Concentration : 30.1 gm/dL  Auto Neutrophil # : x  Auto Lymphocyte # : x  Auto Monocyte # : x  Auto Eosinophil # : x  Auto Basophil # : x  Auto Neutrophil % : x  Auto Lymphocyte % : x  Auto Monocyte % : x  Auto Eosinophil % : x  Auto Basophil % : x      12-28    137  |  102  |  49<H>  ----------------------------<  143<H>  5.5<H>   |  24  |  2.20<H>    Ca    8.5      28 Dec 2021 07:34  Phos  5.2     12-28  Mg     2.2     12-28    TPro  5.6<L>  /  Alb  3.2<L>  /  TBili  0.8  /  DBili  x   /  AST  18  /  ALT  12  /  AlkPhos  57  12-28            Radiology:    < from: CT Angio Lower Extremity w/ IV Cont, Left (12.24.21 @ 15:38) >  ACC: [id number] EXAM:  CT ANGIO LWR EXT (W)AW IC LT                          PROCEDURE DATE:  12/24/2021          INTERPRETATION:  CLINICAL INFORMATION: Left leg swelling, assess for   active bleeding/hematoma    COMPARISON: None.    CT ANGIOGRAM LEFT LOWER EXTREMITY:    PROCEDURE:  Initially, nonenhanced CT was obtained through the calves. Then,   following the rapid administration of intravenous contrast, CT   angiography was performed through left hemipelvis and left lower   extremities down to the toes.  Delayed images through the calves were   also obtained. Sagittal and coronal reformats as well as 3D   reconstructions were performed.    125 mls of Omnipaque 350 was administered intravenously without   complication and 25 mls were discarded.    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    LEFT LOWER EXTREMITY:    Partially thrombosed 4.5 x 2.7 cm saccular aneurysm left internal iliac   artery. Diffusely atherosclerotic SFA. Moderate size intramuscular   hematoma involving biceps femoris muscle. No intravenous contrast   extravasation. Mildly stenotic popliteal artery. Extensive calf vessel   calcifications limiting evaluation of patency. Occlusion of the posterior   tibial artery with distal reconstitution. Occlusion of the distal   dorsalis pedis below the ankle and distal peroneal at the ankle.    ADDITIONAL FINDINGS: None.    IMPRESSION:  Moderate to large left anterior thigh intramuscular hematoma. No active   arterial bleeding.    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 Additional chronic   vascular findings as above.        < from: CT Abdomen and Pelvis No Cont (12.27.21 @ 13:41) >    ACC: [id number] EXAM:  CT ABDOMEN AND PELVIS                          PROCEDURE DATE:  12/27/2021          INTERPRETATION:  CT ABDOMEN AND PELVIS    CLINICAL INFORMATION: Left lower extremity swelling and bruising. Acute   on chronic blood loss anemia. Left lower extremity intramuscular   hematoma. Rule out retroperitoneal or pelvic hematoma.    COMPARISON: CT angiogram left lower extremity December 24, 2021. CT   abdomen pelvis April 1, 2016.    PROCEDURE:  CT of the Abdomen and Pelvis was performed without intravenous contrast.  Intravenous contrast: None  Oral contrast: None.  Sagittal and coronal reformats were performed.    FINDINGS:    Limited assessment of solid organs without intravenous contrast.    LOWER CHEST: Enlarged heart. Low density cardiac blood pool consistent   with anemia. Mitral annular and coronary artery calcifications. Trace   bilateral pleural effusions and minimal dependent atelectasis. Minimal   dependent atelectasis bilaterally.  LIVER: Hyperdense liver parenchymal attenuation relative to spleen which   can be seen with amiodarone use and iron deposition. No definite focal   lesion on this limited noncontrast study.  BILE DUCTS: Nondilated.  GALLBLADDER: Layering high density material in nondilated gallbladder   probably vicarious contrast excretion from recent CT angiogram.  SPLEEN: Few punctate calcified granulomas.  PANCREAS: Normal.  ADRENALS: Mild bilateral thickening.  KIDNEYS/URETERS: Bilateral renal cysts    BLADDER: Unremarkable  REPRODUCTIVE ORGANS: Prostate size within normal limits.    BOWEL: Moderate size hiatal hernia. No bowel obstruction. Right   hemicolectomy anastomosis.  PERITONEUM: No ascites, hemoperitoneum or extraluminal gas.  VESSELS:  Redemonstrated aneurysmal left internal iliac artery and right   common iliac artery better assessed on recent contrast-enhanced CT.   Extensive aortoiliac calcific atherosclerosis.  RETROPERITONEUM/LYMPH NODES: No retroperitoneal hematoma. No adenopathy.  SOFT TISSUES: No significant change intramuscular hematoma in anterior   left thigh centered in rectus femoris extending distally beyond the   field-of-view. Abundant unorganized fluid density in right hip/thigh   subcutaneous tissues. Midline postsurgical changes. Periumbilical   Metz-type hernia containing small bowel loop.  BONES: Degenerative changes. No acute fracture seen.    IMPRESSION:  No retroperitoneal hematoma or hemoperitoneum.  No significant change intramuscular hematoma anterior left thigh.                Vital Signs Last 24 Hrs  T(C): 36.9 (29 Dec 2021 05:30), Max: 36.9 (29 Dec 2021 05:30)  T(F): 98.5 (29 Dec 2021 05:30), Max: 98.5 (29 Dec 2021 05:30)  HR: 82 (29 Dec 2021 05:30) (79 - 82)  BP: 111/66 (29 Dec 2021 05:30) (100/50 - 112/52)  BP(mean): --  RR: 16 (29 Dec 2021 05:30) (16 - 16)  SpO2: 97% (29 Dec 2021 05:30) (93% - 97%)        REVIEW OF SYSTEMS:    CONSTITUTIONAL: No fever, weight loss, or fatigue  EYES: No eye pain, visual disturbances, or discharge  ENMT:  No difficulty hearing, tinnitus, vertigo; No sinus or throat pain  NECK: No pain or stiffness  BREASTS: No pain, masses, or nipple discharge  RESPIRATORY: No cough, wheezing, chills or hemoptysis; No shortness of breath  CARDIOVASCULAR: No chest pain, palpitations, dizziness, or leg swelling  GASTROINTESTINAL: No abdominal or epigastric pain. No nausea, vomiting, or hematemesis; No diarrhea or constipation. No melena or hematochezia.  GENITOURINARY: No dysuria, frequency, hematuria, or incontinence  NEUROLOGICAL: No headaches, memory loss, loss of strength, numbness, or tremors  SKIN: No itching, burning, rashes, or lesions   LYMPH NODES: No enlarged glands  ENDOCRINE: No heat or cold intolerance; No hair loss  MUSCULOSKELETAL: No joint pain or swelling; No muscle, back, or extremity pain  PSYCHIATRIC: No depression, anxiety, mood swings, or difficulty sleeping  HEME/LYMPH: No easy bruising, or bleeding gums  ALLERGY AND IMMUNOLOGIC: No hives or eczema  VASCULAR: per HPI        Physical Exam:  89 yo  gentleman lying in semi Frances's position, awake, alert, no acute complaints    Head: normocephalic, atraumatic    Eyes: PERRLA, EOMI, no nystagmus, sclera anicteric    ENT: nasal discharge, uvula midline, no oropharyngeal erythema/exudate    Neck: supple, negative JVD, negative carotid bruits, no thyromegaly    Chest: CTA bilaterally, neg wheeze/ rhonchi/ rales/ crackles/ egophany    Cardiovascular: regular rate and rhythm, neg murmurs/rubs/gallops    Abdomen: soft, non distended, non tender to palpation in all 4 quadrants, negative rebound/guarding, normal bowel sounds    Extremities:  LLE ace wrap    Neurologic Exam:    Alert and oriented x 3       Motor Exam:    Right UE:             no focal weakness, > 3+/5 throughout    Left UE:               no focal weakness, > 3+/5 throughout      Right LE:            no focal weakness, > 3+/5 throughout    Left LE:              no focal weakness, > 3+/5 throughout               Sensation:           intact to light touch x 4 extremities                                                Gait:  not tested        PM&R Impression:    1) deconditioned  2) no focal weakness    Recommendations/ Plan :    1) Physical therapy focusing on therapeutic exercises, bed mobility/transfer out of bed evaluation, progressive ambulation with assistive devices prn.    2) Anticipated Disposition Plan/Recs:  pending functional progress

## 2021-12-29 NOTE — CONSULT NOTE ADULT - ASSESSMENT
per Internal Medicine    89 yo M with history of essential HTN, HLD, CKD4, chronic HFrEF (LVEF 35-40%, last TTE available for review 2018), CAD s/p PCI, aortic stenosis s/p AVR, chronic atrial fibrillation, hypothyroidism, recent GIB (now off warfarin x 2 months) admitted to vascular surgery service for bruising of RLE and L internal iliac artery aneurysm.    #L internal iliac artery aneurysm  -management per vascular. Per my discussion with Dr. Barroso, may be repaired on non-urgent basis, possibly as outpatient  -seen by cardiology, high risk surgical patient, recommend transfusing to Hgb 8  -speak with cardiology about potentially lowering Hb goal to 7    #Anemia, likely acute vs chronic blood loss anemia  LLE intramuscular hematoma  Elevated PTT  Hgb 6.2 on admission - incomplete responses to pRBC (6.2 -> 1U -> 6.5 -> 2U -> 8.1 and 7.8 today). Other than intramuscular hematoma, no other evidence of blood loss - recent admission at outside hospital 2 months ago for GIB at which time his warfarin was stopped though he says he has been having brown stools. Elevated PTT noted, no known history of bleeding disorder and no family history.  -per primary team, hematology has been consulted, workup for factor inhibitor, other coagulopathy sent  -discussed with hematology fellow - await coagulopathy workup, would like to see H/H stabilized  -CT abdomen/pelvis to r/o RP bleed     #AS s/p AVR  Chronic atrial fibrillation  Previously on warfarin but stopped due to GIB most recently about 2 months prior to admission. EKG V-paced  -continue home metoprolol (switched to short acting BID inpatient)   -cardiology following    #Chronic HFrEF  CAD s/p PCI  LVEF 35-40% on TTE here 2018 but reportedly has a more recent one. Does not appear volume overloaded.  -continue home metoprolol (switched to short acting BID inpatient), lasix 10mg once daily  -continue home statin    #Dementia - mild, he is independent at home, continue home donepezil  #HLD - continue home statin  #CKD4 - at baseline creatinine, monitor  #Hypothyroidism - continue home levothyroxine 25mcg once daily  #BPH - continue home tamsulosin  #Gout - continue home allopurinol    Dispo: transferred to medicine given inadequate response to blood transfusion, downtrending anemia of unknown bleeding source, ongoing coagulopathy workup

## 2021-12-29 NOTE — PROGRESS NOTE ADULT - SUBJECTIVE AND OBJECTIVE BOX
OVERNIGHT EVENTS: NAEON. VSS    SUBJECTIVE / INTERVAL HPI: Patient seen and examined at bedside. Endorsed L leg pain. States last BM 2 weeks ago, denied abdominal pain.     VITAL SIGNS:  Vital Signs Last 24 Hrs  T(C): 36.7 (29 Dec 2021 12:12), Max: 36.9 (29 Dec 2021 05:30)  T(F): 98.1 (29 Dec 2021 12:12), Max: 98.5 (29 Dec 2021 05:30)  HR: 83 (29 Dec 2021 12:12) (79 - 83)  BP: 105/59 (29 Dec 2021 12:12) (100/50 - 111/66)  BP(mean): --  RR: 16 (29 Dec 2021 12:12) (16 - 16)  SpO2: 96% (29 Dec 2021 12:12) (96% - 97%)    PHYSICAL EXAM:    General: Elderly male in no acute distress  HEENT: NC/AT; PERRL, anicteric sclera; MMM  Neck: supple  Cardiovascular: +S1/S2, RRR, no murmurs, rubs, gallops. Euvolemic  Respiratory: CTA B/L; no W/R/R  Gastrointestinal: soft, NT/ND; +BSx4  Extremities: WWP.   Skin: Ecchymosis L buttock (new), ankle   Vascular: 2+ radial B/L, DP/PT pulses 2+ Right 1+ Left   Neurological: AAOx3; no focal deficits. 5/5 UE. LE exam limited  Psych: Pleasant, answering question appropriately    MEDICATIONS:  MEDICATIONS  (STANDING):  atorvastatin 20 milliGRAM(s) Oral at bedtime  cholecalciferol 1000 Unit(s) Oral daily  cyclophosphamide 50 milliGRAM(s) Oral once  dextrose 40% Gel 15 Gram(s) Oral once  dextrose 5%. 1000 milliLiter(s) (50 mL/Hr) IV Continuous <Continuous>  dextrose 5%. 1000 milliLiter(s) (100 mL/Hr) IV Continuous <Continuous>  dextrose 50% Injectable 25 Gram(s) IV Push once  dextrose 50% Injectable 12.5 Gram(s) IV Push once  dextrose 50% Injectable 25 Gram(s) IV Push once  donepezil 5 milliGRAM(s) Oral at bedtime  folic acid 1 milliGRAM(s) Oral daily  glucagon  Injectable 1 milliGRAM(s) IntraMuscular once  insulin lispro (ADMELOG) corrective regimen sliding scale   SubCutaneous Before meals and at bedtime  lactated ringers. 1000 milliLiter(s) (100 mL/Hr) IV Continuous <Continuous>  levothyroxine 25 MICROGram(s) Oral daily  memantine 5 milliGRAM(s) Oral daily  metoprolol tartrate 25 milliGRAM(s) Oral every 12 hours  ondansetron  IVPB 12 milliGRAM(s) IV Intermittent daily  pantoprazole    Tablet 40 milliGRAM(s) Oral before breakfast  polyethylene glycol 3350 17 Gram(s) Oral daily  predniSONE   Tablet 60 milliGRAM(s) Oral every 24 hours  senna 1 Tablet(s) Oral at bedtime  tamsulosin 0.4 milliGRAM(s) Oral at bedtime  traZODone 25 milliGRAM(s) Oral at bedtime  vitamin B complex with vitamin C 1 Tablet(s) Oral daily    MEDICATIONS  (PRN):  acetaminophen     Tablet .. 650 milliGRAM(s) Oral every 6 hours PRN Mild Pain (1 - 3)  oxyCODONE    IR 10 milliGRAM(s) Oral every 6 hours PRN Severe Pain (7 - 10)  traMADol 25 milliGRAM(s) Oral every 6 hours PRN Moderate Pain (4 - 6)      ALLERGIES:  Allergies    No Known Allergies    Intolerances        LABS:                        7.4    8.54  )-----------( 189      ( 29 Dec 2021 11:27 )             24.5     12-29    135  |  101  |  66<H>  ----------------------------<  200<H>  4.9   |  24  |  2.42<H>    Ca    8.8      29 Dec 2021 11:24  Phos  4.4     12-29  Mg     2.3     12-29    TPro  5.5<L>  /  Alb  3.3  /  TBili  0.8  /  DBili  x   /  AST  18  /  ALT  12  /  AlkPhos  54  12-29    PT/INR - ( 28 Dec 2021 15:23 )   PT: 13.0 sec;   INR: 1.09          PTT - ( 27 Dec 2021 22:35 )  PTT:80.9 sec    CAPILLARY BLOOD GLUCOSE      POCT Blood Glucose.: 199 mg/dL (29 Dec 2021 12:17)      RADIOLOGY & ADDITIONAL TESTS: Reviewed.    PLAN:  OVERNIGHT EVENTS: NAEON. VSS    SUBJECTIVE / INTERVAL HPI: Patient seen and examined at bedside. Endorsed L leg pain. States last BM 2 weeks ago, denied abdominal pain.     VITAL SIGNS:  Vital Signs Last 24 Hrs  T(C): 36.7 (29 Dec 2021 12:12), Max: 36.9 (29 Dec 2021 05:30)  T(F): 98.1 (29 Dec 2021 12:12), Max: 98.5 (29 Dec 2021 05:30)  HR: 83 (29 Dec 2021 12:12) (79 - 83)  BP: 105/59 (29 Dec 2021 12:12) (100/50 - 111/66)  BP(mean): --  RR: 16 (29 Dec 2021 12:12) (16 - 16)  SpO2: 96% (29 Dec 2021 12:12) (96% - 97%)    PHYSICAL EXAM:    General: Elderly male in no acute distress  HEENT: NC/AT; PERRL, anicteric sclera; MMM  Neck: supple  Cardiovascular: +S1/S2, RRR, no murmurs, rubs, gallops. Euvolemic  Respiratory: CTA B/L; no W/R/R  Gastrointestinal: soft, NT/ND; +BSx4  Extremities: WWP. Tender to palpation at L foot dorsum and shin  Skin: Ecchymosis L buttock (new), ankle   Vascular: 2+ radial B/L, DP/PT pulses 2+ Right 1+ Left   Neurological: AAOx3; no focal deficits. 5/5 UE. LE exam limited  Psych: Pleasant, answering question appropriately    MEDICATIONS:  MEDICATIONS  (STANDING):  atorvastatin 20 milliGRAM(s) Oral at bedtime  cholecalciferol 1000 Unit(s) Oral daily  cyclophosphamide 50 milliGRAM(s) Oral once  dextrose 40% Gel 15 Gram(s) Oral once  dextrose 5%. 1000 milliLiter(s) (50 mL/Hr) IV Continuous <Continuous>  dextrose 5%. 1000 milliLiter(s) (100 mL/Hr) IV Continuous <Continuous>  dextrose 50% Injectable 25 Gram(s) IV Push once  dextrose 50% Injectable 12.5 Gram(s) IV Push once  dextrose 50% Injectable 25 Gram(s) IV Push once  donepezil 5 milliGRAM(s) Oral at bedtime  folic acid 1 milliGRAM(s) Oral daily  glucagon  Injectable 1 milliGRAM(s) IntraMuscular once  insulin lispro (ADMELOG) corrective regimen sliding scale   SubCutaneous Before meals and at bedtime  lactated ringers. 1000 milliLiter(s) (100 mL/Hr) IV Continuous <Continuous>  levothyroxine 25 MICROGram(s) Oral daily  memantine 5 milliGRAM(s) Oral daily  metoprolol tartrate 25 milliGRAM(s) Oral every 12 hours  ondansetron  IVPB 12 milliGRAM(s) IV Intermittent daily  pantoprazole    Tablet 40 milliGRAM(s) Oral before breakfast  polyethylene glycol 3350 17 Gram(s) Oral daily  predniSONE   Tablet 60 milliGRAM(s) Oral every 24 hours  senna 1 Tablet(s) Oral at bedtime  tamsulosin 0.4 milliGRAM(s) Oral at bedtime  traZODone 25 milliGRAM(s) Oral at bedtime  vitamin B complex with vitamin C 1 Tablet(s) Oral daily    MEDICATIONS  (PRN):  acetaminophen     Tablet .. 650 milliGRAM(s) Oral every 6 hours PRN Mild Pain (1 - 3)  oxyCODONE    IR 10 milliGRAM(s) Oral every 6 hours PRN Severe Pain (7 - 10)  traMADol 25 milliGRAM(s) Oral every 6 hours PRN Moderate Pain (4 - 6)      ALLERGIES:  Allergies    No Known Allergies    Intolerances        LABS:                        7.4    8.54  )-----------( 189      ( 29 Dec 2021 11:27 )             24.5     12-29    135  |  101  |  66<H>  ----------------------------<  200<H>  4.9   |  24  |  2.42<H>    Ca    8.8      29 Dec 2021 11:24  Phos  4.4     12-29  Mg     2.3     12-29    TPro  5.5<L>  /  Alb  3.3  /  TBili  0.8  /  DBili  x   /  AST  18  /  ALT  12  /  AlkPhos  54  12-29    PT/INR - ( 28 Dec 2021 15:23 )   PT: 13.0 sec;   INR: 1.09          PTT - ( 27 Dec 2021 22:35 )  PTT:80.9 sec    CAPILLARY BLOOD GLUCOSE      POCT Blood Glucose.: 199 mg/dL (29 Dec 2021 12:17)      RADIOLOGY & ADDITIONAL TESTS: Reviewed.    PLAN:

## 2021-12-29 NOTE — DIETITIAN INITIAL EVALUATION ADULT. - OTHER INFO
89YOM with history of essential HTN, HLD, CKD4, chronic HFrEF (LVEF 35-40%, last TTE available for review 2018), CAD s/p PCI, aortic stenosis s/p AVR, chronic atrial fibrillation, hypothyroidism, recent GIB (now off warfarin x 2 months) admitted for possible surgical intervention for bruising of RLE and L internal iliac artery aneurysm transferred to medicine for persistent anemia potentially 2/2 clotting factor inbihibitor. US Doppler LLE 12/24 negative for DVT. Now s/p transfusion CBC with Hb 6.3. Getting another 2 units PRBC.    Pt seen in 7WO, currently on DASH, Renal restriction. Reports fair appetite, tolerate PO as much as he can however states his food options is limited. Suspected PO intake <75% in the past 5 days since admitted. Agrees on trial ONS, pending MD verification. RD educate pt on current diet order. Noted with elevated K, and phos, continue to encourage optimum intake. Pt receptive. Endorse soreness to throat however denies chewing/ swallowing difficulties.  lbs, unaware of UBW. NFPE reveal mild muscle depletion to clavicle and mild fat loss to triceps. As per ASPEN guidelines, pt meets criteria for moderate PCM. Pt is constipated, MD made aware. Denies N/V. Skin: intact, LLE ace wrap, no edema noted. Mitul score: 17. Please see additional nutrition recs below. 89YOM with history of essential HTN, HLD, CKD4, chronic HFrEF (LVEF 35-40%, last TTE available for review 2018), CAD s/p PCI, aortic stenosis s/p AVR, chronic atrial fibrillation, hypothyroidism, recent GIB (now off warfarin x 2 months) admitted for possible surgical intervention for bruising of RLE and L internal iliac artery aneurysm transferred to medicine for persistent anemia potentially 2/2 clotting factor inbihibitor. US Doppler LLE 12/24 negative for DVT. Now s/p transfusion CBC with Hb 6.3. Getting another 2 units PRBC.    Pt seen in 7WO, currently on DASH, Renal restriction. Reports fair appetite, tolerate PO as much as he can however states his food options is limited. Suspected PO intake not meeting EER; <75% in the past 7 days since admitted. Agrees on trial ONS, pending MD verification. RD educate pt on current diet order. Noted with elevated K, and phos, continue to encourage optimum intake. Pt receptive. Endorse soreness to throat however denies chewing/ swallowing difficulties.  lbs, unaware of UBW. NFPE reveal moderate muscle depletion to clavicle and mild fat loss to triceps. As per ASPEN guidelines, pt meets criteria for moderate PCM. Pt is constipated, MD made aware. Denies N/V. Skin: intact, LLE ace wrap, no edema noted. Mitul score: 17. Please see additional nutrition recs below.

## 2021-12-29 NOTE — DIETITIAN INITIAL EVALUATION ADULT. - ADD RECOMMEND
1. Dash, Renal diet. Reassess and consider to DC renal restriction if medically feasible, improved renal indices. 3. Add Nepro  kcal, 19g pro/ serving) to promote PO intake 4. Continue Bcomples, D3, folic acid 5. Monitor labs (lytes, renal indices BG), recommend stool softener if constipation continues. 1. Dash, Renal diet. Reassess and consider to DC renal restriction if medically feasible, improved renal indices. 2. Add Nepro  kcal, 19g pro/ serving) to promote PO intake 3. Continue B-complex, D3, folic acid 4. Monitor labs (lytes, renal indices BG), recommend stool softener if constipation continues.

## 2021-12-29 NOTE — DIETITIAN NUTRITION RISK NOTIFICATION - TREATMENT: THE FOLLOWING DIET HAS BEEN RECOMMENDED
1. Dash, Renal diet.   >>Reassess and consider to DC renal restriction if medically feasible, improved renal indices.   2. Add Nepro  kcal, 19g pro/ serving) to promote PO intake   3. Continue B-complex, D3, folic acid   4. Monitor labs (lytes, renal indices BG), recommend stool softener if constipation continues.

## 2021-12-29 NOTE — PROGRESS NOTE ADULT - ASSESSMENT
89 yo M with PMH of HTN, HLD, CKD IV, chronic HFrEF (EF 30-35%), CAD (s/p RCA PCI 2007),AS s/p bioprostetic AVR 2013 w/ revision 2016, Afib, colon cancer (s/p R hemicolectomy 2016) admitted for severe anemia secondary to IM hematoma with inadequate response to transfusion. Hematology consulted for anemia and coagulopathy     Anemia   - secondary to left anterior thigh intramuscular hematoma, no active arterial bleeding noted on CTA LLE.   - Hemodynamically stable.  - Hb on admission 6.2, s/p 1 unit, post transfusion Hb 6.3 (inadequate response). He received 2 units on 12/25/21 for Hb 6.5, post transfusion CBC with Hb 8.1 (inadequate response) c/f ongoing bleeding. Hb stable for past 48 hours.   - Iron studies may not be accurate if done after PRBC transfusion.  - FOBT negative  - No evidence of hemolysis   - Maintain active T&S, Transfuse for Hb <7.0       Coagulopathy   - Elevated PT/PTT and INR c/f factor deficiency versus factor inhibitor   - H/o aortic stenosis, check von Willebrand disease activity and antigen to rule out acquired von Willebrand disease.   - Received heparin SQ x 2 doses  - Fibrinogen normal, no evidence of DIC   - He was given 10 units of cryo on 12/25/21 given persistently elevated PTT and lack of adequate response to PRBC transfusion c/f ongoing bleeding. FFP was not given initially given concern for volume overload in the setting of low LVEF.   - PTT post cryoprecipitate and FFP was still elevated c/f inhibitor.   - Mixing study with partial correction c/w presence of an inhibitor.   - [place] inhibitor assay - factor VIII inhibitor with high [place] titer of 17.   - Factor VIII 1 % and Factor VII 49%. All other factors including Factor II, V, X, IX, XI, XII WNL.    - Check Lupus anticoagulant, Anti-cardiolipin Ab and B2-glycoprotein Ab   - At this time, patient has a stable Hb for past 48 hours. There is extension of ecchymosis to the left lateral abdominal wall. Does not require further FFP or factor eight inhibitor bypassing agent (FEIBA) or recombinant factor VIIa (NOVO7) as Hb has been stable, will consider if he has worsening bleeding and/or becomes hemodynamically unstable. There is a high risk of arterial and venous thrombosis with FEIBA and NOVO7 and patient is already high risk for stroke given his history of A.fib.   - Started on prednisone 1mg/kg oral to eliminate potential inhibitor if present. Will add cyclophosphamide 75 mg oral QD to the steroids today (12/29/21)   - HIV negative and pending hepatitis panel.    Discussed with Dr. Chandler and primary team.  89 yo M with PMH of HTN, HLD, CKD IV, chronic HFrEF (EF 30-35%), CAD (s/p RCA PCI 2007),AS s/p bioprostetic AVR 2013 w/ revision 2016, Afib, colon cancer (s/p R hemicolectomy 2016) admitted for severe anemia secondary to IM hematoma with inadequate response to transfusion. Hematology consulted for anemia and coagulopathy     Anemia   - secondary to left anterior thigh intramuscular hematoma, no active arterial bleeding noted on CTA LLE.   - Hemodynamically stable.  - Hb on admission 6.2, s/p 1 unit, post transfusion Hb 6.3 (inadequate response). He received 2 units on 12/25/21 for Hb 6.5, post transfusion CBC with Hb 8.1 (inadequate response) c/f ongoing bleeding. Hb stable for past 48 hours.   - Iron studies may not be accurate if done after PRBC transfusion.  - FOBT negative  - No evidence of hemolysis   - Maintain active T&S, Transfuse for Hb <7.0       Coagulopathy   - Elevated PT/PTT and INR c/f factor deficiency versus factor inhibitor   - H/o aortic stenosis, check von Willebrand disease activity and antigen to rule out acquired von Willebrand disease.   - Received heparin SQ x 2 doses  - Fibrinogen normal, no evidence of DIC   - He was given 10 units of cryo on 12/25/21 given persistently elevated PTT and lack of adequate response to PRBC transfusion c/f ongoing bleeding. FFP was not given initially given concern for volume overload in the setting of low LVEF.   - PTT post cryoprecipitate and FFP was still elevated c/f inhibitor.   - Mixing study with partial correction c/w presence of an inhibitor.   - [place] inhibitor assay - factor VIII inhibitor with high [place] titer of 17.   - Factor VIII 1 % and Factor VII 49%. All other factors including Factor II, V, X, IX, XI, XII WNL.    - Check Lupus anticoagulant, Anti-cardiolipin Ab and B2-glycoprotein Ab   - At this time, patient has a stable Hb for past 48 hours. There is extension of ecchymosis to the left lateral abdominal wall. Does not require further FFP or factor eight inhibitor bypassing agent (FEIBA) or recombinant factor VIIa (NOVO7) as Hb has been stable, will consider if he has worsening bleeding and/or becomes hemodynamically unstable. There is a high risk of arterial and venous thrombosis with FEIBA and NOVO7 and patient is already high risk for stroke given his history of A.fib.   - Started on prednisone 1mg/kg oral to eliminate potential inhibitor if present. Will add cyclophosphamide 75 mg oral QD to the steroids today (12/29/21)   - HIV negative and pending hepatitis panel.  - Check Abdominal US to evaluate for hematoma.     Discussed with Dr. Chandler and primary team.

## 2021-12-30 NOTE — PROGRESS NOTE ADULT - ASSESSMENT
89YOM with history of essential HTN, HLD, CKD4, chronic HFrEF (LVEF 35-40%, last TTE available for review 2018), CAD s/p PCI, aortic stenosis s/p AVR, chronic atrial fibrillation, hypothyroidism, recent GIB (now off warfarin x 2 months) admitted to vascular surgery service for bruising of RLE and L internal iliac artery aneurysm.    #Anemia with LLE intramuscular hematoma  Elevated PTT  Hgb 6.2 on admission - incomplete responses to pRBC Other than intramuscular hematoma, no other evidence of blood loss - recent admission at outside hospital 2 months ago for GIB at which time his warfarin was stopped though he says he has been having brown stools. Elevated PTT noted, no known history of bleeding disorder and no family history.   -Elevated inhibitory assay/[place] unit elevated   -Starting cyclophosphamide 50mg oral today, 75mg tomorrow  -Zofran prior to cyclophosphamide doses  -Daily prednisone 60, PPI and insulin ss  -If Hb drops further, obtain CT abdomen/pelvis without contrast (CKD) to r/o RP bleed     #Constipation  -Miralax, Senna daily    #L internal iliac artery aneurysm  -outpatient repair with vascular    #AS s/p AVR  Chronic atrial fibrillation  Previously on warfarin but stopped due to GIB most recently about 2 months prior to admission. EKG V-paced  -continue home metoprolol (switched to short acting BID inpatient)     #Chronic HFrEF  CAD s/p PCI  2018 TTE LVEF 35-40%. Home metoprolol succinate  -Metoprolol tartrate 25mg BID  -Holding lasix 10mg once daily  -continue home statin    #Dementia - mild, he is independent at home, continue home donepezil  #HLD - continue home statin  #CKD4 - at baseline creatinine, monitor  #Hypothyroidism - continue home levothyroxine 25mcg once daily  #BPH - continue home tamsulosin  #Gout - continue home allopurinol   89YOM with history of essential HTN, HLD, CKD4, chronic HFrEF (LVEF 35-40%, last TTE available for review 2018), CAD s/p PCI, aortic stenosis s/p AVR, chronic atrial fibrillation, hypothyroidism, recent GIB (now off warfarin x 2 months) admitted to vascular surgery service for bruising of RLE and L internal iliac artery aneurysm.    #Anemia with LLE intramuscular hematoma  Elevated PTT  Hgb 6.2 on admission - incomplete responses to pRBC Other than intramuscular hematoma, no other evidence of blood loss - recent admission at outside hospital 2 months ago for GIB at which time his warfarin was stopped though he says he has been having brown stools. Elevated PTT noted, no known history of bleeding disorder and no family history.   -Elevated inhibitory assay/[place] unit elevated   - cyclophosphamide 75mg qd  -Zofran prior to cyclophosphamide doses  -Daily prednisone 60, PPI and insulin ss  -If Hb drops further, obtain CT abdomen/pelvis without contrast (CKD) to r/o RP bleed     #Constipation  -Miralax, Senna daily    #Hyperkalemia  -HyperK cocktail today, repeat BMP in afternoon  -F/U EKG    #L internal iliac artery aneurysm  -outpatient repair with vascular    #AS s/p AVR  Chronic atrial fibrillation  Previously on warfarin but stopped due to GIB most recently about 2 months prior to admission. EKG V-paced  -continue home metoprolol (switched to short acting BID inpatient)     #Chronic HFrEF  CAD s/p PCI  2018 TTE LVEF 35-40%. Home metoprolol succinate  -Metoprolol tartrate 25mg BID  -Holding lasix 10mg once daily  -continue home statin    #Dementia - mild, he is independent at home, continue home donepezil  #HLD - continue home statin  #CKD4 - at baseline creatinine, monitor  #Hypothyroidism - continue home levothyroxine 25mcg once daily  #BPH - continue home tamsulosin  #Gout - continue home allopurinol

## 2021-12-30 NOTE — PROGRESS NOTE ADULT - SUBJECTIVE AND OBJECTIVE BOX
OVERNIGHT EVENTS:    SUBJECTIVE / INTERVAL HPI: Patient seen and examined at bedside. Reported still no bowel movement. Denied CP, SOB. Leg pain slightly improved since Ace bandage loosened    VITAL SIGNS:  Vital Signs Last 24 Hrs  T(C): 36.9 (30 Dec 2021 05:50), Max: 36.9 (30 Dec 2021 05:50)  T(F): 98.4 (30 Dec 2021 05:50), Max: 98.4 (30 Dec 2021 05:50)  HR: 81 (30 Dec 2021 05:50) (80 - 83)  BP: 109/76 (30 Dec 2021 05:50) (98/65 - 109/76)  BP(mean): --  RR: 16 (30 Dec 2021 05:50) (16 - 16)  SpO2: 94% (30 Dec 2021 05:50) (92% - 96%)    PHYSICAL EXAM:    General: No acute distress  HEENT: NC/AT; PERRL, anicteric sclera; MMM  Neck: supple  Cardiovascular: +S1/S2, RRR, no murmurs, rubs, gallops  Respiratory: CTA B/L; no W/R/R  Gastrointestinal: soft, NT/ND; +BSx4  Extremities: WWP; no edema, clubbing or cyanosis  Vascular: 2+ radial, DP/PT pulses B/L  Neurological: AAOx3; no focal deficits    MEDICATIONS:  MEDICATIONS  (STANDING):  atorvastatin 20 milliGRAM(s) Oral at bedtime  cholecalciferol 1000 Unit(s) Oral daily  cyclophosphamide 75 milliGRAM(s) Oral daily  dextrose 40% Gel 15 Gram(s) Oral once  dextrose 5%. 1000 milliLiter(s) (50 mL/Hr) IV Continuous <Continuous>  dextrose 5%. 1000 milliLiter(s) (100 mL/Hr) IV Continuous <Continuous>  dextrose 50% Injectable 25 Gram(s) IV Push once  dextrose 50% Injectable 12.5 Gram(s) IV Push once  dextrose 50% Injectable 25 Gram(s) IV Push once  donepezil 5 milliGRAM(s) Oral at bedtime  folic acid 1 milliGRAM(s) Oral daily  glucagon  Injectable 1 milliGRAM(s) IntraMuscular once  insulin lispro (ADMELOG) corrective regimen sliding scale   SubCutaneous Before meals and at bedtime  lactated ringers. 1000 milliLiter(s) (100 mL/Hr) IV Continuous <Continuous>  levothyroxine 25 MICROGram(s) Oral daily  memantine 5 milliGRAM(s) Oral daily  metoprolol tartrate 25 milliGRAM(s) Oral every 12 hours  ondansetron  IVPB 12 milliGRAM(s) IV Intermittent daily  pantoprazole    Tablet 40 milliGRAM(s) Oral before breakfast  polyethylene glycol 3350 17 Gram(s) Oral daily  predniSONE   Tablet 60 milliGRAM(s) Oral every 24 hours  senna 1 Tablet(s) Oral at bedtime  tamsulosin 0.4 milliGRAM(s) Oral at bedtime  traZODone 25 milliGRAM(s) Oral at bedtime  vitamin B complex with vitamin C 1 Tablet(s) Oral daily    MEDICATIONS  (PRN):  acetaminophen     Tablet .. 650 milliGRAM(s) Oral every 6 hours PRN Mild Pain (1 - 3)  oxyCODONE    IR 10 milliGRAM(s) Oral every 6 hours PRN Severe Pain (7 - 10)  traMADol 25 milliGRAM(s) Oral every 6 hours PRN Moderate Pain (4 - 6)      ALLERGIES:  Allergies    No Known Allergies    Intolerances        LABS:                        7.6    7.88  )-----------( 194      ( 29 Dec 2021 20:03 )             25.2     12-29    135  |  101  |  66<H>  ----------------------------<  200<H>  4.9   |  24  |  2.42<H>    Ca    8.8      29 Dec 2021 11:24  Phos  4.4     12-29  Mg     2.3     12-29    TPro  5.5<L>  /  Alb  3.3  /  TBili  0.8  /  DBili  x   /  AST  18  /  ALT  12  /  AlkPhos  54  12-29    PT/INR - ( 28 Dec 2021 15:23 )   PT: 13.0 sec;   INR: 1.09              CAPILLARY BLOOD GLUCOSE      POCT Blood Glucose.: 173 mg/dL (29 Dec 2021 22:10)      RADIOLOGY & ADDITIONAL TESTS: Reviewed.    PLAN:  OVERNIGHT EVENTS: NAEON    SUBJECTIVE / INTERVAL HPI: Patient seen and examined at bedside. Reported still no bowel movement. Denied CP, SOB. Leg pain slightly improved since Ace bandage loosened    VITAL SIGNS:  Vital Signs Last 24 Hrs  T(C): 36.9 (30 Dec 2021 05:50), Max: 36.9 (30 Dec 2021 05:50)  T(F): 98.4 (30 Dec 2021 05:50), Max: 98.4 (30 Dec 2021 05:50)  HR: 81 (30 Dec 2021 05:50) (80 - 83)  BP: 109/76 (30 Dec 2021 05:50) (98/65 - 109/76)  BP(mean): --  RR: 16 (30 Dec 2021 05:50) (16 - 16)  SpO2: 94% (30 Dec 2021 05:50) (92% - 96%)    PHYSICAL EXAM:    General: Elderly male in no acute distress  HEENT: NC/AT; PERRL, anicteric sclera; MMM  Neck: supple  Cardiovascular: +S1/S2, RRR, no murmurs, rubs, gallops. Euvolemic  Respiratory: CTA B/L; no W/R/R  Gastrointestinal: soft, NT/ND; +BSx4  Extremities: WWP. Tender to palpation at L foot dorsum and shin  Skin: Ecchymosis L buttock and ankle with yellow border  Vascular: 2+ radial B/L, DP/PT pulses 2+ Right 1+ Left   Neurological: AAOx3; no focal deficits. 5/5 UE. LE exam limited  Psych: Pleasant, answering question appropriately    MEDICATIONS:  MEDICATIONS  (STANDING):  atorvastatin 20 milliGRAM(s) Oral at bedtime  cholecalciferol 1000 Unit(s) Oral daily  cyclophosphamide 75 milliGRAM(s) Oral daily  dextrose 40% Gel 15 Gram(s) Oral once  dextrose 5%. 1000 milliLiter(s) (50 mL/Hr) IV Continuous <Continuous>  dextrose 5%. 1000 milliLiter(s) (100 mL/Hr) IV Continuous <Continuous>  dextrose 50% Injectable 25 Gram(s) IV Push once  dextrose 50% Injectable 12.5 Gram(s) IV Push once  dextrose 50% Injectable 25 Gram(s) IV Push once  donepezil 5 milliGRAM(s) Oral at bedtime  folic acid 1 milliGRAM(s) Oral daily  glucagon  Injectable 1 milliGRAM(s) IntraMuscular once  insulin lispro (ADMELOG) corrective regimen sliding scale   SubCutaneous Before meals and at bedtime  lactated ringers. 1000 milliLiter(s) (100 mL/Hr) IV Continuous <Continuous>  levothyroxine 25 MICROGram(s) Oral daily  memantine 5 milliGRAM(s) Oral daily  metoprolol tartrate 25 milliGRAM(s) Oral every 12 hours  ondansetron  IVPB 12 milliGRAM(s) IV Intermittent daily  pantoprazole    Tablet 40 milliGRAM(s) Oral before breakfast  polyethylene glycol 3350 17 Gram(s) Oral daily  predniSONE   Tablet 60 milliGRAM(s) Oral every 24 hours  senna 1 Tablet(s) Oral at bedtime  tamsulosin 0.4 milliGRAM(s) Oral at bedtime  traZODone 25 milliGRAM(s) Oral at bedtime  vitamin B complex with vitamin C 1 Tablet(s) Oral daily    MEDICATIONS  (PRN):  acetaminophen     Tablet .. 650 milliGRAM(s) Oral every 6 hours PRN Mild Pain (1 - 3)  oxyCODONE    IR 10 milliGRAM(s) Oral every 6 hours PRN Severe Pain (7 - 10)  traMADol 25 milliGRAM(s) Oral every 6 hours PRN Moderate Pain (4 - 6)      ALLERGIES:  Allergies    No Known Allergies    Intolerances        LABS:                        7.6    7.88  )-----------( 194      ( 29 Dec 2021 20:03 )             25.2     12-29    135  |  101  |  66<H>  ----------------------------<  200<H>  4.9   |  24  |  2.42<H>    Ca    8.8      29 Dec 2021 11:24  Phos  4.4     12-29  Mg     2.3     12-29    TPro  5.5<L>  /  Alb  3.3  /  TBili  0.8  /  DBili  x   /  AST  18  /  ALT  12  /  AlkPhos  54  12-29    PT/INR - ( 28 Dec 2021 15:23 )   PT: 13.0 sec;   INR: 1.09              CAPILLARY BLOOD GLUCOSE      POCT Blood Glucose.: 173 mg/dL (29 Dec 2021 22:10)      RADIOLOGY & ADDITIONAL TESTS: Reviewed.    PLAN:

## 2021-12-30 NOTE — PROGRESS NOTE ADULT - ASSESSMENT
89 yo M with PMH of HTN, HLD, CKD IV, chronic HFrEF (EF 30-35%), CAD (s/p RCA PCI 2007),AS s/p bioprostetic AVR 2013 w/ revision 2016, Afib, colon cancer (s/p R hemicolectomy 2016) admitted for severe anemia secondary to IM hematoma with inadequate response to transfusion. Hematology consulted for anemia and coagulopathy     Anemia   - secondary to left anterior thigh intramuscular hematoma, no active arterial bleeding noted on CTA LLE.   - Hemodynamically stable.  - Hb on admission 6.2, s/p 1 unit, post transfusion Hb 6.3 (inadequate response). He received 2 units on 12/25/21 for Hb 6.5, post transfusion CBC with Hb 8.1 (inadequate response) c/f ongoing bleeding. Hb stable.   - Iron studies may not be accurate if done after PRBC transfusion.  - FOBT negative  - No evidence of hemolysis   - Maintain active T&S, Transfuse for Hb <7.0       Coagulopathy   - Elevated PT/PTT and INR c/f factor deficiency versus factor inhibitor   - H/o aortic stenosis, check von Willebrand disease activity and antigen to rule out acquired von Willebrand disease.   - Received heparin SQ x 2 doses  - Fibrinogen normal, no evidence of DIC   - He was given 10 units of cryo on 12/25/21 given persistently elevated PTT and lack of adequate response to PRBC transfusion c/f ongoing bleeding. FFP was not given initially given concern for volume overload in the setting of low LVEF.   - PTT post cryoprecipitate and FFP was still elevated c/f inhibitor.   - Mixing study with partial correction c/w presence of an inhibitor.   - [place] inhibitor assay - factor VIII inhibitor with high [place] titer of 17.   - Factor VIII 1 % and Factor VII 49%. All other factors including Factor II, V, X, IX, XI, XII WNL.    - Check Lupus anticoagulant, Anti-cardiolipin Ab and B2-glycoprotein Ab   - At this time, patient has a stable Hb for past 48 hours. There is extension of ecchymosis to the left lateral abdominal wall. Does not require further FFP or factor eight inhibitor bypassing agent (FEIBA) or recombinant factor VIIa (NOVO7) as Hb has been stable, will consider if he has worsening bleeding and/or becomes hemodynamically unstable. There is a high risk of arterial and venous thrombosis with FEIBA and NOVO7 and patient is already high risk for stroke given his history of A.fib.   - Started on prednisone 1mg/kg oral to eliminate potential inhibitor if present. Will add cyclophosphamide 75 mg oral QD to the steroids (12/29/21)   - HIV negative and pending hepatitis panel.  - Check Abdominal US to evaluate for hematoma if he has worsening drop in Hb.     Discussed with Dr. Chandler and primary team.

## 2021-12-30 NOTE — PROGRESS NOTE ADULT - SUBJECTIVE AND OBJECTIVE BOX
Hematology Oncology Progress Note (Dr. Chandler)  Discussed with Dr. Chandler and recommendations reviewed with the primary team.    Interval HPI: Patient seen and examined. No new complaints. Denies any abdominal pain, n/v/d.     HPI: 89 year old male with PMH of HTN, HLD, CKD IV, chronic HFrEF (EF 30-35%), CAD (s/p RCA PCI 2007), AS s/p bioprostetic AVR 2013 w/ revision 2016, Afib, colon cancer (s/p R hemicolectomy 2016) p/w painful swelling and ecchymosis of LLE. States the swelling started about 10 days ago, initially on b/l toes and then progressed to calves and thigh, worse in LLE compared to RLE. He had been ambulating without difficulty prior to this. Denies any recent falls or trauma to the area.  Denies any fever, chills, dizziness, light-headedness, palpitation, coughs, shortness of breath, chest pain, or abdominal pain. Found to be anemic with Hgb of 6.2.  He was taking coumadin until about 6 weeks ago (due to risk of GIB).  LE ultrasound negative for DVT.  Found to have a moderate intramuscular hematoma w/out concern for active bleeding or extravasation. However, he was found to have an incidental L internal iliac aneurysm that is enlarged since previous imaging in 2016. He is to be admitted for possible surgical intervention. No surgery date planned as of yet. He is s/p 1 unit PRBC for Hb 6.2 with inadequate response, post transfusion CBC with Hb 6.3. Getting another 2 units PRBC. Hematology consulted for anemia and coagulopathy. He denies any history of epistaxis, gum bleeding, hematuria, joint bleeding.     PAST MEDICAL & SURGICAL HISTORY:  Aortic valve replaced  Atrial fibrillation  CAD (coronary artery disease)  HTN (hypertension)  PUD (peptic ulcer disease)  Constipation  Iron deficiency anemia  Diverticulosis  High cholesterol  Carotid arterial disease  Internal hemorrhoids  Endocarditis  Colon cancer  Hypothyroidism  S/P AVR 2013 @ [facility]  by Dr. Parikh  Cardiac pacemaker 3yrs ago  H/O inguinal hernia repair  H/O right hemicolectomy  History of appendectomy    Allergies: NKDA       Medications:  Home Medications:  Align 4 mg oral capsule: 1 cap(s) orally once a day (24 Dec 2021 21:23)  allopurinol 100 mg oral tablet: 1 tab(s) orally once a day (24 Dec 2021 21:23)  atorvastatin 20 mg oral tablet: 1 tab(s) orally once a day (at bedtime) (24 Dec 2021 21:23)  B Complex 50 oral tablet, extended release: 1 tab(s) orally once a day (24 Dec 2021 21:23)  Citrucel 2 g/19 g oral powder for reconstitution: 1 packet(s) orally once a day (24 Dec 2021 21:23)  CoQ10 300 mg oral capsule: 1 cap(s) orally once a day (24 Dec 2021 21:23)  donepezil 5 mg oral tablet: 1 tab(s) orally 2 times a day (24 Dec 2021 21:23)  folic acid 1 mg oral tablet: 1 tab(s) orally once a day (24 Dec 2021 21:23)  Lasix 20 mg oral tablet: 0.5 tab(s) orally once a day (24 Dec 2021 21:23)  levothyroxine 25 mcg (0.025 mg) oral tablet: 1 tab(s) orally once a day (24 Dec 2021 21:23)  memantine 5 mg oral tablet: 1 tab(s) orally once a day (24 Dec 2021 21:23)  metoprolol succinate 50 mg oral tablet, extended release: 1 tab(s) orally 2 times a day (24 Dec 2021 21:23)  omeprazole 20 mg oral delayed release capsule: 1 cap(s) orally once a day (24 Dec 2021 21:23)  tamsulosin 0.4 mg oral capsule: 1 tab(s) orally once a day (24 Dec 2021 21:23)  traZODone 50 mg oral tablet: 0.5 tab(s) orally once a day (24 Dec 2021 21:23)  Vitamin D3 1000 intl units oral tablet: 1 tab(s) orally once a day (24 Dec 2021 21:23)      Social History: Former smoker, quit 30 years ago. Used to smoke 0.5 ppd x 25 years. Denies any alcohol use.     FAMILY HISTORY: No family history of bleeding disorder.     MEDICATIONS  (STANDING):  atorvastatin 20 milliGRAM(s) Oral at bedtime  cholecalciferol 1000 Unit(s) Oral daily  cyclophosphamide 75 milliGRAM(s) Oral daily  dextrose 40% Gel 15 Gram(s) Oral once  dextrose 5%. 1000 milliLiter(s) (100 mL/Hr) IV Continuous <Continuous>  dextrose 5%. 1000 milliLiter(s) (50 mL/Hr) IV Continuous <Continuous>  dextrose 50% Injectable 25 Gram(s) IV Push once  dextrose 50% Injectable 12.5 Gram(s) IV Push once  dextrose 50% Injectable 25 Gram(s) IV Push once  donepezil 5 milliGRAM(s) Oral at bedtime  folic acid 1 milliGRAM(s) Oral daily  glucagon  Injectable 1 milliGRAM(s) IntraMuscular once  insulin lispro (ADMELOG) corrective regimen sliding scale   SubCutaneous Before meals and at bedtime  lactated ringers. 1000 milliLiter(s) (100 mL/Hr) IV Continuous <Continuous>  levothyroxine 25 MICROGram(s) Oral daily  memantine 5 milliGRAM(s) Oral daily  metoprolol tartrate 25 milliGRAM(s) Oral every 12 hours  ondansetron  IVPB 12 milliGRAM(s) IV Intermittent daily  pantoprazole    Tablet 40 milliGRAM(s) Oral before breakfast  polyethylene glycol 3350 17 Gram(s) Oral daily  predniSONE   Tablet 60 milliGRAM(s) Oral every 24 hours  senna 1 Tablet(s) Oral at bedtime  tamsulosin 0.4 milliGRAM(s) Oral at bedtime  traZODone 25 milliGRAM(s) Oral at bedtime  vitamin B complex with vitamin C 1 Tablet(s) Oral daily    MEDICATIONS  (PRN):  acetaminophen     Tablet .. 650 milliGRAM(s) Oral every 6 hours PRN Mild Pain (1 - 3)  oxyCODONE    IR 10 milliGRAM(s) Oral every 6 hours PRN Severe Pain (7 - 10)  traMADol 25 milliGRAM(s) Oral every 6 hours PRN Moderate Pain (4 - 6)      PHYSICAL EXAM:  Vital Signs Last 24 Hrs  T(C): 36.9 (30 Dec 2021 05:50), Max: 36.9 (30 Dec 2021 05:50)  T(F): 98.4 (30 Dec 2021 05:50), Max: 98.4 (30 Dec 2021 05:50)  HR: 81 (30 Dec 2021 05:50) (80 - 83)  BP: 109/76 (30 Dec 2021 05:50) (98/65 - 109/76)  RR: 16 (30 Dec 2021 05:50) (16 - 16)  SpO2: 94% (30 Dec 2021 05:50) (92% - 96%)    Gen: in NAD  HEENT: normocephalic/atraumatic  Neck: supple  Cardiovascular: RR, nl S1S2, murmur noted   Respiratory: clear air entry b/l  Gastrointestinal: BS+, soft, NT/ND, ecchymosis noted over left lateral abdominal wall/flank region.   Extremities: LLE in ace wrap, no edema, no calf tenderness, ecchymosis noted. Bruising noted over b/l UE.   Neurological: AAOx3, no focal deficits  Musculoskeletal:  full ROM  Psychiatric:  mood stable      Labs:                        7.7    7.33  )-----------( 194      ( 30 Dec 2021 08:39 )             25.4     12-30    134<L>  |  100  |  72<H>  ----------------------------<  116<H>  5.7<H>   |  23  |  2.56<H>    Ca    8.8      30 Dec 2021 08:36  Phos  4.0     12-30  Mg     2.3     12-30    TPro  5.5<L>  /  Alb  3.3  /  TBili  0.8  /  DBili  x   /  AST  18  /  ALT  12  /  AlkPhos  54  12-29    PT/INR - ( 28 Dec 2021 15:23 )   PT: 13.0 sec;   INR: 1.09       Imaging Studies:  CT Abdomen and Pelvis No Cont (12.27.21 @ 13:41)   IMPRESSION:  No retroperitoneal hematoma or hemoperitoneum.  No significant change intramuscular hematoma anterior left thigh.    CTA LLE 12/24/21  IMPRESSION:  Moderate to large left anterior thigh intramuscular hematoma. No active arterial bleeding.  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 Additional chronic vascular findings as above.    US Doppler LLE 12/24/21  IMPRESSION:  No deep vein thrombosis seen.

## 2021-12-31 NOTE — PROGRESS NOTE ADULT - SUBJECTIVE AND OBJECTIVE BOX
LENGTH OF HOSPITAL STAY: 7d    SUBJECTIVE: No acute overnight events    HISTORY OF PRESENTING ILLNESS:   89yo male presented with painful swelling and bruises of LLE. States started about 10d ago by bilateral bruises in toes and swelling in calves and thighs (L>R) with the right-side gradually improving but left side worsening since then and becoming painful. Has been ambulating without difficulty and denies any recent falls, traumas or preceding events. Denies any fever, chills, dizziness, light-headedness, palpitation, coughs, shortness of breath, chest pain, or abdominal pain. PMH HTN, HLD, CKD stage 4 (Cr 2, eGFR 28), HF (EF 30-35%), CAD (s/p PCI for RCA stenting 2007), aortic stenosis (s/p aortic valve replacement 2016), Afib, hypothyroidism, peptic ulcer disease, internal hemorrhoids, prostate and colon cancers (s/p R hemicolectomy 2016). He has a pacemaker and has been on Coumadin until 6w ago (when stopped due to risks of GIB). Upon presentation, pt had a Hgb of 6.2.  (24 Dec 2021 19:39)    PAST MEDICAL & SURGICAL HISTORY:  Aortic valve replaced  Atrial fibrillation  CAD (coronary artery disease)  HTN (hypertension)  PUD (peptic ulcer disease)  Constipation  Iron deficiency anemia  Diverticulosis  High cholesterol  Carotid arterial disease  Internal hemorrhoids  Endocarditis  Colon cancer  Hypothyroidism  S/P AVR  2013 @ [facility]  by Dr. Young  Cardiac pacemaker  3yrs ago  H/O inguinal hernia repair  H/O right hemicolectomy  History of appendectomy    ALLERGIES:  No Known Allergies    MEDICATIONS:  STANDING MEDICATIONS  atorvastatin 20 milliGRAM(s) Oral at bedtime  cholecalciferol 1000 Unit(s) Oral daily  cyclophosphamide 75 milliGRAM(s) Oral daily  dextrose 40% Gel 15 Gram(s) Oral once  dextrose 5%. 1000 milliLiter(s) IV Continuous <Continuous>  dextrose 5%. 1000 milliLiter(s) IV Continuous <Continuous>  dextrose 50% Injectable 25 Gram(s) IV Push once  dextrose 50% Injectable 12.5 Gram(s) IV Push once  dextrose 50% Injectable 25 Gram(s) IV Push once  donepezil 5 milliGRAM(s) Oral at bedtime  folic acid 1 milliGRAM(s) Oral daily  glucagon  Injectable 1 milliGRAM(s) IntraMuscular once  insulin lispro (ADMELOG) corrective regimen sliding scale   SubCutaneous Before meals and at bedtime  levothyroxine 25 MICROGram(s) Oral daily  memantine 5 milliGRAM(s) Oral daily  metoprolol tartrate 25 milliGRAM(s) Oral every 12 hours  ondansetron  IVPB 12 milliGRAM(s) IV Intermittent daily  pantoprazole    Tablet 40 milliGRAM(s) Oral before breakfast  polyethylene glycol 3350 17 Gram(s) Oral daily  predniSONE   Tablet 60 milliGRAM(s) Oral every 24 hours  senna 1 Tablet(s) Oral at bedtime  tamsulosin 0.4 milliGRAM(s) Oral at bedtime  traZODone 25 milliGRAM(s) Oral at bedtime  vitamin B complex with vitamin C 1 Tablet(s) Oral daily    PRN MEDICATIONS  acetaminophen     Tablet .. 650 milliGRAM(s) Oral every 6 hours PRN  oxyCODONE    IR 10 milliGRAM(s) Oral every 6 hours PRN  traMADol 25 milliGRAM(s) Oral every 6 hours PRN    VITALS:   T(F): 97.4  HR: 83  BP: 142/53  RR: 18  SpO2: 93%    LABS:                        8.0    6.65  )-----------( 188      ( 31 Dec 2021 09:04 )             27.1     12-31    134<L>  |  102  |  70<H>  ----------------------------<  151<H>  5.3   |  24  |  2.29<H>    Ca    8.7      31 Dec 2021 09:04  Phos  4.3     12-31  Mg     2.4     12-31    TPro  5.6<L>  /  Alb  3.4  /  TBili  0.8  /  DBili  x   /  AST  29  /  ALT  31  /  AlkPhos  59  12-31    PTT - ( 30 Dec 2021 16:57 )  PTT:57.1 sec    RADIOLOGY:  Reviewed    PHYSICAL EXAM:  GEN: No acute distress  HEENT: NCAT  LUNGS: Clear to auscultation bilaterally   HEART: S1/S2 present. RRR.   ABD: Soft, non-tender, non-distended. Bowel sounds present  EXT: Swollen left leg with hematoma, stable/improving  NEURO: AAOX3

## 2021-12-31 NOTE — PROGRESS NOTE ADULT - ASSESSMENT
89 yo M with PMHx of essential hypertension, hyperlipidemia, CKD4, chronic HFrEF (EF 30-35%), CAD s/p PCI (2007), AS s/p bioprosthetic AVR (2013) with revision (2016), AFib, colon cancer s/p R hemicolectomy (2016), admitted for severe anemia secondary to left intramuscular hematoma with inadequate response to transfusion. Hematology consulted for evaluation of anemia and coagulopathy. s/p 3 U PRBC (12/25/21 x 2, 12/27/21), 1 FFP (12/26/21) and 10 U Cryoprecipitate (12/26/21). Clinically no other sites of bleeding, FOBT (12/26/21) negative, and no evidence of hemolysis. No evidence of vWD.     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 Factor VIII inhibitor detected with high [place] titer of 17. Factor VIII 1%.   - No evidence of acquired/inherited vWD thus far, pending vWF antigen level.   - Pending Lupus anticoagulant, anti-cardiolipin Ab and anti-beta2-glycoprotein Ab screening tests   - Will consider FFP, factor eight inhibitor bypassing agent (FEIBA) or recombinant factor VIIa (NovoSeven) if bleeding recurs and/or becomes hemodynamically unstable. There is a high risk of arterial and venous thrombosis with FEIBA and NovoSeven and patient is already at high risk for stroke given his history of AFib  - Continue with PO Prednisone 1 mg/kg (12/27/21). PO Cyclophosphamide 75 mg QD was added on 12/29/21.  - HIV and Hepatitis panel negative.   - Maintain active T+S, transfuse if Hb < 7 or if actively bleeding/symptomatic  - Upon discharge, follow-up with Dr. Lori Chandler at [facility] and Blood on [facility] in 1 week    Discussed with Dr. Chandler  89 yo M with PMHx of essential hypertension, hyperlipidemia, CKD4, chronic HFrEF (EF 30-35%), CAD s/p PCI (2007), AS s/p bioprosthetic AVR (2013) with revision (2016), AFib, colon cancer s/p R hemicolectomy (2016), admitted for severe anemia secondary to left intramuscular hematoma with inadequate response to transfusion. Hematology consulted for evaluation of anemia and coagulopathy. s/p 3 U PRBC (12/25/21 x 2, 12/27/21), 1 FFP (12/26/21) and 10 U Cryoprecipitate (12/26/21). Clinically no other sites of bleeding, FOBT (12/26/21) negative, and no evidence of hemolysis. No evidence of vWD.     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 Factor VIII inhibitor detected with high [place] titer of 17. Factor VIII 1%.   - No evidence of acquired/inherited vWD thus far, pending vWF antigen level.   - Pending Lupus anticoagulant, anti-cardiolipin Ab and anti-beta2-glycoprotein Ab screening tests   - Will consider FFP, factor eight inhibitor bypassing agent (FEIBA) or recombinant factor VIIa (NovoSeven) if bleeding recurs and/or becomes hemodynamically unstable. There is a high risk of arterial and venous thrombosis with FEIBA and NovoSeven and patient is already at high risk for stroke given his history of AFib  - Continue with PO Prednisone 1 mg/kg (12/27/21). PO Cyclophosphamide 75 mg QD was added on 12/29/21.  - HIV and Hepatitis panel negative.   - Trend CBC with differential and PTT once daily   - Maintain active T+S, transfuse if Hb < 7 or if actively bleeding/symptomatic  - Upon discharge, follow-up with Dr. Lori Chandler at [facility] and Blood on [facility] in 1 week    Discussed with Dr. Chandler

## 2021-12-31 NOTE — PROGRESS NOTE ADULT - ASSESSMENT
89YOM with history of essential HTN, HLD, CKD4, chronic HFrEF (LVEF 35-40%, last TTE available for review 2018), CAD s/p PCI, aortic stenosis s/p AVR, chronic atrial fibrillation, hypothyroidism, recent GIB (now off warfarin x 2 months) admitted to vascular surgery service for bruising of RLE and L internal iliac artery aneurysm.    #Anemia with LLE intramuscular hematoma  Elevated PTT  Hgb 6.2 on admission - incomplete responses to pRBC Other than intramuscular hematoma, no other evidence of blood loss - recent admission at outside hospital 2 months ago for GIB at which time his warfarin was stopped though he says he has been having brown stools. Elevated PTT noted, no known history of bleeding disorder and no family history.   -Elevated inhibitory assay/[place] unit elevated   - cyclophosphamide 75mg qd  -Zofran prior to cyclophosphamide doses  -Daily prednisone 60, PPI and insulin ss  -If Hb drops further, obtain CT abdomen/pelvis without contrast (CKD) to r/o RP bleed     #Constipation  -Miralax, Senna daily    #Hyperkalemia  -HyperK cocktail today, repeat BMP in afternoon  -F/U EKG    #L internal iliac artery aneurysm  -outpatient repair with vascular    #AS s/p AVR  Chronic atrial fibrillation  Previously on warfarin but stopped due to GIB most recently about 2 months prior to admission. EKG V-paced  -continue home metoprolol (switched to short acting BID inpatient)     #Chronic HFrEF  CAD s/p PCI  2018 TTE LVEF 35-40%. Home metoprolol succinate  -Metoprolol tartrate 25mg BID  -Holding lasix 10mg once daily  -continue home statin    #Dementia - mild, he is independent at home, continue home donepezil  #HLD - continue home statin  #CKD4 - at baseline creatinine, monitor  #Hypothyroidism - continue home levothyroxine 25mcg once daily  #BPH - continue home tamsulosin  #Gout - continue home allopurinol   89YOM with history of essential HTN, HLD, CKD4, chronic HFrEF (LVEF 35-40%, last TTE available for review 2018), CAD s/p PCI, aortic stenosis s/p AVR, chronic atrial fibrillation, hypothyroidism, recent GIB (now off warfarin x 2 months) transfer from vascular surgery service for bruising of RLE and L internal iliac artery aneurysm now treating with steroid and cyclophosphamide for anemia 2/2 circulating factor inhibitor    #Anemia with LLE intramuscular hematoma  Elevated PTT  Hgb 6.2 on admission - incomplete responses to pRBC Other than intramuscular hematoma, no other evidence of blood loss - recent admission at outside hospital 2 months ago for GIB at which time his warfarin was stopped though he says he has been having brown stools. Elevated PTT noted, no known history of bleeding disorder and no family history.   -Elevated inhibitory assay/[place] unit elevated   - cyclophosphamide 75mg qd  -Zofran prior to cyclophosphamide doses  -Daily prednisone 60, PPI and insulin ss  -If Hb drops further, obtain CT abdomen/pelvis without contrast (CKD) to r/o RP bleed     #Constipation  -Miralax, Senna daily    #Hyperkalemia  -Still with elevated K. 5.3 on 12/31, will give lokelma 5mg x1    #L internal iliac artery aneurysm  -outpatient repair with vascular    #AS s/p AVR  Chronic atrial fibrillation  Previously on warfarin but stopped due to GIB most recently about 2 months prior to admission. EKG V-paced  -continue home metoprolol (switched to short acting BID inpatient)     #Chronic HFrEF  CAD s/p PCI  2018 TTE LVEF 35-40%. Home metoprolol succinate  -Metoprolol tartrate 25mg BID  -Holding lasix 10mg once daily  -continue home statin    #Dementia - mild, he is independent at home, continue home donepezil  #HLD - continue home statin  #CKD4 - at baseline creatinine, monitor  #Hypothyroidism - continue home levothyroxine 25mcg once daily  #BPH - continue home tamsulosin  #Gout - continue home allopurinol

## 2021-12-31 NOTE — PROGRESS NOTE ADULT - SUBJECTIVE AND OBJECTIVE BOX
OVERNIGHT EVENTS:    SUBJECTIVE / INTERVAL HPI: Patient seen and examined at bedside.     VITAL SIGNS:  Vital Signs Last 24 Hrs  T(C): 36.7 (31 Dec 2021 05:35), Max: 36.8 (30 Dec 2021 20:30)  T(F): 98 (31 Dec 2021 05:35), Max: 98.2 (30 Dec 2021 20:30)  HR: 86 (31 Dec 2021 05:35) (79 - 86)  BP: 116/60 (31 Dec 2021 05:35) (101/48 - 118/71)  BP(mean): --  RR: 17 (31 Dec 2021 05:35) (17 - 17)  SpO2: 97% (31 Dec 2021 05:35) (97% - 98%)    PHYSICAL EXAM:    General: No acute distress  HEENT: NC/AT; PERRL, anicteric sclera; MMM  Neck: supple  Cardiovascular: +S1/S2, RRR, no murmurs, rubs, gallops  Respiratory: CTA B/L; no W/R/R  Gastrointestinal: soft, NT/ND; +BSx4  Extremities: WWP; no edema, clubbing or cyanosis  Vascular: 2+ radial, DP/PT pulses B/L  Neurological: AAOx3; no focal deficits    MEDICATIONS:  MEDICATIONS  (STANDING):  atorvastatin 20 milliGRAM(s) Oral at bedtime  cholecalciferol 1000 Unit(s) Oral daily  cyclophosphamide 75 milliGRAM(s) Oral daily  dextrose 40% Gel 15 Gram(s) Oral once  dextrose 5%. 1000 milliLiter(s) (50 mL/Hr) IV Continuous <Continuous>  dextrose 5%. 1000 milliLiter(s) (100 mL/Hr) IV Continuous <Continuous>  dextrose 50% Injectable 25 Gram(s) IV Push once  dextrose 50% Injectable 12.5 Gram(s) IV Push once  dextrose 50% Injectable 25 Gram(s) IV Push once  donepezil 5 milliGRAM(s) Oral at bedtime  folic acid 1 milliGRAM(s) Oral daily  glucagon  Injectable 1 milliGRAM(s) IntraMuscular once  insulin lispro (ADMELOG) corrective regimen sliding scale   SubCutaneous Before meals and at bedtime  levothyroxine 25 MICROGram(s) Oral daily  memantine 5 milliGRAM(s) Oral daily  metoprolol tartrate 25 milliGRAM(s) Oral every 12 hours  ondansetron  IVPB 12 milliGRAM(s) IV Intermittent daily  pantoprazole    Tablet 40 milliGRAM(s) Oral before breakfast  polyethylene glycol 3350 17 Gram(s) Oral daily  predniSONE   Tablet 60 milliGRAM(s) Oral every 24 hours  senna 1 Tablet(s) Oral at bedtime  tamsulosin 0.4 milliGRAM(s) Oral at bedtime  traZODone 25 milliGRAM(s) Oral at bedtime  vitamin B complex with vitamin C 1 Tablet(s) Oral daily    MEDICATIONS  (PRN):  acetaminophen     Tablet .. 650 milliGRAM(s) Oral every 6 hours PRN Mild Pain (1 - 3)  oxyCODONE    IR 10 milliGRAM(s) Oral every 6 hours PRN Severe Pain (7 - 10)  traMADol 25 milliGRAM(s) Oral every 6 hours PRN Moderate Pain (4 - 6)      ALLERGIES:  Allergies    No Known Allergies    Intolerances        LABS:                        7.7    7.33  )-----------( 194      ( 30 Dec 2021 08:39 )             25.4     12-30    134<L>  |  100  |  73<H>  ----------------------------<  143<H>  5.1   |  23  |  2.56<H>    Ca    8.4      30 Dec 2021 16:57  Phos  4.0     12-30  Mg     2.3     12-30    TPro  5.5<L>  /  Alb  3.3  /  TBili  0.8  /  DBili  x   /  AST  18  /  ALT  12  /  AlkPhos  54  12-29    PTT - ( 30 Dec 2021 16:57 )  PTT:57.1 sec    CAPILLARY BLOOD GLUCOSE      POCT Blood Glucose.: 173 mg/dL (30 Dec 2021 20:59)      RADIOLOGY & ADDITIONAL TESTS: Reviewed.    PLAN:  OVERNIGHT EVENTS: PTT improved. Hb stable 8 (yest 7.7). Cr downtrending    SUBJECTIVE / INTERVAL HPI: Patient seen and examined at bedside. Denied CP, SOB. Leg pain improved. Now states last BM 2 days ago    VITAL SIGNS:  Vital Signs Last 24 Hrs  T(C): 36.7 (31 Dec 2021 05:35), Max: 36.8 (30 Dec 2021 20:30)  T(F): 98 (31 Dec 2021 05:35), Max: 98.2 (30 Dec 2021 20:30)  HR: 86 (31 Dec 2021 05:35) (79 - 86)  BP: 116/60 (31 Dec 2021 05:35) (101/48 - 118/71)  BP(mean): --  RR: 17 (31 Dec 2021 05:35) (17 - 17)  SpO2: 97% (31 Dec 2021 05:35) (97% - 98%)    PHYSICAL EXAM:    General: Elderly male in no acute distress  HEENT: NC/AT; PERRL, anicteric sclera; MMM  Neck: supple  Cardiovascular: +S1/S2, RRR, no murmurs, rubs, gallops. Euvolemic  Respiratory: CTA B/L; no W/R/R  Gastrointestinal: soft, NT/ND; +BSx4  Extremities: WWP. Tender to palpation at L foot dorsum and shin  Skin: Ecchymosis L buttock and ankle with yellow border  Vascular: 2+ radial B/L, DP/PT pulses 2+ Right 1+ Left   Neurological: AAOx3; no focal deficits. 5/5 UE. LE exam limited  Psych: Pleasant, answering question appropriately    MEDICATIONS:  MEDICATIONS  (STANDING):  atorvastatin 20 milliGRAM(s) Oral at bedtime  cholecalciferol 1000 Unit(s) Oral daily  cyclophosphamide 75 milliGRAM(s) Oral daily  dextrose 40% Gel 15 Gram(s) Oral once  dextrose 5%. 1000 milliLiter(s) (50 mL/Hr) IV Continuous <Continuous>  dextrose 5%. 1000 milliLiter(s) (100 mL/Hr) IV Continuous <Continuous>  dextrose 50% Injectable 25 Gram(s) IV Push once  dextrose 50% Injectable 12.5 Gram(s) IV Push once  dextrose 50% Injectable 25 Gram(s) IV Push once  donepezil 5 milliGRAM(s) Oral at bedtime  folic acid 1 milliGRAM(s) Oral daily  glucagon  Injectable 1 milliGRAM(s) IntraMuscular once  insulin lispro (ADMELOG) corrective regimen sliding scale   SubCutaneous Before meals and at bedtime  levothyroxine 25 MICROGram(s) Oral daily  memantine 5 milliGRAM(s) Oral daily  metoprolol tartrate 25 milliGRAM(s) Oral every 12 hours  ondansetron  IVPB 12 milliGRAM(s) IV Intermittent daily  pantoprazole    Tablet 40 milliGRAM(s) Oral before breakfast  polyethylene glycol 3350 17 Gram(s) Oral daily  predniSONE   Tablet 60 milliGRAM(s) Oral every 24 hours  senna 1 Tablet(s) Oral at bedtime  tamsulosin 0.4 milliGRAM(s) Oral at bedtime  traZODone 25 milliGRAM(s) Oral at bedtime  vitamin B complex with vitamin C 1 Tablet(s) Oral daily    MEDICATIONS  (PRN):  acetaminophen     Tablet .. 650 milliGRAM(s) Oral every 6 hours PRN Mild Pain (1 - 3)  oxyCODONE    IR 10 milliGRAM(s) Oral every 6 hours PRN Severe Pain (7 - 10)  traMADol 25 milliGRAM(s) Oral every 6 hours PRN Moderate Pain (4 - 6)      ALLERGIES:  Allergies    No Known Allergies    Intolerances        LABS:                        7.7    7.33  )-----------( 194      ( 30 Dec 2021 08:39 )             25.4     12-30    134<L>  |  100  |  73<H>  ----------------------------<  143<H>  5.1   |  23  |  2.56<H>    Ca    8.4      30 Dec 2021 16:57  Phos  4.0     12-30  Mg     2.3     12-30    TPro  5.5<L>  /  Alb  3.3  /  TBili  0.8  /  DBili  x   /  AST  18  /  ALT  12  /  AlkPhos  54  12-29    PTT - ( 30 Dec 2021 16:57 )  PTT:57.1 sec    CAPILLARY BLOOD GLUCOSE      POCT Blood Glucose.: 173 mg/dL (30 Dec 2021 20:59)      RADIOLOGY & ADDITIONAL TESTS: Reviewed.    PLAN:

## 2022-01-01 ENCOUNTER — RESULT REVIEW (OUTPATIENT)
Age: 87
End: 2022-01-01

## 2022-01-01 ENCOUNTER — INPATIENT (INPATIENT)
Facility: HOSPITAL | Age: 87
LOS: 2 days | End: 2022-07-08
Attending: PLASTIC SURGERY | Admitting: PLASTIC SURGERY

## 2022-01-01 ENCOUNTER — APPOINTMENT (OUTPATIENT)
Dept: VASCULAR SURGERY | Facility: CLINIC | Age: 87
End: 2022-01-01

## 2022-01-01 ENCOUNTER — TRANSCRIPTION ENCOUNTER (OUTPATIENT)
Age: 87
End: 2022-01-01

## 2022-01-01 ENCOUNTER — INPATIENT (INPATIENT)
Facility: HOSPITAL | Age: 87
LOS: 7 days | DRG: 808 | End: 2022-07-05
Attending: HOSPITALIST | Admitting: STUDENT IN AN ORGANIZED HEALTH CARE EDUCATION/TRAINING PROGRAM
Payer: MEDICARE

## 2022-01-01 VITALS
RESPIRATION RATE: 18 BRPM | WEIGHT: 134.04 LBS | HEIGHT: 63 IN | SYSTOLIC BLOOD PRESSURE: 118 MMHG | OXYGEN SATURATION: 97 % | HEART RATE: 80 BPM | TEMPERATURE: 98 F | DIASTOLIC BLOOD PRESSURE: 72 MMHG

## 2022-01-01 VITALS — OXYGEN SATURATION: 91 %

## 2022-01-01 VITALS
HEART RATE: 88 BPM | SYSTOLIC BLOOD PRESSURE: 108 MMHG | RESPIRATION RATE: 18 BRPM | TEMPERATURE: 97 F | DIASTOLIC BLOOD PRESSURE: 55 MMHG | OXYGEN SATURATION: 97 %

## 2022-01-01 VITALS
TEMPERATURE: 98 F | SYSTOLIC BLOOD PRESSURE: 108 MMHG | OXYGEN SATURATION: 97 % | DIASTOLIC BLOOD PRESSURE: 59 MMHG | HEART RATE: 80 BPM | RESPIRATION RATE: 16 BRPM

## 2022-01-01 VITALS — HEART RATE: 80 BPM | OXYGEN SATURATION: 77 %

## 2022-01-01 DIAGNOSIS — Z00.00 ENCOUNTER FOR GENERAL ADULT MEDICAL EXAMINATION WITHOUT ABNORMAL FINDINGS: ICD-10-CM

## 2022-01-01 DIAGNOSIS — Z87.891 PERSONAL HISTORY OF NICOTINE DEPENDENCE: ICD-10-CM

## 2022-01-01 DIAGNOSIS — D66 HEREDITARY FACTOR VIII DEFICIENCY: ICD-10-CM

## 2022-01-01 DIAGNOSIS — D61.818 OTHER PANCYTOPENIA: ICD-10-CM

## 2022-01-01 DIAGNOSIS — G47.00 INSOMNIA, UNSPECIFIED: ICD-10-CM

## 2022-01-01 DIAGNOSIS — E78.5 HYPERLIPIDEMIA, UNSPECIFIED: ICD-10-CM

## 2022-01-01 DIAGNOSIS — Z90.49 ACQUIRED ABSENCE OF OTHER SPECIFIED PARTS OF DIGESTIVE TRACT: Chronic | ICD-10-CM

## 2022-01-01 DIAGNOSIS — R50.81 FEVER PRESENTING WITH CONDITIONS CLASSIFIED ELSEWHERE: ICD-10-CM

## 2022-01-01 DIAGNOSIS — Z95.2 PRESENCE OF PROSTHETIC HEART VALVE: Chronic | ICD-10-CM

## 2022-01-01 DIAGNOSIS — I96 GANGRENE, NOT ELSEWHERE CLASSIFIED: ICD-10-CM
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
## 2022-01-01 DIAGNOSIS — I25.10 ATHEROSCLEROTIC HEART DISEASE OF NATIVE CORONARY ARTERY WITHOUT ANGINA PECTORIS: ICD-10-CM

## 2022-01-01 DIAGNOSIS — N18.30 CHRONIC KIDNEY DISEASE, STAGE 3 UNSPECIFIED: ICD-10-CM

## 2022-01-01 DIAGNOSIS — K92.0 HEMATEMESIS: ICD-10-CM

## 2022-01-01 DIAGNOSIS — K27.9 PEPTIC ULCER, SITE UNSPECIFIED, UNSPECIFIED AS ACUTE OR CHRONIC, WITHOUT HEMORRHAGE OR PERFORATION: ICD-10-CM

## 2022-01-01 DIAGNOSIS — I46.9 CARDIAC ARREST, CAUSE UNSPECIFIED: ICD-10-CM

## 2022-01-01 DIAGNOSIS — Z85.46 PERSONAL HISTORY OF MALIGNANT NEOPLASM OF PROSTATE: ICD-10-CM

## 2022-01-01 DIAGNOSIS — Z88.1 ALLERGY STATUS TO OTHER ANTIBIOTIC AGENTS STATUS: ICD-10-CM

## 2022-01-01 DIAGNOSIS — Z90.49 ACQUIRED ABSENCE OF OTHER SPECIFIED PARTS OF DIGESTIVE TRACT: ICD-10-CM

## 2022-01-01 DIAGNOSIS — Z98.89 OTHER SPECIFIED POSTPROCEDURAL STATES: Chronic | ICD-10-CM

## 2022-01-01 DIAGNOSIS — I50.22 CHRONIC SYSTOLIC (CONGESTIVE) HEART FAILURE: ICD-10-CM

## 2022-01-01 DIAGNOSIS — N40.0 BENIGN PROSTATIC HYPERPLASIA WITHOUT LOWER URINARY TRACT SYMPTOMS: ICD-10-CM

## 2022-01-01 DIAGNOSIS — Z95.5 PRESENCE OF CORONARY ANGIOPLASTY IMPLANT AND GRAFT: ICD-10-CM

## 2022-01-01 DIAGNOSIS — N17.9 ACUTE KIDNEY FAILURE, UNSPECIFIED: ICD-10-CM

## 2022-01-01 DIAGNOSIS — J96.92 RESPIRATORY FAILURE, UNSPECIFIED WITH HYPERCAPNIA: ICD-10-CM

## 2022-01-01 DIAGNOSIS — Z51.5 ENCOUNTER FOR PALLIATIVE CARE: ICD-10-CM

## 2022-01-01 DIAGNOSIS — R57.9 SHOCK, UNSPECIFIED: ICD-10-CM

## 2022-01-01 DIAGNOSIS — I10 ESSENTIAL (PRIMARY) HYPERTENSION: ICD-10-CM

## 2022-01-01 DIAGNOSIS — Z29.9 ENCOUNTER FOR PROPHYLACTIC MEASURES, UNSPECIFIED: ICD-10-CM

## 2022-01-01 DIAGNOSIS — T36.1X5A ADVERSE EFFECT OF CEPHALOSPORINS AND OTHER BETA-LACTAM ANTIBIOTICS, INITIAL ENCOUNTER: ICD-10-CM

## 2022-01-01 DIAGNOSIS — S80.11XA CONTUSION OF RIGHT LOWER LEG, INITIAL ENCOUNTER: ICD-10-CM

## 2022-01-01 DIAGNOSIS — F03.90 UNSPECIFIED DEMENTIA WITHOUT BEHAVIORAL DISTURBANCE: ICD-10-CM

## 2022-01-01 DIAGNOSIS — E87.70 FLUID OVERLOAD, UNSPECIFIED: ICD-10-CM

## 2022-01-01 DIAGNOSIS — I13.0 HYPERTENSIVE HEART AND CHRONIC KIDNEY DISEASE WITH HEART FAILURE AND STAGE 1 THROUGH STAGE 4 CHRONIC KIDNEY DISEASE, OR UNSPECIFIED CHRONIC KIDNEY DISEASE: ICD-10-CM

## 2022-01-01 DIAGNOSIS — D70.9 NEUTROPENIA, UNSPECIFIED: ICD-10-CM

## 2022-01-01 DIAGNOSIS — M79.89 OTHER SPECIFIED SOFT TISSUE DISORDERS: ICD-10-CM

## 2022-01-01 DIAGNOSIS — E87.2 ACIDOSIS: ICD-10-CM

## 2022-01-01 DIAGNOSIS — L51.3 STEVENS-JOHNSON SYNDROME-TOXIC EPIDERMAL NECROLYSIS OVERLAP SYNDROME: ICD-10-CM

## 2022-01-01 DIAGNOSIS — Z95.0 PRESENCE OF CARDIAC PACEMAKER: ICD-10-CM

## 2022-01-01 DIAGNOSIS — E03.9 HYPOTHYROIDISM, UNSPECIFIED: ICD-10-CM

## 2022-01-01 DIAGNOSIS — D62 ACUTE POSTHEMORRHAGIC ANEMIA: ICD-10-CM

## 2022-01-01 DIAGNOSIS — I48.91 UNSPECIFIED ATRIAL FIBRILLATION: ICD-10-CM

## 2022-01-01 DIAGNOSIS — Z95.0 PRESENCE OF CARDIAC PACEMAKER: Chronic | ICD-10-CM

## 2022-01-01 DIAGNOSIS — Z66 DO NOT RESUSCITATE: ICD-10-CM

## 2022-01-01 DIAGNOSIS — Z79.82 LONG TERM (CURRENT) USE OF ASPIRIN: ICD-10-CM

## 2022-01-01 DIAGNOSIS — J96.91 RESPIRATORY FAILURE, UNSPECIFIED WITH HYPOXIA: ICD-10-CM
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
## 2022-01-01 DIAGNOSIS — N18.4 CHRONIC KIDNEY DISEASE, STAGE 4 (SEVERE): ICD-10-CM

## 2022-01-01 DIAGNOSIS — Z85.038 PERSONAL HISTORY OF OTHER MALIGNANT NEOPLASM OF LARGE INTESTINE: ICD-10-CM

## 2022-01-01 DIAGNOSIS — I72.3 ANEURYSM OF ILIAC ARTERY: ICD-10-CM

## 2022-01-01 DIAGNOSIS — I48.20 CHRONIC ATRIAL FIBRILLATION, UNSPECIFIED: ICD-10-CM

## 2022-01-01 DIAGNOSIS — T14.8XXA OTHER INJURY OF UNSPECIFIED BODY REGION, INITIAL ENCOUNTER: ICD-10-CM

## 2022-01-01 DIAGNOSIS — T36.8X5A ADVERSE EFFECT OF OTHER SYSTEMIC ANTIBIOTICS, INITIAL ENCOUNTER: ICD-10-CM

## 2022-01-01 DIAGNOSIS — K59.00 CONSTIPATION, UNSPECIFIED: ICD-10-CM

## 2022-01-01 DIAGNOSIS — E44.0 MODERATE PROTEIN-CALORIE MALNUTRITION: ICD-10-CM

## 2022-01-01 DIAGNOSIS — M10.9 GOUT, UNSPECIFIED: ICD-10-CM

## 2022-01-01 DIAGNOSIS — E87.5 HYPERKALEMIA: ICD-10-CM

## 2022-01-01 DIAGNOSIS — N17.0 ACUTE KIDNEY FAILURE WITH TUBULAR NECROSIS: ICD-10-CM

## 2022-01-01 DIAGNOSIS — Z95.3 PRESENCE OF XENOGENIC HEART VALVE: ICD-10-CM

## 2022-01-01 DIAGNOSIS — L51.1 STEVENS-JOHNSON SYNDROME: ICD-10-CM

## 2022-01-01 DIAGNOSIS — S70.11XA CONTUSION OF RIGHT THIGH, INITIAL ENCOUNTER: ICD-10-CM

## 2022-01-01 LAB
-  AMIKACIN: SIGNIFICANT CHANGE UP
-  AMPICILLIN/SULBACTAM: SIGNIFICANT CHANGE UP
-  AMPICILLIN: SIGNIFICANT CHANGE UP
-  CEFEPIME: SIGNIFICANT CHANGE UP
-  CEFTAZIDIME: SIGNIFICANT CHANGE UP
-  CIPROFLOXACIN: SIGNIFICANT CHANGE UP
-  CIPROFLOXACIN: SIGNIFICANT CHANGE UP
-  CLINDAMYCIN: SIGNIFICANT CHANGE UP
-  CLINDAMYCIN: SIGNIFICANT CHANGE UP
-  DAPTOMYCIN: SIGNIFICANT CHANGE UP
-  ERYTHROMYCIN: SIGNIFICANT CHANGE UP
-  ERYTHROMYCIN: SIGNIFICANT CHANGE UP
-  GENTAMICIN: SIGNIFICANT CHANGE UP
-  LEVOFLOXACIN: SIGNIFICANT CHANGE UP
-  LEVOFLOXACIN: SIGNIFICANT CHANGE UP
-  LINEZOLID: SIGNIFICANT CHANGE UP
-  MEROPENEM: SIGNIFICANT CHANGE UP
-  NITROFURANTOIN: SIGNIFICANT CHANGE UP
-  OXACILLIN: SIGNIFICANT CHANGE UP
-  OXACILLIN: SIGNIFICANT CHANGE UP
-  RIFAMPIN: SIGNIFICANT CHANGE UP
-  RIFAMPIN: SIGNIFICANT CHANGE UP
-  TETRACYCLINE: SIGNIFICANT CHANGE UP
-  TOBRAMYCIN: SIGNIFICANT CHANGE UP
-  TRIMETHOPRIM/SULFAMETHOXAZOLE: SIGNIFICANT CHANGE UP
-  VANCOMYCIN: SIGNIFICANT CHANGE UP
A1C WITH ESTIMATED AVERAGE GLUCOSE RESULT: 5.2 % — SIGNIFICANT CHANGE UP (ref 4–5.6)
ACANTHOCYTES BLD QL SMEAR: SLIGHT — SIGNIFICANT CHANGE UP
ALBUMIN SERPL ELPH-MCNC: 2.4 G/DL — LOW (ref 3.5–5.2)
ALBUMIN SERPL ELPH-MCNC: 2.9 G/DL — LOW (ref 3.3–5)
ALBUMIN SERPL ELPH-MCNC: 2.9 G/DL — LOW (ref 3.3–5)
ALBUMIN SERPL ELPH-MCNC: 2.9 G/DL — LOW (ref 3.5–5.2)
ALBUMIN SERPL ELPH-MCNC: 3 G/DL — LOW (ref 3.3–5)
ALBUMIN SERPL ELPH-MCNC: 3 G/DL — LOW (ref 3.5–5.2)
ALBUMIN SERPL ELPH-MCNC: 3.1 G/DL — LOW (ref 3.3–5)
ALBUMIN SERPL ELPH-MCNC: 3.2 G/DL — LOW (ref 3.3–5)
ALBUMIN SERPL ELPH-MCNC: 3.2 G/DL — LOW (ref 3.5–5.2)
ALBUMIN SERPL ELPH-MCNC: 3.3 G/DL — SIGNIFICANT CHANGE UP (ref 3.3–5)
ALBUMIN SERPL ELPH-MCNC: 3.6 G/DL — SIGNIFICANT CHANGE UP (ref 3.3–5)
ALBUMIN SERPL ELPH-MCNC: 3.6 G/DL — SIGNIFICANT CHANGE UP (ref 3.3–5)
ALBUMIN SERPL ELPH-MCNC: 4 G/DL — SIGNIFICANT CHANGE UP (ref 3.3–5)
ALP SERPL-CCNC: 104 U/L — SIGNIFICANT CHANGE UP (ref 30–115)
ALP SERPL-CCNC: 109 U/L — SIGNIFICANT CHANGE UP (ref 30–115)
ALP SERPL-CCNC: 119 U/L — HIGH (ref 30–115)
ALP SERPL-CCNC: 122 U/L — HIGH (ref 30–115)
ALP SERPL-CCNC: 49 U/L — SIGNIFICANT CHANGE UP (ref 40–120)
ALP SERPL-CCNC: 51 U/L — SIGNIFICANT CHANGE UP (ref 40–120)
ALP SERPL-CCNC: 54 U/L — SIGNIFICANT CHANGE UP (ref 40–120)
ALP SERPL-CCNC: 55 U/L — SIGNIFICANT CHANGE UP (ref 40–120)
ALP SERPL-CCNC: 70 U/L — SIGNIFICANT CHANGE UP (ref 40–120)
ALP SERPL-CCNC: 72 U/L — SIGNIFICANT CHANGE UP (ref 40–120)
ALP SERPL-CCNC: 78 U/L — SIGNIFICANT CHANGE UP (ref 40–120)
ALP SERPL-CCNC: 90 U/L — SIGNIFICANT CHANGE UP (ref 40–120)
ALT FLD-CCNC: 11 U/L — SIGNIFICANT CHANGE UP (ref 0–41)
ALT FLD-CCNC: 11 U/L — SIGNIFICANT CHANGE UP (ref 10–45)
ALT FLD-CCNC: 12 U/L — SIGNIFICANT CHANGE UP (ref 0–41)
ALT FLD-CCNC: 12 U/L — SIGNIFICANT CHANGE UP (ref 0–41)
ALT FLD-CCNC: 13 U/L — SIGNIFICANT CHANGE UP (ref 0–41)
ALT FLD-CCNC: 26 U/L — SIGNIFICANT CHANGE UP (ref 10–45)
ALT FLD-CCNC: 30 U/L — SIGNIFICANT CHANGE UP (ref 10–45)
ALT FLD-CCNC: 33 U/L — SIGNIFICANT CHANGE UP (ref 10–45)
ALT FLD-CCNC: 39 U/L — SIGNIFICANT CHANGE UP (ref 10–45)
ALT FLD-CCNC: 6 U/L — LOW (ref 10–45)
ALT FLD-CCNC: 6 U/L — LOW (ref 10–45)
ALT FLD-CCNC: 7 U/L — LOW (ref 10–45)
ANION GAP SERPL CALC-SCNC: 10 MMOL/L — SIGNIFICANT CHANGE UP (ref 5–17)
ANION GAP SERPL CALC-SCNC: 11 MMOL/L — SIGNIFICANT CHANGE UP (ref 7–14)
ANION GAP SERPL CALC-SCNC: 12 MMOL/L — SIGNIFICANT CHANGE UP (ref 5–17)
ANION GAP SERPL CALC-SCNC: 12 MMOL/L — SIGNIFICANT CHANGE UP (ref 5–17)
ANION GAP SERPL CALC-SCNC: 12 MMOL/L — SIGNIFICANT CHANGE UP (ref 7–14)
ANION GAP SERPL CALC-SCNC: 13 MMOL/L — SIGNIFICANT CHANGE UP (ref 5–17)
ANION GAP SERPL CALC-SCNC: 13 MMOL/L — SIGNIFICANT CHANGE UP (ref 5–17)
ANION GAP SERPL CALC-SCNC: 13 MMOL/L — SIGNIFICANT CHANGE UP (ref 7–14)
ANION GAP SERPL CALC-SCNC: 15 MMOL/L — SIGNIFICANT CHANGE UP (ref 5–17)
ANION GAP SERPL CALC-SCNC: 17 MMOL/L — HIGH (ref 7–14)
ANION GAP SERPL CALC-SCNC: 7 MMOL/L — SIGNIFICANT CHANGE UP (ref 5–17)
ANION GAP SERPL CALC-SCNC: 9 MMOL/L — SIGNIFICANT CHANGE UP (ref 5–17)
ANISOCYTOSIS BLD QL: SIGNIFICANT CHANGE UP
ANISOCYTOSIS BLD QL: SIGNIFICANT CHANGE UP
ANISOCYTOSIS BLD QL: SLIGHT — SIGNIFICANT CHANGE UP
APPEARANCE UR: ABNORMAL
APPEARANCE UR: CLEAR — SIGNIFICANT CHANGE UP
APPEARANCE UR: CLEAR — SIGNIFICANT CHANGE UP
APTT BLD: 29.3 SEC — SIGNIFICANT CHANGE UP (ref 27.5–35.5)
APTT BLD: 29.5 SEC — SIGNIFICANT CHANGE UP (ref 27.5–35.5)
APTT BLD: 30.9 SEC — SIGNIFICANT CHANGE UP (ref 27.5–35.5)
APTT BLD: 41.3 SEC — HIGH (ref 27.5–35.5)
APTT BLD: 43.1 SEC — HIGH (ref 27.5–35.5)
APTT BLD: 54 SEC — HIGH (ref 27.5–35.5)
APTT BLD: 61.5 SEC — HIGH (ref 27–39.2)
AST SERPL-CCNC: 12 U/L — SIGNIFICANT CHANGE UP (ref 0–41)
AST SERPL-CCNC: 14 U/L — SIGNIFICANT CHANGE UP (ref 10–40)
AST SERPL-CCNC: 14 U/L — SIGNIFICANT CHANGE UP (ref 10–40)
AST SERPL-CCNC: 16 U/L — SIGNIFICANT CHANGE UP (ref 0–41)
AST SERPL-CCNC: 16 U/L — SIGNIFICANT CHANGE UP (ref 0–41)
AST SERPL-CCNC: 17 U/L — SIGNIFICANT CHANGE UP (ref 0–41)
AST SERPL-CCNC: 17 U/L — SIGNIFICANT CHANGE UP (ref 10–40)
AST SERPL-CCNC: 20 U/L — SIGNIFICANT CHANGE UP (ref 10–40)
AST SERPL-CCNC: 21 U/L — SIGNIFICANT CHANGE UP (ref 10–40)
AST SERPL-CCNC: 22 U/L — SIGNIFICANT CHANGE UP (ref 10–40)
AST SERPL-CCNC: 22 U/L — SIGNIFICANT CHANGE UP (ref 10–40)
AST SERPL-CCNC: 30 U/L — SIGNIFICANT CHANGE UP (ref 10–40)
BACTERIA # UR AUTO: NEGATIVE — SIGNIFICANT CHANGE UP
BACTERIA # UR AUTO: PRESENT /HPF
BASOPHILS # BLD AUTO: 0 K/UL — SIGNIFICANT CHANGE UP (ref 0–0.2)
BASOPHILS # BLD AUTO: 0.02 K/UL — SIGNIFICANT CHANGE UP (ref 0–0.2)
BASOPHILS # BLD AUTO: 0.02 K/UL — SIGNIFICANT CHANGE UP (ref 0–0.2)
BASOPHILS # BLD AUTO: 0.03 K/UL — SIGNIFICANT CHANGE UP (ref 0–0.2)
BASOPHILS # BLD AUTO: 0.03 K/UL — SIGNIFICANT CHANGE UP (ref 0–0.2)
BASOPHILS # BLD AUTO: 0.04 K/UL — SIGNIFICANT CHANGE UP (ref 0–0.2)
BASOPHILS # BLD AUTO: 0.04 K/UL — SIGNIFICANT CHANGE UP (ref 0–0.2)
BASOPHILS # BLD AUTO: 0.05 K/UL — SIGNIFICANT CHANGE UP (ref 0–0.2)
BASOPHILS # BLD AUTO: 0.05 K/UL — SIGNIFICANT CHANGE UP (ref 0–0.2)
BASOPHILS # BLD AUTO: 0.06 K/UL — SIGNIFICANT CHANGE UP (ref 0–0.2)
BASOPHILS # BLD AUTO: 0.06 K/UL — SIGNIFICANT CHANGE UP (ref 0–0.2)
BASOPHILS # BLD AUTO: 0.2 K/UL — SIGNIFICANT CHANGE UP (ref 0–0.2)
BASOPHILS NFR BLD AUTO: 0 % — SIGNIFICANT CHANGE UP (ref 0–1)
BASOPHILS NFR BLD AUTO: 0 % — SIGNIFICANT CHANGE UP (ref 0–2)
BASOPHILS NFR BLD AUTO: 0.1 % — SIGNIFICANT CHANGE UP (ref 0–1)
BASOPHILS NFR BLD AUTO: 0.2 % — SIGNIFICANT CHANGE UP (ref 0–1)
BASOPHILS NFR BLD AUTO: 0.2 % — SIGNIFICANT CHANGE UP (ref 0–1)
BASOPHILS NFR BLD AUTO: 0.9 % — SIGNIFICANT CHANGE UP (ref 0–2)
BASOPHILS NFR BLD AUTO: 0.9 % — SIGNIFICANT CHANGE UP (ref 0–2)
BASOPHILS NFR BLD AUTO: 2 % — SIGNIFICANT CHANGE UP (ref 0–2)
BASOPHILS NFR BLD AUTO: 2.6 % — HIGH (ref 0–2)
BASOPHILS NFR BLD AUTO: 2.8 % — HIGH (ref 0–2)
BASOPHILS NFR BLD AUTO: 5.9 % — HIGH (ref 0–2)
BASOPHILS NFR BLD AUTO: 7.8 % — HIGH (ref 0–2)
BASOPHILS NFR BLD AUTO: 8.9 % — HIGH (ref 0–2)
BILIRUB DIRECT SERPL-MCNC: 0.2 MG/DL — SIGNIFICANT CHANGE UP (ref 0–0.3)
BILIRUB DIRECT SERPL-MCNC: <0.2 MG/DL — SIGNIFICANT CHANGE UP (ref 0–0.3)
BILIRUB INDIRECT FLD-MCNC: 0.2 MG/DL — SIGNIFICANT CHANGE UP (ref 0.2–1)
BILIRUB INDIRECT FLD-MCNC: >0 MG/DL — LOW (ref 0.2–1.2)
BILIRUB INDIRECT FLD-MCNC: >0 MG/DL — LOW (ref 0.2–1.2)
BILIRUB INDIRECT FLD-MCNC: SIGNIFICANT CHANGE UP MG/DL (ref 0.2–1)
BILIRUB SERPL-MCNC: 0.2 MG/DL — SIGNIFICANT CHANGE UP (ref 0.2–1.2)
BILIRUB SERPL-MCNC: 0.3 MG/DL — SIGNIFICANT CHANGE UP (ref 0.2–1.2)
BILIRUB SERPL-MCNC: 0.4 MG/DL — SIGNIFICANT CHANGE UP (ref 0.2–1.2)
BILIRUB SERPL-MCNC: 0.4 MG/DL — SIGNIFICANT CHANGE UP (ref 0.2–1.2)
BILIRUB SERPL-MCNC: 0.5 MG/DL — SIGNIFICANT CHANGE UP (ref 0.2–1.2)
BILIRUB SERPL-MCNC: 0.6 MG/DL — SIGNIFICANT CHANGE UP (ref 0.2–1.2)
BILIRUB SERPL-MCNC: 0.8 MG/DL — SIGNIFICANT CHANGE UP (ref 0.2–1.2)
BILIRUB SERPL-MCNC: 0.8 MG/DL — SIGNIFICANT CHANGE UP (ref 0.2–1.2)
BILIRUB SERPL-MCNC: 0.9 MG/DL — SIGNIFICANT CHANGE UP (ref 0.2–1.2)
BILIRUB SERPL-MCNC: 1 MG/DL — SIGNIFICANT CHANGE UP (ref 0.2–1.2)
BILIRUB UR-MCNC: NEGATIVE — SIGNIFICANT CHANGE UP
BLD GP AB SCN SERPL QL: NEGATIVE — SIGNIFICANT CHANGE UP
BLD GP AB SCN SERPL QL: SIGNIFICANT CHANGE UP
BUN SERPL-MCNC: 17 MG/DL — SIGNIFICANT CHANGE UP (ref 7–23)
BUN SERPL-MCNC: 19 MG/DL — SIGNIFICANT CHANGE UP (ref 7–23)
BUN SERPL-MCNC: 22 MG/DL — SIGNIFICANT CHANGE UP (ref 7–23)
BUN SERPL-MCNC: 23 MG/DL — SIGNIFICANT CHANGE UP (ref 7–23)
BUN SERPL-MCNC: 25 MG/DL — HIGH (ref 7–23)
BUN SERPL-MCNC: 26 MG/DL — HIGH (ref 7–23)
BUN SERPL-MCNC: 31 MG/DL — HIGH (ref 7–23)
BUN SERPL-MCNC: 37 MG/DL — HIGH (ref 7–23)
BUN SERPL-MCNC: 56 MG/DL — HIGH (ref 7–23)
BUN SERPL-MCNC: 64 MG/DL — HIGH (ref 7–23)
BUN SERPL-MCNC: 67 MG/DL — CRITICAL HIGH (ref 10–20)
BUN SERPL-MCNC: 67 MG/DL — HIGH (ref 7–23)
BUN SERPL-MCNC: 67 MG/DL — HIGH (ref 7–23)
BUN SERPL-MCNC: 69 MG/DL — CRITICAL HIGH (ref 10–20)
BUN SERPL-MCNC: 73 MG/DL — CRITICAL HIGH (ref 10–20)
BUN SERPL-MCNC: 73 MG/DL — HIGH (ref 7–23)
BUN SERPL-MCNC: 74 MG/DL — HIGH (ref 7–23)
BUN SERPL-MCNC: 76 MG/DL — CRITICAL HIGH (ref 10–20)
BURR CELLS BLD QL SMEAR: PRESENT — SIGNIFICANT CHANGE UP
CALCIUM SERPL-MCNC: 8.4 MG/DL — SIGNIFICANT CHANGE UP (ref 8.4–10.5)
CALCIUM SERPL-MCNC: 8.5 MG/DL — SIGNIFICANT CHANGE UP (ref 8.4–10.5)
CALCIUM SERPL-MCNC: 8.5 MG/DL — SIGNIFICANT CHANGE UP (ref 8.4–10.5)
CALCIUM SERPL-MCNC: 8.6 MG/DL — SIGNIFICANT CHANGE UP (ref 8.4–10.5)
CALCIUM SERPL-MCNC: 8.7 MG/DL — SIGNIFICANT CHANGE UP (ref 8.4–10.5)
CALCIUM SERPL-MCNC: 8.9 MG/DL — SIGNIFICANT CHANGE UP (ref 8.4–10.5)
CALCIUM SERPL-MCNC: 8.9 MG/DL — SIGNIFICANT CHANGE UP (ref 8.4–10.5)
CALCIUM SERPL-MCNC: 8.9 MG/DL — SIGNIFICANT CHANGE UP (ref 8.5–10.1)
CALCIUM SERPL-MCNC: 9 MG/DL — SIGNIFICANT CHANGE UP (ref 8.4–10.5)
CALCIUM SERPL-MCNC: 9.1 MG/DL — SIGNIFICANT CHANGE UP (ref 8.4–10.5)
CALCIUM SERPL-MCNC: 9.1 MG/DL — SIGNIFICANT CHANGE UP (ref 8.5–10.1)
CALCIUM SERPL-MCNC: 9.2 MG/DL — SIGNIFICANT CHANGE UP (ref 8.4–10.5)
CALCIUM SERPL-MCNC: 9.2 MG/DL — SIGNIFICANT CHANGE UP (ref 8.5–10.1)
CALCIUM SERPL-MCNC: 9.3 MG/DL — SIGNIFICANT CHANGE UP (ref 8.4–10.5)
CALCIUM SERPL-MCNC: 9.5 MG/DL — SIGNIFICANT CHANGE UP (ref 8.5–10.1)
CHLORIDE SERPL-SCNC: 100 MMOL/L — SIGNIFICANT CHANGE UP (ref 96–108)
CHLORIDE SERPL-SCNC: 100 MMOL/L — SIGNIFICANT CHANGE UP (ref 98–110)
CHLORIDE SERPL-SCNC: 101 MMOL/L — SIGNIFICANT CHANGE UP (ref 96–108)
CHLORIDE SERPL-SCNC: 101 MMOL/L — SIGNIFICANT CHANGE UP (ref 96–108)
CHLORIDE SERPL-SCNC: 102 MMOL/L — SIGNIFICANT CHANGE UP (ref 96–108)
CHLORIDE SERPL-SCNC: 102 MMOL/L — SIGNIFICANT CHANGE UP (ref 96–108)
CHLORIDE SERPL-SCNC: 103 MMOL/L — SIGNIFICANT CHANGE UP (ref 96–108)
CHLORIDE SERPL-SCNC: 104 MMOL/L — SIGNIFICANT CHANGE UP (ref 96–108)
CHLORIDE SERPL-SCNC: 104 MMOL/L — SIGNIFICANT CHANGE UP (ref 96–108)
CHLORIDE SERPL-SCNC: 107 MMOL/L — SIGNIFICANT CHANGE UP (ref 96–108)
CHLORIDE SERPL-SCNC: 94 MMOL/L — LOW (ref 96–108)
CHLORIDE SERPL-SCNC: 94 MMOL/L — LOW (ref 96–108)
CHLORIDE SERPL-SCNC: 96 MMOL/L — SIGNIFICANT CHANGE UP (ref 96–108)
CHLORIDE SERPL-SCNC: 97 MMOL/L — SIGNIFICANT CHANGE UP (ref 96–108)
CHLORIDE SERPL-SCNC: 98 MMOL/L — SIGNIFICANT CHANGE UP (ref 96–108)
CHLORIDE SERPL-SCNC: 98 MMOL/L — SIGNIFICANT CHANGE UP (ref 98–110)
CHLORIDE SERPL-SCNC: 98 MMOL/L — SIGNIFICANT CHANGE UP (ref 98–110)
CHLORIDE SERPL-SCNC: 99 MMOL/L — SIGNIFICANT CHANGE UP (ref 98–110)
CK MB CFR SERPL CALC: 3.7 NG/ML — SIGNIFICANT CHANGE UP (ref 0.6–6.3)
CK SERPL-CCNC: 58 U/L — SIGNIFICANT CHANGE UP (ref 0–225)
CO2 SERPL-SCNC: 17 MMOL/L — SIGNIFICANT CHANGE UP (ref 17–32)
CO2 SERPL-SCNC: 21 MMOL/L — SIGNIFICANT CHANGE UP (ref 17–32)
CO2 SERPL-SCNC: 22 MMOL/L — SIGNIFICANT CHANGE UP (ref 17–32)
CO2 SERPL-SCNC: 24 MMOL/L — SIGNIFICANT CHANGE UP (ref 22–31)
CO2 SERPL-SCNC: 25 MMOL/L — SIGNIFICANT CHANGE UP (ref 17–32)
CO2 SERPL-SCNC: 25 MMOL/L — SIGNIFICANT CHANGE UP (ref 22–31)
CO2 SERPL-SCNC: 27 MMOL/L — SIGNIFICANT CHANGE UP (ref 22–31)
CO2 SERPL-SCNC: 28 MMOL/L — SIGNIFICANT CHANGE UP (ref 22–31)
CO2 SERPL-SCNC: 29 MMOL/L — SIGNIFICANT CHANGE UP (ref 22–31)
CO2 SERPL-SCNC: 30 MMOL/L — SIGNIFICANT CHANGE UP (ref 22–31)
CO2 SERPL-SCNC: 31 MMOL/L — SIGNIFICANT CHANGE UP (ref 22–31)
CO2 SERPL-SCNC: 32 MMOL/L — HIGH (ref 22–31)
COLOR SPEC: YELLOW — SIGNIFICANT CHANGE UP
COMMENT - URINE: SIGNIFICANT CHANGE UP
CONFIRM APTT STACLOT: POSITIVE
CREAT ?TM UR-MCNC: 130 MG/DL — SIGNIFICANT CHANGE UP
CREAT ?TM UR-MCNC: 94 MG/DL — SIGNIFICANT CHANGE UP
CREAT SERPL-MCNC: 1.55 MG/DL — HIGH (ref 0.5–1.3)
CREAT SERPL-MCNC: 1.58 MG/DL — HIGH (ref 0.5–1.3)
CREAT SERPL-MCNC: 1.64 MG/DL — HIGH (ref 0.5–1.3)
CREAT SERPL-MCNC: 1.68 MG/DL — HIGH (ref 0.5–1.3)
CREAT SERPL-MCNC: 1.71 MG/DL — HIGH (ref 0.5–1.3)
CREAT SERPL-MCNC: 1.87 MG/DL — HIGH (ref 0.5–1.3)
CREAT SERPL-MCNC: 1.94 MG/DL — HIGH (ref 0.5–1.3)
CREAT SERPL-MCNC: 2.05 MG/DL — HIGH (ref 0.5–1.3)
CREAT SERPL-MCNC: 2.11 MG/DL — HIGH (ref 0.5–1.3)
CREAT SERPL-MCNC: 2.18 MG/DL — HIGH (ref 0.5–1.3)
CREAT SERPL-MCNC: 2.31 MG/DL — HIGH (ref 0.5–1.3)
CREAT SERPL-MCNC: 2.39 MG/DL — HIGH (ref 0.5–1.3)
CREAT SERPL-MCNC: 2.46 MG/DL — HIGH (ref 0.5–1.3)
CREAT SERPL-MCNC: 2.99 MG/DL — HIGH (ref 0.5–1.3)
CREAT SERPL-MCNC: 3.1 MG/DL — HIGH (ref 0.7–1.5)
CREAT SERPL-MCNC: 3.23 MG/DL — HIGH (ref 0.5–1.3)
CREAT SERPL-MCNC: 3.3 MG/DL — HIGH (ref 0.7–1.5)
CREAT SERPL-MCNC: 3.32 MG/DL — HIGH (ref 0.5–1.3)
CREAT SERPL-MCNC: 3.5 MG/DL — HIGH (ref 0.7–1.5)
CREAT SERPL-MCNC: 3.7 MG/DL — HIGH (ref 0.7–1.5)
CULTURE RESULTS: SIGNIFICANT CHANGE UP
D DIMER BLD IA.RAPID-MCNC: 9150 NG/ML DDU — HIGH (ref 0–230)
DACRYOCYTES BLD QL SMEAR: SLIGHT — SIGNIFICANT CHANGE UP
DIFF PNL FLD: ABNORMAL
DIFF PNL FLD: NEGATIVE — SIGNIFICANT CHANGE UP
DIFF PNL FLD: NEGATIVE — SIGNIFICANT CHANGE UP
DRVVT SCREEN TO CONFIRM RATIO: SIGNIFICANT CHANGE UP
EGFR: 15 ML/MIN/1.73M2 — LOW
EGFR: 16 ML/MIN/1.73M2 — LOW
EGFR: 17 ML/MIN/1.73M2 — LOW
EGFR: 18 ML/MIN/1.73M2 — LOW
EGFR: 19 ML/MIN/1.73M2 — LOW
EGFR: 28 ML/MIN/1.73M2 — LOW
EGFR: 32 ML/MIN/1.73M2 — LOW
EGFR: 33 ML/MIN/1.73M2 — LOW
EGFR: 37 ML/MIN/1.73M2 — LOW
EGFR: 38 ML/MIN/1.73M2 — LOW
EGFR: 39 ML/MIN/1.73M2 — LOW
EGFR: 41 ML/MIN/1.73M2 — LOW
EGFR: 41 ML/MIN/1.73M2 — LOW
EOSINOPHIL # BLD AUTO: 0 K/UL — SIGNIFICANT CHANGE UP (ref 0–0.5)
EOSINOPHIL # BLD AUTO: 0 K/UL — SIGNIFICANT CHANGE UP (ref 0–0.7)
EOSINOPHIL # BLD AUTO: 0.01 K/UL — SIGNIFICANT CHANGE UP (ref 0–0.5)
EOSINOPHIL # BLD AUTO: 0.05 K/UL — SIGNIFICANT CHANGE UP (ref 0–0.5)
EOSINOPHIL # BLD AUTO: 0.07 K/UL — SIGNIFICANT CHANGE UP (ref 0–0.5)
EOSINOPHIL # BLD AUTO: 0.07 K/UL — SIGNIFICANT CHANGE UP (ref 0–0.7)
EOSINOPHIL # BLD AUTO: 0.09 K/UL — SIGNIFICANT CHANGE UP (ref 0–0.5)
EOSINOPHIL # BLD AUTO: 0.14 K/UL — SIGNIFICANT CHANGE UP (ref 0–0.5)
EOSINOPHIL # BLD AUTO: 0.37 K/UL — SIGNIFICANT CHANGE UP (ref 0–0.7)
EOSINOPHIL # BLD AUTO: 0.42 K/UL — SIGNIFICANT CHANGE UP (ref 0–0.7)
EOSINOPHIL # BLD AUTO: 0.95 K/UL — HIGH (ref 0–0.5)
EOSINOPHIL NFR BLD AUTO: 0 % — SIGNIFICANT CHANGE UP (ref 0–6)
EOSINOPHIL NFR BLD AUTO: 0 % — SIGNIFICANT CHANGE UP (ref 0–8)
EOSINOPHIL NFR BLD AUTO: 0.4 % — SIGNIFICANT CHANGE UP (ref 0–8)
EOSINOPHIL NFR BLD AUTO: 11 % — HIGH (ref 0–6)
EOSINOPHIL NFR BLD AUTO: 2.1 % — SIGNIFICANT CHANGE UP (ref 0–8)
EOSINOPHIL NFR BLD AUTO: 2.2 % — SIGNIFICANT CHANGE UP (ref 0–6)
EOSINOPHIL NFR BLD AUTO: 2.8 % — SIGNIFICANT CHANGE UP (ref 0–8)
EOSINOPHIL NFR BLD AUTO: 4.3 % — SIGNIFICANT CHANGE UP (ref 0–6)
EOSINOPHIL NFR BLD AUTO: 5.4 % — SIGNIFICANT CHANGE UP (ref 0–6)
EOSINOPHIL NFR BLD AUTO: 9.2 % — HIGH (ref 0–6)
EOSINOPHIL NFR BLD AUTO: 9.3 % — HIGH (ref 0–6)
EPI CELLS # UR: 13 /HPF — HIGH (ref 0–5)
EPI CELLS # UR: SIGNIFICANT CHANGE UP /HPF (ref 0–5)
ESTIMATED AVERAGE GLUCOSE: 103 MG/DL — SIGNIFICANT CHANGE UP (ref 68–114)
FACT VIII ACT/NOR PPP: 2.7 BU — HIGH (ref 0–0.5)
FACTOR TESTED: SIGNIFICANT CHANGE UP
FERRITIN SERPL-MCNC: 161 NG/ML — SIGNIFICANT CHANGE UP (ref 30–400)
FOLATE SERPL-MCNC: >20 NG/ML — SIGNIFICANT CHANGE UP
GAS PNL BLDA: SIGNIFICANT CHANGE UP
GIANT PLATELETS BLD QL SMEAR: PRESENT — SIGNIFICANT CHANGE UP
GLUCOSE BLDC GLUCOMTR-MCNC: 102 MG/DL — HIGH (ref 70–99)
GLUCOSE BLDC GLUCOMTR-MCNC: 106 MG/DL — HIGH (ref 70–99)
GLUCOSE BLDC GLUCOMTR-MCNC: 112 MG/DL — HIGH (ref 70–99)
GLUCOSE BLDC GLUCOMTR-MCNC: 113 MG/DL — HIGH (ref 70–99)
GLUCOSE BLDC GLUCOMTR-MCNC: 120 MG/DL — HIGH (ref 70–99)
GLUCOSE BLDC GLUCOMTR-MCNC: 152 MG/DL — HIGH (ref 70–99)
GLUCOSE BLDC GLUCOMTR-MCNC: 153 MG/DL — HIGH (ref 70–99)
GLUCOSE BLDC GLUCOMTR-MCNC: 157 MG/DL — HIGH (ref 70–99)
GLUCOSE BLDC GLUCOMTR-MCNC: 159 MG/DL — HIGH (ref 70–99)
GLUCOSE BLDC GLUCOMTR-MCNC: 161 MG/DL — HIGH (ref 70–99)
GLUCOSE BLDC GLUCOMTR-MCNC: 162 MG/DL — HIGH (ref 70–99)
GLUCOSE BLDC GLUCOMTR-MCNC: 173 MG/DL — HIGH (ref 70–99)
GLUCOSE BLDC GLUCOMTR-MCNC: 178 MG/DL — HIGH (ref 70–99)
GLUCOSE BLDC GLUCOMTR-MCNC: 186 MG/DL — HIGH (ref 70–99)
GLUCOSE BLDC GLUCOMTR-MCNC: 190 MG/DL — HIGH (ref 70–99)
GLUCOSE BLDC GLUCOMTR-MCNC: 195 MG/DL — HIGH (ref 70–99)
GLUCOSE BLDC GLUCOMTR-MCNC: 214 MG/DL — HIGH (ref 70–99)
GLUCOSE BLDC GLUCOMTR-MCNC: 218 MG/DL — HIGH (ref 70–99)
GLUCOSE BLDC GLUCOMTR-MCNC: 234 MG/DL — HIGH (ref 70–99)
GLUCOSE SERPL-MCNC: 104 MG/DL — HIGH (ref 70–99)
GLUCOSE SERPL-MCNC: 105 MG/DL — HIGH (ref 70–99)
GLUCOSE SERPL-MCNC: 105 MG/DL — HIGH (ref 70–99)
GLUCOSE SERPL-MCNC: 106 MG/DL — HIGH (ref 70–99)
GLUCOSE SERPL-MCNC: 114 MG/DL — HIGH (ref 70–99)
GLUCOSE SERPL-MCNC: 125 MG/DL — HIGH (ref 70–99)
GLUCOSE SERPL-MCNC: 136 MG/DL — HIGH (ref 70–99)
GLUCOSE SERPL-MCNC: 148 MG/DL — HIGH (ref 70–99)
GLUCOSE SERPL-MCNC: 172 MG/DL — HIGH (ref 70–99)
GLUCOSE SERPL-MCNC: 188 MG/DL — HIGH (ref 70–99)
GLUCOSE SERPL-MCNC: 193 MG/DL — HIGH (ref 70–99)
GLUCOSE SERPL-MCNC: 200 MG/DL — HIGH (ref 70–99)
GLUCOSE SERPL-MCNC: 87 MG/DL — SIGNIFICANT CHANGE UP (ref 70–99)
GLUCOSE SERPL-MCNC: 88 MG/DL — SIGNIFICANT CHANGE UP (ref 70–99)
GLUCOSE SERPL-MCNC: 93 MG/DL — SIGNIFICANT CHANGE UP (ref 70–99)
GLUCOSE SERPL-MCNC: 95 MG/DL — SIGNIFICANT CHANGE UP (ref 70–99)
GLUCOSE SERPL-MCNC: 97 MG/DL — SIGNIFICANT CHANGE UP (ref 70–99)
GLUCOSE SERPL-MCNC: 99 MG/DL — SIGNIFICANT CHANGE UP (ref 70–99)
GLUCOSE UR QL: NEGATIVE — SIGNIFICANT CHANGE UP
GRAM STN FLD: SIGNIFICANT CHANGE UP
GRAN CASTS # UR COMP ASSIST: ABNORMAL /LPF
HCT VFR BLD CALC: 22.5 % — LOW (ref 39–50)
HCT VFR BLD CALC: 24.3 % — LOW (ref 39–50)
HCT VFR BLD CALC: 25.1 % — LOW (ref 39–50)
HCT VFR BLD CALC: 25.2 % — LOW (ref 39–50)
HCT VFR BLD CALC: 25.2 % — LOW (ref 39–50)
HCT VFR BLD CALC: 25.5 % — LOW (ref 39–50)
HCT VFR BLD CALC: 28 % — LOW (ref 42–52)
HCT VFR BLD CALC: 28 % — LOW (ref 42–52)
HCT VFR BLD CALC: 28.5 % — LOW (ref 39–50)
HCT VFR BLD CALC: 28.6 % — LOW (ref 39–50)
HCT VFR BLD CALC: 28.8 % — LOW (ref 42–52)
HCT VFR BLD CALC: 29 % — LOW (ref 39–50)
HCT VFR BLD CALC: 29.1 % — LOW (ref 39–50)
HCT VFR BLD CALC: 29.4 % — LOW (ref 42–52)
HCT VFR BLD CALC: 30.3 % — LOW (ref 39–50)
HCT VFR BLD CALC: 30.7 % — LOW (ref 39–50)
HCT VFR BLD CALC: 31.1 % — LOW (ref 39–50)
HCT VFR BLD CALC: 32.6 % — LOW (ref 39–50)
HCT VFR BLD CALC: 33.2 % — LOW (ref 39–50)
HEMATOPATHOLOGY REPORT: SIGNIFICANT CHANGE UP
HGB BLD-MCNC: 7 G/DL — CRITICAL LOW (ref 13–17)
HGB BLD-MCNC: 7.4 G/DL — LOW (ref 13–17)
HGB BLD-MCNC: 7.5 G/DL — LOW (ref 13–17)
HGB BLD-MCNC: 7.6 G/DL — LOW (ref 13–17)
HGB BLD-MCNC: 8.3 G/DL — LOW (ref 14–18)
HGB BLD-MCNC: 8.4 G/DL — LOW (ref 14–18)
HGB BLD-MCNC: 8.4 G/DL — LOW (ref 14–18)
HGB BLD-MCNC: 8.6 G/DL — LOW (ref 13–17)
HGB BLD-MCNC: 8.8 G/DL — LOW (ref 13–17)
HGB BLD-MCNC: 8.8 G/DL — LOW (ref 13–17)
HGB BLD-MCNC: 8.8 G/DL — LOW (ref 14–18)
HGB BLD-MCNC: 8.9 G/DL — LOW (ref 13–17)
HGB BLD-MCNC: 9 G/DL — LOW (ref 13–17)
HGB BLD-MCNC: 9.2 G/DL — LOW (ref 13–17)
HGB BLD-MCNC: 9.3 G/DL — LOW (ref 13–17)
HGB BLD-MCNC: 9.8 G/DL — LOW (ref 13–17)
HGB BLD-MCNC: 9.8 G/DL — LOW (ref 13–17)
HYALINE CASTS # UR AUTO: 61 /LPF — HIGH (ref 0–7)
HYALINE CASTS # UR AUTO: ABNORMAL /LPF (ref 0–2)
HYPOCHROMIA BLD QL: SIGNIFICANT CHANGE UP
HYPOCHROMIA BLD QL: SLIGHT — SIGNIFICANT CHANGE UP
IMM GRANULOCYTES NFR BLD AUTO: 0.4 % — SIGNIFICANT CHANGE UP (ref 0–1.5)
IMM GRANULOCYTES NFR BLD AUTO: 0.4 % — SIGNIFICANT CHANGE UP (ref 0–1.5)
IMM GRANULOCYTES NFR BLD AUTO: 0.5 % — SIGNIFICANT CHANGE UP (ref 0–1.5)
IMM GRANULOCYTES NFR BLD AUTO: 0.7 % — SIGNIFICANT CHANGE UP (ref 0–1.5)
IMM GRANULOCYTES NFR BLD AUTO: 1.4 % — HIGH (ref 0.1–0.3)
IMM GRANULOCYTES NFR BLD AUTO: 2.7 % — HIGH (ref 0.1–0.3)
IMM GRANULOCYTES NFR BLD AUTO: 4 % — HIGH (ref 0.1–0.3)
INR BLD: 1.07 — SIGNIFICANT CHANGE UP (ref 0.88–1.16)
INR BLD: 1.11 — SIGNIFICANT CHANGE UP (ref 0.88–1.16)
INR BLD: 1.14 — SIGNIFICANT CHANGE UP (ref 0.88–1.16)
INR BLD: 1.16 — SIGNIFICANT CHANGE UP (ref 0.88–1.16)
INR BLD: 1.26 — HIGH (ref 0.88–1.16)
INR BLD: 1.3 RATIO — SIGNIFICANT CHANGE UP (ref 0.65–1.3)
IRON SATN MFR SERPL: 30 UG/DL — LOW (ref 45–165)
IRON SATN MFR SERPL: SIGNIFICANT CHANGE UP % (ref 16–55)
KETONES UR-MCNC: ABNORMAL MG/DL
KETONES UR-MCNC: NEGATIVE — SIGNIFICANT CHANGE UP
KETONES UR-MCNC: SIGNIFICANT CHANGE UP
LA NT DPL PPP QL: 32.3 SEC — SIGNIFICANT CHANGE UP
LEUKOCYTE ESTERASE UR-ACNC: ABNORMAL
LEUKOCYTE ESTERASE UR-ACNC: NEGATIVE — SIGNIFICANT CHANGE UP
LEUKOCYTE ESTERASE UR-ACNC: NEGATIVE — SIGNIFICANT CHANGE UP
LYMPHOCYTES # BLD AUTO: 0 % — LOW (ref 13–44)
LYMPHOCYTES # BLD AUTO: 0 K/UL — LOW (ref 1–3.3)
LYMPHOCYTES # BLD AUTO: 0.19 K/UL — LOW (ref 1–3.3)
LYMPHOCYTES # BLD AUTO: 0.2 K/UL — LOW (ref 1–3.3)
LYMPHOCYTES # BLD AUTO: 0.3 K/UL — LOW (ref 1.2–3.4)
LYMPHOCYTES # BLD AUTO: 0.32 K/UL — LOW (ref 1.2–3.4)
LYMPHOCYTES # BLD AUTO: 0.39 K/UL — LOW (ref 1–3.3)
LYMPHOCYTES # BLD AUTO: 0.39 K/UL — LOW (ref 1–3.3)
LYMPHOCYTES # BLD AUTO: 0.41 K/UL — LOW (ref 1–3.3)
LYMPHOCYTES # BLD AUTO: 0.56 K/UL — LOW (ref 1–3.3)
LYMPHOCYTES # BLD AUTO: 0.57 K/UL — LOW (ref 1–3.3)
LYMPHOCYTES # BLD AUTO: 0.58 K/UL — LOW (ref 1.2–3.4)
LYMPHOCYTES # BLD AUTO: 0.61 K/UL — LOW (ref 1–3.3)
LYMPHOCYTES # BLD AUTO: 0.67 K/UL — LOW (ref 1–3.3)
LYMPHOCYTES # BLD AUTO: 0.82 K/UL — LOW (ref 1–3.3)
LYMPHOCYTES # BLD AUTO: 0.89 K/UL — LOW (ref 1–3.3)
LYMPHOCYTES # BLD AUTO: 0.9 % — LOW (ref 13–44)
LYMPHOCYTES # BLD AUTO: 0.92 K/UL — LOW (ref 1.2–3.4)
LYMPHOCYTES # BLD AUTO: 1.03 K/UL — SIGNIFICANT CHANGE UP (ref 1–3.3)
LYMPHOCYTES # BLD AUTO: 1.33 K/UL — SIGNIFICANT CHANGE UP (ref 1–3.3)
LYMPHOCYTES # BLD AUTO: 1.6 % — LOW (ref 20.5–51.1)
LYMPHOCYTES # BLD AUTO: 1.7 % — LOW (ref 20.5–51.1)
LYMPHOCYTES # BLD AUTO: 1.7 % — LOW (ref 20.5–51.1)
LYMPHOCYTES # BLD AUTO: 15 % — SIGNIFICANT CHANGE UP (ref 13–44)
LYMPHOCYTES # BLD AUTO: 15.9 % — SIGNIFICANT CHANGE UP (ref 13–44)
LYMPHOCYTES # BLD AUTO: 17.4 % — SIGNIFICANT CHANGE UP (ref 13–44)
LYMPHOCYTES # BLD AUTO: 18.4 % — SIGNIFICANT CHANGE UP (ref 13–44)
LYMPHOCYTES # BLD AUTO: 32.5 % — SIGNIFICANT CHANGE UP (ref 13–44)
LYMPHOCYTES # BLD AUTO: 4.4 % — LOW (ref 13–44)
LYMPHOCYTES # BLD AUTO: 4.5 % — LOW (ref 20.5–51.1)
LYMPHOCYTES # BLD AUTO: 48.4 % — HIGH (ref 13–44)
LYMPHOCYTES # BLD AUTO: 54.1 % — HIGH (ref 13–44)
LYMPHOCYTES # BLD AUTO: 58.8 % — HIGH (ref 13–44)
LYMPHOCYTES # BLD AUTO: 68.9 % — HIGH (ref 13–44)
LYMPHOCYTES # BLD AUTO: 7.1 % — LOW (ref 13–44)
LYMPHOCYTES # BLD AUTO: 72.3 % — HIGH (ref 13–44)
MACROCYTES BLD QL: SIGNIFICANT CHANGE UP
MACROCYTES BLD QL: SIGNIFICANT CHANGE UP
MACROCYTES BLD QL: SLIGHT — SIGNIFICANT CHANGE UP
MACROCYTES BLD QL: SLIGHT — SIGNIFICANT CHANGE UP
MAGNESIUM SERPL-MCNC: 1.7 MG/DL — SIGNIFICANT CHANGE UP (ref 1.6–2.6)
MAGNESIUM SERPL-MCNC: 1.8 MG/DL — SIGNIFICANT CHANGE UP (ref 1.6–2.6)
MAGNESIUM SERPL-MCNC: 1.8 MG/DL — SIGNIFICANT CHANGE UP (ref 1.6–2.6)
MAGNESIUM SERPL-MCNC: 1.9 MG/DL — SIGNIFICANT CHANGE UP (ref 1.6–2.6)
MAGNESIUM SERPL-MCNC: 1.9 MG/DL — SIGNIFICANT CHANGE UP (ref 1.8–2.4)
MAGNESIUM SERPL-MCNC: 2 MG/DL — SIGNIFICANT CHANGE UP (ref 1.6–2.6)
MAGNESIUM SERPL-MCNC: 2 MG/DL — SIGNIFICANT CHANGE UP (ref 1.8–2.4)
MAGNESIUM SERPL-MCNC: 2 MG/DL — SIGNIFICANT CHANGE UP (ref 1.8–2.4)
MAGNESIUM SERPL-MCNC: 2.1 MG/DL — SIGNIFICANT CHANGE UP (ref 1.6–2.6)
MAGNESIUM SERPL-MCNC: 2.1 MG/DL — SIGNIFICANT CHANGE UP (ref 1.6–2.6)
MAGNESIUM SERPL-MCNC: 2.1 MG/DL — SIGNIFICANT CHANGE UP (ref 1.8–2.4)
MAGNESIUM SERPL-MCNC: 2.2 MG/DL — SIGNIFICANT CHANGE UP (ref 1.6–2.6)
MAGNESIUM SERPL-MCNC: 2.2 MG/DL — SIGNIFICANT CHANGE UP (ref 1.6–2.6)
MAGNESIUM SERPL-MCNC: 2.3 MG/DL — SIGNIFICANT CHANGE UP (ref 1.6–2.6)
MAGNESIUM SERPL-MCNC: 2.5 MG/DL — SIGNIFICANT CHANGE UP (ref 1.6–2.6)
MANUAL SMEAR VERIFICATION: SIGNIFICANT CHANGE UP
MCHC RBC-ENTMCNC: 28.8 G/DL — LOW (ref 32–37)
MCHC RBC-ENTMCNC: 29.3 GM/DL — LOW (ref 32–36)
MCHC RBC-ENTMCNC: 29.4 GM/DL — LOW (ref 32–36)
MCHC RBC-ENTMCNC: 29.5 GM/DL — LOW (ref 32–36)
MCHC RBC-ENTMCNC: 29.5 GM/DL — LOW (ref 32–36)
MCHC RBC-ENTMCNC: 29.8 GM/DL — LOW (ref 32–36)
MCHC RBC-ENTMCNC: 29.8 GM/DL — LOW (ref 32–36)
MCHC RBC-ENTMCNC: 29.9 G/DL — LOW (ref 32–37)
MCHC RBC-ENTMCNC: 29.9 GM/DL — LOW (ref 32–36)
MCHC RBC-ENTMCNC: 30 G/DL — LOW (ref 32–37)
MCHC RBC-ENTMCNC: 30 G/DL — LOW (ref 32–37)
MCHC RBC-ENTMCNC: 30.1 GM/DL — LOW (ref 32–36)
MCHC RBC-ENTMCNC: 30.1 GM/DL — LOW (ref 32–36)
MCHC RBC-ENTMCNC: 30.2 PG — SIGNIFICANT CHANGE UP (ref 27–31)
MCHC RBC-ENTMCNC: 30.3 GM/DL — LOW (ref 32–36)
MCHC RBC-ENTMCNC: 30.4 GM/DL — LOW (ref 32–36)
MCHC RBC-ENTMCNC: 30.5 GM/DL — LOW (ref 32–36)
MCHC RBC-ENTMCNC: 30.5 PG — SIGNIFICANT CHANGE UP (ref 27–34)
MCHC RBC-ENTMCNC: 30.5 PG — SIGNIFICANT CHANGE UP (ref 27–34)
MCHC RBC-ENTMCNC: 30.6 GM/DL — LOW (ref 32–36)
MCHC RBC-ENTMCNC: 30.6 PG — SIGNIFICANT CHANGE UP (ref 27–34)
MCHC RBC-ENTMCNC: 30.7 PG — SIGNIFICANT CHANGE UP (ref 27–34)
MCHC RBC-ENTMCNC: 30.8 PG — SIGNIFICANT CHANGE UP (ref 27–31)
MCHC RBC-ENTMCNC: 30.8 PG — SIGNIFICANT CHANGE UP (ref 27–34)
MCHC RBC-ENTMCNC: 30.8 PG — SIGNIFICANT CHANGE UP (ref 27–34)
MCHC RBC-ENTMCNC: 30.9 GM/DL — LOW (ref 32–36)
MCHC RBC-ENTMCNC: 30.9 PG — SIGNIFICANT CHANGE UP (ref 27–34)
MCHC RBC-ENTMCNC: 30.9 PG — SIGNIFICANT CHANGE UP (ref 27–34)
MCHC RBC-ENTMCNC: 31 PG — SIGNIFICANT CHANGE UP (ref 27–31)
MCHC RBC-ENTMCNC: 31.1 GM/DL — LOW (ref 32–36)
MCHC RBC-ENTMCNC: 31.1 PG — SIGNIFICANT CHANGE UP (ref 27–34)
MCHC RBC-ENTMCNC: 31.2 PG — HIGH (ref 27–31)
MCHC RBC-ENTMCNC: 31.4 PG — SIGNIFICANT CHANGE UP (ref 27–34)
MCHC RBC-ENTMCNC: 31.8 PG — SIGNIFICANT CHANGE UP (ref 27–34)
MCHC RBC-ENTMCNC: 32.1 PG — SIGNIFICANT CHANGE UP (ref 27–34)
MCHC RBC-ENTMCNC: 32.2 PG — SIGNIFICANT CHANGE UP (ref 27–34)
MCV RBC AUTO: 100.7 FL — HIGH (ref 80–94)
MCV RBC AUTO: 101.8 FL — HIGH (ref 80–100)
MCV RBC AUTO: 102.2 FL — HIGH (ref 80–100)
MCV RBC AUTO: 102.4 FL — HIGH (ref 80–100)
MCV RBC AUTO: 102.5 FL — HIGH (ref 80–100)
MCV RBC AUTO: 102.8 FL — HIGH (ref 80–100)
MCV RBC AUTO: 102.8 FL — HIGH (ref 80–94)
MCV RBC AUTO: 102.9 FL — HIGH (ref 80–100)
MCV RBC AUTO: 103 FL — HIGH (ref 80–100)
MCV RBC AUTO: 103.2 FL — HIGH (ref 80–100)
MCV RBC AUTO: 103.3 FL — HIGH (ref 80–100)
MCV RBC AUTO: 103.7 FL — HIGH (ref 80–100)
MCV RBC AUTO: 104.1 FL — HIGH (ref 80–100)
MCV RBC AUTO: 104.1 FL — HIGH (ref 80–100)
MCV RBC AUTO: 104.1 FL — HIGH (ref 80–94)
MCV RBC AUTO: 104.6 FL — HIGH (ref 80–100)
MCV RBC AUTO: 105.4 FL — HIGH (ref 80–100)
MCV RBC AUTO: 105.7 FL — HIGH (ref 80–100)
MCV RBC AUTO: 107.5 FL — HIGH (ref 80–94)
METAMYELOCYTES # FLD: 1.7 % — HIGH (ref 0–0)
METAMYELOCYTES # FLD: 2.6 % — HIGH (ref 0–0)
METAMYELOCYTES # FLD: 5.3 % — HIGH (ref 0–0)
METAMYELOCYTES # FLD: 7 % — HIGH (ref 0–0)
METHOD TYPE: SIGNIFICANT CHANGE UP
MICROCYTES BLD QL: SLIGHT — SIGNIFICANT CHANGE UP
MONOCYTES # BLD AUTO: 0.08 K/UL — SIGNIFICANT CHANGE UP (ref 0–0.9)
MONOCYTES # BLD AUTO: 0.15 K/UL — SIGNIFICANT CHANGE UP (ref 0–0.9)
MONOCYTES # BLD AUTO: 0.17 K/UL — SIGNIFICANT CHANGE UP (ref 0–0.9)
MONOCYTES # BLD AUTO: 0.24 K/UL — SIGNIFICANT CHANGE UP (ref 0–0.9)
MONOCYTES # BLD AUTO: 0.26 K/UL — SIGNIFICANT CHANGE UP (ref 0–0.9)
MONOCYTES # BLD AUTO: 0.27 K/UL — SIGNIFICANT CHANGE UP (ref 0–0.9)
MONOCYTES # BLD AUTO: 0.36 K/UL — SIGNIFICANT CHANGE UP (ref 0–0.9)
MONOCYTES # BLD AUTO: 0.37 K/UL — SIGNIFICANT CHANGE UP (ref 0–0.9)
MONOCYTES # BLD AUTO: 0.44 K/UL — SIGNIFICANT CHANGE UP (ref 0–0.9)
MONOCYTES # BLD AUTO: 0.44 K/UL — SIGNIFICANT CHANGE UP (ref 0–0.9)
MONOCYTES # BLD AUTO: 0.48 K/UL — SIGNIFICANT CHANGE UP (ref 0–0.9)
MONOCYTES # BLD AUTO: 0.49 K/UL — SIGNIFICANT CHANGE UP (ref 0.1–0.6)
MONOCYTES # BLD AUTO: 0.69 K/UL — SIGNIFICANT CHANGE UP (ref 0–0.9)
MONOCYTES # BLD AUTO: 0.77 K/UL — SIGNIFICANT CHANGE UP (ref 0–0.9)
MONOCYTES # BLD AUTO: 0.77 K/UL — SIGNIFICANT CHANGE UP (ref 0–0.9)
MONOCYTES # BLD AUTO: 1.19 K/UL — HIGH (ref 0.1–0.6)
MONOCYTES # BLD AUTO: 1.2 K/UL — HIGH (ref 0.1–0.6)
MONOCYTES # BLD AUTO: 1.52 K/UL — HIGH (ref 0.1–0.6)
MONOCYTES NFR BLD AUTO: 0.9 % — LOW (ref 1.7–9.3)
MONOCYTES NFR BLD AUTO: 10.5 % — SIGNIFICANT CHANGE UP (ref 2–14)
MONOCYTES NFR BLD AUTO: 13.3 % — SIGNIFICANT CHANGE UP (ref 2–14)
MONOCYTES NFR BLD AUTO: 2.6 % — SIGNIFICANT CHANGE UP (ref 2–14)
MONOCYTES NFR BLD AUTO: 2.7 % — SIGNIFICANT CHANGE UP (ref 2–14)
MONOCYTES NFR BLD AUTO: 20.3 % — HIGH (ref 2–14)
MONOCYTES NFR BLD AUTO: 25.2 % — HIGH (ref 2–14)
MONOCYTES NFR BLD AUTO: 29.4 % — HIGH (ref 2–14)
MONOCYTES NFR BLD AUTO: 3.5 % — SIGNIFICANT CHANGE UP (ref 2–14)
MONOCYTES NFR BLD AUTO: 32 % — HIGH (ref 2–14)
MONOCYTES NFR BLD AUTO: 43.9 % — HIGH (ref 2–14)
MONOCYTES NFR BLD AUTO: 7 % — SIGNIFICANT CHANGE UP (ref 1.7–9.3)
MONOCYTES NFR BLD AUTO: 7.2 % — SIGNIFICANT CHANGE UP (ref 2–14)
MONOCYTES NFR BLD AUTO: 7.4 % — SIGNIFICANT CHANGE UP (ref 2–14)
MONOCYTES NFR BLD AUTO: 7.5 % — SIGNIFICANT CHANGE UP (ref 1.7–9.3)
MONOCYTES NFR BLD AUTO: 8.1 % — SIGNIFICANT CHANGE UP (ref 2–14)
MONOCYTES NFR BLD AUTO: 9.2 % — SIGNIFICANT CHANGE UP (ref 1.7–9.3)
MONOCYTES NFR BLD AUTO: 9.5 % — SIGNIFICANT CHANGE UP (ref 2–14)
MYELOCYTES NFR BLD: 0.9 % — HIGH (ref 0–0)
NEUTROPHILS # BLD AUTO: 0 K/UL — LOW (ref 1.8–7.4)
NEUTROPHILS # BLD AUTO: 0.01 K/UL — LOW (ref 1.8–7.4)
NEUTROPHILS # BLD AUTO: 0.01 K/UL — LOW (ref 1.8–7.4)
NEUTROPHILS # BLD AUTO: 0.02 K/UL — LOW (ref 1.8–7.4)
NEUTROPHILS # BLD AUTO: 0.03 K/UL — LOW (ref 1.8–7.4)
NEUTROPHILS # BLD AUTO: 0.25 K/UL — LOW (ref 1.8–7.4)
NEUTROPHILS # BLD AUTO: 10.3 K/UL — HIGH (ref 1.4–6.5)
NEUTROPHILS # BLD AUTO: 15.17 K/UL — HIGH (ref 1.4–6.5)
NEUTROPHILS # BLD AUTO: 17.55 K/UL — HIGH (ref 1.4–6.5)
NEUTROPHILS # BLD AUTO: 19.99 K/UL — HIGH (ref 1.8–7.4)
NEUTROPHILS # BLD AUTO: 3.28 K/UL — SIGNIFICANT CHANGE UP (ref 1.8–7.4)
NEUTROPHILS # BLD AUTO: 3.94 K/UL — SIGNIFICANT CHANGE UP (ref 1.8–7.4)
NEUTROPHILS # BLD AUTO: 4.39 K/UL — SIGNIFICANT CHANGE UP (ref 1.8–7.4)
NEUTROPHILS # BLD AUTO: 4.55 K/UL — SIGNIFICANT CHANGE UP (ref 1.8–7.4)
NEUTROPHILS # BLD AUTO: 4.95 K/UL — SIGNIFICANT CHANGE UP (ref 1.8–7.4)
NEUTROPHILS # BLD AUTO: 5.19 K/UL — SIGNIFICANT CHANGE UP (ref 1.8–7.4)
NEUTROPHILS # BLD AUTO: 51.65 K/UL — HIGH (ref 1.4–6.5)
NEUTROPHILS # BLD AUTO: 9.95 K/UL — HIGH (ref 1.8–7.4)
NEUTROPHILS NFR BLD AUTO: 0 % — LOW (ref 43–77)
NEUTROPHILS NFR BLD AUTO: 0.8 % — LOW (ref 43–77)
NEUTROPHILS NFR BLD AUTO: 2.2 % — LOW (ref 43–77)
NEUTROPHILS NFR BLD AUTO: 2.6 % — LOW (ref 43–77)
NEUTROPHILS NFR BLD AUTO: 2.7 % — LOW (ref 43–77)
NEUTROPHILS NFR BLD AUTO: 4.4 % — LOW (ref 43–77)
NEUTROPHILS NFR BLD AUTO: 71.7 % — SIGNIFICANT CHANGE UP (ref 43–77)
NEUTROPHILS NFR BLD AUTO: 72.8 % — SIGNIFICANT CHANGE UP (ref 43–77)
NEUTROPHILS NFR BLD AUTO: 74 % — SIGNIFICANT CHANGE UP (ref 43–77)
NEUTROPHILS NFR BLD AUTO: 76.5 % — SIGNIFICANT CHANGE UP (ref 43–77)
NEUTROPHILS NFR BLD AUTO: 76.9 % — SIGNIFICANT CHANGE UP (ref 43–77)
NEUTROPHILS NFR BLD AUTO: 77.4 % — HIGH (ref 43–77)
NEUTROPHILS NFR BLD AUTO: 79.3 % — HIGH (ref 42.2–75.2)
NEUTROPHILS NFR BLD AUTO: 84.3 % — HIGH (ref 43–77)
NEUTROPHILS NFR BLD AUTO: 87.2 % — HIGH (ref 42.2–75.2)
NEUTROPHILS NFR BLD AUTO: 88.1 % — HIGH (ref 42.2–75.2)
NEUTROPHILS NFR BLD AUTO: 90.2 % — HIGH (ref 43–77)
NEUTROPHILS NFR BLD AUTO: 93.1 % — HIGH (ref 42.2–75.2)
NEUTS BAND # BLD: 17.4 % — HIGH (ref 0–8)
NEUTS BAND # BLD: 2.6 % — SIGNIFICANT CHANGE UP (ref 0–6)
NEUTS BAND # BLD: 5.2 % — SIGNIFICANT CHANGE UP (ref 0–8)
NEUTS BAND # BLD: 6.1 % — SIGNIFICANT CHANGE UP (ref 0–8)
NEUTS BAND # BLD: 9.6 % — HIGH (ref 0–8)
NITRITE UR-MCNC: NEGATIVE — SIGNIFICANT CHANGE UP
NRBC # BLD: 0 /100 WBCS — SIGNIFICANT CHANGE UP (ref 0–0)
NRBC # BLD: 1 /100 — HIGH (ref 0–0)
NRBC # BLD: 1 /100 — HIGH (ref 0–0)
NRBC # BLD: SIGNIFICANT CHANGE UP /100 WBCS (ref 0–0)
NRBC # BLD: SIGNIFICANT CHANGE UP /100 WBCS (ref 0–0)
NT-PROBNP SERPL-SCNC: HIGH PG/ML (ref 0–300)
NT-PROBNP SERPL-SCNC: HIGH PG/ML (ref 0–300)
ORGANISM # SPEC MICROSCOPIC CNT: SIGNIFICANT CHANGE UP
OSMOLALITY UR: 367 MOS/KG — SIGNIFICANT CHANGE UP (ref 50–1200)
OVALOCYTES BLD QL SMEAR: SIGNIFICANT CHANGE UP
OVALOCYTES BLD QL SMEAR: SLIGHT — SIGNIFICANT CHANGE UP
PH UR: 5 — SIGNIFICANT CHANGE UP (ref 5–8)
PH UR: 5.5 — SIGNIFICANT CHANGE UP (ref 5–8)
PH UR: 6 — SIGNIFICANT CHANGE UP (ref 5–8)
PHOSPHATE 24H UR-MCNC: 27 MG/DL — SIGNIFICANT CHANGE UP
PHOSPHATE SERPL-MCNC: 2.7 MG/DL — SIGNIFICANT CHANGE UP (ref 2.5–4.5)
PHOSPHATE SERPL-MCNC: 3.1 MG/DL — SIGNIFICANT CHANGE UP (ref 2.5–4.5)
PHOSPHATE SERPL-MCNC: 3.2 MG/DL — SIGNIFICANT CHANGE UP (ref 2.1–4.9)
PHOSPHATE SERPL-MCNC: 3.3 MG/DL — SIGNIFICANT CHANGE UP (ref 2.5–4.5)
PHOSPHATE SERPL-MCNC: 3.4 MG/DL — SIGNIFICANT CHANGE UP (ref 2.5–4.5)
PHOSPHATE SERPL-MCNC: 3.4 MG/DL — SIGNIFICANT CHANGE UP (ref 2.5–4.5)
PHOSPHATE SERPL-MCNC: 3.6 MG/DL — SIGNIFICANT CHANGE UP (ref 2.1–4.9)
PHOSPHATE SERPL-MCNC: 3.6 MG/DL — SIGNIFICANT CHANGE UP (ref 2.5–4.5)
PHOSPHATE SERPL-MCNC: 3.7 MG/DL — SIGNIFICANT CHANGE UP (ref 2.5–4.5)
PHOSPHATE SERPL-MCNC: 4.1 MG/DL — SIGNIFICANT CHANGE UP (ref 2.5–4.5)
PHOSPHATE SERPL-MCNC: 4.2 MG/DL — SIGNIFICANT CHANGE UP (ref 2.5–4.5)
PHOSPHATE SERPL-MCNC: 4.4 MG/DL — SIGNIFICANT CHANGE UP (ref 2.5–4.5)
PHOSPHATE SERPL-MCNC: 4.9 MG/DL — SIGNIFICANT CHANGE UP (ref 2.1–4.9)
PHOSPHATE SERPL-MCNC: 5.9 MG/DL — HIGH (ref 2.1–4.9)
PLAT MORPH BLD: ABNORMAL
PLAT MORPH BLD: NORMAL — SIGNIFICANT CHANGE UP
PLATELET # BLD AUTO: 103 K/UL — LOW (ref 150–400)
PLATELET # BLD AUTO: 106 K/UL — LOW (ref 150–400)
PLATELET # BLD AUTO: 110 K/UL — LOW (ref 150–400)
PLATELET # BLD AUTO: 112 K/UL — LOW (ref 150–400)
PLATELET # BLD AUTO: 114 K/UL — LOW (ref 150–400)
PLATELET # BLD AUTO: 119 K/UL — LOW (ref 150–400)
PLATELET # BLD AUTO: 120 K/UL — LOW (ref 150–400)
PLATELET # BLD AUTO: 130 K/UL — SIGNIFICANT CHANGE UP (ref 130–400)
PLATELET # BLD AUTO: 140 K/UL — LOW (ref 150–400)
PLATELET # BLD AUTO: 153 K/UL — SIGNIFICANT CHANGE UP (ref 150–400)
PLATELET # BLD AUTO: 159 K/UL — SIGNIFICANT CHANGE UP (ref 150–400)
PLATELET # BLD AUTO: 164 K/UL — SIGNIFICANT CHANGE UP (ref 150–400)
PLATELET # BLD AUTO: 177 K/UL — SIGNIFICANT CHANGE UP (ref 150–400)
PLATELET # BLD AUTO: 183 K/UL — SIGNIFICANT CHANGE UP (ref 150–400)
PLATELET # BLD AUTO: 203 K/UL — SIGNIFICANT CHANGE UP (ref 150–400)
PLATELET # BLD AUTO: 95 K/UL — LOW (ref 150–400)
PLATELET # BLD AUTO: 96 K/UL — LOW (ref 130–400)
PLATELET # BLD AUTO: 96 K/UL — LOW (ref 130–400)
PLATELET # BLD AUTO: 99 K/UL — LOW (ref 130–400)
POIKILOCYTOSIS BLD QL AUTO: SIGNIFICANT CHANGE UP
POIKILOCYTOSIS BLD QL AUTO: SIGNIFICANT CHANGE UP
POIKILOCYTOSIS BLD QL AUTO: SLIGHT — SIGNIFICANT CHANGE UP
POLYCHROMASIA BLD QL SMEAR: SLIGHT — SIGNIFICANT CHANGE UP
POTASSIUM SERPL-MCNC: 3.8 MMOL/L — SIGNIFICANT CHANGE UP (ref 3.5–5.3)
POTASSIUM SERPL-MCNC: 3.9 MMOL/L — SIGNIFICANT CHANGE UP (ref 3.5–5.3)
POTASSIUM SERPL-MCNC: 4 MMOL/L — SIGNIFICANT CHANGE UP (ref 3.5–5.3)
POTASSIUM SERPL-MCNC: 4 MMOL/L — SIGNIFICANT CHANGE UP (ref 3.5–5.3)
POTASSIUM SERPL-MCNC: 4.1 MMOL/L — SIGNIFICANT CHANGE UP (ref 3.5–5.3)
POTASSIUM SERPL-MCNC: 4.1 MMOL/L — SIGNIFICANT CHANGE UP (ref 3.5–5.3)
POTASSIUM SERPL-MCNC: 4.2 MMOL/L — SIGNIFICANT CHANGE UP (ref 3.5–5.3)
POTASSIUM SERPL-MCNC: 4.6 MMOL/L — SIGNIFICANT CHANGE UP (ref 3.5–5)
POTASSIUM SERPL-MCNC: 4.6 MMOL/L — SIGNIFICANT CHANGE UP (ref 3.5–5.3)
POTASSIUM SERPL-MCNC: 4.8 MMOL/L — SIGNIFICANT CHANGE UP (ref 3.5–5.3)
POTASSIUM SERPL-MCNC: 4.8 MMOL/L — SIGNIFICANT CHANGE UP (ref 3.5–5.3)
POTASSIUM SERPL-MCNC: 4.9 MMOL/L — SIGNIFICANT CHANGE UP (ref 3.5–5.3)
POTASSIUM SERPL-MCNC: 5 MMOL/L — SIGNIFICANT CHANGE UP (ref 3.5–5.3)
POTASSIUM SERPL-MCNC: 5.3 MMOL/L — HIGH (ref 3.5–5)
POTASSIUM SERPL-MCNC: 5.4 MMOL/L — HIGH (ref 3.5–5.3)
POTASSIUM SERPL-MCNC: 5.4 MMOL/L — HIGH (ref 3.5–5.3)
POTASSIUM SERPL-MCNC: 5.5 MMOL/L — HIGH (ref 3.5–5.3)
POTASSIUM SERPL-MCNC: 5.6 MMOL/L — HIGH (ref 3.5–5.3)
POTASSIUM SERPL-SCNC: 3.8 MMOL/L — SIGNIFICANT CHANGE UP (ref 3.5–5.3)
POTASSIUM SERPL-SCNC: 3.9 MMOL/L — SIGNIFICANT CHANGE UP (ref 3.5–5.3)
POTASSIUM SERPL-SCNC: 4 MMOL/L — SIGNIFICANT CHANGE UP (ref 3.5–5.3)
POTASSIUM SERPL-SCNC: 4 MMOL/L — SIGNIFICANT CHANGE UP (ref 3.5–5.3)
POTASSIUM SERPL-SCNC: 4.1 MMOL/L — SIGNIFICANT CHANGE UP (ref 3.5–5.3)
POTASSIUM SERPL-SCNC: 4.1 MMOL/L — SIGNIFICANT CHANGE UP (ref 3.5–5.3)
POTASSIUM SERPL-SCNC: 4.2 MMOL/L — SIGNIFICANT CHANGE UP (ref 3.5–5.3)
POTASSIUM SERPL-SCNC: 4.6 MMOL/L — SIGNIFICANT CHANGE UP (ref 3.5–5)
POTASSIUM SERPL-SCNC: 4.6 MMOL/L — SIGNIFICANT CHANGE UP (ref 3.5–5.3)
POTASSIUM SERPL-SCNC: 4.8 MMOL/L — SIGNIFICANT CHANGE UP (ref 3.5–5.3)
POTASSIUM SERPL-SCNC: 4.8 MMOL/L — SIGNIFICANT CHANGE UP (ref 3.5–5.3)
POTASSIUM SERPL-SCNC: 4.9 MMOL/L — SIGNIFICANT CHANGE UP (ref 3.5–5.3)
POTASSIUM SERPL-SCNC: 5 MMOL/L — SIGNIFICANT CHANGE UP (ref 3.5–5.3)
POTASSIUM SERPL-SCNC: 5.3 MMOL/L — HIGH (ref 3.5–5)
POTASSIUM SERPL-SCNC: 5.4 MMOL/L — HIGH (ref 3.5–5.3)
POTASSIUM SERPL-SCNC: 5.4 MMOL/L — HIGH (ref 3.5–5.3)
POTASSIUM SERPL-SCNC: 5.5 MMOL/L — HIGH (ref 3.5–5.3)
POTASSIUM SERPL-SCNC: 5.6 MMOL/L — HIGH (ref 3.5–5.3)
POTASSIUM UR-SCNC: 46 MMOL/L — SIGNIFICANT CHANGE UP
PROT ?TM UR-MCNC: 28 MG/DL — HIGH (ref 0–12)
PROT ?TM UR-MCNC: 65 MG/DLG/24H — SIGNIFICANT CHANGE UP
PROT SERPL-MCNC: 4.5 G/DL — LOW (ref 6–8)
PROT SERPL-MCNC: 4.9 G/DL — LOW (ref 6–8.3)
PROT SERPL-MCNC: 5 G/DL — LOW (ref 6–8.3)
PROT SERPL-MCNC: 5.1 G/DL — LOW (ref 6–8.3)
PROT SERPL-MCNC: 5.2 G/DL — LOW (ref 6–8.3)
PROT SERPL-MCNC: 5.3 G/DL — LOW (ref 6–8.3)
PROT SERPL-MCNC: 5.5 G/DL — LOW (ref 6–8)
PROT SERPL-MCNC: 5.5 G/DL — LOW (ref 6–8)
PROT SERPL-MCNC: 5.9 G/DL — LOW (ref 6–8.3)
PROT SERPL-MCNC: 6.3 G/DL — SIGNIFICANT CHANGE UP (ref 6–8)
PROT UR-MCNC: ABNORMAL
PROT UR-MCNC: ABNORMAL MG/DL
PROT UR-MCNC: NEGATIVE MG/DL — SIGNIFICANT CHANGE UP
PROT/CREAT UR-RTO: 0.3 RATIO — HIGH (ref 0–0.2)
PROTHROM AB SERPL-ACNC: 12.8 SEC — SIGNIFICANT CHANGE UP (ref 10.6–13.6)
PROTHROM AB SERPL-ACNC: 13.3 SEC — SIGNIFICANT CHANGE UP (ref 10.6–13.6)
PROTHROM AB SERPL-ACNC: 13.6 SEC — HIGH (ref 10.5–13.4)
PROTHROM AB SERPL-ACNC: 13.8 SEC — HIGH (ref 10.5–13.4)
PROTHROM AB SERPL-ACNC: 14.9 SEC — HIGH (ref 9.95–12.87)
PROTHROM AB SERPL-ACNC: 15 SEC — HIGH (ref 10.5–13.4)
RAPID RVP RESULT: SIGNIFICANT CHANGE UP
RBC # BLD: 2.18 M/UL — LOW (ref 4.2–5.8)
RBC # BLD: 2.3 M/UL — LOW (ref 4.2–5.8)
RBC # BLD: 2.41 M/UL — LOW (ref 4.2–5.8)
RBC # BLD: 2.42 M/UL — LOW (ref 4.2–5.8)
RBC # BLD: 2.43 M/UL — LOW (ref 4.2–5.8)
RBC # BLD: 2.46 M/UL — LOW (ref 4.2–5.8)
RBC # BLD: 2.68 M/UL — LOW (ref 4.7–6.1)
RBC # BLD: 2.69 M/UL — LOW (ref 4.7–6.1)
RBC # BLD: 2.77 M/UL — LOW (ref 4.2–5.8)
RBC # BLD: 2.78 M/UL — LOW (ref 4.7–6.1)
RBC # BLD: 2.79 M/UL — LOW (ref 4.2–5.8)
RBC # BLD: 2.83 M/UL — LOW (ref 4.2–5.8)
RBC # BLD: 2.85 M/UL — LOW (ref 4.2–5.8)
RBC # BLD: 2.86 M/UL — LOW (ref 4.7–6.1)
RBC # BLD: 2.95 M/UL — LOW (ref 4.2–5.8)
RBC # BLD: 2.96 M/UL — LOW (ref 4.2–5.8)
RBC # BLD: 3.02 M/UL — LOW (ref 4.2–5.8)
RBC # BLD: 3.15 M/UL — LOW (ref 4.2–5.8)
RBC # BLD: 3.19 M/UL — LOW (ref 4.2–5.8)
RBC # FLD: 15.3 % — HIGH (ref 10.3–14.5)
RBC # FLD: 15.4 % — HIGH (ref 10.3–14.5)
RBC # FLD: 15.4 % — HIGH (ref 10.3–14.5)
RBC # FLD: 15.5 % — HIGH (ref 10.3–14.5)
RBC # FLD: 15.5 % — HIGH (ref 10.3–14.5)
RBC # FLD: 15.7 % — HIGH (ref 10.3–14.5)
RBC # FLD: 15.8 % — HIGH (ref 10.3–14.5)
RBC # FLD: 15.8 % — HIGH (ref 11.5–14.5)
RBC # FLD: 15.9 % — HIGH (ref 11.5–14.5)
RBC # FLD: 16.1 % — HIGH (ref 11.5–14.5)
RBC # FLD: 16.3 % — HIGH (ref 10.3–14.5)
RBC # FLD: 16.3 % — HIGH (ref 10.3–14.5)
RBC # FLD: 16.3 % — HIGH (ref 11.5–14.5)
RBC # FLD: 16.4 % — HIGH (ref 10.3–14.5)
RBC # FLD: 16.5 % — HIGH (ref 10.3–14.5)
RBC # FLD: 16.5 % — HIGH (ref 10.3–14.5)
RBC # FLD: 16.9 % — HIGH (ref 10.3–14.5)
RBC BLD AUTO: ABNORMAL
RBC CASTS # UR COMP ASSIST: 9 /HPF — HIGH (ref 0–4)
RBC CASTS # UR COMP ASSIST: < 5 /HPF — SIGNIFICANT CHANGE UP
RETICS #: 62.9 K/UL — SIGNIFICANT CHANGE UP (ref 25–125)
RETICS/RBC NFR: 2.6 % — HIGH (ref 0.5–2.5)
RH IG SCN BLD-IMP: POSITIVE — SIGNIFICANT CHANGE UP
SARS-COV-2 RNA SPEC QL NAA+PROBE: NEGATIVE — SIGNIFICANT CHANGE UP
SARS-COV-2 RNA SPEC QL NAA+PROBE: SIGNIFICANT CHANGE UP
SCHISTOCYTES BLD QL AUTO: SLIGHT — SIGNIFICANT CHANGE UP
SMUDGE CELLS # BLD: PRESENT — SIGNIFICANT CHANGE UP
SODIUM SERPL-SCNC: 132 MMOL/L — LOW (ref 135–146)
SODIUM SERPL-SCNC: 132 MMOL/L — LOW (ref 135–146)
SODIUM SERPL-SCNC: 133 MMOL/L — LOW (ref 135–145)
SODIUM SERPL-SCNC: 134 MMOL/L — LOW (ref 135–145)
SODIUM SERPL-SCNC: 134 MMOL/L — LOW (ref 135–146)
SODIUM SERPL-SCNC: 135 MMOL/L — SIGNIFICANT CHANGE UP (ref 135–145)
SODIUM SERPL-SCNC: 135 MMOL/L — SIGNIFICANT CHANGE UP (ref 135–145)
SODIUM SERPL-SCNC: 135 MMOL/L — SIGNIFICANT CHANGE UP (ref 135–146)
SODIUM SERPL-SCNC: 136 MMOL/L — SIGNIFICANT CHANGE UP (ref 135–145)
SODIUM SERPL-SCNC: 137 MMOL/L — SIGNIFICANT CHANGE UP (ref 135–145)
SODIUM SERPL-SCNC: 139 MMOL/L — SIGNIFICANT CHANGE UP (ref 135–145)
SODIUM SERPL-SCNC: 140 MMOL/L — SIGNIFICANT CHANGE UP (ref 135–145)
SODIUM SERPL-SCNC: 141 MMOL/L — SIGNIFICANT CHANGE UP (ref 135–145)
SODIUM SERPL-SCNC: 141 MMOL/L — SIGNIFICANT CHANGE UP (ref 135–145)
SODIUM SERPL-SCNC: 142 MMOL/L — SIGNIFICANT CHANGE UP (ref 135–145)
SODIUM SERPL-SCNC: 143 MMOL/L — SIGNIFICANT CHANGE UP (ref 135–145)
SODIUM SERPL-SCNC: 145 MMOL/L — SIGNIFICANT CHANGE UP (ref 135–145)
SODIUM UR-SCNC: 20 MMOL/L — SIGNIFICANT CHANGE UP
SODIUM UR-SCNC: 28 MMOL/L — SIGNIFICANT CHANGE UP
SODIUM UR-SCNC: <20 MMOL/L — SIGNIFICANT CHANGE UP
SP GR SPEC: 1.01 — SIGNIFICANT CHANGE UP (ref 1–1.03)
SP GR SPEC: 1.02 — SIGNIFICANT CHANGE UP (ref 1.01–1.03)
SP GR SPEC: 1.02 — SIGNIFICANT CHANGE UP (ref 1–1.03)
SPECIMEN SOURCE: SIGNIFICANT CHANGE UP
SPHEROCYTES BLD QL SMEAR: SLIGHT — SIGNIFICANT CHANGE UP
TIBC SERPL-MCNC: SIGNIFICANT CHANGE UP UG/DL (ref 220–430)
TROPONIN T SERPL-MCNC: 0.04 NG/ML — CRITICAL HIGH
TSH SERPL-MCNC: 3.84 UIU/ML — SIGNIFICANT CHANGE UP (ref 0.27–4.2)
UIBC SERPL-MCNC: <17 UG/DL — SIGNIFICANT CHANGE UP (ref 110–370)
URATE UR-MCNC: 16.6 MG/DL — SIGNIFICANT CHANGE UP
UROBILINOGEN FLD QL: 0.2 E.U./DL — SIGNIFICANT CHANGE UP
UROBILINOGEN FLD QL: 0.2 E.U./DL — SIGNIFICANT CHANGE UP
UROBILINOGEN FLD QL: SIGNIFICANT CHANGE UP
UUN UR-MCNC: 384 MG/DL — SIGNIFICANT CHANGE UP
UUN UR-MCNC: 626 MG/DL — SIGNIFICANT CHANGE UP
VANCOMYCIN TROUGH SERPL-MCNC: 13.3 UG/ML — SIGNIFICANT CHANGE UP (ref 10–20)
VIT B12 SERPL-MCNC: >2000 PG/ML — HIGH (ref 232–1245)
VWF AG ACT/NOR PPP IA: 384 % — HIGH (ref 63–170)
VWF:RCO ACT/NOR PPP PL AGG: 273 % — HIGH (ref 45–133)
WBC # BLD: 0.6 K/UL — CRITICAL LOW (ref 3.8–10.5)
WBC # BLD: 0.8 K/UL — CRITICAL LOW (ref 3.8–10.5)
WBC # BLD: 0.85 K/UL — CRITICAL LOW (ref 3.8–10.5)
WBC # BLD: 0.95 K/UL — CRITICAL LOW (ref 3.8–10.5)
WBC # BLD: 1.23 K/UL — LOW (ref 3.8–10.5)
WBC # BLD: 1.75 K/UL — LOW (ref 3.8–10.5)
WBC # BLD: 10.5 K/UL — SIGNIFICANT CHANGE UP (ref 3.8–10.5)
WBC # BLD: 12.99 K/UL — HIGH (ref 4.8–10.8)
WBC # BLD: 17.22 K/UL — HIGH (ref 4.8–10.8)
WBC # BLD: 20.14 K/UL — HIGH (ref 4.8–10.8)
WBC # BLD: 22.11 K/UL — HIGH (ref 3.8–10.5)
WBC # BLD: 4.21 K/UL — SIGNIFICANT CHANGE UP (ref 3.8–10.5)
WBC # BLD: 5.15 K/UL — SIGNIFICANT CHANGE UP (ref 3.8–10.5)
WBC # BLD: 5.49 K/UL — SIGNIFICANT CHANGE UP (ref 3.8–10.5)
WBC # BLD: 5.92 K/UL — SIGNIFICANT CHANGE UP (ref 3.8–10.5)
WBC # BLD: 5.93 K/UL — SIGNIFICANT CHANGE UP (ref 3.8–10.5)
WBC # BLD: 53.97 K/UL — CRITICAL HIGH (ref 4.8–10.8)
WBC # BLD: 6.54 K/UL — SIGNIFICANT CHANGE UP (ref 3.8–10.5)
WBC # BLD: 7.24 K/UL — SIGNIFICANT CHANGE UP (ref 3.8–10.5)
WBC # FLD AUTO: 0.6 K/UL — CRITICAL LOW (ref 3.8–10.5)
WBC # FLD AUTO: 0.8 K/UL — CRITICAL LOW (ref 3.8–10.5)
WBC # FLD AUTO: 0.85 K/UL — CRITICAL LOW (ref 3.8–10.5)
WBC # FLD AUTO: 0.95 K/UL — CRITICAL LOW (ref 3.8–10.5)
WBC # FLD AUTO: 1.23 K/UL — LOW (ref 3.8–10.5)
WBC # FLD AUTO: 1.75 K/UL — LOW (ref 3.8–10.5)
WBC # FLD AUTO: 10.5 K/UL — SIGNIFICANT CHANGE UP (ref 3.8–10.5)
WBC # FLD AUTO: 12.99 K/UL — HIGH (ref 4.8–10.8)
WBC # FLD AUTO: 17.22 K/UL — HIGH (ref 4.8–10.8)
WBC # FLD AUTO: 20.14 K/UL — HIGH (ref 4.8–10.8)
WBC # FLD AUTO: 22.11 K/UL — HIGH (ref 3.8–10.5)
WBC # FLD AUTO: 4.21 K/UL — SIGNIFICANT CHANGE UP (ref 3.8–10.5)
WBC # FLD AUTO: 5.15 K/UL — SIGNIFICANT CHANGE UP (ref 3.8–10.5)
WBC # FLD AUTO: 5.49 K/UL — SIGNIFICANT CHANGE UP (ref 3.8–10.5)
WBC # FLD AUTO: 5.92 K/UL — SIGNIFICANT CHANGE UP (ref 3.8–10.5)
WBC # FLD AUTO: 5.93 K/UL — SIGNIFICANT CHANGE UP (ref 3.8–10.5)
WBC # FLD AUTO: 53.97 K/UL — CRITICAL HIGH (ref 4.8–10.8)
WBC # FLD AUTO: 6.54 K/UL — SIGNIFICANT CHANGE UP (ref 3.8–10.5)
WBC # FLD AUTO: 7.24 K/UL — SIGNIFICANT CHANGE UP (ref 3.8–10.5)
WBC UR QL: 75 /HPF — HIGH (ref 0–5)
WBC UR QL: < 5 /HPF — SIGNIFICANT CHANGE UP

## 2022-01-01 PROCEDURE — 99233 SBSQ HOSP IP/OBS HIGH 50: CPT

## 2022-01-01 PROCEDURE — P9045: CPT

## 2022-01-01 PROCEDURE — U0003: CPT

## 2022-01-01 PROCEDURE — 99233 SBSQ HOSP IP/OBS HIGH 50: CPT | Mod: GC

## 2022-01-01 PROCEDURE — 36415 COLL VENOUS BLD VENIPUNCTURE: CPT

## 2022-01-01 PROCEDURE — 88341 IMHCHEM/IMCYTCHM EA ADD ANTB: CPT

## 2022-01-01 PROCEDURE — 99239 HOSP IP/OBS DSCHRG MGMT >30: CPT

## 2022-01-01 PROCEDURE — 80053 COMPREHEN METABOLIC PANEL: CPT

## 2022-01-01 PROCEDURE — 85335 FACTOR INHIBITOR TEST: CPT

## 2022-01-01 PROCEDURE — 86900 BLOOD TYPING SEROLOGIC ABO: CPT

## 2022-01-01 PROCEDURE — 82746 ASSAY OF FOLIC ACID SERUM: CPT

## 2022-01-01 PROCEDURE — 88313 SPECIAL STAINS GROUP 2: CPT

## 2022-01-01 PROCEDURE — 85230 CLOT FACTOR VII PROCONVERTIN: CPT

## 2022-01-01 PROCEDURE — 71045 X-RAY EXAM CHEST 1 VIEW: CPT

## 2022-01-01 PROCEDURE — 82962 GLUCOSE BLOOD TEST: CPT

## 2022-01-01 PROCEDURE — 87635 SARS-COV-2 COVID-19 AMP PRB: CPT

## 2022-01-01 PROCEDURE — 88313 SPECIAL STAINS GROUP 2: CPT | Mod: 26

## 2022-01-01 PROCEDURE — 97110 THERAPEUTIC EXERCISES: CPT

## 2022-01-01 PROCEDURE — 88350 IMFLUOR EA ADDL 1ANTB STN PX: CPT | Mod: 26

## 2022-01-01 PROCEDURE — 71045 X-RAY EXAM CHEST 1 VIEW: CPT | Mod: 26

## 2022-01-01 PROCEDURE — 83735 ASSAY OF MAGNESIUM: CPT

## 2022-01-01 PROCEDURE — 88364 INSITU HYBRIDIZATION (FISH): CPT | Mod: 26

## 2022-01-01 PROCEDURE — 85245 CLOT FACTOR VIII VW RISTOCTN: CPT

## 2022-01-01 PROCEDURE — 99285 EMERGENCY DEPT VISIT HI MDM: CPT | Mod: 25

## 2022-01-01 PROCEDURE — 83540 ASSAY OF IRON: CPT

## 2022-01-01 PROCEDURE — 99232 SBSQ HOSP IP/OBS MODERATE 35: CPT | Mod: GC

## 2022-01-01 PROCEDURE — 99292 CRITICAL CARE ADDL 30 MIN: CPT

## 2022-01-01 PROCEDURE — 88342 IMHCHEM/IMCYTCHM 1ST ANTB: CPT | Mod: 26,59

## 2022-01-01 PROCEDURE — P9059: CPT

## 2022-01-01 PROCEDURE — 88365 INSITU HYBRIDIZATION (FISH): CPT | Mod: 26

## 2022-01-01 PROCEDURE — 99291 CRITICAL CARE FIRST HOUR: CPT

## 2022-01-01 PROCEDURE — 84156 ASSAY OF PROTEIN URINE: CPT

## 2022-01-01 PROCEDURE — 85240 CLOT FACTOR VIII AHG 1 STAGE: CPT

## 2022-01-01 PROCEDURE — 36430 TRANSFUSION BLD/BLD COMPNT: CPT

## 2022-01-01 PROCEDURE — 85220 BLOOC CLOT FACTOR V TEST: CPT

## 2022-01-01 PROCEDURE — 85025 COMPLETE CBC W/AUTO DIFF WBC: CPT

## 2022-01-01 PROCEDURE — 86965 POOLING BLOOD PLATELETS: CPT

## 2022-01-01 PROCEDURE — 88360 TUMOR IMMUNOHISTOCHEM/MANUAL: CPT

## 2022-01-01 PROCEDURE — 87184 SC STD DISK METHOD PER PLATE: CPT

## 2022-01-01 PROCEDURE — 82272 OCCULT BLD FECES 1-3 TESTS: CPT

## 2022-01-01 PROCEDURE — 88346 IMFLUOR 1ST 1ANTB STAIN PX: CPT | Mod: 26

## 2022-01-01 PROCEDURE — 88305 TISSUE EXAM BY PATHOLOGIST: CPT

## 2022-01-01 PROCEDURE — 80076 HEPATIC FUNCTION PANEL: CPT

## 2022-01-01 PROCEDURE — 0225U NFCT DS DNA&RNA 21 SARSCOV2: CPT

## 2022-01-01 PROCEDURE — 85045 AUTOMATED RETICULOCYTE COUNT: CPT

## 2022-01-01 PROCEDURE — 88350 IMFLUOR EA ADDL 1ANTB STN PX: CPT

## 2022-01-01 PROCEDURE — 86850 RBC ANTIBODY SCREEN: CPT

## 2022-01-01 PROCEDURE — 82728 ASSAY OF FERRITIN: CPT

## 2022-01-01 PROCEDURE — 80048 BASIC METABOLIC PNL TOTAL CA: CPT

## 2022-01-01 PROCEDURE — 88311 DECALCIFY TISSUE: CPT

## 2022-01-01 PROCEDURE — 86923 COMPATIBILITY TEST ELECTRIC: CPT

## 2022-01-01 PROCEDURE — 85730 THROMBOPLASTIN TIME PARTIAL: CPT

## 2022-01-01 PROCEDURE — 81001 URINALYSIS AUTO W/SCOPE: CPT

## 2022-01-01 PROCEDURE — 85670 THROMBIN TIME PLASMA: CPT

## 2022-01-01 PROCEDURE — 83036 HEMOGLOBIN GLYCOSYLATED A1C: CPT

## 2022-01-01 PROCEDURE — 85280 CLOT FACTOR XII HAGEMAN: CPT

## 2022-01-01 PROCEDURE — 74176 CT ABD & PELVIS W/O CONTRAST: CPT

## 2022-01-01 PROCEDURE — 87389 HIV-1 AG W/HIV-1&-2 AB AG IA: CPT

## 2022-01-01 PROCEDURE — 84100 ASSAY OF PHOSPHORUS: CPT

## 2022-01-01 PROCEDURE — 84540 ASSAY OF URINE/UREA-N: CPT

## 2022-01-01 PROCEDURE — 97116 GAIT TRAINING THERAPY: CPT

## 2022-01-01 PROCEDURE — 81003 URINALYSIS AUTO W/O SCOPE: CPT

## 2022-01-01 PROCEDURE — 93010 ELECTROCARDIOGRAM REPORT: CPT

## 2022-01-01 PROCEDURE — 83010 ASSAY OF HAPTOGLOBIN QUANT: CPT

## 2022-01-01 PROCEDURE — 38222 DX BONE MARROW BX & ASPIR: CPT | Mod: RT

## 2022-01-01 PROCEDURE — 88305 TISSUE EXAM BY PATHOLOGIST: CPT | Mod: 26

## 2022-01-01 PROCEDURE — 85097 BONE MARROW INTERPRETATION: CPT

## 2022-01-01 PROCEDURE — 86901 BLOOD TYPING SEROLOGIC RH(D): CPT

## 2022-01-01 PROCEDURE — 87186 SC STD MICRODIL/AGAR DIL: CPT

## 2022-01-01 PROCEDURE — P9012: CPT

## 2022-01-01 PROCEDURE — 93971 EXTREMITY STUDY: CPT

## 2022-01-01 PROCEDURE — 99285 EMERGENCY DEPT VISIT HI MDM: CPT

## 2022-01-01 PROCEDURE — 85598 HEXAGNAL PHOSPH PLTLT NEUTRL: CPT

## 2022-01-01 PROCEDURE — 93005 ELECTROCARDIOGRAM TRACING: CPT

## 2022-01-01 PROCEDURE — 85611 PROTHROMBIN TEST: CPT

## 2022-01-01 PROCEDURE — 83550 IRON BINDING TEST: CPT

## 2022-01-01 PROCEDURE — 88364 INSITU HYBRIDIZATION (FISH): CPT

## 2022-01-01 PROCEDURE — P9040: CPT

## 2022-01-01 PROCEDURE — 99223 1ST HOSP IP/OBS HIGH 75: CPT

## 2022-01-01 PROCEDURE — 88360 TUMOR IMMUNOHISTOCHEM/MANUAL: CPT | Mod: 26

## 2022-01-01 PROCEDURE — 85250 CLOT FACTOR IX PTC/CHRSTMAS: CPT

## 2022-01-01 PROCEDURE — 88312 SPECIAL STAINS GROUP 1: CPT | Mod: 26

## 2022-01-01 PROCEDURE — P9016: CPT

## 2022-01-01 PROCEDURE — 93306 TTE W/DOPPLER COMPLETE: CPT

## 2022-01-01 PROCEDURE — 97530 THERAPEUTIC ACTIVITIES: CPT

## 2022-01-01 PROCEDURE — 87077 CULTURE AEROBIC IDENTIFY: CPT

## 2022-01-01 PROCEDURE — 88312 SPECIAL STAINS GROUP 1: CPT

## 2022-01-01 PROCEDURE — 11106 INCAL BX SKN SINGLE LES: CPT

## 2022-01-01 PROCEDURE — 88346 IMFLUOR 1ST 1ANTB STAIN PX: CPT

## 2022-01-01 PROCEDURE — 38222 DX BONE MARROW BX & ASPIR: CPT

## 2022-01-01 PROCEDURE — 85210 CLOT FACTOR II PROTHROM SPEC: CPT

## 2022-01-01 PROCEDURE — 82607 VITAMIN B-12: CPT

## 2022-01-01 PROCEDURE — 99232 SBSQ HOSP IP/OBS MODERATE 35: CPT

## 2022-01-01 PROCEDURE — U0005: CPT

## 2022-01-01 PROCEDURE — 88365 INSITU HYBRIDIZATION (FISH): CPT

## 2022-01-01 PROCEDURE — 82040 ASSAY OF SERUM ALBUMIN: CPT

## 2022-01-01 PROCEDURE — 87070 CULTURE OTHR SPECIMN AEROBIC: CPT

## 2022-01-01 PROCEDURE — 85260 CLOT FACTOR X STUART-POWER: CPT

## 2022-01-01 PROCEDURE — 85027 COMPLETE CBC AUTOMATED: CPT

## 2022-01-01 PROCEDURE — 88311 DECALCIFY TISSUE: CPT | Mod: 26

## 2022-01-01 PROCEDURE — 87205 SMEAR GRAM STAIN: CPT

## 2022-01-01 PROCEDURE — 99358 PROLONG SERVICE W/O CONTACT: CPT | Mod: NC

## 2022-01-01 PROCEDURE — 85610 PROTHROMBIN TIME: CPT

## 2022-01-01 PROCEDURE — 83615 LACTATE (LD) (LDH) ENZYME: CPT

## 2022-01-01 PROCEDURE — 87040 BLOOD CULTURE FOR BACTERIA: CPT

## 2022-01-01 PROCEDURE — 73706 CT ANGIO LWR EXTR W/O&W/DYE: CPT | Mod: MA

## 2022-01-01 PROCEDURE — 85732 THROMBOPLASTIN TIME PARTIAL: CPT

## 2022-01-01 PROCEDURE — 36620 INSERTION CATHETER ARTERY: CPT

## 2022-01-01 PROCEDURE — 93970 EXTREMITY STUDY: CPT | Mod: 26

## 2022-01-01 PROCEDURE — 85379 FIBRIN DEGRADATION QUANT: CPT

## 2022-01-01 PROCEDURE — 82570 ASSAY OF URINE CREATININE: CPT

## 2022-01-01 PROCEDURE — 77002 NEEDLE LOCALIZATION BY XRAY: CPT | Mod: 26

## 2022-01-01 PROCEDURE — 80202 ASSAY OF VANCOMYCIN: CPT

## 2022-01-01 PROCEDURE — 99221 1ST HOSP IP/OBS SF/LOW 40: CPT

## 2022-01-01 PROCEDURE — 77002 NEEDLE LOCALIZATION BY XRAY: CPT

## 2022-01-01 PROCEDURE — 84300 ASSAY OF URINE SODIUM: CPT

## 2022-01-01 PROCEDURE — 85270 CLOT FACTOR XI PTA: CPT

## 2022-01-01 PROCEDURE — 87075 CULTR BACTERIA EXCEPT BLOOD: CPT

## 2022-01-01 PROCEDURE — 84466 ASSAY OF TRANSFERRIN: CPT

## 2022-01-01 PROCEDURE — 80074 ACUTE HEPATITIS PANEL: CPT

## 2022-01-01 PROCEDURE — 99497 ADVNCD CARE PLAN 30 MIN: CPT | Mod: 25

## 2022-01-01 PROCEDURE — 88341 IMHCHEM/IMCYTCHM EA ADD ANTB: CPT | Mod: 26,59

## 2022-01-01 PROCEDURE — 99222 1ST HOSP IP/OBS MODERATE 55: CPT

## 2022-01-01 PROCEDURE — 97161 PT EVAL LOW COMPLEX 20 MIN: CPT

## 2022-01-01 PROCEDURE — 85384 FIBRINOGEN ACTIVITY: CPT

## 2022-01-01 PROCEDURE — 85246 CLOT FACTOR VIII VW ANTIGEN: CPT

## 2022-01-01 RX ORDER — DIPHENHYDRAMINE HCL 50 MG
50 CAPSULE ORAL ONCE
Refills: 0 | Status: COMPLETED | OUTPATIENT
Start: 2022-01-01 | End: 2022-01-01

## 2022-01-01 RX ORDER — LEVOTHYROXINE SODIUM 125 MCG
25 TABLET ORAL DAILY
Refills: 0 | Status: DISCONTINUED | OUTPATIENT
Start: 2022-01-01 | End: 2022-01-01

## 2022-01-01 RX ORDER — SODIUM CHLORIDE 9 MG/ML
1000 INJECTION INTRAMUSCULAR; INTRAVENOUS; SUBCUTANEOUS
Refills: 0 | Status: DISCONTINUED | OUTPATIENT
Start: 2022-01-01 | End: 2022-01-01

## 2022-01-01 RX ORDER — FUROSEMIDE 40 MG
20 TABLET ORAL EVERY 24 HOURS
Refills: 0 | Status: DISCONTINUED | OUTPATIENT
Start: 2022-01-01 | End: 2022-01-01

## 2022-01-01 RX ORDER — LEVOTHYROXINE SODIUM 125 MCG
25 TABLET ORAL EVERY 24 HOURS
Refills: 0 | Status: DISCONTINUED | OUTPATIENT
Start: 2022-01-01 | End: 2022-01-01

## 2022-01-01 RX ORDER — MEMANTINE HYDROCHLORIDE 10 MG/1
5 TABLET ORAL EVERY 24 HOURS
Refills: 0 | Status: DISCONTINUED | OUTPATIENT
Start: 2022-01-01 | End: 2022-01-01

## 2022-01-01 RX ORDER — ZOLPIDEM TARTRATE 10 MG/1
5 TABLET ORAL AT BEDTIME
Refills: 0 | Status: DISCONTINUED | OUTPATIENT
Start: 2022-01-01 | End: 2022-01-01

## 2022-01-01 RX ORDER — PANTOPRAZOLE SODIUM 20 MG/1
40 TABLET, DELAYED RELEASE ORAL
Refills: 0 | Status: DISCONTINUED | OUTPATIENT
Start: 2022-01-01 | End: 2022-01-01

## 2022-01-01 RX ORDER — FUROSEMIDE 40 MG
0 TABLET ORAL
Qty: 0 | Refills: 0 | DISCHARGE

## 2022-01-01 RX ORDER — FUROSEMIDE 40 MG
1 TABLET ORAL
Qty: 0 | Refills: 0 | DISCHARGE

## 2022-01-01 RX ORDER — FUROSEMIDE 40 MG
20 TABLET ORAL ONCE
Refills: 0 | Status: COMPLETED | OUTPATIENT
Start: 2022-01-01 | End: 2022-01-01

## 2022-01-01 RX ORDER — PHENYLEPHRINE HYDROCHLORIDE 10 MG/ML
0.1 INJECTION INTRAVENOUS
Qty: 160 | Refills: 0 | Status: DISCONTINUED | OUTPATIENT
Start: 2022-01-01 | End: 2022-01-01

## 2022-01-01 RX ORDER — ESZOPICLONE 2 MG/1
1 TABLET, COATED ORAL
Qty: 0 | Refills: 0 | DISCHARGE

## 2022-01-01 RX ORDER — SODIUM CHLORIDE 9 MG/ML
1000 INJECTION, SOLUTION INTRAVENOUS
Refills: 0 | Status: DISCONTINUED | OUTPATIENT
Start: 2022-01-01 | End: 2022-01-01

## 2022-01-01 RX ORDER — DONEPEZIL HYDROCHLORIDE 10 MG/1
1 TABLET, FILM COATED ORAL
Qty: 0 | Refills: 0 | DISCHARGE

## 2022-01-01 RX ORDER — TAMSULOSIN HYDROCHLORIDE 0.4 MG/1
0.4 CAPSULE ORAL AT BEDTIME
Refills: 0 | Status: DISCONTINUED | OUTPATIENT
Start: 2022-01-01 | End: 2022-01-01

## 2022-01-01 RX ORDER — LEVOTHYROXINE SODIUM 125 MCG
1 TABLET ORAL
Qty: 0 | Refills: 0 | DISCHARGE

## 2022-01-01 RX ORDER — ZALEPLON 10 MG
5 CAPSULE ORAL AT BEDTIME
Refills: 0 | Status: DISCONTINUED | OUTPATIENT
Start: 2022-01-01 | End: 2022-01-01

## 2022-01-01 RX ORDER — VANCOMYCIN HCL 1 G
750 VIAL (EA) INTRAVENOUS EVERY 24 HOURS
Refills: 0 | Status: DISCONTINUED | OUTPATIENT
Start: 2022-01-01 | End: 2022-01-01

## 2022-01-01 RX ORDER — SENNA PLUS 8.6 MG/1
2 TABLET ORAL AT BEDTIME
Refills: 0 | Status: DISCONTINUED | OUTPATIENT
Start: 2022-01-01 | End: 2022-01-01

## 2022-01-01 RX ORDER — UBIDECARENONE 100 MG
1 CAPSULE ORAL
Qty: 0 | Refills: 0 | DISCHARGE

## 2022-01-01 RX ORDER — ALBUMIN HUMAN 25 %
250 VIAL (ML) INTRAVENOUS ONCE
Refills: 0 | Status: COMPLETED | OUTPATIENT
Start: 2022-01-01 | End: 2022-01-01

## 2022-01-01 RX ORDER — CYCLOPHOSPHAMIDE 100 MG
1 VIAL (EA) INTRAVENOUS
Qty: 30 | Refills: 0
Start: 2022-01-01 | End: 2022-01-01

## 2022-01-01 RX ORDER — SODIUM BICARBONATE 1 MEQ/ML
0.12 SYRINGE (ML) INTRAVENOUS
Qty: 150 | Refills: 0 | Status: DISCONTINUED | OUTPATIENT
Start: 2022-01-01 | End: 2022-01-01

## 2022-01-01 RX ORDER — OXYCODONE HYDROCHLORIDE 5 MG/1
10 TABLET ORAL EVERY 6 HOURS
Refills: 0 | Status: DISCONTINUED | OUTPATIENT
Start: 2022-01-01 | End: 2022-01-01

## 2022-01-01 RX ORDER — METHYLCELLULOSE 500 MG
1 TABLET ORAL
Qty: 0 | Refills: 0 | DISCHARGE

## 2022-01-01 RX ORDER — ACETAMINOPHEN 500 MG
650 TABLET ORAL EVERY 6 HOURS
Refills: 0 | Status: DISCONTINUED | OUTPATIENT
Start: 2022-01-01 | End: 2022-01-01

## 2022-01-01 RX ORDER — ONDANSETRON 8 MG/1
4 TABLET, FILM COATED ORAL THREE TIMES A DAY
Refills: 0 | Status: DISCONTINUED | OUTPATIENT
Start: 2022-01-01 | End: 2022-01-01

## 2022-01-01 RX ORDER — VANCOMYCIN HCL 1 G
VIAL (EA) INTRAVENOUS
Refills: 0 | Status: DISCONTINUED | OUTPATIENT
Start: 2022-01-01 | End: 2022-01-01

## 2022-01-01 RX ORDER — ASCORBIC ACID 60 MG
500 TABLET,CHEWABLE ORAL DAILY
Refills: 0 | Status: DISCONTINUED | OUTPATIENT
Start: 2022-01-01 | End: 2022-01-01

## 2022-01-01 RX ORDER — FOLIC ACID 0.8 MG
1 TABLET ORAL EVERY 24 HOURS
Refills: 0 | Status: DISCONTINUED | OUTPATIENT
Start: 2022-01-01 | End: 2022-01-01

## 2022-01-01 RX ORDER — DOPAMINE HYDROCHLORIDE 40 MG/ML
2 INJECTION, SOLUTION, CONCENTRATE INTRAVENOUS
Qty: 400 | Refills: 0 | Status: DISCONTINUED | OUTPATIENT
Start: 2022-01-01 | End: 2022-01-01

## 2022-01-01 RX ORDER — FUROSEMIDE 40 MG
80 TABLET ORAL EVERY 12 HOURS
Refills: 0 | Status: DISCONTINUED | OUTPATIENT
Start: 2022-01-01 | End: 2022-01-01

## 2022-01-01 RX ORDER — RITUXIMAB 10 MG/ML
620 INJECTION, SOLUTION INTRAVENOUS ONCE
Refills: 0 | Status: COMPLETED | OUTPATIENT
Start: 2022-01-01 | End: 2022-01-01

## 2022-01-01 RX ORDER — CHOLECALCIFEROL (VITAMIN D3) 125 MCG
1 CAPSULE ORAL
Qty: 0 | Refills: 0 | DISCHARGE

## 2022-01-01 RX ORDER — VASOPRESSIN 20 [USP'U]/ML
0.02 INJECTION INTRAVENOUS
Qty: 50 | Refills: 0 | Status: DISCONTINUED | OUTPATIENT
Start: 2022-01-01 | End: 2022-01-01

## 2022-01-01 RX ORDER — HEPARIN SODIUM 5000 [USP'U]/ML
5000 INJECTION INTRAVENOUS; SUBCUTANEOUS EVERY 8 HOURS
Refills: 0 | Status: DISCONTINUED | OUTPATIENT
Start: 2022-01-01 | End: 2022-01-01

## 2022-01-01 RX ORDER — ACETAMINOPHEN 500 MG
1 TABLET ORAL
Qty: 0 | Refills: 0 | DISCHARGE

## 2022-01-01 RX ORDER — MORPHINE SULFATE 50 MG/1
2 CAPSULE, EXTENDED RELEASE ORAL EVERY 6 HOURS
Refills: 0 | Status: DISCONTINUED | OUTPATIENT
Start: 2022-01-01 | End: 2022-01-01

## 2022-01-01 RX ORDER — ACETAMINOPHEN 500 MG
650 TABLET ORAL ONCE
Refills: 0 | Status: COMPLETED | OUTPATIENT
Start: 2022-01-01 | End: 2022-01-01

## 2022-01-01 RX ORDER — ATORVASTATIN CALCIUM 80 MG/1
20 TABLET, FILM COATED ORAL AT BEDTIME
Refills: 0 | Status: DISCONTINUED | OUTPATIENT
Start: 2022-01-01 | End: 2022-01-01

## 2022-01-01 RX ORDER — FUROSEMIDE 40 MG
60 TABLET ORAL ONCE
Refills: 0 | Status: COMPLETED | OUTPATIENT
Start: 2022-01-01 | End: 2022-01-01

## 2022-01-01 RX ORDER — LANOLIN ALCOHOL/MO/W.PET/CERES
5 CREAM (GRAM) TOPICAL AT BEDTIME
Refills: 0 | Status: DISCONTINUED | OUTPATIENT
Start: 2022-01-01 | End: 2022-01-01

## 2022-01-01 RX ORDER — SODIUM CHLORIDE 9 MG/ML
500 INJECTION INTRAMUSCULAR; INTRAVENOUS; SUBCUTANEOUS ONCE
Refills: 0 | Status: COMPLETED | OUTPATIENT
Start: 2022-01-01 | End: 2022-01-01

## 2022-01-01 RX ORDER — FILGRASTIM 480MCG/1.6
300 VIAL (ML) INJECTION DAILY
Refills: 0 | Status: DISCONTINUED | OUTPATIENT
Start: 2022-01-01 | End: 2022-01-01

## 2022-01-01 RX ORDER — SODIUM BICARBONATE 1 MEQ/ML
150 SYRINGE (ML) INTRAVENOUS ONCE
Refills: 0 | Status: COMPLETED | OUTPATIENT
Start: 2022-01-01 | End: 2022-01-01

## 2022-01-01 RX ORDER — DONEPEZIL HYDROCHLORIDE 10 MG/1
10 TABLET, FILM COATED ORAL AT BEDTIME
Refills: 0 | Status: DISCONTINUED | OUTPATIENT
Start: 2022-01-01 | End: 2022-01-01

## 2022-01-01 RX ORDER — ALLOPURINOL 300 MG
1 TABLET ORAL
Qty: 0 | Refills: 0 | DISCHARGE

## 2022-01-01 RX ORDER — DOCUSATE SODIUM 100 MG
1 CAPSULE ORAL
Qty: 0 | Refills: 0 | DISCHARGE

## 2022-01-01 RX ORDER — HEPARIN SODIUM 5000 [USP'U]/ML
5000 INJECTION INTRAVENOUS; SUBCUTANEOUS EVERY 12 HOURS
Refills: 0 | Status: DISCONTINUED | OUTPATIENT
Start: 2022-01-01 | End: 2022-01-01

## 2022-01-01 RX ORDER — TAMSULOSIN HYDROCHLORIDE 0.4 MG/1
1 CAPSULE ORAL
Qty: 0 | Refills: 0 | DISCHARGE

## 2022-01-01 RX ORDER — FUROSEMIDE 40 MG
20 TABLET ORAL ONCE
Refills: 0 | Status: DISCONTINUED | OUTPATIENT
Start: 2022-01-01 | End: 2022-01-01

## 2022-01-01 RX ORDER — SODIUM BICARBONATE 1 MEQ/ML
100 SYRINGE (ML) INTRAVENOUS ONCE
Refills: 0 | Status: COMPLETED | OUTPATIENT
Start: 2022-01-01 | End: 2022-01-01

## 2022-01-01 RX ORDER — ONDANSETRON 8 MG/1
0 TABLET, FILM COATED ORAL
Qty: 0 | Refills: 0 | DISCHARGE

## 2022-01-01 RX ORDER — CHOLECALCIFEROL (VITAMIN D3) 125 MCG
1000 CAPSULE ORAL EVERY 24 HOURS
Refills: 0 | Status: DISCONTINUED | OUTPATIENT
Start: 2022-01-01 | End: 2022-01-01

## 2022-01-01 RX ORDER — FOLIC ACID 0.8 MG
1 TABLET ORAL DAILY
Refills: 0 | Status: DISCONTINUED | OUTPATIENT
Start: 2022-01-01 | End: 2022-01-01

## 2022-01-01 RX ORDER — VANCOMYCIN HCL 1 G
1000 VIAL (EA) INTRAVENOUS ONCE
Refills: 0 | Status: COMPLETED | OUTPATIENT
Start: 2022-01-01 | End: 2022-01-01

## 2022-01-01 RX ORDER — ALLOPURINOL 300 MG
100 TABLET ORAL DAILY
Refills: 0 | Status: DISCONTINUED | OUTPATIENT
Start: 2022-01-01 | End: 2022-01-01

## 2022-01-01 RX ORDER — TRAZODONE HCL 50 MG
0.5 TABLET ORAL
Qty: 0 | Refills: 0 | DISCHARGE

## 2022-01-01 RX ORDER — OXYCODONE AND ACETAMINOPHEN 5; 325 MG/1; MG/1
1 TABLET ORAL EVERY 4 HOURS
Refills: 0 | Status: DISCONTINUED | OUTPATIENT
Start: 2022-01-01 | End: 2022-01-01

## 2022-01-01 RX ORDER — CYCLOPHOSPHAMIDE 100 MG
1.5 VIAL (EA) INTRAVENOUS
Qty: 45 | Refills: 0
Start: 2022-01-01 | End: 2022-01-01

## 2022-01-01 RX ORDER — METOPROLOL TARTRATE 50 MG
1 TABLET ORAL
Qty: 0 | Refills: 0 | DISCHARGE

## 2022-01-01 RX ORDER — ACETAMINOPHEN 500 MG
2 TABLET ORAL
Qty: 0 | Refills: 0 | DISCHARGE
Start: 2022-01-01

## 2022-01-01 RX ORDER — CEFEPIME 1 G/1
2000 INJECTION, POWDER, FOR SOLUTION INTRAMUSCULAR; INTRAVENOUS EVERY 24 HOURS
Refills: 0 | Status: DISCONTINUED | OUTPATIENT
Start: 2022-01-01 | End: 2022-01-01

## 2022-01-01 RX ORDER — MORPHINE SULFATE 50 MG/1
2 CAPSULE, EXTENDED RELEASE ORAL
Refills: 0 | Status: DISCONTINUED | OUTPATIENT
Start: 2022-01-01 | End: 2022-01-01

## 2022-01-01 RX ORDER — TRAMADOL HYDROCHLORIDE 50 MG/1
25 TABLET ORAL EVERY 6 HOURS
Refills: 0 | Status: DISCONTINUED | OUTPATIENT
Start: 2022-01-01 | End: 2022-01-01

## 2022-01-01 RX ORDER — IPRATROPIUM/ALBUTEROL SULFATE 18-103MCG
3 AEROSOL WITH ADAPTER (GRAM) INHALATION ONCE
Refills: 0 | Status: COMPLETED | OUTPATIENT
Start: 2022-01-01 | End: 2022-01-01

## 2022-01-01 RX ORDER — MIDODRINE HYDROCHLORIDE 2.5 MG/1
5 TABLET ORAL THREE TIMES A DAY
Refills: 0 | Status: DISCONTINUED | OUTPATIENT
Start: 2022-01-01 | End: 2022-01-01

## 2022-01-01 RX ORDER — SODIUM CHLORIDE 9 MG/ML
500 INJECTION, SOLUTION INTRAVENOUS ONCE
Refills: 0 | Status: COMPLETED | OUTPATIENT
Start: 2022-01-01 | End: 2022-01-01

## 2022-01-01 RX ORDER — PHENYLEPHRINE HYDROCHLORIDE 10 MG/ML
0.1 INJECTION INTRAVENOUS
Qty: 40 | Refills: 0 | Status: DISCONTINUED | OUTPATIENT
Start: 2022-01-01 | End: 2022-01-01

## 2022-01-01 RX ORDER — TRAZODONE HCL 50 MG
100 TABLET ORAL AT BEDTIME
Refills: 0 | Status: DISCONTINUED | OUTPATIENT
Start: 2022-01-01 | End: 2022-01-01

## 2022-01-01 RX ORDER — SODIUM ZIRCONIUM CYCLOSILICATE 10 G/10G
10 POWDER, FOR SUSPENSION ORAL ONCE
Refills: 0 | Status: COMPLETED | OUTPATIENT
Start: 2022-01-01 | End: 2022-01-01

## 2022-01-01 RX ORDER — SODIUM CHLORIDE 9 MG/ML
1000 INJECTION INTRAMUSCULAR; INTRAVENOUS; SUBCUTANEOUS ONCE
Refills: 0 | Status: DISCONTINUED | OUTPATIENT
Start: 2022-01-01 | End: 2022-01-01

## 2022-01-01 RX ORDER — MAGNESIUM SULFATE 500 MG/ML
1 VIAL (ML) INJECTION ONCE
Refills: 0 | Status: COMPLETED | OUTPATIENT
Start: 2022-01-01 | End: 2022-01-01

## 2022-01-01 RX ORDER — OMEPRAZOLE 10 MG/1
1 CAPSULE, DELAYED RELEASE ORAL
Qty: 0 | Refills: 0 | DISCHARGE

## 2022-01-01 RX ORDER — NOREPINEPHRINE BITARTRATE/D5W 8 MG/250ML
0.05 PLASTIC BAG, INJECTION (ML) INTRAVENOUS
Qty: 16 | Refills: 0 | Status: DISCONTINUED | OUTPATIENT
Start: 2022-01-01 | End: 2022-01-01

## 2022-01-01 RX ORDER — PANTOPRAZOLE SODIUM 20 MG/1
8 TABLET, DELAYED RELEASE ORAL
Qty: 80 | Refills: 0 | Status: DISCONTINUED | OUTPATIENT
Start: 2022-01-01 | End: 2022-01-01

## 2022-01-01 RX ORDER — IMMUNE GLOBULIN (HUMAN) 10 G/100ML
60 INJECTION INTRAVENOUS; SUBCUTANEOUS DAILY
Refills: 0 | Status: DISCONTINUED | OUTPATIENT
Start: 2022-01-01 | End: 2022-01-01

## 2022-01-01 RX ORDER — METOCLOPRAMIDE HCL 10 MG
5 TABLET ORAL ONCE
Refills: 0 | Status: COMPLETED | OUTPATIENT
Start: 2022-01-01 | End: 2022-01-01

## 2022-01-01 RX ORDER — OMEPRAZOLE 10 MG/1
0 CAPSULE, DELAYED RELEASE ORAL
Qty: 0 | Refills: 0 | DISCHARGE

## 2022-01-01 RX ORDER — MAGNESIUM SULFATE 500 MG/ML
2 VIAL (ML) INJECTION ONCE
Refills: 0 | Status: COMPLETED | OUTPATIENT
Start: 2022-01-01 | End: 2022-01-01

## 2022-01-01 RX ORDER — SALICYLIC ACID 0.5 %
1 CLEANSER (GRAM) TOPICAL DAILY
Refills: 0 | Status: DISCONTINUED | OUTPATIENT
Start: 2022-01-01 | End: 2022-01-01

## 2022-01-01 RX ORDER — IPRATROPIUM/ALBUTEROL SULFATE 18-103MCG
3 AEROSOL WITH ADAPTER (GRAM) INHALATION EVERY 6 HOURS
Refills: 0 | Status: DISCONTINUED | OUTPATIENT
Start: 2022-01-01 | End: 2022-01-01

## 2022-01-01 RX ORDER — MIDAZOLAM HYDROCHLORIDE 1 MG/ML
0.02 INJECTION, SOLUTION INTRAMUSCULAR; INTRAVENOUS
Qty: 100 | Refills: 0 | Status: DISCONTINUED | OUTPATIENT
Start: 2022-01-01 | End: 2022-01-01

## 2022-01-01 RX ORDER — ONDANSETRON 8 MG/1
1 TABLET, FILM COATED ORAL
Qty: 30 | Refills: 0
Start: 2022-01-01 | End: 2022-01-01

## 2022-01-01 RX ORDER — FUROSEMIDE 40 MG
0.5 TABLET ORAL
Qty: 0 | Refills: 0 | DISCHARGE

## 2022-01-01 RX ORDER — TRAZODONE HCL 50 MG
1 TABLET ORAL
Qty: 0 | Refills: 0 | DISCHARGE

## 2022-01-01 RX ORDER — POLYETHYLENE GLYCOL 3350 17 G/17G
17 POWDER, FOR SOLUTION ORAL DAILY
Refills: 0 | Status: DISCONTINUED | OUTPATIENT
Start: 2022-01-01 | End: 2022-01-01

## 2022-01-01 RX ORDER — POTASSIUM CHLORIDE 20 MEQ
20 PACKET (EA) ORAL ONCE
Refills: 0 | Status: COMPLETED | OUTPATIENT
Start: 2022-01-01 | End: 2022-01-01

## 2022-01-01 RX ORDER — ZINC SULFATE TAB 220 MG (50 MG ZINC EQUIVALENT) 220 (50 ZN) MG
220 TAB ORAL DAILY
Refills: 0 | Status: DISCONTINUED | OUTPATIENT
Start: 2022-01-01 | End: 2022-01-01

## 2022-01-01 RX ORDER — DONEPEZIL HYDROCHLORIDE 10 MG/1
5 TABLET, FILM COATED ORAL
Refills: 0 | Status: DISCONTINUED | OUTPATIENT
Start: 2022-01-01 | End: 2022-01-01

## 2022-01-01 RX ORDER — MIDODRINE HYDROCHLORIDE 2.5 MG/1
10 TABLET ORAL EVERY 8 HOURS
Refills: 0 | Status: DISCONTINUED | OUTPATIENT
Start: 2022-01-01 | End: 2022-01-01

## 2022-01-01 RX ORDER — CEFEPIME 1 G/1
2000 INJECTION, POWDER, FOR SOLUTION INTRAMUSCULAR; INTRAVENOUS EVERY 24 HOURS
Refills: 0 | Status: COMPLETED | OUTPATIENT
Start: 2022-01-01 | End: 2022-01-01

## 2022-01-01 RX ORDER — ONDANSETRON 8 MG/1
1 TABLET, FILM COATED ORAL
Qty: 0 | Refills: 0 | DISCHARGE

## 2022-01-01 RX ORDER — DONEPEZIL HYDROCHLORIDE 10 MG/1
10 TABLET, FILM COATED ORAL
Qty: 0 | Refills: 0 | DISCHARGE

## 2022-01-01 RX ORDER — MEMANTINE HYDROCHLORIDE 10 MG/1
1 TABLET ORAL
Qty: 0 | Refills: 0 | DISCHARGE

## 2022-01-01 RX ORDER — ALUMINUM ZIRCONIUM TRICHLOROHYDREX GLY 0.2 G/G
1 STICK TOPICAL
Qty: 0 | Refills: 0 | DISCHARGE

## 2022-01-01 RX ADMIN — Medication 4: at 22:18

## 2022-01-01 RX ADMIN — ATORVASTATIN CALCIUM 20 MILLIGRAM(S): 80 TABLET, FILM COATED ORAL at 22:50

## 2022-01-01 RX ADMIN — IMMUNE GLOBULIN (HUMAN) 85.71 GRAM(S): 10 INJECTION INTRAVENOUS; SUBCUTANEOUS at 20:32

## 2022-01-01 RX ADMIN — Medication 1000 UNIT(S): at 12:49

## 2022-01-01 RX ADMIN — Medication 3 MILLILITER(S): at 01:02

## 2022-01-01 RX ADMIN — ZINC SULFATE TAB 220 MG (50 MG ZINC EQUIVALENT) 220 MILLIGRAM(S): 220 (50 ZN) TAB at 11:16

## 2022-01-01 RX ADMIN — Medication 1 MILLIGRAM(S): at 11:31

## 2022-01-01 RX ADMIN — TAMSULOSIN HYDROCHLORIDE 0.4 MILLIGRAM(S): 0.4 CAPSULE ORAL at 22:40

## 2022-01-01 RX ADMIN — Medication 1000 UNIT(S): at 06:22

## 2022-01-01 RX ADMIN — Medication 1000 UNIT(S): at 06:04

## 2022-01-01 RX ADMIN — Medication 1000 UNIT(S): at 11:17

## 2022-01-01 RX ADMIN — Medication 60 MILLIGRAM(S): at 12:48

## 2022-01-01 RX ADMIN — ATORVASTATIN CALCIUM 20 MILLIGRAM(S): 80 TABLET, FILM COATED ORAL at 22:40

## 2022-01-01 RX ADMIN — Medication 2: at 12:35

## 2022-01-01 RX ADMIN — ZOLPIDEM TARTRATE 5 MILLIGRAM(S): 10 TABLET ORAL at 22:19

## 2022-01-01 RX ADMIN — Medication 650 MILLIGRAM(S): at 14:25

## 2022-01-01 RX ADMIN — ONDANSETRON 224 MILLIGRAM(S): 8 TABLET, FILM COATED ORAL at 11:18

## 2022-01-01 RX ADMIN — ATORVASTATIN CALCIUM 20 MILLIGRAM(S): 80 TABLET, FILM COATED ORAL at 22:25

## 2022-01-01 RX ADMIN — Medication 5 MILLIGRAM(S): at 22:30

## 2022-01-01 RX ADMIN — TAMSULOSIN HYDROCHLORIDE 0.4 MILLIGRAM(S): 0.4 CAPSULE ORAL at 23:49

## 2022-01-01 RX ADMIN — SODIUM CHLORIDE 1000 MILLILITER(S): 9 INJECTION INTRAMUSCULAR; INTRAVENOUS; SUBCUTANEOUS at 07:05

## 2022-01-01 RX ADMIN — TAMSULOSIN HYDROCHLORIDE 0.4 MILLIGRAM(S): 0.4 CAPSULE ORAL at 21:49

## 2022-01-01 RX ADMIN — Medication 1 MILLIGRAM(S): at 12:59

## 2022-01-01 RX ADMIN — Medication 2: at 12:38

## 2022-01-01 RX ADMIN — Medication 650 MILLIGRAM(S): at 14:55

## 2022-01-01 RX ADMIN — Medication 75 MILLIGRAM(S): at 13:18

## 2022-01-01 RX ADMIN — ATORVASTATIN CALCIUM 20 MILLIGRAM(S): 80 TABLET, FILM COATED ORAL at 21:28

## 2022-01-01 RX ADMIN — Medication 100 MILLIGRAM(S): at 14:33

## 2022-01-01 RX ADMIN — Medication 25 MILLIGRAM(S): at 06:08

## 2022-01-01 RX ADMIN — Medication 100 MILLIGRAM(S): at 11:46

## 2022-01-01 RX ADMIN — Medication 83.33 MILLILITER(S): at 17:27

## 2022-01-01 RX ADMIN — Medication 1000 UNIT(S): at 05:41

## 2022-01-01 RX ADMIN — Medication 2: at 21:54

## 2022-01-01 RX ADMIN — Medication 1000 UNIT(S): at 06:01

## 2022-01-01 RX ADMIN — DONEPEZIL HYDROCHLORIDE 5 MILLIGRAM(S): 10 TABLET, FILM COATED ORAL at 21:54

## 2022-01-01 RX ADMIN — TAMSULOSIN HYDROCHLORIDE 0.4 MILLIGRAM(S): 0.4 CAPSULE ORAL at 22:15

## 2022-01-01 RX ADMIN — Medication 1 MILLIGRAM(S): at 06:28

## 2022-01-01 RX ADMIN — MIDODRINE HYDROCHLORIDE 10 MILLIGRAM(S): 2.5 TABLET ORAL at 15:41

## 2022-01-01 RX ADMIN — MEMANTINE HYDROCHLORIDE 5 MILLIGRAM(S): 10 TABLET ORAL at 06:25

## 2022-01-01 RX ADMIN — MEMANTINE HYDROCHLORIDE 5 MILLIGRAM(S): 10 TABLET ORAL at 06:39

## 2022-01-01 RX ADMIN — SODIUM CHLORIDE 100 MILLILITER(S): 9 INJECTION INTRAMUSCULAR; INTRAVENOUS; SUBCUTANEOUS at 03:00

## 2022-01-01 RX ADMIN — ZINC SULFATE TAB 220 MG (50 MG ZINC EQUIVALENT) 220 MILLIGRAM(S): 220 (50 ZN) TAB at 11:46

## 2022-01-01 RX ADMIN — PANTOPRAZOLE SODIUM 40 MILLIGRAM(S): 20 TABLET, DELAYED RELEASE ORAL at 06:21

## 2022-01-01 RX ADMIN — Medication 25 MICROGRAM(S): at 06:45

## 2022-01-01 RX ADMIN — DONEPEZIL HYDROCHLORIDE 5 MILLIGRAM(S): 10 TABLET, FILM COATED ORAL at 06:28

## 2022-01-01 RX ADMIN — Medication 25 MILLIGRAM(S): at 21:16

## 2022-01-01 RX ADMIN — MORPHINE SULFATE 2 MILLIGRAM(S): 50 CAPSULE, EXTENDED RELEASE ORAL at 12:16

## 2022-01-01 RX ADMIN — Medication 25 MICROGRAM(S): at 06:21

## 2022-01-01 RX ADMIN — TAMSULOSIN HYDROCHLORIDE 0.4 MILLIGRAM(S): 0.4 CAPSULE ORAL at 22:24

## 2022-01-01 RX ADMIN — TRAMADOL HYDROCHLORIDE 25 MILLIGRAM(S): 50 TABLET ORAL at 11:17

## 2022-01-01 RX ADMIN — Medication 100 MILLIGRAM(S): at 21:25

## 2022-01-01 RX ADMIN — DONEPEZIL HYDROCHLORIDE 5 MILLIGRAM(S): 10 TABLET, FILM COATED ORAL at 18:01

## 2022-01-01 RX ADMIN — PANTOPRAZOLE SODIUM 40 MILLIGRAM(S): 20 TABLET, DELAYED RELEASE ORAL at 06:54

## 2022-01-01 RX ADMIN — ATORVASTATIN CALCIUM 20 MILLIGRAM(S): 80 TABLET, FILM COATED ORAL at 21:53

## 2022-01-01 RX ADMIN — PANTOPRAZOLE SODIUM 40 MILLIGRAM(S): 20 TABLET, DELAYED RELEASE ORAL at 06:22

## 2022-01-01 RX ADMIN — ZINC SULFATE TAB 220 MG (50 MG ZINC EQUIVALENT) 220 MILLIGRAM(S): 220 (50 ZN) TAB at 14:33

## 2022-01-01 RX ADMIN — Medication 5 MILLIGRAM(S): at 23:42

## 2022-01-01 RX ADMIN — Medication 500 MILLIGRAM(S): at 11:53

## 2022-01-01 RX ADMIN — Medication 25 MICROGRAM(S): at 05:50

## 2022-01-01 RX ADMIN — Medication 25 MICROGRAM(S): at 06:01

## 2022-01-01 RX ADMIN — MORPHINE SULFATE 2 MILLIGRAM(S): 50 CAPSULE, EXTENDED RELEASE ORAL at 10:32

## 2022-01-01 RX ADMIN — POLYETHYLENE GLYCOL 3350 17 GRAM(S): 17 POWDER, FOR SOLUTION ORAL at 11:54

## 2022-01-01 RX ADMIN — Medication 300 MICROGRAM(S): at 11:45

## 2022-01-01 RX ADMIN — Medication 25 MICROGRAM(S): at 06:08

## 2022-01-01 RX ADMIN — DONEPEZIL HYDROCHLORIDE 5 MILLIGRAM(S): 10 TABLET, FILM COATED ORAL at 20:28

## 2022-01-01 RX ADMIN — HEPARIN SODIUM 5000 UNIT(S): 5000 INJECTION INTRAVENOUS; SUBCUTANEOUS at 21:33

## 2022-01-01 RX ADMIN — SODIUM CHLORIDE 100 MILLILITER(S): 9 INJECTION INTRAMUSCULAR; INTRAVENOUS; SUBCUTANEOUS at 12:06

## 2022-01-01 RX ADMIN — DONEPEZIL HYDROCHLORIDE 10 MILLIGRAM(S): 10 TABLET, FILM COATED ORAL at 21:33

## 2022-01-01 RX ADMIN — Medication 20 MILLIEQUIVALENT(S): at 16:15

## 2022-01-01 RX ADMIN — DONEPEZIL HYDROCHLORIDE 5 MILLIGRAM(S): 10 TABLET, FILM COATED ORAL at 21:16

## 2022-01-01 RX ADMIN — MEMANTINE HYDROCHLORIDE 5 MILLIGRAM(S): 10 TABLET ORAL at 11:53

## 2022-01-01 RX ADMIN — Medication 500 MILLIGRAM(S): at 13:37

## 2022-01-01 RX ADMIN — Medication 25 MICROGRAM(S): at 05:13

## 2022-01-01 RX ADMIN — Medication 500 MILLIGRAM(S): at 13:20

## 2022-01-01 RX ADMIN — Medication 20 MILLIGRAM(S): at 06:28

## 2022-01-01 RX ADMIN — Medication 2: at 22:08

## 2022-01-01 RX ADMIN — POLYETHYLENE GLYCOL 3350 17 GRAM(S): 17 POWDER, FOR SOLUTION ORAL at 19:44

## 2022-01-01 RX ADMIN — Medication 1 MILLIGRAM(S): at 12:49

## 2022-01-01 RX ADMIN — Medication 100 MILLIGRAM(S): at 21:29

## 2022-01-01 RX ADMIN — ONDANSETRON 224 MILLIGRAM(S): 8 TABLET, FILM COATED ORAL at 11:33

## 2022-01-01 RX ADMIN — Medication 2: at 17:57

## 2022-01-01 RX ADMIN — ATORVASTATIN CALCIUM 20 MILLIGRAM(S): 80 TABLET, FILM COATED ORAL at 21:25

## 2022-01-01 RX ADMIN — Medication 5 MILLIGRAM(S): at 23:34

## 2022-01-01 RX ADMIN — TRAMADOL HYDROCHLORIDE 25 MILLIGRAM(S): 50 TABLET ORAL at 11:51

## 2022-01-01 RX ADMIN — Medication 1000 UNIT(S): at 11:52

## 2022-01-01 RX ADMIN — Medication 1 MILLIGRAM(S): at 06:39

## 2022-01-01 RX ADMIN — OXYCODONE HYDROCHLORIDE 10 MILLIGRAM(S): 5 TABLET ORAL at 00:46

## 2022-01-01 RX ADMIN — ATORVASTATIN CALCIUM 20 MILLIGRAM(S): 80 TABLET, FILM COATED ORAL at 22:14

## 2022-01-01 RX ADMIN — HEPARIN SODIUM 5000 UNIT(S): 5000 INJECTION INTRAVENOUS; SUBCUTANEOUS at 05:13

## 2022-01-01 RX ADMIN — Medication 100 MILLIGRAM(S): at 22:40

## 2022-01-01 RX ADMIN — Medication 25 GRAM(S): at 11:17

## 2022-01-01 RX ADMIN — TAMSULOSIN HYDROCHLORIDE 0.4 MILLIGRAM(S): 0.4 CAPSULE ORAL at 22:51

## 2022-01-01 RX ADMIN — SENNA PLUS 1 TABLET(S): 8.6 TABLET ORAL at 01:19

## 2022-01-01 RX ADMIN — Medication 25 MILLIGRAM(S): at 21:18

## 2022-01-01 RX ADMIN — Medication 2: at 17:30

## 2022-01-01 RX ADMIN — ZINC SULFATE TAB 220 MG (50 MG ZINC EQUIVALENT) 220 MILLIGRAM(S): 220 (50 ZN) TAB at 11:36

## 2022-01-01 RX ADMIN — Medication 25 MICROGRAM(S): at 06:25

## 2022-01-01 RX ADMIN — TAMSULOSIN HYDROCHLORIDE 0.4 MILLIGRAM(S): 0.4 CAPSULE ORAL at 21:53

## 2022-01-01 RX ADMIN — Medication 500 MILLIGRAM(S): at 16:14

## 2022-01-01 RX ADMIN — POLYETHYLENE GLYCOL 3350 17 GRAM(S): 17 POWDER, FOR SOLUTION ORAL at 11:32

## 2022-01-01 RX ADMIN — SENNA PLUS 1 TABLET(S): 8.6 TABLET ORAL at 21:17

## 2022-01-01 RX ADMIN — Medication 100 MILLIGRAM(S): at 21:58

## 2022-01-01 RX ADMIN — OXYCODONE AND ACETAMINOPHEN 1 TABLET(S): 5; 325 TABLET ORAL at 10:18

## 2022-01-01 RX ADMIN — Medication 1 APPLICATION(S): at 12:09

## 2022-01-01 RX ADMIN — DONEPEZIL HYDROCHLORIDE 5 MILLIGRAM(S): 10 TABLET, FILM COATED ORAL at 05:49

## 2022-01-01 RX ADMIN — SODIUM CHLORIDE 1000 MILLILITER(S): 9 INJECTION INTRAMUSCULAR; INTRAVENOUS; SUBCUTANEOUS at 01:40

## 2022-01-01 RX ADMIN — ATORVASTATIN CALCIUM 20 MILLIGRAM(S): 80 TABLET, FILM COATED ORAL at 21:59

## 2022-01-01 RX ADMIN — Medication 1 MILLIGRAM(S): at 06:04

## 2022-01-01 RX ADMIN — ATORVASTATIN CALCIUM 20 MILLIGRAM(S): 80 TABLET, FILM COATED ORAL at 21:33

## 2022-01-01 RX ADMIN — DONEPEZIL HYDROCHLORIDE 5 MILLIGRAM(S): 10 TABLET, FILM COATED ORAL at 19:13

## 2022-01-01 RX ADMIN — ATORVASTATIN CALCIUM 20 MILLIGRAM(S): 80 TABLET, FILM COATED ORAL at 21:49

## 2022-01-01 RX ADMIN — Medication 50 MILLIGRAM(S): at 16:35

## 2022-01-01 RX ADMIN — ATORVASTATIN CALCIUM 20 MILLIGRAM(S): 80 TABLET, FILM COATED ORAL at 21:16

## 2022-01-01 RX ADMIN — Medication 1000 UNIT(S): at 11:33

## 2022-01-01 RX ADMIN — Medication 1000 UNIT(S): at 05:49

## 2022-01-01 RX ADMIN — DONEPEZIL HYDROCHLORIDE 10 MILLIGRAM(S): 10 TABLET, FILM COATED ORAL at 21:25

## 2022-01-01 RX ADMIN — DONEPEZIL HYDROCHLORIDE 10 MILLIGRAM(S): 10 TABLET, FILM COATED ORAL at 22:50

## 2022-01-01 RX ADMIN — PANTOPRAZOLE SODIUM 40 MILLIGRAM(S): 20 TABLET, DELAYED RELEASE ORAL at 06:39

## 2022-01-01 RX ADMIN — PANTOPRAZOLE SODIUM 40 MILLIGRAM(S): 20 TABLET, DELAYED RELEASE ORAL at 05:13

## 2022-01-01 RX ADMIN — Medication 20 MILLIGRAM(S): at 06:01

## 2022-01-01 RX ADMIN — SODIUM CHLORIDE 250 MILLILITER(S): 9 INJECTION INTRAMUSCULAR; INTRAVENOUS; SUBCUTANEOUS at 15:03

## 2022-01-01 RX ADMIN — DONEPEZIL HYDROCHLORIDE 5 MILLIGRAM(S): 10 TABLET, FILM COATED ORAL at 22:14

## 2022-01-01 RX ADMIN — Medication 1000 UNIT(S): at 06:39

## 2022-01-01 RX ADMIN — Medication 60 MILLIGRAM(S): at 13:00

## 2022-01-01 RX ADMIN — CEFEPIME 100 MILLIGRAM(S): 1 INJECTION, POWDER, FOR SOLUTION INTRAMUSCULAR; INTRAVENOUS at 17:56

## 2022-01-01 RX ADMIN — ZINC SULFATE TAB 220 MG (50 MG ZINC EQUIVALENT) 220 MILLIGRAM(S): 220 (50 ZN) TAB at 13:20

## 2022-01-01 RX ADMIN — Medication 500 MILLIGRAM(S): at 14:24

## 2022-01-01 RX ADMIN — Medication 60 MILLIGRAM(S): at 05:13

## 2022-01-01 RX ADMIN — PANTOPRAZOLE SODIUM 40 MILLIGRAM(S): 20 TABLET, DELAYED RELEASE ORAL at 05:42

## 2022-01-01 RX ADMIN — Medication 75 MILLIGRAM(S): at 14:20

## 2022-01-01 RX ADMIN — Medication 2: at 17:58

## 2022-01-01 RX ADMIN — OXYCODONE HYDROCHLORIDE 10 MILLIGRAM(S): 5 TABLET ORAL at 16:51

## 2022-01-01 RX ADMIN — TAMSULOSIN HYDROCHLORIDE 0.4 MILLIGRAM(S): 0.4 CAPSULE ORAL at 21:28

## 2022-01-01 RX ADMIN — HEPARIN SODIUM 5000 UNIT(S): 5000 INJECTION INTRAVENOUS; SUBCUTANEOUS at 22:50

## 2022-01-01 RX ADMIN — ZINC SULFATE TAB 220 MG (50 MG ZINC EQUIVALENT) 220 MILLIGRAM(S): 220 (50 ZN) TAB at 12:08

## 2022-01-01 RX ADMIN — TRAMADOL HYDROCHLORIDE 25 MILLIGRAM(S): 50 TABLET ORAL at 19:01

## 2022-01-01 RX ADMIN — Medication 650 MILLIGRAM(S): at 06:33

## 2022-01-01 RX ADMIN — Medication 100 MILLIGRAM(S): at 22:36

## 2022-01-01 RX ADMIN — Medication 50 MEQ/KG/HR: at 09:35

## 2022-01-01 RX ADMIN — TAMSULOSIN HYDROCHLORIDE 0.4 MILLIGRAM(S): 0.4 CAPSULE ORAL at 22:13

## 2022-01-01 RX ADMIN — ONDANSETRON 224 MILLIGRAM(S): 8 TABLET, FILM COATED ORAL at 12:13

## 2022-01-01 RX ADMIN — Medication 20 MILLIGRAM(S): at 01:12

## 2022-01-01 RX ADMIN — Medication 1 TABLET(S): at 11:32

## 2022-01-01 RX ADMIN — Medication 75 MILLIGRAM(S): at 11:33

## 2022-01-01 RX ADMIN — MEMANTINE HYDROCHLORIDE 5 MILLIGRAM(S): 10 TABLET ORAL at 13:02

## 2022-01-01 RX ADMIN — Medication 1 TABLET(S): at 12:48

## 2022-01-01 RX ADMIN — Medication 650 MILLIGRAM(S): at 13:45

## 2022-01-01 RX ADMIN — Medication 20 MILLIGRAM(S): at 06:45

## 2022-01-01 RX ADMIN — Medication 60 MILLIGRAM(S): at 11:54

## 2022-01-01 RX ADMIN — Medication 2: at 12:48

## 2022-01-01 RX ADMIN — Medication 1 MILLIGRAM(S): at 06:25

## 2022-01-01 RX ADMIN — ZOLPIDEM TARTRATE 5 MILLIGRAM(S): 10 TABLET ORAL at 21:28

## 2022-01-01 RX ADMIN — SENNA PLUS 2 TABLET(S): 8.6 TABLET ORAL at 21:59

## 2022-01-01 RX ADMIN — Medication 100 MILLIEQUIVALENT(S): at 09:52

## 2022-01-01 RX ADMIN — HEPARIN SODIUM 5000 UNIT(S): 5000 INJECTION INTRAVENOUS; SUBCUTANEOUS at 06:21

## 2022-01-01 RX ADMIN — Medication 500 MILLIGRAM(S): at 12:08

## 2022-01-01 RX ADMIN — Medication 650 MILLIGRAM(S): at 14:45

## 2022-01-01 RX ADMIN — Medication 500 MILLIGRAM(S): at 11:46

## 2022-01-01 RX ADMIN — Medication 1000 UNIT(S): at 12:58

## 2022-01-01 RX ADMIN — PHENYLEPHRINE HYDROCHLORIDE 1.13 MICROGRAM(S)/KG/MIN: 10 INJECTION INTRAVENOUS at 09:34

## 2022-01-01 RX ADMIN — Medication 2: at 12:34

## 2022-01-01 RX ADMIN — Medication 25 MICROGRAM(S): at 06:20

## 2022-01-01 RX ADMIN — SODIUM CHLORIDE 100 MILLILITER(S): 9 INJECTION INTRAMUSCULAR; INTRAVENOUS; SUBCUTANEOUS at 21:49

## 2022-01-01 RX ADMIN — Medication 75 MILLIGRAM(S): at 11:17

## 2022-01-01 RX ADMIN — Medication 25 MICROGRAM(S): at 06:28

## 2022-01-01 RX ADMIN — ZINC SULFATE TAB 220 MG (50 MG ZINC EQUIVALENT) 220 MILLIGRAM(S): 220 (50 ZN) TAB at 13:37

## 2022-01-01 RX ADMIN — PANTOPRAZOLE SODIUM 40 MILLIGRAM(S): 20 TABLET, DELAYED RELEASE ORAL at 06:45

## 2022-01-01 RX ADMIN — Medication 1 MILLIGRAM(S): at 05:49

## 2022-01-01 RX ADMIN — Medication 20 MILLIGRAM(S): at 06:21

## 2022-01-01 RX ADMIN — Medication 4: at 21:02

## 2022-01-01 RX ADMIN — SODIUM CHLORIDE 100 MILLILITER(S): 9 INJECTION, SOLUTION INTRAVENOUS at 09:32

## 2022-01-01 RX ADMIN — Medication 100 MILLIGRAM(S): at 21:33

## 2022-01-01 RX ADMIN — SODIUM ZIRCONIUM CYCLOSILICATE 10 GRAM(S): 10 POWDER, FOR SUSPENSION ORAL at 09:13

## 2022-01-01 RX ADMIN — DONEPEZIL HYDROCHLORIDE 10 MILLIGRAM(S): 10 TABLET, FILM COATED ORAL at 22:24

## 2022-01-01 RX ADMIN — TRAMADOL HYDROCHLORIDE 25 MILLIGRAM(S): 50 TABLET ORAL at 18:01

## 2022-01-01 RX ADMIN — Medication 650 MILLIGRAM(S): at 07:33

## 2022-01-01 RX ADMIN — SENNA PLUS 2 TABLET(S): 8.6 TABLET ORAL at 22:14

## 2022-01-01 RX ADMIN — MEMANTINE HYDROCHLORIDE 5 MILLIGRAM(S): 10 TABLET ORAL at 12:49

## 2022-01-01 RX ADMIN — ONDANSETRON 224 MILLIGRAM(S): 8 TABLET, FILM COATED ORAL at 12:59

## 2022-01-01 RX ADMIN — Medication 20 MILLIGRAM(S): at 06:39

## 2022-01-01 RX ADMIN — SENNA PLUS 2 TABLET(S): 8.6 TABLET ORAL at 21:29

## 2022-01-01 RX ADMIN — Medication 25 MICROGRAM(S): at 06:22

## 2022-01-01 RX ADMIN — Medication 1000 UNIT(S): at 06:25

## 2022-01-01 RX ADMIN — MIDODRINE HYDROCHLORIDE 5 MILLIGRAM(S): 2.5 TABLET ORAL at 10:32

## 2022-01-01 RX ADMIN — PANTOPRAZOLE SODIUM 40 MILLIGRAM(S): 20 TABLET, DELAYED RELEASE ORAL at 06:08

## 2022-01-01 RX ADMIN — Medication 60 MILLIGRAM(S): at 11:19

## 2022-01-01 RX ADMIN — CEFEPIME 100 MILLIGRAM(S): 1 INJECTION, POWDER, FOR SOLUTION INTRAMUSCULAR; INTRAVENOUS at 17:47

## 2022-01-01 RX ADMIN — HEPARIN SODIUM 5000 UNIT(S): 5000 INJECTION INTRAVENOUS; SUBCUTANEOUS at 06:22

## 2022-01-01 RX ADMIN — Medication 100 MILLIGRAM(S): at 22:14

## 2022-01-01 RX ADMIN — Medication 500 MILLIGRAM(S): at 11:36

## 2022-01-01 RX ADMIN — Medication 60 MILLIGRAM(S): at 11:31

## 2022-01-01 RX ADMIN — Medication 25 MILLIGRAM(S): at 18:29

## 2022-01-01 RX ADMIN — Medication 1 MILLIGRAM(S): at 06:21

## 2022-01-01 RX ADMIN — Medication 1000 UNIT(S): at 06:28

## 2022-01-01 RX ADMIN — OXYCODONE HYDROCHLORIDE 10 MILLIGRAM(S): 5 TABLET ORAL at 01:15

## 2022-01-01 RX ADMIN — Medication 2: at 17:13

## 2022-01-01 RX ADMIN — Medication 5 MILLIGRAM(S): at 21:16

## 2022-01-01 RX ADMIN — SODIUM CHLORIDE 100 MILLILITER(S): 9 INJECTION INTRAMUSCULAR; INTRAVENOUS; SUBCUTANEOUS at 13:38

## 2022-01-01 RX ADMIN — HEPARIN SODIUM 5000 UNIT(S): 5000 INJECTION INTRAVENOUS; SUBCUTANEOUS at 15:41

## 2022-01-01 RX ADMIN — SENNA PLUS 1 TABLET(S): 8.6 TABLET ORAL at 22:14

## 2022-01-01 RX ADMIN — ZINC SULFATE TAB 220 MG (50 MG ZINC EQUIVALENT) 220 MILLIGRAM(S): 220 (50 ZN) TAB at 11:53

## 2022-01-01 RX ADMIN — Medication 1 MILLIGRAM(S): at 06:45

## 2022-01-01 RX ADMIN — HEPARIN SODIUM 5000 UNIT(S): 5000 INJECTION INTRAVENOUS; SUBCUTANEOUS at 06:26

## 2022-01-01 RX ADMIN — SODIUM CHLORIDE 100 MILLILITER(S): 9 INJECTION INTRAMUSCULAR; INTRAVENOUS; SUBCUTANEOUS at 08:43

## 2022-01-01 RX ADMIN — Medication 1000 UNIT(S): at 06:21

## 2022-01-01 RX ADMIN — Medication 20 MILLIGRAM(S): at 05:50

## 2022-01-01 RX ADMIN — Medication 2: at 12:56

## 2022-01-01 RX ADMIN — Medication 25 MILLIGRAM(S): at 06:21

## 2022-01-01 RX ADMIN — ATORVASTATIN CALCIUM 20 MILLIGRAM(S): 80 TABLET, FILM COATED ORAL at 23:49

## 2022-01-01 RX ADMIN — Medication 20 MILLIGRAM(S): at 06:25

## 2022-01-01 RX ADMIN — Medication 1 MILLIGRAM(S): at 11:17

## 2022-01-01 RX ADMIN — TAMSULOSIN HYDROCHLORIDE 0.4 MILLIGRAM(S): 0.4 CAPSULE ORAL at 21:58

## 2022-01-01 RX ADMIN — TAMSULOSIN HYDROCHLORIDE 0.4 MILLIGRAM(S): 0.4 CAPSULE ORAL at 21:17

## 2022-01-01 RX ADMIN — Medication 5 MILLIGRAM(S): at 22:15

## 2022-01-01 RX ADMIN — Medication 100 MILLIGRAM(S): at 22:51

## 2022-01-01 RX ADMIN — Medication 250 MILLIGRAM(S): at 17:48

## 2022-01-01 RX ADMIN — Medication 300 MICROGRAM(S): at 19:47

## 2022-01-01 RX ADMIN — Medication 100 MILLIGRAM(S): at 22:25

## 2022-01-01 RX ADMIN — Medication 150 MILLIEQUIVALENT(S): at 09:35

## 2022-01-01 RX ADMIN — Medication 25 MICROGRAM(S): at 06:04

## 2022-01-01 RX ADMIN — TAMSULOSIN HYDROCHLORIDE 0.4 MILLIGRAM(S): 0.4 CAPSULE ORAL at 21:33

## 2022-01-01 RX ADMIN — Medication 100 MILLIGRAM(S): at 11:35

## 2022-01-01 RX ADMIN — OXYCODONE AND ACETAMINOPHEN 1 TABLET(S): 5; 325 TABLET ORAL at 10:24

## 2022-01-01 RX ADMIN — Medication 100 GRAM(S): at 16:14

## 2022-01-01 RX ADMIN — DONEPEZIL HYDROCHLORIDE 5 MILLIGRAM(S): 10 TABLET, FILM COATED ORAL at 06:26

## 2022-01-01 RX ADMIN — PANTOPRAZOLE SODIUM 40 MILLIGRAM(S): 20 TABLET, DELAYED RELEASE ORAL at 06:30

## 2022-01-01 RX ADMIN — MIDAZOLAM HYDROCHLORIDE 1.2 MG/KG/HR: 1 INJECTION, SOLUTION INTRAMUSCULAR; INTRAVENOUS at 12:25

## 2022-01-01 RX ADMIN — HEPARIN SODIUM 5000 UNIT(S): 5000 INJECTION INTRAVENOUS; SUBCUTANEOUS at 19:29

## 2022-01-01 RX ADMIN — Medication 1 TABLET(S): at 12:08

## 2022-01-01 RX ADMIN — Medication 300 MICROGRAM(S): at 13:54

## 2022-01-01 RX ADMIN — MEMANTINE HYDROCHLORIDE 5 MILLIGRAM(S): 10 TABLET ORAL at 11:17

## 2022-01-01 RX ADMIN — PANTOPRAZOLE SODIUM 40 MILLIGRAM(S): 20 TABLET, DELAYED RELEASE ORAL at 06:04

## 2022-01-01 RX ADMIN — TAMSULOSIN HYDROCHLORIDE 0.4 MILLIGRAM(S): 0.4 CAPSULE ORAL at 21:16

## 2022-01-01 RX ADMIN — Medication 100 MILLIGRAM(S): at 11:53

## 2022-01-01 RX ADMIN — Medication 25 MILLIGRAM(S): at 06:54

## 2022-01-01 RX ADMIN — DONEPEZIL HYDROCHLORIDE 5 MILLIGRAM(S): 10 TABLET, FILM COATED ORAL at 06:39

## 2022-01-01 RX ADMIN — Medication 250 MILLIGRAM(S): at 18:40

## 2022-01-01 RX ADMIN — Medication 250 MILLIGRAM(S): at 19:44

## 2022-01-01 RX ADMIN — Medication 80 MILLIGRAM(S): at 11:45

## 2022-01-01 RX ADMIN — HEPARIN SODIUM 5000 UNIT(S): 5000 INJECTION INTRAVENOUS; SUBCUTANEOUS at 17:48

## 2022-01-01 RX ADMIN — Medication 1000 UNIT(S): at 05:14

## 2022-01-01 RX ADMIN — Medication 1 TABLET(S): at 13:19

## 2022-01-01 RX ADMIN — TRAMADOL HYDROCHLORIDE 25 MILLIGRAM(S): 50 TABLET ORAL at 12:51

## 2022-01-01 RX ADMIN — MEMANTINE HYDROCHLORIDE 5 MILLIGRAM(S): 10 TABLET ORAL at 06:29

## 2022-01-01 RX ADMIN — Medication 300 MICROGRAM(S): at 14:46

## 2022-01-01 RX ADMIN — DONEPEZIL HYDROCHLORIDE 5 MILLIGRAM(S): 10 TABLET, FILM COATED ORAL at 17:51

## 2022-01-01 RX ADMIN — Medication 500 MILLIGRAM(S): at 11:16

## 2022-01-01 RX ADMIN — Medication 1 MILLIGRAM(S): at 06:01

## 2022-01-01 RX ADMIN — Medication 1 MILLIGRAM(S): at 12:07

## 2022-01-01 RX ADMIN — MIDODRINE HYDROCHLORIDE 10 MILLIGRAM(S): 2.5 TABLET ORAL at 22:50

## 2022-01-01 RX ADMIN — Medication 2.82 MICROGRAM(S)/KG/MIN: at 09:34

## 2022-01-01 RX ADMIN — POLYETHYLENE GLYCOL 3350 17 GRAM(S): 17 POWDER, FOR SOLUTION ORAL at 12:49

## 2022-01-01 RX ADMIN — SODIUM CHLORIDE 1000 MILLILITER(S): 9 INJECTION, SOLUTION INTRAVENOUS at 17:10

## 2022-01-01 RX ADMIN — Medication 5 MILLIGRAM(S): at 22:12

## 2022-01-01 RX ADMIN — Medication 100 MILLIGRAM(S): at 16:14

## 2022-01-01 RX ADMIN — Medication 5 MILLIGRAM(S): at 21:53

## 2022-01-01 RX ADMIN — Medication 25 MICROGRAM(S): at 06:39

## 2022-01-01 RX ADMIN — SODIUM CHLORIDE 100 MILLILITER(S): 9 INJECTION INTRAMUSCULAR; INTRAVENOUS; SUBCUTANEOUS at 13:27

## 2022-01-01 RX ADMIN — DONEPEZIL HYDROCHLORIDE 10 MILLIGRAM(S): 10 TABLET, FILM COATED ORAL at 21:49

## 2022-01-01 RX ADMIN — Medication 1000 UNIT(S): at 06:45

## 2022-01-01 RX ADMIN — ONDANSETRON 224 MILLIGRAM(S): 8 TABLET, FILM COATED ORAL at 14:03

## 2022-01-01 RX ADMIN — Medication 25 MILLIGRAM(S): at 17:31

## 2022-01-01 RX ADMIN — ATORVASTATIN CALCIUM 20 MILLIGRAM(S): 80 TABLET, FILM COATED ORAL at 21:17

## 2022-01-01 RX ADMIN — Medication 25 MICROGRAM(S): at 05:38

## 2022-01-01 RX ADMIN — Medication 1 TABLET(S): at 11:17

## 2022-01-01 RX ADMIN — MORPHINE SULFATE 2 MILLIGRAM(S): 50 CAPSULE, EXTENDED RELEASE ORAL at 14:33

## 2022-01-01 RX ADMIN — Medication 25 MILLIGRAM(S): at 22:15

## 2022-01-01 RX ADMIN — MEMANTINE HYDROCHLORIDE 5 MILLIGRAM(S): 10 TABLET ORAL at 11:33

## 2022-01-01 RX ADMIN — Medication 1 MILLIGRAM(S): at 13:21

## 2022-01-01 RX ADMIN — Medication 25 MILLIGRAM(S): at 05:38

## 2022-01-01 RX ADMIN — HEPARIN SODIUM 5000 UNIT(S): 5000 INJECTION INTRAVENOUS; SUBCUTANEOUS at 17:47

## 2022-01-01 RX ADMIN — IMMUNE GLOBULIN (HUMAN) 85.71 GRAM(S): 10 INJECTION INTRAVENOUS; SUBCUTANEOUS at 16:55

## 2022-01-01 RX ADMIN — TAMSULOSIN HYDROCHLORIDE 0.4 MILLIGRAM(S): 0.4 CAPSULE ORAL at 21:25

## 2022-01-01 RX ADMIN — Medication 1 MILLIGRAM(S): at 11:53

## 2022-01-01 RX ADMIN — SENNA PLUS 2 TABLET(S): 8.6 TABLET ORAL at 23:49

## 2022-01-01 RX ADMIN — PANTOPRAZOLE SODIUM 40 MILLIGRAM(S): 20 TABLET, DELAYED RELEASE ORAL at 18:29

## 2022-01-01 RX ADMIN — POLYETHYLENE GLYCOL 3350 17 GRAM(S): 17 POWDER, FOR SOLUTION ORAL at 14:33

## 2022-01-01 RX ADMIN — Medication 25 MILLIGRAM(S): at 18:02

## 2022-01-01 RX ADMIN — Medication 1 APPLICATION(S): at 13:21

## 2022-01-01 RX ADMIN — Medication 100 MILLIGRAM(S): at 23:49

## 2022-01-01 RX ADMIN — Medication 25 MICROGRAM(S): at 05:41

## 2022-01-01 RX ADMIN — PANTOPRAZOLE SODIUM 10 MG/HR: 20 TABLET, DELAYED RELEASE ORAL at 09:34

## 2022-01-01 RX ADMIN — MIDODRINE HYDROCHLORIDE 10 MILLIGRAM(S): 2.5 TABLET ORAL at 05:14

## 2022-01-01 RX ADMIN — POLYETHYLENE GLYCOL 3350 17 GRAM(S): 17 POWDER, FOR SOLUTION ORAL at 13:00

## 2022-01-01 RX ADMIN — PANTOPRAZOLE SODIUM 40 MILLIGRAM(S): 20 TABLET, DELAYED RELEASE ORAL at 06:25

## 2022-01-01 RX ADMIN — PANTOPRAZOLE SODIUM 40 MILLIGRAM(S): 20 TABLET, DELAYED RELEASE ORAL at 06:28

## 2022-01-01 RX ADMIN — OXYCODONE HYDROCHLORIDE 10 MILLIGRAM(S): 5 TABLET ORAL at 15:51

## 2022-01-01 RX ADMIN — Medication 25 MILLIGRAM(S): at 17:05

## 2022-01-01 RX ADMIN — Medication 1 TABLET(S): at 13:02

## 2022-01-01 RX ADMIN — ZINC SULFATE TAB 220 MG (50 MG ZINC EQUIVALENT) 220 MILLIGRAM(S): 220 (50 ZN) TAB at 14:24

## 2022-01-01 RX ADMIN — Medication 25 MICROGRAM(S): at 06:54

## 2022-01-01 RX ADMIN — Medication 5 MILLIGRAM(S): at 21:17

## 2022-01-01 RX ADMIN — PANTOPRAZOLE SODIUM 40 MILLIGRAM(S): 20 TABLET, DELAYED RELEASE ORAL at 06:02

## 2022-01-01 RX ADMIN — Medication 5 MILLIGRAM(S): at 21:25

## 2022-01-01 RX ADMIN — DOPAMINE HYDROCHLORIDE 4.52 MICROGRAM(S)/KG/MIN: 40 INJECTION, SOLUTION, CONCENTRATE INTRAVENOUS at 09:35

## 2022-01-01 RX ADMIN — DONEPEZIL HYDROCHLORIDE 10 MILLIGRAM(S): 10 TABLET, FILM COATED ORAL at 22:40

## 2022-01-01 RX ADMIN — RITUXIMAB 155 MILLIGRAM(S): 10 INJECTION, SOLUTION INTRAVENOUS at 16:42

## 2022-01-01 RX ADMIN — Medication 100 MILLIGRAM(S): at 11:16

## 2022-01-01 RX ADMIN — ZINC SULFATE TAB 220 MG (50 MG ZINC EQUIVALENT) 220 MILLIGRAM(S): 220 (50 ZN) TAB at 16:14

## 2022-01-01 RX ADMIN — TRAMADOL HYDROCHLORIDE 25 MILLIGRAM(S): 50 TABLET ORAL at 12:17

## 2022-01-01 RX ADMIN — MORPHINE SULFATE 2 MILLIGRAM(S): 50 CAPSULE, EXTENDED RELEASE ORAL at 12:01

## 2022-01-01 RX ADMIN — Medication 25 MILLIGRAM(S): at 21:53

## 2022-01-01 RX ADMIN — Medication 20 MILLIGRAM(S): at 03:15

## 2022-01-01 RX ADMIN — Medication 75 MILLIGRAM(S): at 11:53

## 2022-01-01 RX ADMIN — Medication 1 TABLET(S): at 11:52

## 2022-01-01 RX ADMIN — HEPARIN SODIUM 5000 UNIT(S): 5000 INJECTION INTRAVENOUS; SUBCUTANEOUS at 05:40

## 2022-01-01 NOTE — PROGRESS NOTE ADULT - ASSESSMENT
89 yo M with PMHx of essential hypertension, hyperlipidemia, CKD4, chronic HFrEF (EF 30-35%), CAD s/p PCI (2007), AS s/p bioprosthetic AVR (2013) with revision (2016), AFib, colon cancer s/p R hemicolectomy (2016), admitted for severe anemia secondary to left intramuscular hematoma with inadequate response to transfusion. Hematology consulted for evaluation of anemia and coagulopathy. s/p 3 U PRBC (12/25/21 x 2, 12/27/21), 1 FFP (12/26/21) and 10 U Cryoprecipitate (12/26/21). Clinically no other sites of bleeding, FOBT (12/26/21) negative, and no evidence of hemolysis. No evidence of vWD.     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 Factor VIII inhibitor detected with high [place] titer of 17. Factor VIII 1%.   - No evidence of acquired/inherited vWD thus far, pending vWF antigen level.   - Pending Lupus anticoagulant, anti-cardiolipin Ab and anti-beta2-glycoprotein Ab screening tests   - Will consider FFP, factor eight inhibitor bypassing agent (FEIBA) or recombinant factor VIIa (NovoSeven) if bleeding recurs and/or becomes hemodynamically unstable. There is a high risk of arterial and venous thrombosis with FEIBA and NovoSeven and patient is already at high risk for stroke given his history of AFib  - Continue with PO Prednisone 1 mg/kg (12/27/21). PO Cyclophosphamide 75 mg QD was added on 12/29/21. Repeat Inhibitor assay to assay inhibitor titer; may consider Rituxan on 01/03/21.   - HIV and Hepatitis panel negative.   - Trend CBC with differential and PTT once daily. Repeat CBC tonight at 4 pm given slight decrease in Hb, although clinically no evidence of bleeding.   - Maintain active T+S, transfuse if Hb < 7 or if actively bleeding/symptomatic  - Upon discharge, follow-up with Dr. Lori Chandler at [facility] on [facility] in 1 week    Discussed with Dr. Chandler

## 2022-01-01 NOTE — PROGRESS NOTE ADULT - ASSESSMENT
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  Transferred to Medicine in light of ongoing anemia, now thought to be 2/2 a factor VIII inhibitor.  Started on Cyclophosphamide on 12/29.  Notably, developed an BRITT on CKD4 thought to be 2/2 hypovolemia after improvement w/small IVF bolus.  Of note, his PTT is down again today w/o sign of bleeding.      #Anemia NOS, Factor VIIIi - CTAP w/o bleed but also w/o contrast given CKD; now on steroids and Cyclophosphamide; will monitor Hb; if Hb stable and PTT continues to improve, would discuss timing of discharge on Monday w/Dr. Chandler   #BRITT on CKD4 - resolving, encourage PO intake, renally dose meds (including Cyclophosphamide)    #RLE hematoma - will need to f/u with Vascular; wean opiate as able   #Essential HTN, chronic systolic CHF, chronic afib - c/w Metoprolol and statin, holding home Lasix   #Hypothyroidism - c/w Levothyroxine   #DVT PPx - SCD  #Dispo - TBD    Ruth Ordoñez  [phone]

## 2022-01-01 NOTE — PROGRESS NOTE ADULT - SUBJECTIVE AND OBJECTIVE BOX
LENGTH OF HOSPITAL STAY: 8d    SUBJECTIVE: No acute overnight events    HISTORY OF PRESENTING ILLNESS:   89yo male presented with painful swelling and bruises of LLE. States started about 10d ago by bilateral bruises in toes and swelling in calves and thighs (L>R) with the right-side gradually improving but left side worsening since then and becoming painful. Has been ambulating without difficulty and denies any recent falls, traumas or preceding events. Denies any fever, chills, dizziness, light-headedness, palpitation, coughs, shortness of breath, chest pain, or abdominal pain. PMH HTN, HLD, CKD stage 4 (Cr 2, eGFR 28), HF (EF 30-35%), CAD (s/p PCI for RCA stenting 2007), aortic stenosis (s/p aortic valve replacement 2016), Afib, hypothyroidism, peptic ulcer disease, internal hemorrhoids, prostate and colon cancers (s/p R hemicolectomy 2016). He has a pacemaker and has been on Coumadin until 6w ago (when stopped due to risks of GIB). Upon presentation, pt had a Hgb of 6.2.  (24 Dec 2021 19:39)    PAST MEDICAL & SURGICAL HISTORY:  Aortic valve replaced  Atrial fibrillation  CAD (coronary artery disease)  HTN (hypertension)  PUD (peptic ulcer disease)  Constipation  Iron deficiency anemia  Diverticulosis  High cholesterol  Carotid arterial disease  Internal hemorrhoids  Endocarditis  Colon cancer  Hypothyroidism  S/P AVR  2013 @ [facility]  by Dr. Young  Cardiac pacemaker  3yrs ago  H/O inguinal hernia repair  H/O right hemicolectomy  History of appendectomy    ALLERGIES:  No Known Allergies    MEDICATIONS:  STANDING MEDICATIONS  atorvastatin 20 milliGRAM(s) Oral at bedtime  cholecalciferol 1000 Unit(s) Oral daily  cyclophosphamide 75 milliGRAM(s) Oral daily  dextrose 40% Gel 15 Gram(s) Oral once  dextrose 5%. 1000 milliLiter(s) IV Continuous <Continuous>  dextrose 5%. 1000 milliLiter(s) IV Continuous <Continuous>  dextrose 50% Injectable 25 Gram(s) IV Push once  dextrose 50% Injectable 12.5 Gram(s) IV Push once  dextrose 50% Injectable 25 Gram(s) IV Push once  donepezil 5 milliGRAM(s) Oral at bedtime  folic acid 1 milliGRAM(s) Oral daily  glucagon  Injectable 1 milliGRAM(s) IntraMuscular once  insulin lispro (ADMELOG) corrective regimen sliding scale   SubCutaneous Before meals and at bedtime  lactated ringers. 1000 milliLiter(s) IV Continuous <Continuous>  levothyroxine 25 MICROGram(s) Oral daily  memantine 5 milliGRAM(s) Oral daily  metoprolol tartrate 25 milliGRAM(s) Oral every 12 hours  ondansetron  IVPB 12 milliGRAM(s) IV Intermittent daily  pantoprazole    Tablet 40 milliGRAM(s) Oral before breakfast  polyethylene glycol 3350 17 Gram(s) Oral daily  predniSONE   Tablet 60 milliGRAM(s) Oral every 24 hours  senna 1 Tablet(s) Oral at bedtime  tamsulosin 0.4 milliGRAM(s) Oral at bedtime  traZODone 25 milliGRAM(s) Oral at bedtime  vitamin B complex with vitamin C 1 Tablet(s) Oral daily    PRN MEDICATIONS  acetaminophen     Tablet .. 650 milliGRAM(s) Oral every 6 hours PRN  oxyCODONE    IR 10 milliGRAM(s) Oral every 6 hours PRN  traMADol 25 milliGRAM(s) Oral every 6 hours PRN    VITALS:   T(F): 98.5  HR: 80  BP: 102/64  RR: 16  SpO2: 93%    LABS:                        7.4    5.93  )-----------( 183      ( 01 Jan 2022 07:30 )             25.1     01-01    137  |  103  |  73<H>  ----------------------------<  114<H>  5.4<H>   |  25  |  2.39<H>    Ca    8.4      01 Jan 2022 07:30  Phos  4.4     01-01  Mg     2.3     01-01    TPro  5.2<L>  /  Alb  3.0<L>  /  TBili  0.8  /  DBili  x   /  AST  20  /  ALT  26  /  AlkPhos  55  01-01    PT/INR - ( 01 Jan 2022 07:30 )   PT: 12.8 sec;   INR: 1.07       PTT - ( 01 Jan 2022 07:30 )  PTT:54.0 sec    PHYSICAL EXAM:  GEN: No acute distress  HEENT: NCAT  LUNGS: Clear to auscultation bilaterally   HEART: S1/S2 present. RRR.   ABD: Soft, non-tender, non-distended. Bowel sounds present  EXT: Swollen left leg with hematoma, stable/improving  NEURO: AAOX3

## 2022-01-01 NOTE — PROGRESS NOTE ADULT - SUBJECTIVE AND OBJECTIVE BOX
INTERVAL EVENTS:  -- NAEO    SUBJECTIVE:  -- reports blisters in LLE stable and less painful; no changes or L lateral thigh hematoma  -- no hematuria, BRBPR or melena  -- good appetite  -- denies CP, SOB, palpitations and orthopnea   -- Review of Systems: 12 point review of systems otherwise negative    MEDICATIONS:  MEDICATIONS  (STANDING):  atorvastatin 20 milliGRAM(s) Oral at bedtime  cholecalciferol 1000 Unit(s) Oral daily  cyclophosphamide 75 milliGRAM(s) Oral daily  dextrose 40% Gel 15 Gram(s) Oral once  dextrose 5%. 1000 milliLiter(s) (50 mL/Hr) IV Continuous <Continuous>  dextrose 5%. 1000 milliLiter(s) (100 mL/Hr) IV Continuous <Continuous>  dextrose 50% Injectable 25 Gram(s) IV Push once  dextrose 50% Injectable 12.5 Gram(s) IV Push once  dextrose 50% Injectable 25 Gram(s) IV Push once  donepezil 5 milliGRAM(s) Oral at bedtime  folic acid 1 milliGRAM(s) Oral daily  glucagon  Injectable 1 milliGRAM(s) IntraMuscular once  insulin lispro (ADMELOG) corrective regimen sliding scale   SubCutaneous Before meals and at bedtime  lactated ringers. 1000 milliLiter(s) (100 mL/Hr) IV Continuous <Continuous>  levothyroxine 25 MICROGram(s) Oral daily  memantine 5 milliGRAM(s) Oral daily  metoprolol tartrate 25 milliGRAM(s) Oral every 12 hours  ondansetron  IVPB 12 milliGRAM(s) IV Intermittent daily  pantoprazole    Tablet 40 milliGRAM(s) Oral before breakfast  polyethylene glycol 3350 17 Gram(s) Oral daily  predniSONE   Tablet 60 milliGRAM(s) Oral every 24 hours  senna 1 Tablet(s) Oral at bedtime  tamsulosin 0.4 milliGRAM(s) Oral at bedtime  traZODone 25 milliGRAM(s) Oral at bedtime  vitamin B complex with vitamin C 1 Tablet(s) Oral daily    MEDICATIONS  (PRN):  acetaminophen     Tablet .. 650 milliGRAM(s) Oral every 6 hours PRN Mild Pain (1 - 3)  oxyCODONE    IR 10 milliGRAM(s) Oral every 6 hours PRN Severe Pain (7 - 10)  traMADol 25 milliGRAM(s) Oral every 6 hours PRN Moderate Pain (4 - 6)    Allergies: No Known Allergies    OBJECTIVE:  Vital Signs Last 24 Hrs  T(C): 36.9 (01 Jan 2022 12:10), Max: 36.9 (01 Jan 2022 12:10)  T(F): 98.5 (01 Jan 2022 12:10), Max: 98.5 (01 Jan 2022 12:10)  HR: 80 (01 Jan 2022 12:10) (70 - 80)  BP: 102/64 (01 Jan 2022 12:10) (102/64 - 119/73)  BP(mean): --  RR: 16 (01 Jan 2022 12:10) (16 - 17)  SpO2: 93% (01 Jan 2022 12:10) (93% - 96%)  I&O's Summary    PHYSICAL EXAM:  Gen: NAD, sitting upright in bed  HEENT: NCAT, MMM, clear OP  CV: RRR, no m/g/r appreciated  Pulm: CTA B, no w/r/r; no increase in WOB  Abd: normoactive BS, soft, NTND  Ext: WWP, LLE > RLE with 1+ pitting edema in exposed areas, L thigh wrapped  Skin: multiple dorsal bullae L foot w/neighboring blanching erythema   Neuro: AOx3, nonfocal  Psych: pleasant, conversant and appropriate    LABS:                        7.4    5.93  )-----------( 183      ( 01 Jan 2022 07:30 )             25.1     01-01    137  |  103  |  73<H>  ----------------------------<  114<H>  5.4<H>   |  25  |  2.39<H>    Ca    8.4      01 Jan 2022 07:30  Phos  4.4     01-01  Mg     2.3     01-01    TPro  5.2<L>  /  Alb  3.0<L>  /  TBili  0.8  /  DBili  x   /  AST  20  /  ALT  26  /  AlkPhos  55  01-01    PT/INR - ( 01 Jan 2022 07:30 )   PT: 12.8 sec;   INR: 1.07     PTT - ( 01 Jan 2022 07:30 )  PTT:54.0 sec    CAPILLARY BLOOD GLUCOSE  POCT Blood Glucose.: 186 mg/dL (01 Jan 2022 12:28)  POCT Blood Glucose.: 120 mg/dL (01 Jan 2022 08:04)  POCT Blood Glucose.: 200 mg/dL (31 Dec 2021 21:38)  POCT Blood Glucose.: 125 mg/dL (31 Dec 2021 16:54)        MICRODATA:      RADIOLOGY/OTHER STUDIES:

## 2022-01-02 NOTE — PROGRESS NOTE ADULT - SUBJECTIVE AND OBJECTIVE BOX
LENGTH OF HOSPITAL STAY: 9d    SUBJECTIVE: No acute overnight events. No hematuria.    HISTORY OF PRESENTING ILLNESS:   89yo male presented with painful swelling and bruises of LLE. States started about 10d ago by bilateral bruises in toes and swelling in calves and thighs (L>R) with the right-side gradually improving but left side worsening since then and becoming painful. Has been ambulating without difficulty and denies any recent falls, traumas or preceding events. Denies any fever, chills, dizziness, light-headedness, palpitation, coughs, shortness of breath, chest pain, or abdominal pain. PMH HTN, HLD, CKD stage 4 (Cr 2, eGFR 28), HF (EF 30-35%), CAD (s/p PCI for RCA stenting 2007), aortic stenosis (s/p aortic valve replacement 2016), Afib, hypothyroidism, peptic ulcer disease, internal hemorrhoids, prostate and colon cancers (s/p R hemicolectomy 2016). He has a pacemaker and has been on Coumadin until 6w ago (when stopped due to risks of GIB). Upon presentation, pt had a Hgb of 6.2.  (24 Dec 2021 19:39)    PAST MEDICAL & SURGICAL HISTORY:  Aortic valve replaced  Atrial fibrillation  CAD  HTN (hypertension)  PUD (peptic ulcer disease)  Constipation  Iron deficiency anemia  Diverticulosis  High cholesterol  Carotid arterial disease  Internal hemorrhoids  Endocarditis  Colon cancer  Hypothyroidism  S/P AVR  2013 @ [facility]  by Dr. Young  Cardiac pacemaker  3yrs ago  H/O inguinal hernia repair  H/O right hemicolectomy  History of appendectomy    ALLERGIES:  No Known Allergies    MEDICATIONS:  STANDING MEDICATIONS  atorvastatin 20 milliGRAM(s) Oral at bedtime  cholecalciferol 1000 Unit(s) Oral daily  cyclophosphamide 75 milliGRAM(s) Oral daily  dextrose 40% Gel 15 Gram(s) Oral once  dextrose 5%. 1000 milliLiter(s) IV Continuous <Continuous>  dextrose 5%. 1000 milliLiter(s) IV Continuous <Continuous>  dextrose 50% Injectable 25 Gram(s) IV Push once  dextrose 50% Injectable 12.5 Gram(s) IV Push once  dextrose 50% Injectable 25 Gram(s) IV Push once  donepezil 5 milliGRAM(s) Oral at bedtime  folic acid 1 milliGRAM(s) Oral daily  glucagon  Injectable 1 milliGRAM(s) IntraMuscular once  insulin lispro (ADMELOG) corrective regimen sliding scale   SubCutaneous Before meals and at bedtime  lactated ringers. 1000 milliLiter(s) IV Continuous <Continuous>  levothyroxine 25 MICROGram(s) Oral daily  melatonin 5 milliGRAM(s) Oral at bedtime  memantine 5 milliGRAM(s) Oral daily  metoprolol tartrate 25 milliGRAM(s) Oral every 12 hours  ondansetron  IVPB 12 milliGRAM(s) IV Intermittent daily  pantoprazole    Tablet 40 milliGRAM(s) Oral before breakfast  polyethylene glycol 3350 17 Gram(s) Oral daily  predniSONE   Tablet 60 milliGRAM(s) Oral every 24 hours  senna 1 Tablet(s) Oral at bedtime  tamsulosin 0.4 milliGRAM(s) Oral at bedtime  traZODone 25 milliGRAM(s) Oral at bedtime  vitamin B complex with vitamin C 1 Tablet(s) Oral daily    PRN MEDICATIONS  acetaminophen     Tablet .. 650 milliGRAM(s) Oral every 6 hours PRN  oxyCODONE    IR 10 milliGRAM(s) Oral every 6 hours PRN  traMADol 25 milliGRAM(s) Oral every 6 hours PRN    VITALS:   T(F): 98  HR: 88  BP: 98/60  RR: 16  SpO2: 94%    LABS:                        7.6    7.24  )-----------( 203      ( 02 Jan 2022 11:47 )             25.5     01-02    136  |  102  |  74<H>  ----------------------------<  172<H>  4.6   |  25  |  2.46<H>    Ca    8.5      02 Jan 2022 11:47  Phos  4.4     01-01  Mg     2.2     01-02    TPro  5.3<L>  /  Alb  3.3  /  TBili  0.8  /  DBili  x   /  AST  30  /  ALT  39  /  AlkPhos  54  01-02    PT/INR - ( 01 Jan 2022 07:30 )   PT: 12.8 sec;   INR: 1.07       PTT - ( 02 Jan 2022 11:47 )  PTT:43.1 sec    RADIOLOGY:  Reviewed    PHYSICAL EXAM:  GEN: No acute distress  HEENT: NCAT  LUNGS: Clear to auscultation bilaterally   HEART: S1/S2 present. RRR.   ABD: Soft, non-tender, non-distended. Bowel sounds present  EXT: Swollen left leg with hematoma, stable/improving  NEURO: AAOX3

## 2022-01-02 NOTE — PROGRESS NOTE ADULT - SUBJECTIVE AND OBJECTIVE BOX
CONNIE FREEMAN, 89y, Male  MRN-[id number]  Patient is a 89y old  Male who presents with a chief complaint of LLE swelling and pain (02 Jan 2022 13:48)      OVERNIGHT EVENTS: naeo     SUBJECTIVE: Patient seen and examined at bedside. Reports feeling fine. Denies fevers, chills, HA, chest pain, sob, nausea, vomiting, abdominal pain, diarrhea, constipation, dysuria.     12 Point ROS Negative unless noted otherwise above.  -------------------------------------------------------------------------------  VITAL SIGNS:  Vital Signs Last 24 Hrs  T(C): 36.7 (02 Jan 2022 12:09), Max: 36.8 (01 Jan 2022 19:57)  T(F): 98 (02 Jan 2022 12:09), Max: 98.3 (01 Jan 2022 19:57)  HR: 88 (02 Jan 2022 12:09) (76 - 88)  BP: 98/60 (02 Jan 2022 12:09) (98/60 - 116/72)  BP(mean): --  RR: 16 (02 Jan 2022 12:09) (16 - 17)  SpO2: 94% (02 Jan 2022 12:09) (94% - 96%)  I&O's Summary      PHYSICAL EXAM:    General: elderly man in NAD, well developed  HEENT: NC/AT; EOMI, PERRLA, anicteric sclera; moist mucosal membranes.  Neck: supple, trachea midline  Cardiovascular: RRR, +S1/S2; NO M/R/G  Respiratory: CTA B/L; no W/R/R  Gastrointestinal: soft, NT/ND; +BSx4  Extremities: WWP; no edema or cyanosis  Vascular: 2+ radial, DP/PT pulses B/L  Neurological: AAOx3; no focal deficits    ALLERGIES:  Allergies    No Known Allergies    Intolerances        MEDICATIONS:  MEDICATIONS  (STANDING):  atorvastatin 20 milliGRAM(s) Oral at bedtime  cholecalciferol 1000 Unit(s) Oral daily  cyclophosphamide 75 milliGRAM(s) Oral daily  dextrose 40% Gel 15 Gram(s) Oral once  dextrose 5%. 1000 milliLiter(s) (50 mL/Hr) IV Continuous <Continuous>  dextrose 5%. 1000 milliLiter(s) (100 mL/Hr) IV Continuous <Continuous>  dextrose 50% Injectable 25 Gram(s) IV Push once  dextrose 50% Injectable 12.5 Gram(s) IV Push once  dextrose 50% Injectable 25 Gram(s) IV Push once  donepezil 5 milliGRAM(s) Oral at bedtime  folic acid 1 milliGRAM(s) Oral daily  glucagon  Injectable 1 milliGRAM(s) IntraMuscular once  insulin lispro (ADMELOG) corrective regimen sliding scale   SubCutaneous Before meals and at bedtime  lactated ringers. 1000 milliLiter(s) (100 mL/Hr) IV Continuous <Continuous>  levothyroxine 25 MICROGram(s) Oral daily  melatonin 5 milliGRAM(s) Oral at bedtime  memantine 5 milliGRAM(s) Oral daily  metoprolol tartrate 25 milliGRAM(s) Oral every 12 hours  ondansetron  IVPB 12 milliGRAM(s) IV Intermittent daily  pantoprazole    Tablet 40 milliGRAM(s) Oral before breakfast  polyethylene glycol 3350 17 Gram(s) Oral daily  predniSONE   Tablet 60 milliGRAM(s) Oral every 24 hours  senna 1 Tablet(s) Oral at bedtime  tamsulosin 0.4 milliGRAM(s) Oral at bedtime  traZODone 25 milliGRAM(s) Oral at bedtime  vitamin B complex with vitamin C 1 Tablet(s) Oral daily    MEDICATIONS  (PRN):  acetaminophen     Tablet .. 650 milliGRAM(s) Oral every 6 hours PRN Mild Pain (1 - 3)  oxyCODONE    IR 10 milliGRAM(s) Oral every 6 hours PRN Severe Pain (7 - 10)  traMADol 25 milliGRAM(s) Oral every 6 hours PRN Moderate Pain (4 - 6)      -------------------------------------------------------------------------------  LABS:                        7.6    7.24  )-----------( 203      ( 02 Jan 2022 11:47 )             25.5     01-02    136  |  102  |  74<H>  ----------------------------<  172<H>  4.6   |  25  |  2.46<H>    Ca    8.5      02 Jan 2022 11:47  Phos  4.4     01-01  Mg     2.2     01-02    TPro  5.3<L>  /  Alb  3.3  /  TBili  0.8  /  DBili  x   /  AST  30  /  ALT  39  /  AlkPhos  54  01-02    LIVER FUNCTIONS - ( 02 Jan 2022 11:47 )  Alb: 3.3 g/dL / Pro: 5.3 g/dL / ALK PHOS: 54 U/L / ALT: 39 U/L / AST: 30 U/L / GGT: x           PT/INR - ( 01 Jan 2022 07:30 )   PT: 12.8 sec;   INR: 1.07          PTT - ( 02 Jan 2022 11:47 )  PTT:43.1 sec    CAPILLARY BLOOD GLUCOSE      POCT Blood Glucose.: 190 mg/dL (02 Jan 2022 12:30)      COVID-19 PCR: NotDetec (01 Jan 2022 06:20)  COVID-19 PCR: Negative (24 Dec 2021 14:05)      RADIOLOGY & ADDITIONAL TESTS: Reviewed.

## 2022-01-02 NOTE — PROGRESS NOTE ADULT - ASSESSMENT
89YOM with history of essential HTN, HLD, CKD4, chronic HFrEF (LVEF 35-40%, last TTE available for review 2018), CAD s/p PCI, aortic stenosis s/p AVR, chronic atrial fibrillation, hypothyroidism, recent GIB (now off warfarin x 2 months) transfer from vascular surgery service for bruising of RLE and L internal iliac artery aneurysm now treating with steroid and cyclophosphamide for anemia 2/2 circulating factor inhibitor    #Anemia with LLE intramuscular hematoma  Elevated PTT  Hgb 6.2 on admission - incomplete responses to pRBC Other than intramuscular hematoma, no other evidence of blood loss - recent admission at outside hospital 2 months ago for GIB at which time his warfarin was stopped though he says he has been having brown stools. Elevated PTT noted, no known history of bleeding disorder and no family history.   -Elevated inhibitory assay/[place] unit elevated   - cyclophosphamide 75mg qd  -Zofran prior to cyclophosphamide doses  -Daily prednisone 60, PPI and insulin ss  -If Hb drops further, obtain CT abdomen/pelvis without contrast (CKD) to r/o RP bleed     #Constipation  -Miralax, Senna daily    #L internal iliac artery aneurysm  -outpatient repair with vascular    #AS s/p AVR  Chronic atrial fibrillation  Previously on warfarin but stopped due to GIB most recently about 2 months prior to admission. EKG V-paced  -continue home metoprolol (switched to short acting BID inpatient)     #Chronic HFrEF  CAD s/p PCI  2018 TTE LVEF 35-40%. Home metoprolol succinate  -Metoprolol tartrate 25mg BID  -Holding lasix 10mg once daily  -continue home statin    #Dementia - mild, he is independent at home, continue home donepezil  #HLD - continue home statin  #CKD4 - at baseline creatinine, monitor  #Hypothyroidism - continue home levothyroxine 25mcg once daily  #BPH - continue home tamsulosin  #Gout - continue home allopurinol

## 2022-01-02 NOTE — PROGRESS NOTE ADULT - ASSESSMENT
89 yo M with PMHx of essential hypertension, hyperlipidemia, CKD4, chronic HFrEF (EF 30-35%), CAD s/p PCI (2007), AS s/p bioprosthetic AVR (2013) with revision (2016), AFib, colon cancer s/p R hemicolectomy (2016), admitted for severe anemia secondary to left intramuscular hematoma with inadequate response to transfusion. Hematology consulted for evaluation of anemia and coagulopathy. s/p 3 U PRBC (12/25/21 x 2, 12/27/21), 1 FFP (12/26/21) and 10 U Cryoprecipitate (12/26/21). Clinically no other sites of bleeding, FOBT (12/26/21) negative, and no evidence of hemolysis. No evidence of vWD.     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 Factor VIII inhibitor detected with high [place] titer of 17. Factor VIII 1%.   - No evidence of acquired/inherited vWD thus far, pending vWF antigen level.   - Pending Lupus anticoagulant, anti-cardiolipin Ab and anti-beta2-glycoprotein Ab screening tests   - Will consider FFP, factor eight inhibitor bypassing agent (FEIBA) or recombinant factor VIIa (NovoSeven) if bleeding recurs and/or becomes hemodynamically unstable. There is a high risk of arterial and venous thrombosis with FEIBA and NovoSeven and patient is already at high risk for stroke given his history of AFib  - Continue with PO Prednisone 1 mg/kg (12/27/21). PO Cyclophosphamide 75 mg QD was added on 12/29/21. Repeat Inhibitor assay to assay inhibitor titer; may consider Rituxan on 01/03/21.   - HIV and Hepatitis panel negative.   - Trend CBC with differential and PTT once daily. Clinically no evidence of bleeding; slight decrease in Hb resolved spontaneously; cyclophosphamide can additionally cause anemia via myelosuppression.   - Maintain active T+S, transfuse if Hb < 7 or if actively bleeding/symptomatic  - Upon discharge, follow-up with Dr. Lori Chandler at [facility] [place] and Blood on [place] location in 1 week    Discussed with Dr. Chandler

## 2022-01-03 NOTE — PROGRESS NOTE ADULT - SUBJECTIVE AND OBJECTIVE BOX
Physical Medicine and Rehabilitation Progress Note:    Patient is a 89y old  Male who presents with a chief complaint of LLE swelling and pain (03 Jan 2022 13:56)      HPI:  89yo male presented with painful swelling and bruises of LLE. States started about 10d ago by bilateral bruises in toes and swelling in calves and thighs (L>R) with the right-side gradually improving but left side worsening since then and becoming painful. Has been ambulating without difficulty and denies any recent falls, traumas or preceding events. Denies any fever, chills, dizziness, light-headedness, palpitation, coughs, shortness of breath, chest pain, or abdominal pain. PMH HTN, HLD, CKD stage 4 (Cr 2, eGFR 28), HF (EF 30-35%), CAD (s/p PCI for RCA stenting 2007), aortic stenosis (s/p aortic valve replacement 2016), Afib, hypothyroidism, peptic ulcer disease, internal hemorrhoids, prostate and colon cancers (s/p R hemicolectomy 2016). He has a pacemaker and has been on Coumadin until 6w ago (when stopped due to risks of GIB). Upon presentation, pt had a Hgb of 6.2.  (24 Dec 2021 19:39)                            7.4    5.15  )-----------( 159      ( 03 Jan 2022 07:14 )             25.2       01-03    137  |  104  |  73<H>  ----------------------------<  105<H>  5.4<H>   |  24  |  2.31<H>    Ca    8.7      03 Jan 2022 07:14  Phos  4.2     01-03  Mg     2.5     01-03    TPro  5.1<L>  /  Alb  3.1<L>  /  TBili  0.9  /  DBili  x   /  AST  22  /  ALT  33  /  AlkPhos  51  01-03    Vital Signs Last 24 Hrs  T(C): 36.4 (03 Jan 2022 11:20), Max: 36.8 (03 Jan 2022 05:19)  T(F): 97.6 (03 Jan 2022 11:20), Max: 98.3 (03 Jan 2022 05:19)  HR: 80 (03 Jan 2022 11:20) (79 - 80)  BP: 106/58 (03 Jan 2022 11:20) (104/63 - 123/78)  BP(mean): 78 (02 Jan 2022 20:45) (78 - 78)  RR: 16 (03 Jan 2022 11:20) (16 - 16)  SpO2: 94% (03 Jan 2022 11:20) (94% - 95%)    MEDICATIONS  (STANDING):  atorvastatin 20 milliGRAM(s) Oral at bedtime  cholecalciferol 1000 Unit(s) Oral daily  cyclophosphamide 75 milliGRAM(s) Oral daily  dextrose 40% Gel 15 Gram(s) Oral once  dextrose 5%. 1000 milliLiter(s) (50 mL/Hr) IV Continuous <Continuous>  dextrose 5%. 1000 milliLiter(s) (100 mL/Hr) IV Continuous <Continuous>  dextrose 50% Injectable 25 Gram(s) IV Push once  dextrose 50% Injectable 12.5 Gram(s) IV Push once  dextrose 50% Injectable 25 Gram(s) IV Push once  donepezil 5 milliGRAM(s) Oral at bedtime  folic acid 1 milliGRAM(s) Oral daily  glucagon  Injectable 1 milliGRAM(s) IntraMuscular once  insulin lispro (ADMELOG) corrective regimen sliding scale   SubCutaneous Before meals and at bedtime  lactated ringers. 1000 milliLiter(s) (100 mL/Hr) IV Continuous <Continuous>  levothyroxine 25 MICROGram(s) Oral daily  melatonin 5 milliGRAM(s) Oral at bedtime  memantine 5 milliGRAM(s) Oral daily  metoprolol tartrate 25 milliGRAM(s) Oral every 12 hours  ondansetron  IVPB 12 milliGRAM(s) IV Intermittent daily  pantoprazole    Tablet 40 milliGRAM(s) Oral before breakfast  polyethylene glycol 3350 17 Gram(s) Oral daily  predniSONE   Tablet 60 milliGRAM(s) Oral every 24 hours  senna 1 Tablet(s) Oral at bedtime  tamsulosin 0.4 milliGRAM(s) Oral at bedtime  traZODone 25 milliGRAM(s) Oral at bedtime  vitamin B complex with vitamin C 1 Tablet(s) Oral daily    MEDICATIONS  (PRN):  acetaminophen     Tablet .. 650 milliGRAM(s) Oral every 6 hours PRN Mild Pain (1 - 3)  oxyCODONE    IR 10 milliGRAM(s) Oral every 6 hours PRN Severe Pain (7 - 10)  traMADol 25 milliGRAM(s) Oral every 6 hours PRN Moderate Pain (4 - 6)    Currently Undergoing Physical/ Occupational Therapy at bedside.    Functional Status Assessment:   1/2/2022      Cognitive/Neuro/Behavioral  Cognitive/Neuro/Behavioral [WDL Definition: Alert; opens eyes spontaneously; arouses to voice or touch; oriented x 4; follows commands; speech spontaneous, logical; purposeful motor response; behavior appropriate to situation]: WDL    Language Assistance  Preferred Language to Address Healthcare Preferred Language to Address Healthcare: English    Therapeutic Interventions      Bed Mobility  Bed Mobility Training Sit-to-Supine: contact guard;  verbal cues;  nonverbal cues (demo/gestures);  1 person assist;  bed rails  Bed Mobility Training Supine-to-Sit: contact guard;  verbal cues;  nonverbal cues (demo/gestures);  1 person assist;  bed rails  Bed Mobility Training Limitations: decreased ability to use arms for pushing/pulling;  decreased ability to use legs for bridging/pushing;  decreased strength;  impaired balance;  impaired postural control    Sit-Stand Transfer Training  Transfer Training Sit-to-Stand Transfer: minimum assist (75% patient effort);  verbal cues;  nonverbal cues (demo/gestures);  1 person assist;  full weight-bearing   rolling walker  Transfer Training Stand-to-Sit Transfer: minimum assist (75% patient effort);  1 person assist;  verbal cues;  nonverbal cues (demo/gestures);  full weight-bearing   britton walker  Sit-to-Stand Transfer Training Transfer Safety Analysis: decreased balance;  decreased strength;  impaired balance;  impaired postural control;  rolling walker    Gait Training  Gait Training: minimum assist (75% patient effort);  verbal cues;  nonverbal cues (demo/gestures);  1 person assist;  full weight-bearing   rolling walker;  50 feet  Gait Analysis: 3-point gait   decreased rosita;  decreased hip/knee flexion;  decreased step length;  decreased stride length;  decreased strength;  impaired balance;  impaired postural control;  50 feet;  rolling walker  Gait Number of Times:: x 1    Therapeutic Exercise  Therapeutic Exercise Detail: Seated LAQ, ankle pumps [id number] ankle pumps, heel slides, quad set, glut set x5           PM&R Impression: as above    Current Disposition Plan Recommendations:    subacute rehab placement

## 2022-01-03 NOTE — PROGRESS NOTE ADULT - SUBJECTIVE AND OBJECTIVE BOX
LENGTH OF HOSPITAL STAY: 9d    SUBJECTIVE: No acute overnight events. No hematuria.    HISTORY OF PRESENTING ILLNESS:   89yo male presented with painful swelling and bruises of LLE. States started about 10d ago by bilateral bruises in toes and swelling in calves and thighs (L>R) with the right-side gradually improving but left side worsening since then and becoming painful. Has been ambulating without difficulty and denies any recent falls, traumas or preceding events. Denies any fever, chills, dizziness, light-headedness, palpitation, coughs, shortness of breath, chest pain, or abdominal pain. PMH HTN, HLD, CKD stage 4 (Cr 2, eGFR 28), HF (EF 30-35%), CAD (s/p PCI for RCA stenting 2007), aortic stenosis (s/p aortic valve replacement 2016), Afib, hypothyroidism, peptic ulcer disease, internal hemorrhoids, prostate and colon cancers (s/p R hemicolectomy 2016). He has a pacemaker and has been on Coumadin until 6w ago (when stopped due to risks of GIB). Upon presentation, pt had a Hgb of 6.2.  (24 Dec 2021 19:39)    PAST MEDICAL & SURGICAL HISTORY:  Aortic valve replaced  Atrial fibrillation  CAD  HTN (hypertension)  PUD (peptic ulcer disease)  Constipation  Iron deficiency anemia  Diverticulosis  High cholesterol  Carotid arterial disease  Internal hemorrhoids  Endocarditis  Colon cancer  Hypothyroidism  S/P AVR  2013 @ [facility]  by Dr. Young  Cardiac pacemaker  3yrs ago  H/O inguinal hernia repair  H/O right hemicolectomy  History of appendectomy    ALLERGIES:  No Known Allergies    MEDICATIONS:  STANDING MEDICATIONS  atorvastatin 20 milliGRAM(s) Oral at bedtime  cholecalciferol 1000 Unit(s) Oral daily  cyclophosphamide 75 milliGRAM(s) Oral daily  dextrose 40% Gel 15 Gram(s) Oral once  dextrose 5%. 1000 milliLiter(s) IV Continuous <Continuous>  dextrose 5%. 1000 milliLiter(s) IV Continuous <Continuous>  dextrose 50% Injectable 25 Gram(s) IV Push once  dextrose 50% Injectable 12.5 Gram(s) IV Push once  dextrose 50% Injectable 25 Gram(s) IV Push once  donepezil 5 milliGRAM(s) Oral at bedtime  folic acid 1 milliGRAM(s) Oral daily  glucagon  Injectable 1 milliGRAM(s) IntraMuscular once  insulin lispro (ADMELOG) corrective regimen sliding scale   SubCutaneous Before meals and at bedtime  lactated ringers. 1000 milliLiter(s) IV Continuous <Continuous>  levothyroxine 25 MICROGram(s) Oral daily  melatonin 5 milliGRAM(s) Oral at bedtime  memantine 5 milliGRAM(s) Oral daily  metoprolol tartrate 25 milliGRAM(s) Oral every 12 hours  ondansetron  IVPB 12 milliGRAM(s) IV Intermittent daily  pantoprazole    Tablet 40 milliGRAM(s) Oral before breakfast  polyethylene glycol 3350 17 Gram(s) Oral daily  predniSONE   Tablet 60 milliGRAM(s) Oral every 24 hours  senna 1 Tablet(s) Oral at bedtime  tamsulosin 0.4 milliGRAM(s) Oral at bedtime  traZODone 25 milliGRAM(s) Oral at bedtime  vitamin B complex with vitamin C 1 Tablet(s) Oral daily    PRN MEDICATIONS  acetaminophen     Tablet .. 650 milliGRAM(s) Oral every 6 hours PRN  oxyCODONE    IR 10 milliGRAM(s) Oral every 6 hours PRN  traMADol 25 milliGRAM(s) Oral every 6 hours PRN      LABS:                                   7.4    5.15  )-----------( 159      ( 03 Jan 2022 07:14 )             25.2   PTT - ( 02 Jan 2022 11:47 )  PTT:43.1 sec    RADIOLOGY:  Reviewed    PHYSICAL EXAM:  GEN: No acute distress  HEENT: NCAT  LUNGS: Clear to auscultation bilaterally   HEART: S1/S2 present. RRR.   ABD: Soft, non-tender, non-distended. Bowel sounds present  EXT: Swollen left leg with hematoma, stable/improving  NEURO: AAOX3

## 2022-01-03 NOTE — PROGRESS NOTE ADULT - ASSESSMENT
89YOM with history of essential HTN, HLD, CKD4, chronic HFrEF (LVEF 35-40%, last TTE available for review 2018), CAD s/p PCI, aortic stenosis s/p AVR, chronic atrial fibrillation, hypothyroidism, recent GIB (now off warfarin x 2 months) transfer from vascular surgery service for bruising of RLE and L internal iliac artery aneurysm now treating with steroid and cyclophosphamide for anemia 2/2 circulating factor inhibitor    #Anemia with LLE intramuscular hematoma  Elevated PTT  Hgb 6.2 on admission - incomplete responses to pRBC Other than intramuscular hematoma, no other evidence of blood loss - recent admission at outside hospital 2 months ago for GIB at which time his warfarin was stopped though he says he has been having brown stools. Elevated PTT noted, no known history of bleeding disorder and no family history.   -Elevated inhibitory assay/[place] unit elevated   - cyclophosphamide 75mg qd  -Zofran prior to cyclophosphamide doses  -Daily prednisone 60, PPI and insulin ss  -If Hb drops further, obtain CT abdomen/pelvis without contrast (CKD) to r/o RP bleed     #Constipation  -Miralax, Senna daily    #L internal iliac artery aneurysm  -outpatient repair with vascular    #AS s/p AVR  Chronic atrial fibrillation  Previously on warfarin but stopped due to GIB most recently about 2 months prior to admission. EKG V-paced  -continue home metoprolol (switched to short acting BID inpatient)     #Chronic HFrEF  CAD s/p PCI  2018 TTE LVEF 35-40%. Home metoprolol succinate  -Metoprolol tartrate 25mg BID  -Holding lasix 10mg once daily  -continue home statin    #Dementia - mild, he is independent at home, continue home donepezil  #HLD - continue home statin  #CKD4 - at baseline creatinine, monitor  #Hypothyroidism - continue home levothyroxine 25mcg once daily  #BPH - continue home tamsulosin  #Gout - continue home allopurinol   89YOM with history of essential HTN, HLD, CKD4, chronic HFrEF (LVEF 35-40%, last TTE available for review 2018), CAD s/p PCI, aortic stenosis s/p AVR, chronic atrial fibrillation, hypothyroidism, recent GIB (now off warfarin x 2 months) transfer from vascular surgery service for bruising of RLE and L internal iliac artery aneurysm now treating with steroid and cyclophosphamide for anemia 2/2 circulating factor inhibitor    #Anemia with LLE intramuscular hematoma  Elevated PTT  Hgb 6.2 on admission - incomplete responses to pRBC Other than intramuscular hematoma, no other evidence of blood loss - recent admission at outside hospital 2 months ago for GIB at which time his warfarin was stopped though he says he has been having brown stools. Elevated PTT noted, no known history of bleeding disorder and no family history.   -Elevated inhibitory assay/[place] unit elevated   -F/u repeat inhibitor assay  - cyclophosphamide 75mg qd  -Zofran prior to cyclophosphamide doses  -Daily prednisone 60, PPI and insulin ss  -If Hb drops further, obtain CT abdomen/pelvis without contrast (CKD) to r/o RP bleed     #Constipation  -Miralax, Senna daily    #L internal iliac artery aneurysm  -outpatient repair with vascular    #AS s/p AVR  Chronic atrial fibrillation  Previously on warfarin but stopped due to GIB most recently about 2 months prior to admission. EKG V-paced  -continue home metoprolol (switched to short acting BID inpatient)     #Chronic HFrEF  CAD s/p PCI  2018 TTE LVEF 35-40%. Home metoprolol succinate  -Metoprolol tartrate 25mg BID  -Holding lasix 10mg once daily  -continue home statin    #Dementia - mild, he is independent at home, continue home donepezil  #HLD - continue home statin  #CKD4 - at baseline creatinine, monitor  #Hypothyroidism - continue home levothyroxine 25mcg once daily  #BPH - continue home tamsulosin  #Gout - continue home allopurinol

## 2022-01-03 NOTE — PROGRESS NOTE ADULT - ASSESSMENT
89 yo M with PMHx of essential hypertension, hyperlipidemia, CKD4, chronic HFrEF (EF 30-35%), CAD s/p PCI (2007), AS s/p bioprosthetic AVR (2013) with revision (2016), AFib, colon cancer s/p R hemicolectomy (2016), admitted for severe anemia secondary to left intramuscular hematoma with inadequate response to transfusion. Hematology consulted for evaluation of anemia and coagulopathy. s/p 3 U PRBC (12/25/21 x 2, 12/27/21), 1 FFP (12/26/21) and 10 U Cryoprecipitate (12/26/21). Clinically no other sites of bleeding, FOBT (12/26/21) negative, and no evidence of hemolysis. No evidence of vWD.     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 Factor VIII inhibitor detected with high [place] titer of 17. Factor VIII 1%.   - No evidence of acquired/inherited vWD thus far, pending vWF antigen level.   - Pending Lupus anticoagulant, anti-cardiolipin Ab and anti-beta2-glycoprotein Ab screening tests   - Will consider FFP, factor eight inhibitor bypassing agent (FEIBA) or recombinant factor VIIa (NovoSeven) if bleeding recurs and/or becomes hemodynamically unstable. There is a high risk of arterial and venous thrombosis with FEIBA and NovoSeven and patient is already at high risk for stroke given his history of AFib  - Continue with PO Prednisone 1 mg/kg (12/27/21). PO Cyclophosphamide 75 mg QD was added on 12/29/21. Repeat Inhibitor assay to assay inhibitor titer;   - possible  Rituxan on 01/04/21 to help durability of response.   - HIV and Hepatitis panel negative.   - Trend CBC with differential and PTT once daily. Clinically no evidence of bleeding; slight decrease in Hb resolved spontaneously; cyclophosphamide can additionally cause anemia via myelosuppression.   - Maintain active T+S, transfuse if Hb < 7 or if actively bleeding/symptomatic  - Upon discharge, follow-up with Dr. Lori Chandler at [facility] on [place] location in 1 week    Discussed with Dr. Chandler

## 2022-01-03 NOTE — PROGRESS NOTE ADULT - ASSESSMENT
per Internal Medicine    89 yo M with history of essential HTN, HLD, CKD4, chronic HFrEF (LVEF 35-40%, last TTE available for review 2018), CAD s/p PCI, aortic stenosis s/p AVR, chronic atrial fibrillation, hypothyroidism, recent GIB (now off warfarin x 2 months) transfer from vascular surgery service for bruising of RLE and L internal iliac artery aneurysm now treating with steroid and cyclophosphamide for anemia 2/2 circulating factor inhibitor    #Anemia with LLE intramuscular hematoma  Elevated PTT  Hgb 6.2 on admission - incomplete responses to pRBC Other than intramuscular hematoma, no other evidence of blood loss - recent admission at outside hospital 2 months ago for GIB at which time his warfarin was stopped though he says he has been having brown stools. Elevated PTT noted, no known history of bleeding disorder and no family history.   -Elevated inhibitory assay/[place] unit elevated   - cyclophosphamide 75mg qd  -Zofran prior to cyclophosphamide doses  -Daily prednisone 60, PPI and insulin ss  -If Hb drops further, obtain CT abdomen/pelvis without contrast (CKD) to r/o RP bleed     #Constipation  -Miralax, Senna daily    #L internal iliac artery aneurysm  -outpatient repair with vascular    #AS s/p AVR  Chronic atrial fibrillation  Previously on warfarin but stopped due to GIB most recently about 2 months prior to admission. EKG V-paced  -continue home metoprolol (switched to short acting BID inpatient)     #Chronic HFrEF  CAD s/p PCI  2018 TTE LVEF 35-40%. Home metoprolol succinate  -Metoprolol tartrate 25mg BID  -Holding lasix 10mg once daily  -continue home statin    #Dementia - mild, he is independent at home, continue home donepezil  #HLD - continue home statin  #CKD4 - at baseline creatinine, monitor  #Hypothyroidism - continue home levothyroxine 25mcg once daily  #BPH - continue home tamsulosin  #Gout - continue home allopurinol

## 2022-01-03 NOTE — PROGRESS NOTE ADULT - SUBJECTIVE AND OBJECTIVE BOX
OVERNIGHT EVENTS: naeon    SUBJECTIVE / INTERVAL HPI: Patient seen and examined at bedside. Denied CP, SOB,  feeling lightheaded.    VITAL SIGNS:  Vital Signs Last 24 Hrs  T(C): 36.4 (03 Jan 2022 11:20), Max: 36.8 (03 Jan 2022 05:19)  T(F): 97.6 (03 Jan 2022 11:20), Max: 98.3 (03 Jan 2022 05:19)  HR: 80 (03 Jan 2022 11:20) (79 - 80)  BP: 106/58 (03 Jan 2022 11:20) (104/63 - 123/78)  BP(mean): 78 (02 Jan 2022 20:45) (78 - 78)  RR: 16 (03 Jan 2022 11:20) (16 - 16)  SpO2: 94% (03 Jan 2022 11:20) (94% - 95%)    PHYSICAL EXAM:    General: elderly man in NAD, well developed  HEENT: NC/AT; EOMI, PERRLA, anicteric sclera; moist mucosal membranes.  Neck: supple, trachea midline  Cardiovascular: RRR, +S1/S2; NO M/R/G  Respiratory: CTA B/L; no W/R/R  Gastrointestinal: soft, NT/ND; +BSx4  Extremities: WWP; no edema or cyanosis. Bullae on L dorsal foot  Vascular: 2+ radial, DP/PT pulses B/L  Skin: Ecchymosis L hip and thigh  Neurological: AAOx3; no focal deficits    MEDICATIONS:  MEDICATIONS  (STANDING):  atorvastatin 20 milliGRAM(s) Oral at bedtime  cholecalciferol 1000 Unit(s) Oral daily  cyclophosphamide 75 milliGRAM(s) Oral daily  dextrose 40% Gel 15 Gram(s) Oral once  dextrose 5%. 1000 milliLiter(s) (50 mL/Hr) IV Continuous <Continuous>  dextrose 5%. 1000 milliLiter(s) (100 mL/Hr) IV Continuous <Continuous>  dextrose 50% Injectable 25 Gram(s) IV Push once  dextrose 50% Injectable 12.5 Gram(s) IV Push once  dextrose 50% Injectable 25 Gram(s) IV Push once  donepezil 5 milliGRAM(s) Oral at bedtime  folic acid 1 milliGRAM(s) Oral daily  glucagon  Injectable 1 milliGRAM(s) IntraMuscular once  insulin lispro (ADMELOG) corrective regimen sliding scale   SubCutaneous Before meals and at bedtime  lactated ringers. 1000 milliLiter(s) (100 mL/Hr) IV Continuous <Continuous>  levothyroxine 25 MICROGram(s) Oral daily  melatonin 5 milliGRAM(s) Oral at bedtime  memantine 5 milliGRAM(s) Oral daily  metoprolol tartrate 25 milliGRAM(s) Oral every 12 hours  ondansetron  IVPB 12 milliGRAM(s) IV Intermittent daily  pantoprazole    Tablet 40 milliGRAM(s) Oral before breakfast  polyethylene glycol 3350 17 Gram(s) Oral daily  predniSONE   Tablet 60 milliGRAM(s) Oral every 24 hours  senna 1 Tablet(s) Oral at bedtime  tamsulosin 0.4 milliGRAM(s) Oral at bedtime  traZODone 25 milliGRAM(s) Oral at bedtime  vitamin B complex with vitamin C 1 Tablet(s) Oral daily    MEDICATIONS  (PRN):  acetaminophen     Tablet .. 650 milliGRAM(s) Oral every 6 hours PRN Mild Pain (1 - 3)  oxyCODONE    IR 10 milliGRAM(s) Oral every 6 hours PRN Severe Pain (7 - 10)  traMADol 25 milliGRAM(s) Oral every 6 hours PRN Moderate Pain (4 - 6)      ALLERGIES:  Allergies    No Known Allergies    Intolerances        LABS:                        7.4    5.15  )-----------( 159      ( 03 Jan 2022 07:14 )             25.2     01-03    137  |  104  |  73<H>  ----------------------------<  105<H>  5.4<H>   |  24  |  2.31<H>    Ca    8.7      03 Jan 2022 07:14  Phos  4.2     01-03  Mg     2.5     01-03    TPro  5.1<L>  /  Alb  3.1<L>  /  TBili  0.9  /  DBili  x   /  AST  22  /  ALT  33  /  AlkPhos  51  01-03    PTT - ( 02 Jan 2022 11:47 )  PTT:43.1 sec    CAPILLARY BLOOD GLUCOSE      POCT Blood Glucose.: 195 mg/dL (03 Jan 2022 12:09)      RADIOLOGY & ADDITIONAL TESTS: Reviewed.    PLAN:

## 2022-01-04 NOTE — PROGRESS NOTE ADULT - SUBJECTIVE AND OBJECTIVE BOX
OVERNIGHT EVENTS: NAEON    SUBJECTIVE / INTERVAL HPI: Patient seen and examined at bedside. Denied cp, palpitations, sob. Leg pain improved.    VITAL SIGNS:  Vital Signs Last 24 Hrs  T(C): 36.2 (04 Jan 2022 06:00), Max: 36.6 (03 Jan 2022 21:05)  T(F): 97.2 (04 Jan 2022 06:00), Max: 97.8 (03 Jan 2022 21:05)  HR: 83 (04 Jan 2022 06:00) (81 - 83)  BP: 109/67 (04 Jan 2022 06:00) (107/63 - 112/69)  BP(mean): --  RR: 18 (04 Jan 2022 06:00) (18 - 18)  SpO2: 97% (04 Jan 2022 06:00) (95% - 97%)    PHYSICAL EXAM:    General: elderly man in NAD, well developed  HEENT: NC/AT; EOMI, PERRLA, anicteric sclera; moist mucosal membranes.  Neck: supple, trachea midline  Cardiovascular: RRR, +S1/S2; NO M/R/G  Respiratory: CTA B/L; no W/R/R  Gastrointestinal: soft, NT/ND; +BSx4  Extremities: WWP; no edema or cyanosis. Bullae on L dorsal foot  Vascular: 2+ radial, DP/PT pulses B/L  Skin: Ecchymosis L hip and thigh extended to posterior thorax  Neurological: AAOx3; no focal deficits    MEDICATIONS:  MEDICATIONS  (STANDING):  acetaminophen     Tablet .. 650 milliGRAM(s) Oral once  atorvastatin 20 milliGRAM(s) Oral at bedtime  cholecalciferol 1000 Unit(s) Oral daily  cyclophosphamide 75 milliGRAM(s) Oral daily  dextrose 40% Gel 15 Gram(s) Oral once  dextrose 5%. 1000 milliLiter(s) (50 mL/Hr) IV Continuous <Continuous>  dextrose 5%. 1000 milliLiter(s) (100 mL/Hr) IV Continuous <Continuous>  dextrose 50% Injectable 25 Gram(s) IV Push once  dextrose 50% Injectable 12.5 Gram(s) IV Push once  dextrose 50% Injectable 25 Gram(s) IV Push once  diphenhydrAMINE 50 milliGRAM(s) Oral once  donepezil 5 milliGRAM(s) Oral at bedtime  folic acid 1 milliGRAM(s) Oral daily  glucagon  Injectable 1 milliGRAM(s) IntraMuscular once  insulin lispro (ADMELOG) corrective regimen sliding scale   SubCutaneous Before meals and at bedtime  lactated ringers. 1000 milliLiter(s) (100 mL/Hr) IV Continuous <Continuous>  levothyroxine 25 MICROGram(s) Oral daily  melatonin 5 milliGRAM(s) Oral at bedtime  memantine 5 milliGRAM(s) Oral daily  metoprolol tartrate 25 milliGRAM(s) Oral every 12 hours  ondansetron  IVPB 12 milliGRAM(s) IV Intermittent daily  pantoprazole    Tablet 40 milliGRAM(s) Oral before breakfast  polyethylene glycol 3350 17 Gram(s) Oral daily  predniSONE   Tablet 60 milliGRAM(s) Oral every 24 hours  riTUXimab IVPB (Non - oncologic) 620 milliGRAM(s) IV Intermittent once  senna 1 Tablet(s) Oral at bedtime  tamsulosin 0.4 milliGRAM(s) Oral at bedtime  traZODone 25 milliGRAM(s) Oral at bedtime  vitamin B complex with vitamin C 1 Tablet(s) Oral daily    MEDICATIONS  (PRN):  acetaminophen     Tablet .. 650 milliGRAM(s) Oral every 6 hours PRN Mild Pain (1 - 3)  oxyCODONE    IR 10 milliGRAM(s) Oral every 6 hours PRN Severe Pain (7 - 10)  traMADol 25 milliGRAM(s) Oral every 6 hours PRN Moderate Pain (4 - 6)      ALLERGIES:  Allergies    No Known Allergies    Intolerances        LABS:                        7.5    5.92  )-----------( 153      ( 04 Jan 2022 06:51 )             25.2     01-04    135  |  103  |  73<H>  ----------------------------<  106<H>  5.5<H>   |  25  |  2.11<H>    Ca    8.6      04 Jan 2022 06:51  Phos  4.1     01-04  Mg     2.3     01-04    TPro  5.1<L>  /  Alb  3.1<L>  /  TBili  0.9  /  DBili  x   /  AST  22  /  ALT  33  /  AlkPhos  51  01-03        CAPILLARY BLOOD GLUCOSE      POCT Blood Glucose.: 173 mg/dL (04 Jan 2022 12:14)      RADIOLOGY & ADDITIONAL TESTS: Reviewed.    PLAN:

## 2022-01-04 NOTE — PROGRESS NOTE ADULT - ASSESSMENT
89 yo M with PMHx of essential hypertension, hyperlipidemia, CKD4, chronic HFrEF (EF 30-35%), CAD s/p PCI (2007), AS s/p bioprosthetic AVR (2013) with revision (2016), AFib, colon cancer s/p R hemicolectomy (2016), admitted for severe anemia secondary to left intramuscular hematoma with inadequate response to transfusion. Hematology consulted for evaluation of anemia and coagulopathy. s/p 3 U PRBC (12/25/21 x 2, 12/27/21), 1 FFP (12/26/21) and 10 U Cryoprecipitate (12/26/21). Clinically no other sites of bleeding, FOBT (12/26/21) negative, and no evidence of hemolysis. No evidence of vWD.     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 Factor VIII inhibitor detected with high Eagle titer of 17. Factor VIII 1%.   - No evidence of acquired/inherited vWD thus far, pending vWF antigen level.   - Pending Lupus anticoagulant, anti-cardiolipin Ab and anti-beta2-glycoprotein Ab screening tests   - Will consider FFP, factor eight inhibitor bypassing agent (FEIBA) or recombinant factor VIIa (NovoSeven) if bleeding recurs and/or becomes hemodynamically unstable. There is a high risk of arterial and venous thrombosis with FEIBA and NovoSeven and patient is already at high risk for stroke given his history of AFib  - Continue with PO Prednisone 1 mg/kg (12/27/21). PO Cyclophosphamide 75 mg QD was added on 12/29/21. Repeat Inhibitor assay to assay inhibitor titer;   - HIV and Hepatitis panel negative.   -Rituxan on 01/04/21 to help durability of response. Will need follow up with Dr. Chandler in one week for weekly Rituxan (total 4 doses)    Rituxan dose 375 mg/m2.   - Trend CBC with differential and PTT once daily. Clinically no evidence of bleeding; slight decrease in Hb resolved spontaneously; cyclophosphamide can additionally cause anemia via myelosuppression.   - Maintain active T+S, transfuse if Hb < 7 or if actively bleeding/symptomatic  - Upon discharge, follow-up with Dr. Lori Chandler at [facility] on [address] location in 1 week    Discussed with Dr. Chandler

## 2022-01-04 NOTE — PROGRESS NOTE ADULT - SUBJECTIVE AND OBJECTIVE BOX
SUBJECTIVE: No acute overnight events. No hematuria.    HISTORY OF PRESENTING ILLNESS:   89yo male presented with painful swelling and bruises of LLE. States started about 10d ago by bilateral bruises in toes and swelling in calves and thighs (L>R) with the right-side gradually improving but left side worsening since then and becoming painful. Has been ambulating without difficulty and denies any recent falls, traumas or preceding events. Denies any fever, chills, dizziness, light-headedness, palpitation, coughs, shortness of breath, chest pain, or abdominal pain. PMH HTN, HLD, CKD stage 4 (Cr 2, eGFR 28), HF (EF 30-35%), CAD (s/p PCI for RCA stenting 2007), aortic stenosis (s/p aortic valve replacement 2016), Afib, hypothyroidism, peptic ulcer disease, internal hemorrhoids, prostate and colon cancers (s/p R hemicolectomy 2016). He has a pacemaker and has been on Coumadin until 6w ago (when stopped due to risks of GIB). Upon presentation, pt had a Hgb of 6.2.  (24 Dec 2021 19:39)    PAST MEDICAL & SURGICAL HISTORY:  Aortic valve replaced  Atrial fibrillation  CAD  HTN (hypertension)  PUD (peptic ulcer disease)  Constipation  Iron deficiency anemia  Diverticulosis  High cholesterol  Carotid arterial disease  Internal hemorrhoids  Endocarditis  Colon cancer  Hypothyroidism  S/P AVR  2013 @ [facility]  by Dr. Young  Cardiac pacemaker  3yrs ago  H/O inguinal hernia repair  H/O right hemicolectomy  History of appendectomy    ALLERGIES:  No Known Allergies    MEDICATIONS:  STANDING MEDICATIONS  atorvastatin 20 milliGRAM(s) Oral at bedtime  cholecalciferol 1000 Unit(s) Oral daily  cyclophosphamide 75 milliGRAM(s) Oral daily  dextrose 40% Gel 15 Gram(s) Oral once  dextrose 5%. 1000 milliLiter(s) IV Continuous <Continuous>  dextrose 5%. 1000 milliLiter(s) IV Continuous <Continuous>  dextrose 50% Injectable 25 Gram(s) IV Push once  dextrose 50% Injectable 12.5 Gram(s) IV Push once  dextrose 50% Injectable 25 Gram(s) IV Push once  donepezil 5 milliGRAM(s) Oral at bedtime  folic acid 1 milliGRAM(s) Oral daily  glucagon  Injectable 1 milliGRAM(s) IntraMuscular once  insulin lispro (ADMELOG) corrective regimen sliding scale   SubCutaneous Before meals and at bedtime  lactated ringers. 1000 milliLiter(s) IV Continuous <Continuous>  levothyroxine 25 MICROGram(s) Oral daily  melatonin 5 milliGRAM(s) Oral at bedtime  memantine 5 milliGRAM(s) Oral daily  metoprolol tartrate 25 milliGRAM(s) Oral every 12 hours  ondansetron  IVPB 12 milliGRAM(s) IV Intermittent daily  pantoprazole    Tablet 40 milliGRAM(s) Oral before breakfast  polyethylene glycol 3350 17 Gram(s) Oral daily  predniSONE   Tablet 60 milliGRAM(s) Oral every 24 hours  senna 1 Tablet(s) Oral at bedtime  tamsulosin 0.4 milliGRAM(s) Oral at bedtime  traZODone 25 milliGRAM(s) Oral at bedtime  vitamin B complex with vitamin C 1 Tablet(s) Oral daily    PRN MEDICATIONS  acetaminophen     Tablet .. 650 milliGRAM(s) Oral every 6 hours PRN  oxyCODONE    IR 10 milliGRAM(s) Oral every 6 hours PRN  traMADol 25 milliGRAM(s) Oral every 6 hours PRN      LABS:                        7.5    5.92  )-----------( 153      ( 04 Jan 2022 06:51 )             25.2                           7.4    5.15  )-----------( 159      ( 03 Jan 2022 07:14 )             25.2   PTT - ( 02 Jan 2022 11:47 )  PTT:43.1 sec    RADIOLOGY:  Reviewed    PHYSICAL EXAM:  GEN: No acute distress  HEENT: NCAT  LUNGS: Clear to auscultation bilaterally   HEART: S1/S2 present. RRR.   ABD: Soft, non-tender, non-distended. Bowel sounds present  EXT: Swollen left leg with hematoma, stable/improving  NEURO: AAOX3

## 2022-01-04 NOTE — PROGRESS NOTE ADULT - PROBLEM SELECTOR PLAN 1
Fluids: Tolerating PO  Electrolytes: replete as necessary, K>4, Mg>2  Nutrition: Dash/TLC with nepro can   Bowel Regimen: Senna, miralax  DVT ppx: Holding  GI ppx: None  Code: Full  Disposition: home w/home PT
Fluids: Tolerating PO  Electrolytes: replete as necessary, K>4, Mg>2  Nutrition: Dash/TLC with nepro can   Bowel Regimen: Senna, miralax  DVT ppx: Holding  GI ppx: None  Code: Full  Disposition: Subacute rehab vs home w/home PT

## 2022-01-04 NOTE — DISCHARGE NOTE NURSING/CASE MANAGEMENT/SOCIAL WORK - NSDCPEFALRISK_GEN_ALL_CORE
For information on Fall & Injury Prevention, visit: [email or web address] OR  [email or web address] OR  https://www.cdc.gov/steadi/patient.html

## 2022-01-04 NOTE — PROGRESS NOTE ADULT - ASSESSMENT
89YOM with history of essential HTN, HLD, CKD4, chronic HFrEF (LVEF 35-40%, last TTE available for review 2018), CAD s/p PCI, aortic stenosis s/p AVR, chronic atrial fibrillation, hypothyroidism, recent GIB (now off warfarin x 2 months) transfer from vascular surgery service for bruising of RLE and L internal iliac artery aneurysm now treating with steroid and cyclophosphamide for anemia 2/2 circulating factor inhibitor plan to add Rituximab today.    #Anemia with LLE intramuscular hematoma  Elevated PTT  Hgb 6.2 on admission - incomplete responses to pRBC Other than intramuscular hematoma, no other evidence of blood loss - recent admission at outside hospital 2 months ago for GIB at which time his warfarin was stopped though he says he has been having brown stools. Elevated PTT noted, no known history of bleeding disorder and no family history.   -Elevated inhibitory assay/[place] unit elevated   -Reticulocyte index 1.09 today which may be i/s/o introduction of chemo  -F/u repeat inhibitor assay  -cyclophosphamide 75mg qd  -Zofran prior to cyclophosphamide doses  -Rituximab 620mg starting 1/4   -Daily prednisone 60, PPI and insulin ss  -If Hb drops further, obtain CT abdomen/pelvis without contrast (CKD) to r/o RP bleed     #Constipation  -Miralax, Senna daily    #L internal iliac artery aneurysm  -outpatient repair with vascular    #AS s/p AVR  Chronic atrial fibrillation  Previously on warfarin but stopped due to GIB most recently about 2 months prior to admission. EKG V-paced  -continue home metoprolol (switched to short acting BID inpatient)     #Chronic HFrEF  CAD s/p PCI  2018 TTE LVEF 35-40%. Home metoprolol succinate  -Metoprolol tartrate 25mg BID  -Holding lasix 10mg once daily  -continue home statin    #Dementia - mild, he is independent at home, continue home donepezil  #HLD - continue home statin  #CKD4 - at baseline creatinine, monitor  #Hypothyroidism - continue home levothyroxine 25mcg once daily  #BPH - continue home tamsulosin  #Gout - continue home allopurinol

## 2022-01-05 NOTE — PROGRESS NOTE ADULT - ASSESSMENT
per Internal Medicine    89 yo M with history of essential HTN, HLD, CKD4, chronic HFrEF (LVEF 35-40%, last TTE available for review 2018), CAD s/p PCI, aortic stenosis s/p AVR, chronic atrial fibrillation, hypothyroidism, recent GIB (now off warfarin x 2 months) transfer from vascular surgery service for bruising of RLE and L internal iliac artery aneurysm now treating with steroid and cyclophosphamide for anemia 2/2 circulating factor inhibitor plan to add Rituximab today.    #Anemia with LLE intramuscular hematoma  Elevated PTT  Hgb 6.2 on admission - incomplete responses to pRBC Other than intramuscular hematoma, no other evidence of blood loss - recent admission at outside hospital 2 months ago for GIB at which time his warfarin was stopped though he says he has been having brown stools. Elevated PTT noted, no known history of bleeding disorder and no family history.   -Elevated inhibitory assay/[place] unit elevated   -Reticulocyte index 1.09 today which may be i/s/o introduction of chemo  -F/u repeat inhibitor assay  -cyclophosphamide 75mg qd  -Zofran prior to cyclophosphamide doses  -Rituximab 620mg starting 1/4   -Daily prednisone 60, PPI and insulin ss  -If Hb drops further, obtain CT abdomen/pelvis without contrast (CKD) to r/o RP bleed     #Constipation  -Miralax, Senna daily    #L internal iliac artery aneurysm  -outpatient repair with vascular    #AS s/p AVR  Chronic atrial fibrillation  Previously on warfarin but stopped due to GIB most recently about 2 months prior to admission. EKG V-paced  -continue home metoprolol (switched to short acting BID inpatient)     #Chronic HFrEF  CAD s/p PCI  2018 TTE LVEF 35-40%. Home metoprolol succinate  -Metoprolol tartrate 25mg BID  -Holding lasix 10mg once daily  -continue home statin    #Dementia - mild, he is independent at home, continue home donepezil  #HLD - continue home statin  #CKD4 - at baseline creatinine, monitor  #Hypothyroidism - continue home levothyroxine 25mcg once daily  #BPH - continue home tamsulosin  #Gout - continue home allopurinol      Problem/Plan - 1:  ·  Problem: Prophylactic measure.   ·  Plan: Fluids: Tolerating PO  Electrolytes: replete as necessary, K>4, Mg>2  Nutrition: Dash/TLC with nepro can   Bowel Regimen: Senna, miralax  DVT ppx: Holding  GI ppx: None  Code: Full  Disposition: home w/home PT.

## 2022-01-05 NOTE — PROGRESS NOTE ADULT - NUTRITIONAL ASSESSMENT
This patient has been assessed with a concern for Malnutrition and has been determined to have a diagnosis/diagnoses of Moderate protein-calorie malnutrition.    This patient is being managed with:   Diet DASH/TLC-  Sodium & Cholesterol Restricted  For patients receiving Renal Replacement - No Protein Restr No Conc K No Conc Phos Low Sodium (RENAL)  Supplement Feeding Modality:  Oral  Nepro Cans or Servings Per Day:  1       Frequency:  Two Times a day  Entered: Dec 29 2021  1:50PM    

## 2022-01-05 NOTE — PROGRESS NOTE ADULT - ATTENDING COMMENTS
Patient was seen and examined with the resident team today.  I agree with Dr. 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  Transferred to Medicine in light of ongoing anemia, now thought to be 2/2 a factor VIII inhibitor.  Started on Cyclophosphamide on 12/29.  Notably, developed an BRITT on CKD4 thought to be 2/2 hypovolemia after improvement w/small IVF bolus.  Remained in-house over the wekeend for observation of Hb and tolerance of Cyclophosphamide.  Of note, his PTT is down again today, Hb up on it's own and w/o signs of bleeding.      #Anemia NOS, Factor VIII inhibitor - c/w steroids and Cyclophosphamide; if Hb stable and PTT continues to improve, discuss timing of discharge on Monday directly w/Dr. Chandler   #BRITT on CKD4 - resolving, encourage PO intake, renally dose meds (including Cyclophosphamide)    #RLE hematoma - will need to f/u with Vascular; wean opiate as able   #Essential HTN, chronic systolic CHF, chronic afib - c/w Metoprolol and statin, holding home Lasix but should be restarted on discharge  #Hypothyroidism - c/w Levothyroxine   #DVT PPx - SCD  #Dispo - assisted living facility w/24h supervision v [facility], pt prefers the former    Ruth Ordoñez  [phone]
D/c planning for [facility].   Will offer Rituxan for sustained response.   PTT coming down with Prednisone/Cytoxan.   Daily coags, twice a week inhibitor titers.   Case was discussed with housestaff.
Tolerated Rituxan, no bleeding, PTT trending down, repeat inhibitor pending.   OK for d/c to assisted living, will arrange for follow up for second dose of Rituxan.   OK to d/c with Cytoxan at 50mg daily.
Tolerating steroids, low dose cyclophosphamide, monitor coags daily, inhibitor level twice a week.   Monitor for renal function and hematuria.  Case was discussed with primary team.
Continue with steroids and low dose Cytoxan, PTT improving.   Hb stable.   Daily coags.
Fro Rituxmab today with hope of sustained response.   No evidence of bleeding.   Continue with steroids and Cytoxan for now.   PTT every day, please check inhibitor levels.   Outpatient heme follow up.   Case was discussed with housestaff.
Initial attending contact date  12/28/21    . See fellow note written above for details. I reviewed the fellow documentation. I have personally seen and examined this patient. I reviewed vitals, labs, medications, cardiac studies, and additional imaging. I agree with the above fellow's findings and plans as written above with the following additions/statements.    -Pt no longer for [facility]  -Resume meds as noted above  -To fu with outpatient cardiologist  -Pls reconsult as needed
Patient was seen and examined with the resident team today.  I agree with Dr. 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  Transferred to Medicine in light of ongoing anemia NOS.    #Anemia NOS - CTAP w/o contrast given his CKD; f/u outstanding labs requested by Heme; would address transfusion goals with Cardiology given no active CAD and volume overloaded and now on O2 this AM  #RLE hematoma - will need to f/u with Vascular; wean opiate as able   #Essential HTN, chronic systolic CHF, chronic afib - c/w Metoprolol and statin, holding Lasix   #Hypothyroidism - c/w Levothyroxine   #DVT PPx - SCD  #Dispo - TBD     Ruth Ordoñez  [phone]
Patient was seen and examined with the resident team today.  I agree with Dr. 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  Transferred to Medicine in light of ongoing anemia, now thought to be 2/2 a factor VIII inhibitor.  Waiting for confirmatory testing, as may need chemotherapy if confirmed to have an inhibitor.      #Anemia NOS - CTAP w/o bleed but also w/o contrast given CKD; f/u inhibitor testing; started on Pred on 12/27; case discussed directly with Dr. Chandler today   #RLE hematoma - will need to f/u with Vascular; wean opiate as able   #Essential HTN, chronic systolic CHF, chronic afib - c/w Metoprolol and statin, holding Lasix   #Hypothyroidism - c/w Levothyroxine   #DVT PPx - SCD  #Dispo - TBD, needs PT libertyal     Ruth Ordoñez  [phone]
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   If uncontrolled bleeding only consider FIEBA vs novo7, please discuss with heme prior to administration.   May need to consider Cyclophosphamide and Rituximab once inhibitor presence is confirmed.   Please check CBC, PT/PTT at least twice daily for now.
Patient was seen and examined with the resident team today.  I agree with Dr. 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  Transferred to Medicine in light of ongoing anemia, now thought to be 2/2 a factor VIII inhibitor.  Started on Cyclophosphamide on 12/29.  Notably, now has an BRITT on CKD4 thought to be 2/2 hypovolemia.      #Anemia NOS - CTAP w/o bleed but also w/o contrast given CKD; now on steroids and Cyclophosphamide; will monitor Hb   #BRITT on CKD4 - trial of small and slow hydration, repeat BMP to assess K and Cr after AM hyperkalemia cocktail   #RLE hematoma - will need to f/u with Vascular; wean opiate as able   #Essential HTN, chronic systolic CHF, chronic afib - c/w Metoprolol and statin, holding home Lasix   #Hypothyroidism - c/w Levothyroxine   #DVT PPx - SCD  #Dispo - TBD    Ruth Ordoñez  [phone]
A/P:  1. improved acute blood loss anemia due to acquired hemophilia A, FVIII inhibitor with RLE hematoma, s/p 3u PRBCs, 1 FFP, 10u Cryoprecipitate on prednisone 1mg/kg and cyclophosphamide  -- H/H remains stable today  -- plan for rituximab today, monitor x24h  -- f/u LAC, anti-cardiolipin Ab and anti-B2 glycoprotein AB, vWF antigen level  -- HIV, hepatitis panel negative    2. macrocytic anemia, stable   3. chronic afib not on AC due to prior hx of GIB  4. HFreF 40%, not in exacerbation    Dispo: dc in 24h if not adverse reaction to rituxan
Patient was seen and examined with the resident team today.  I agree with Dr. 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  Transferred to Medicine in light of ongoing anemia, now thought to be 2/2 a factor VIII inhibitor.  Started on Cyclophosphamide on 12/29.  Notably, developed an BRITT on CKD4 thought to be 2/2 hypovolemia after improvement w/small IVF bolus.  Of note, his PTT is markedly down today.      #Anemia NOS, Factor VIIIi - CTAP w/o bleed but also w/o contrast given CKD; now on steroids and Cyclophosphamide; will monitor Hb; if Hb stable and PTT continues to improve, would discuss timing of discharge on Monday w/Dr. Chandler   #BRITT on CKD4 - resolving, encourage PO intake, renally dose meds (including Cyclophosphamide)    #RLE hematoma - will need to f/u with Vascular; wean opiate as able   #Essential HTN, chronic systolic CHF, chronic afib - c/w Metoprolol and statin, holding home Lasix   #Hypothyroidism - c/w Levothyroxine   #DVT PPx - SCD  #Dispo - TBD    Ruth Ordoñez  [phone]
Patient was seen and examined with the resident team today.  I agree with Dr. 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  Transferred to Medicine in light of ongoing anemia, now thought to be 2/2 a factor VIII inhibitor.  Started on Cyclophosphamide today.     #Anemia NOS - CTAP w/o bleed but also w/o contrast given CKD; now on steroids and Cyclophosphamide; will monitor Hb   #RLE hematoma - will need to f/u with Vascular; wean opiate as able   #Essential HTN, chronic systolic CHF, chronic afib - c/w Metoprolol and statin, holding Lasix   #Hypothyroidism - c/w Levothyroxine   #DVT PPx - SCD  #Dispo - TBD    Ruth Ordoñez  [phone]
A/P:  1. improved acute blood loss anemia due to acquired hemophilia A, FVIII inhibitor with RLE hematoma, s/p 3u PRBCs, 1 FFP, 10u Cryoprecipitate on prednisone 1mg/kg and cyclophosphamide  -- plan for rituximab this admission, heme/onc reccs appreciated  - f/u LAC, anti-cardiolipin Ab and anti-B2 glycoprotein AB, vWF antigen level  - HIV, hepatitis panel negative  - H/H remains stable  2. macrocytic anemia, stable   3. chronic afib not on AC due to prior hx of GIB  4. HFreF 40%, not in exacerbation

## 2022-01-05 NOTE — PROGRESS NOTE ADULT - ASSESSMENT
89 yo M with PMHx of essential hypertension, hyperlipidemia, CKD4, chronic HFrEF (EF 30-35%), CAD s/p PCI (2007), AS s/p bioprosthetic AVR (2013) with revision (2016), AFib, colon cancer s/p R hemicolectomy (2016), admitted for severe anemia secondary to left intramuscular hematoma with inadequate response to transfusion. Hematology consulted for evaluation of anemia and coagulopathy. s/p 3 U PRBC (12/25/21 x 2, 12/27/21), 1 FFP (12/26/21) and 10 U Cryoprecipitate (12/26/21). Clinically no other sites of bleeding, FOBT (12/26/21) negative, and no evidence of hemolysis. No evidence of vWD.     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 Factor VIII inhibitor detected with high [place] titer of 17. Factor VIII 1%.   - No evidence of acquired/inherited vWD thus far, pending vWF antigen level.   - Pending Lupus anticoagulant, anti-cardiolipin Ab and anti-beta2-glycoprotein Ab screening tests   - Will consider FFP, factor eight inhibitor bypassing agent (FEIBA) or recombinant factor VIIa (NovoSeven) if bleeding recurs and/or becomes hemodynamically unstable. There is a high risk of arterial and venous thrombosis with FEIBA and NovoSeven and patient is already at high risk for stroke given his history of AFib  - Continue with PO Prednisone 1 mg/kg (12/27/21). PO Cyclophosphamide 75 mg QD was added on 12/29/21. Repeat Inhibitor assay to assay inhibitor titer;   - HIV and Hepatitis panel negative.   -S/p Rituxan  01/04/21 to help durability of response. Will need follow up with Dr. Chandler in one week for weekly Rituxan (total 4 doses) (would need to follow up on Tuesday (1/11/22)  -  Clinically no evidence of bleeding; slight decrease in Hb resolved spontaneously; cyclophosphamide can additionally cause anemia via myelosuppression.   - Maintain active T+S, transfuse if Hb < 7 or if actively bleeding/symptomatic  - Upon discharge, follow-up with Dr. Lori Chandler at [facility] on [place] location in 1 week    Discussed with Dr. Chandler

## 2022-01-05 NOTE — PROGRESS NOTE ADULT - REASON FOR ADMISSION
LLE swelling and pain

## 2022-01-05 NOTE — PROGRESS NOTE ADULT - SUBJECTIVE AND OBJECTIVE BOX
SUBJECTIVE: No acute overnight events. No hematuria. S/p Rituxan yesterday which patient states he tolerated well.     HISTORY OF PRESENTING ILLNESS:   89yo male presented with painful swelling and bruises of LLE. States started about 10d ago by bilateral bruises in toes and swelling in calves and thighs (L>R) with the right-side gradually improving but left side worsening since then and becoming painful. Has been ambulating without difficulty and denies any recent falls, traumas or preceding events. Denies any fever, chills, dizziness, light-headedness, palpitation, coughs, shortness of breath, chest pain, or abdominal pain. PMH HTN, HLD, CKD stage 4 (Cr 2, eGFR 28), HF (EF 30-35%), CAD (s/p PCI for RCA stenting 2007), aortic stenosis (s/p aortic valve replacement 2016), Afib, hypothyroidism, peptic ulcer disease, internal hemorrhoids, prostate and colon cancers (s/p R hemicolectomy 2016). He has a pacemaker and has been on Coumadin until 6w ago (when stopped due to risks of GIB). Upon presentation, pt had a Hgb of 6.2.  (24 Dec 2021 19:39)    PAST MEDICAL & SURGICAL HISTORY:  Aortic valve replaced  Atrial fibrillation  CAD  HTN (hypertension)  PUD (peptic ulcer disease)  Constipation  Iron deficiency anemia  Diverticulosis  High cholesterol  Carotid arterial disease  Internal hemorrhoids  Endocarditis  Colon cancer  Hypothyroidism  S/P AVR  2013 @ [facility]  by Dr. Young  Cardiac pacemaker  3yrs ago  H/O inguinal hernia repair  H/O right hemicolectomy  History of appendectomy    ALLERGIES:  No Known Allergies    MEDICATIONS:  STANDING MEDICATIONS  atorvastatin 20 milliGRAM(s) Oral at bedtime  cholecalciferol 1000 Unit(s) Oral daily  cyclophosphamide 75 milliGRAM(s) Oral daily  dextrose 40% Gel 15 Gram(s) Oral once  dextrose 5%. 1000 milliLiter(s) IV Continuous <Continuous>  dextrose 5%. 1000 milliLiter(s) IV Continuous <Continuous>  dextrose 50% Injectable 25 Gram(s) IV Push once  dextrose 50% Injectable 12.5 Gram(s) IV Push once  dextrose 50% Injectable 25 Gram(s) IV Push once  donepezil 5 milliGRAM(s) Oral at bedtime  folic acid 1 milliGRAM(s) Oral daily  glucagon  Injectable 1 milliGRAM(s) IntraMuscular once  insulin lispro (ADMELOG) corrective regimen sliding scale   SubCutaneous Before meals and at bedtime  lactated ringers. 1000 milliLiter(s) IV Continuous <Continuous>  levothyroxine 25 MICROGram(s) Oral daily  melatonin 5 milliGRAM(s) Oral at bedtime  memantine 5 milliGRAM(s) Oral daily  metoprolol tartrate 25 milliGRAM(s) Oral every 12 hours  ondansetron  IVPB 12 milliGRAM(s) IV Intermittent daily  pantoprazole    Tablet 40 milliGRAM(s) Oral before breakfast  polyethylene glycol 3350 17 Gram(s) Oral daily  predniSONE   Tablet 60 milliGRAM(s) Oral every 24 hours  senna 1 Tablet(s) Oral at bedtime  tamsulosin 0.4 milliGRAM(s) Oral at bedtime  traZODone 25 milliGRAM(s) Oral at bedtime  vitamin B complex with vitamin C 1 Tablet(s) Oral daily    PRN MEDICATIONS  acetaminophen     Tablet .. 650 milliGRAM(s) Oral every 6 hours PRN  oxyCODONE    IR 10 milliGRAM(s) Oral every 6 hours PRN  traMADol 25 milliGRAM(s) Oral every 6 hours PRN      LABS:                                 7.4    5.49  )-----------( 164      ( 05 Jan 2022 08:06 )             24.3                7.5    5.92  )-----------( 153      ( 04 Jan 2022 06:51 )             25.2                           7.4    5.15  )-----------( 159      ( 03 Jan 2022 07:14 )             25.2   PTT - ( 02 Jan 2022 11:47 )  PTT:43.1 sec    RADIOLOGY:  Reviewed    PHYSICAL EXAM:  GEN: No acute distress  HEENT: NCAT  LUNGS: Clear to auscultation bilaterally   HEART: S1/S2 present. RRR.   ABD: Soft, non-tender, non-distended. Bowel sounds present  EXT: Swollen left leg with hematoma, stable/improving  NEURO: AAOX3

## 2022-01-05 NOTE — PROGRESS NOTE ADULT - SUBJECTIVE AND OBJECTIVE BOX
OVERNIGHT EVENTS:    SUBJECTIVE / INTERVAL HPI: Patient seen and examined at bedside.     VITAL SIGNS:  Vital Signs Last 24 Hrs  T(C): 36.3 (04 Jan 2022 22:06), Max: 36.7 (04 Jan 2022 16:05)  T(F): 97.3 (04 Jan 2022 22:06), Max: 98.1 (04 Jan 2022 16:05)  HR: 80 (04 Jan 2022 22:06) (80 - 81)  BP: 116/71 (04 Jan 2022 22:06) (103/60 - 122/71)  BP(mean): --  RR: 17 (04 Jan 2022 22:06) (16 - 18)  SpO2: 94% (04 Jan 2022 22:06) (94% - 96%)    PHYSICAL EXAM:    General: No acute distress  HEENT: NC/AT; PERRL, anicteric sclera; MMM  Neck: supple  Cardiovascular: +S1/S2, RRR, no murmurs, rubs, gallops  Respiratory: CTA B/L; no W/R/R  Gastrointestinal: soft, NT/ND; +BSx4  Extremities: WWP; no edema, clubbing or cyanosis  Vascular: 2+ radial, DP/PT pulses B/L  Neurological: AAOx3; no focal deficits    MEDICATIONS:  MEDICATIONS  (STANDING):  atorvastatin 20 milliGRAM(s) Oral at bedtime  cholecalciferol 1000 Unit(s) Oral daily  cyclophosphamide 75 milliGRAM(s) Oral daily  dextrose 40% Gel 15 Gram(s) Oral once  dextrose 5%. 1000 milliLiter(s) (100 mL/Hr) IV Continuous <Continuous>  dextrose 5%. 1000 milliLiter(s) (50 mL/Hr) IV Continuous <Continuous>  dextrose 50% Injectable 25 Gram(s) IV Push once  dextrose 50% Injectable 12.5 Gram(s) IV Push once  dextrose 50% Injectable 25 Gram(s) IV Push once  donepezil 5 milliGRAM(s) Oral at bedtime  folic acid 1 milliGRAM(s) Oral daily  glucagon  Injectable 1 milliGRAM(s) IntraMuscular once  insulin lispro (ADMELOG) corrective regimen sliding scale   SubCutaneous Before meals and at bedtime  lactated ringers. 1000 milliLiter(s) (100 mL/Hr) IV Continuous <Continuous>  levothyroxine 25 MICROGram(s) Oral daily  melatonin 5 milliGRAM(s) Oral at bedtime  memantine 5 milliGRAM(s) Oral daily  metoprolol tartrate 25 milliGRAM(s) Oral every 12 hours  ondansetron  IVPB 12 milliGRAM(s) IV Intermittent daily  pantoprazole    Tablet 40 milliGRAM(s) Oral before breakfast  polyethylene glycol 3350 17 Gram(s) Oral daily  predniSONE   Tablet 60 milliGRAM(s) Oral every 24 hours  senna 1 Tablet(s) Oral at bedtime  tamsulosin 0.4 milliGRAM(s) Oral at bedtime  traZODone 25 milliGRAM(s) Oral at bedtime  vitamin B complex with vitamin C 1 Tablet(s) Oral daily    MEDICATIONS  (PRN):  acetaminophen     Tablet .. 650 milliGRAM(s) Oral every 6 hours PRN Mild Pain (1 - 3)  oxyCODONE    IR 10 milliGRAM(s) Oral every 6 hours PRN Severe Pain (7 - 10)  traMADol 25 milliGRAM(s) Oral every 6 hours PRN Moderate Pain (4 - 6)      ALLERGIES:  Allergies    No Known Allergies    Intolerances        LABS:                        7.5    5.92  )-----------( 153      ( 04 Jan 2022 06:51 )             25.2     01-04    135  |  103  |  73<H>  ----------------------------<  106<H>  5.5<H>   |  25  |  2.11<H>    Ca    8.6      04 Jan 2022 06:51  Phos  4.1     01-04  Mg     2.3     01-04    TPro  5.1<L>  /  Alb  3.1<L>  /  TBili  0.9  /  DBili  x   /  AST  22  /  ALT  33  /  AlkPhos  51  01-03        CAPILLARY BLOOD GLUCOSE      POCT Blood Glucose.: 157 mg/dL (04 Jan 2022 21:49)      RADIOLOGY & ADDITIONAL TESTS: Reviewed.    PLAN:

## 2022-01-05 NOTE — PROGRESS NOTE ADULT - SUBJECTIVE AND OBJECTIVE BOX
Physical Medicine and Rehabilitation Progress Note:    Patient is a 89y old  Male who presents with a chief complaint of LLE swelling and pain (05 Jan 2022 10:12)      HPI:  89yo male presented with painful swelling and bruises of LLE. States started about 10d ago by bilateral bruises in toes and swelling in calves and thighs (L>R) with the right-side gradually improving but left side worsening since then and becoming painful. Has been ambulating without difficulty and denies any recent falls, traumas or preceding events. Denies any fever, chills, dizziness, light-headedness, palpitation, coughs, shortness of breath, chest pain, or abdominal pain. PMH HTN, HLD, CKD stage 4 (Cr 2, eGFR 28), HF (EF 30-35%), CAD (s/p PCI for RCA stenting 2007), aortic stenosis (s/p aortic valve replacement 2016), Afib, hypothyroidism, peptic ulcer disease, internal hemorrhoids, prostate and colon cancers (s/p R hemicolectomy 2016). He has a pacemaker and has been on Coumadin until 6w ago (when stopped due to risks of GIB). Upon presentation, pt had a Hgb of 6.2.  (24 Dec 2021 19:39)                            7.4    5.49  )-----------( 164      ( 05 Jan 2022 08:06 )             24.3       01-05    137  |  104  |  67<H>  ----------------------------<  104<H>  5.6<H>   |  24  |  2.05<H>    Ca    8.5      05 Jan 2022 08:06  Phos  3.6     01-05  Mg     2.3     01-05    TPro  5.0<L>  /  Alb  3.2<L>  /  TBili  1.0  /  DBili  x   /  AST  21  /  ALT  30  /  AlkPhos  49  01-05    Vital Signs Last 24 Hrs  T(C): 36.7 (05 Jan 2022 11:55), Max: 36.7 (04 Jan 2022 16:05)  T(F): 98 (05 Jan 2022 11:55), Max: 98.1 (04 Jan 2022 16:05)  HR: 80 (05 Jan 2022 11:55) (79 - 81)  BP: 108/59 (05 Jan 2022 11:55) (103/60 - 128/75)  BP(mean): --  RR: 16 (05 Jan 2022 11:55) (16 - 18)  SpO2: 97% (05 Jan 2022 11:55) (94% - 97%)    MEDICATIONS  (STANDING):  atorvastatin 20 milliGRAM(s) Oral at bedtime  cholecalciferol 1000 Unit(s) Oral daily  cyclophosphamide 75 milliGRAM(s) Oral daily  dextrose 40% Gel 15 Gram(s) Oral once  dextrose 5%. 1000 milliLiter(s) (50 mL/Hr) IV Continuous <Continuous>  dextrose 5%. 1000 milliLiter(s) (100 mL/Hr) IV Continuous <Continuous>  dextrose 50% Injectable 25 Gram(s) IV Push once  dextrose 50% Injectable 12.5 Gram(s) IV Push once  dextrose 50% Injectable 25 Gram(s) IV Push once  donepezil 5 milliGRAM(s) Oral at bedtime  folic acid 1 milliGRAM(s) Oral daily  glucagon  Injectable 1 milliGRAM(s) IntraMuscular once  insulin lispro (ADMELOG) corrective regimen sliding scale   SubCutaneous Before meals and at bedtime  lactated ringers. 1000 milliLiter(s) (100 mL/Hr) IV Continuous <Continuous>  levothyroxine 25 MICROGram(s) Oral daily  melatonin 5 milliGRAM(s) Oral at bedtime  memantine 5 milliGRAM(s) Oral daily  metoprolol tartrate 25 milliGRAM(s) Oral every 12 hours  ondansetron  IVPB 12 milliGRAM(s) IV Intermittent daily  pantoprazole    Tablet 40 milliGRAM(s) Oral before breakfast  polyethylene glycol 3350 17 Gram(s) Oral daily  predniSONE   Tablet 60 milliGRAM(s) Oral every 24 hours  senna 1 Tablet(s) Oral at bedtime  tamsulosin 0.4 milliGRAM(s) Oral at bedtime  traZODone 25 milliGRAM(s) Oral at bedtime  vitamin B complex with vitamin C 1 Tablet(s) Oral daily    MEDICATIONS  (PRN):  acetaminophen     Tablet .. 650 milliGRAM(s) Oral every 6 hours PRN Mild Pain (1 - 3)  oxyCODONE    IR 10 milliGRAM(s) Oral every 6 hours PRN Severe Pain (7 - 10)  traMADol 25 milliGRAM(s) Oral every 6 hours PRN Moderate Pain (4 - 6)    Currently Undergoing Physical/ Occupational Therapy at bedside.    Functional Status Assessment:   1/4/2022          Cognitive/Neuro/Behavioral  Cognitive/Neuro/Behavioral [WDL Definition: Alert; opens eyes spontaneously; arouses to voice or touch; oriented x 4; follows commands; speech spontaneous, logical; purposeful motor response; behavior appropriate to situation]: WDL    Language Assistance  Preferred Language to Address Healthcare Preferred Language to Address Healthcare: English    Therapeutic Interventions      Bed Mobility  Bed Mobility Training Rolling/Turning: supervision  Bed Mobility Training Scooting: supervision  Bed Mobility Training Supine-to-Sit: contact guard;  verbal cues;  nonverbal cues (demo/gestures);  1 person assist;  bed rails  Bed Mobility Training Limitations: decreased ability to use legs for bridging/pushing;  decreased flexibility;  decreased strength;  impaired balance;  impaired postural control;  pain    Sit-Stand Transfer Training  Transfer Training Sit-to-Stand Transfer: contact guard;  nonverbal cues (demo/gestures);  verbal cues;  1 person assist;  weight-bearing as tolerated   rolling walker  Transfer Training Stand-to-Sit Transfer: contact guard;  verbal cues;  nonverbal cues (demo/gestures);  1 person assist;  weight-bearing as tolerated   rolling walker  Sit-to-Stand Transfer Training Transfer Safety Analysis: decreased balance;  decreased weight-shifting ability;  decreased flexibility;  decreased ROM;  decreased strength;  impaired balance;  impaired postural control;  rolling walker    Gait Training  Gait Training: contact guard;  verbal cues;  nonverbal cues (demo/gestures);  1 person assist;  weight-bearing as tolerated   rolling walker;  30ftx2   Gait Analysis: 2-point gait   decreased rosita;  crouch;  decreased hip/knee flexion;  decreased step length;  decreased stride length;  decreased toe clearance;  decreased flexibility;  decreased ROM;  decreased strength;  impaired balance;  impaired postural control;  pain;  30ftx2;  rolling walker    Therapeutic Exercise  Therapeutic Exercise Detail: Supine Therex: Ankle pump, Heel slides, Quad sets, Glute sets .           PM&R Impression: as above    Current Disposition Plan :       d/c home, home physical therapy, home care services

## 2022-01-05 NOTE — PROGRESS NOTE ADULT - ATTENDING SUPERVISION STATEMENT
Fellow
Resident
Fellow
Resident
Resident
Fellow
Resident

## 2022-04-26 NOTE — DISCHARGE NOTE PROVIDER - NSDCDCMDCOMP_GEN_ALL_CORE
The Service to [address] [place] [address] order in workqueue [id number] requested on 4/19/2022 has been cancelled as, unable to contact. Ordering provider has been notified. If your patient is interested in [address] [place] [address] services, they may call Central Scheduling at [phone] and we would be happy to assist them. The order is valid for one year from date placed. This document is complete and the patient is ready for discharge.

## 2022-06-27 NOTE — H&P ADULT - HISTORY OF PRESENT ILLNESS
89YOM with history of essential HTN, HLD, CKD4, chronic HFrEF (LVEF 35-40%, last TTE available for review 2018), CAD s/p PCI, aortic stenosis s/p AVR, chronic atrial fibrillation s/p PPM, hypothyroidism, GIB (now off warfarin x 8 months), and ?coagulation d/o who presented from Dr. Chandler's office for wbc 1.4. Patient reports no acute sx. Has chronic non-productive cough and insomnia, both unchanged. Denies wt loss, fevers, chills, HA, dizziness, CP, palp, SOB, sore throat, congestion, abd pain, n/v/d/c, dysuria, blood in urine/stool.    In the ED:  VS: Afebrile, HR 78, /76, RR 18, SpO2 98% RA  Labs:  ECG: Paced  Imaging:  Interventions: 89M PMH HTN, HLD, CKD4, chronic HFrEF (LVEF 35-40%, last TTE available for review 2018), CAD s/p PCI, aortic stenosis s/p AVR, chronic atrial fibrillation s/p PPM, hypothyroidism, GIB (now off warfarin x 8 months), and ?coagulation d/o who presented from Dr. Chandler's office for wbc 1.4. Patient reports no acute sx. Has chronic non-productive cough and insomnia, both unchanged. Denies wt loss, fevers, chills, HA, dizziness, CP, palp, SOB, sore throat, congestion, abd pain, n/v/d/c, dysuria, blood in urine/stool.    In the ED:  VS: Afebrile, HR 78, /76, RR 18, SpO2 98% RA  Labs: wbc 0.60, , HGB 9.8, , Cr 1.68, PT 13.6, COVID negative  ECG: Paced  Imaging: CXR unchanged from last admission  Interventions: None 89M PMH HTN, HLD, CKD4, chronic HFrEF (LVEF 30% Dec 2021), CAD s/p PCI, aortic stenosis s/p AVR, chronic atrial fibrillation s/p PPM, hypothyroidism, GIB (now off warfarin x 8 months), colon cancer, prostate cancer, and ?coagulation d/o who presented from Dr. Chandler's office for wbc 1.4. Patient reports no acute sx. Has chronic non-productive cough and insomnia, both unchanged. Denies wt loss, fevers, chills, HA, dizziness, CP, palp, SOB, sore throat, congestion, abd pain, n/v/d/c, dysuria, blood in urine/stool.    In the ED:  VS: Afebrile, HR 78, /76, RR 18, SpO2 98% RA  Labs: wbc 0.60, , HGB 9.8, , Cr 1.68, PT 13.6, COVID negative  ECG: Paced  Imaging: CXR unchanged from last admission  Interventions: None

## 2022-06-27 NOTE — ED PROVIDER NOTE - PRINCIPAL DIAGNOSIS
----- Message from Jenna Sulaiman sent at 8/1/2018  9:25 AM CDT -----  Contact: pt            Name of Who is Calling: pt      What is the request in detail: pt returned the nurse's phone call. Call pt      Can the clinic reply by [facility]: no      What [facility] Back [facility]: [phone]    Spoke with patient, Results given.                                   Neutropenia

## 2022-06-27 NOTE — H&P ADULT - PROBLEM SELECTOR PLAN 10
F: None  E: replete prn  N: Regular  GI: Protonix  A/c: None  Code: FULL CODE - confirmed with patient on admission  Dispo: ERIN

## 2022-06-27 NOTE — PATIENT PROFILE ADULT - FALL HARM RISK - HARM RISK INTERVENTIONS
Assistance with ambulation/Assistance OOB with selected safe patient handling equipment/Communicate Risk of Fall with Harm to all staff/Monitor gait and stability/Reinforce activity limits and safety measures with patient and family/Sit up slowly, dangle for a short time, stand at bedside before walking/Tailored Fall Risk Interventions/Visual Cue: Yellow wristband and red socks/Bed in lowest position, wheels locked, appropriate side rails in place/Call bell, personal items and telephone in reach/Instruct patient to call for assistance before getting out of bed or chair/Non-slip footwear when patient is out of bed/[place] to call system/Physically safe environment - no spills, clutter or unnecessary equipment/Purposeful Proactive Rounding/Room/bathroom lighting operational, light cord in reach

## 2022-06-27 NOTE — ED PROVIDER NOTE - NSICDXPASTSURGICALHX_GEN_ALL_CORE_FT
PAST SURGICAL HISTORY:  Cardiac pacemaker 3yrs ago    H/O inguinal hernia repair     H/O right hemicolectomy     History of appendectomy     S/P AVR 2013 @ [facility]  by Dr. Young

## 2022-06-27 NOTE — ED ADULT NURSE NOTE - OBJECTIVE STATEMENT
Pt received aaox3 sent by [facility] s/p abnormal lab value. pt's WBC low (1.4 drawn 6/23). Pt denies any medical complaints at this time. Pt placed in room with pt neutropenic precautions place.

## 2022-06-27 NOTE — H&P ADULT - PROBLEM SELECTOR PLAN 4
s/p stent. Not on ASA 2/2 GIB 8mo ago. s/p stent. Not on ASA 2/2 GIB 8mo ago.  -c/w lipitor 20mg qhs

## 2022-06-27 NOTE — H&P ADULT - PROBLEM SELECTOR PLAN 1
Patient of Dr. Chandler with hx prostate ca, colon ca. Also hx of ATIF. Baseline hgb approx 8. New found leukopenia to 1.4 outpatient, 0.60 on admission. Also with mild thrombocytopenia. Admitted for BMBx.  Leukopenia/Neutropenia  -  -neutropenic precautions  -BM BX with IR, NPO at MN    Anemia: above baseline  -maintain active t&s  -f/u iron studies in AM    Thrombocytopenia:  -f/u BM Bx Patient of Dr. Chandler with hx prostate ca, colon ca. Also hx of ATIF. Baseline hgb approx 8. New found leukopenia to 1.4 outpatient, 0.60 on admission. Also with mild thrombocytopenia. Admitted for BMBx.  Leukopenia/Neutropenia  -  -neutropenic precautions  -BM BX with IR, NPO at MN  -f/u h/o recs    Anemia: above baseline  -maintain active t&s  -f/u iron studies in AM    Thrombocytopenia: mild  -f/u BM Bx

## 2022-06-27 NOTE — H&P ADULT - PROBLEM SELECTOR PLAN 2
Patient with hx a-fib s/p PPM. Unclear if on metoprolol (was stopped last admission, filled recently per surescripts but patient claims no changes in meds since last admission). Off Coumadin since GIB 8mo ago.  -formal med rec in AM  -NO A/C as hx of GIB

## 2022-06-27 NOTE — H&P ADULT - PROBLEM SELECTOR PLAN 5
Patient with hx of HTN  -Lasix and metoprolol as mentioned above    #CKD: Cr 1.68 on admission. Baseline approx 2.2  -continue to monitor  -avoid nephrotoxic agents Patient with hx of HTN. Normotensive.  -Lasix and metoprolol as mentioned above    #CKD: Cr 1.68 on admission. Baseline approx 2.2  -continue to monitor  -avoid nephrotoxic agents

## 2022-06-27 NOTE — H&P ADULT - NSHPLABSRESULTS_GEN_ALL_CORE
Labs:                        9.8    0.60  )-----------( 140      ( 27 Jun 2022 13:15 )             33.2     06-27    143  |  102  |  23  ----------------------------<  188<H>  4.0   |  32<H>  |  1.68<H>    Ca    9.3      27 Jun 2022 13:15  Phos  3.4     06-27  Mg     2.0     06-27    TPro  5.9<L>  /  Alb  4.0  /  TBili  0.5  /  DBili  <0.2  /  AST  17  /  ALT  7<L>  /  AlkPhos  78  06-27    PT/INR - ( 27 Jun 2022 13:15 )   PT: 13.6 sec;   INR: 1.14          PTT - ( 27 Jun 2022 13:15 )  PTT:29.5 sec      COVID-19 PCR: Negative (27 Jun 2022 13:15)  COVID-19 PCR: NotDetec (04 Jan 2022 18:22)  COVID-19 PCR: NotDetec (01 Jan 2022 06:20)      Radiology: Reviewed

## 2022-06-27 NOTE — H&P ADULT - ASSESSMENT
89M PMH HTN, HLD, CKD4, chronic HFrEF (LVEF 30% Dec 2021), CAD s/p PCI, aortic stenosis s/p AVR, chronic atrial fibrillation s/p PPM, hypothyroidism, GIB (now off warfarin x 8 months), colon cancer, prostate cancer, and ?coagulation d/o who presented from Dr. Chandler's office for wbc 1.4 found to be pancytopenic/neutropenic, pending BM Bx.

## 2022-06-27 NOTE — H&P ADULT - NSICDXPASTSURGICALHX_GEN_ALL_CORE_FT
PAST SURGICAL HISTORY:  Cardiac pacemaker 3yrs ago    H/O inguinal hernia repair     H/O right hemicolectomy     History of appendectomy     S/P AVR 2013 @ [facility]  by Dr. Young

## 2022-06-27 NOTE — ED ADULT NURSE NOTE - NSIMPLEMENTINTERV_GEN_ALL_ED
Implemented All Fall with Harm Risk Interventions:  [place] to call system. Call bell, personal items and telephone within reach. Instruct patient to call for assistance. Room bathroom lighting operational. Non-slip footwear when patient is off stretcher. Physically safe environment: no spills, clutter or unnecessary equipment. Stretcher in lowest position, wheels locked, appropriate side rails in place. Provide visual cue, wrist band, yellow gown, etc. Monitor gait and stability. Monitor for mental status changes and reorient to person, place, and time. Review medications for side effects contributing to fall risk. Reinforce activity limits and safety measures with patient and family. Provide visual clues: red socks.

## 2022-06-27 NOTE — H&P ADULT - NSHPPHYSICALEXAM_GEN_ALL_CORE
GENERAL: Awake, alert and interactive, no acute distress, appears comfortable  NEURO: A&Ox4, no focal deficits  HEENT: Normocephalic, atraumatic, no conjunctivitis or scleral icterus, oral mucosa moist  NECK: Supple  CARDIAC: Regular rate and rhythm, +S1/S2, no murmurs/rubs/gallops  PULM: Breathing comfortably on RA, clear to auscultation bilaterally, no wheezes/rales/rhonchi  ABDOMEN: Soft, nontender, nondistended  MSK: Range of motion grossly intact, L arm in ACE bandage  VASC: 1+ peripheral pulses, no edema, no LE tenderness  Psych: Appropriate affect

## 2022-06-27 NOTE — ED ADULT NURSE NOTE - CHIEF COMPLAINT QUOTE
Pt BIBA from [facility] living. pt neutropenic WBC 1.4 (drawn 6/23), sent in by hematologist for bone marrow biopsy/admission. pt denies any complaints, no documented fevers. No coughing, sob, cp, malaise, or weakness. AAOx3.

## 2022-06-27 NOTE — H&P ADULT - PROBLEM SELECTOR PLAN 9
-c/w home Trazadone 100mg qhs  -interchange eszopiclone for ambien  inpatient -c/w home Trazadone 100mg qhs  -interchange eszopiclone for ambien  inpatient    #Gout  -c/w home allopurinol 100mg qd -c/w home Trazadone 100mg qhs  -interchange eszopiclone for ambien inpatient    #Gout  -c/w home allopurinol 100mg qd    #dementia:   -c/w home memantine and donepezil

## 2022-06-27 NOTE — ED ADULT TRIAGE NOTE - CHIEF COMPLAINT QUOTE
Pt BIBA from [facility] living. pt neutropenic WBC 1.4 (drawn 6/23), sent in by hematologist for bone marrow biopsy/admission. pt denies any complaints, no documented fevers. No coughing, sob, cp, malaise, or weakness. AAOx3.

## 2022-06-27 NOTE — ED PROVIDER NOTE - CLINICAL SUMMARY MEDICAL DECISION MAKING FREE TEXT BOX
sent for eval of low wbc. denies current symptoms. no fever. known to [facility] who is concerned about malignancy, would like to admit for bone marrow biopsy. pt thinks he ate lunch just prior to arrival so needs to be admitted/ have tomorrow per IR. npo after midnight

## 2022-06-27 NOTE — ED PROVIDER NOTE - OBJECTIVE STATEMENT
history of prostate cancer, colon cancer, anemia, hypothyroidism, htn, afib, chf, ckd, cognitive impairment, here for eval of low wbc. Reportedly had labs done showing wbc of 1.4. Sent by hematologist Dr. Chandler for bone marrow biopsy. Denies current symptoms. No pain, fever, chills, sob, n/v/d. Mild cough noted during exam, reports unsure how long it has been there.

## 2022-06-27 NOTE — ED ADULT NURSE NOTE - NSICDXPASTSURGICALHX_GEN_ALL_CORE_FT
PAST SURGICAL HISTORY:  Cardiac pacemaker 3yrs ago    H/O inguinal hernia repair     H/O right hemicolectomy     History of appendectomy     S/P AVR 2013 @ [facility]  by Dr. Young

## 2022-06-27 NOTE — H&P ADULT - PROBLEM SELECTOR PLAN 3
-per surescipts, appears patient was restarted on Lasix either 20mg or 40mg since d/c  -Lasix 20mg for now, formal med rec in AM

## 2022-06-28 NOTE — PHYSICAL THERAPY INITIAL EVALUATION ADULT - ASSISTIVE DEVICE FOR TRANSFER: GAIT, REHAB EVAL
Chief Complaint   Patient presents with   • Physical        HISTORY OF PRESENT ILLNESS: Patient is a 49 year old female. She presents today for complete physical exam.  She is having issues with the cyst located in her groin.  She is concerned about hydradenitis.  I spoke to her about what hydradenitis is and that with 1 or 2 infectious cyst that this does not constitute the diagnosis of hidradenitis.  No other complaints.    REVIEW OF SYSTEMS:  Constitutional:  Patient denies any trouble with sleep. She denies recurrent headaches. Denies any changes in sense of smell or taste. No problems with hearing.  Respiratory:  Denies cough or shortness of breath.  Cardiovascular:  Denies chest pain or palpitations.  Breast: She denies identifying any masses, skin changes, axillary nodes or nipple discharge.  Gastrointestinal:  Denies having problems with nausea or reflux. Bowel movements are regular. No blood is identified in the stool.  Genitourinary:  Denies dysuria, abnormal vaginal bleeding, pelvic pain, vaginal dryness or dyspareunia.  Musculoskeletal:  Denies back pain or joint pain.    PMH:   Past Medical History:   Diagnosis Date   • Tobacco use disorder         SURGICAL HISTORY:   Past Surgical History:   Procedure Laterality Date   • Pap smear,thin prep(inc 19401)  7/6/2009    WNL        FAMILY HISTORY:   Family History   Problem Relation Age of Onset   • Diabetes Mother         Type 2   • Hypertension Mother    • High cholesterol Mother    • Myocardial Infarction Father 63   • Cancer Maternal Grandmother         SOCIAL HISTORY:   Social History     Socioeconomic History   • Marital status: Single     Spouse name: Not on file   • Number of children: 0   • Years of education: Not on file   • Highest education level: Not on file   Occupational History   • Occupation: [employer]   Social Needs   • Financial resource strain: Not on file   • Food insecurity     Worry: Not on file     Inability: Not on file   •  Transportation needs     Medical: Not on file     Non-medical: Not on file   Tobacco Use   • Smoking status: Current Every Day Smoker   • Smokeless tobacco: Never Used   Substance and Sexual Activity   • Alcohol use: Yes     Alcohol/week: 1.0 - 2.0 standard drinks     Types: 1 - 2 Standard drinks or equivalent per week     Frequency: Monthly or less     Drinks per session: 1 or 2     Comment: occ   • Drug use: No   • Sexual activity: Yes     Partners: Male     Birth control/protection: Post-menopausal   Lifestyle   • Physical activity     Days per week: 0 days     Minutes per session: 0 min   • Stress: Only a little   Relationships   • Social connections     Talks on phone: Not on file     Gets together: Not on file     Attends Spiritism service: Not on file     Active member of club or organization: Not on file     Attends meetings of clubs or organizations: Not on file     Relationship status: Not on file   • Intimate partner violence     Fear of current or ex partner: Not on file     Emotionally abused: Not on file     Physically abused: Not on file     Forced sexual activity: Not on file   Other Topics Concern   •  Service No   • Blood Transfusions No   • Caffeine Concern No     Comment: 1 cup of coffee, 1 mountain dew daily   • Occupational Exposure No   • Hobby Hazards No   • Sleep Concern No   • Stress Concern No   • Weight Concern No   • Special Diet No     Comment: General   • Back Care No   • Exercise No     Comment: sedentary   • Bike Helmet No   • Seat Belt Yes   • Self-Exams Yes     Comment: Sometimes   Social History Narrative   • Not on file        MEDICATIONS:   Current Outpatient Medications   Medication Sig Dispense Refill   • Multiple Vitamins-Minerals (MULTIVITAMIN ADULT PO)      • norethindrone-ethinyl estradiol (FEMHRT 1/5) 1-5 MG-MCG Tab Take 1 tablet by mouth daily. 84 tablet 0   • DISPENSE      • DISPENSE      • medroxyPROGESTERone (PROVERA) 10 MG tablet Take 1 tablet by mouth daily.  Day 1-12 of the month 12 tablet 5     No current facility-administered medications for this visit.         ALLERGIES: ALLERGIES:  Patient has no known allergies.     PHYSICAL EXAM:  VITALS:  Visit Vitals  /70 (BP Location: RUE - Right upper extremity)   Ht 5' 1\" (1.549 m)   Wt 73 kg   BMI 30.41 kg/m²     HEENT: Head is normocephalic. Extraocular muscles are intact. Pupils are equal and round.  SKIN: Skin is warm dry and intact. There are no rashes or lesions.  NECK: Neck is supple without any lymphadenopathy. There is no thyromegaly.  BACK:  There is no scoliosis. No CVA or iliosacral tenderness.  LUNGS: Lungs are clear to auscultation with normal respiratory rate and effort.  CARDIAC: Heart has a regular rate and rhythm without murmur rub or gallop.  BREAST EXAM: Breasts have normal symmetry. Her nipples and areola are normal. There are no skin changes. On palpation there are no masses. Axillary nodes are negative.   ABDOMEN: Abdomen is nondistended. Bowel sounds are present in all 4 quadrants. There is no abdominal bruit. There are no peritoneal signs. There is no hepatosplenomegaly. I do not appreciate any hernias.  PELVIC EXAM: She has normal external genitalia. BUS glands are normal. She has normal vaginal mucosa and normal vaginal pool. Cervix is without any obvious lesions. On bimanual exam, the uterus is midline and anteflexed. I do not appreciate any adnexal masses. The adnexa is nontender.       ASSESSMENT:  1. Well woman exam with routine gynecological exam    2. Laboratory exam ordered as part of routine general medical examination    3. Encounter for screening mammogram for malignant neoplasm of breast    4. Need for influenza vaccination      See history of present illness.    PLAN:  Orders Placed This Encounter   • Mammo Screening Bilateral   • INFLUENZA QUADRIVALENT SPLIT PRES FREE 0.5 ML VACC, IM (FLULAVAL,FLUARIX,FLUZONE)   • Comprehensive Metabolic Panel   • Lipid Panel With Reflex   •  Multiple Vitamins-Minerals (MULTIVITAMIN ADULT PO)     Return in about 1 year (around 11/19/2021) for cpe/mammo.        rolling walker

## 2022-06-28 NOTE — PROGRESS NOTE ADULT - ASSESSMENT
89 y/o man with PMH of HTN, HLD, chronic HFref (EF 30%), CAD s/p PCI, aortic stenosis s/p AVR, chronic atrial fibrillation s/p PPM, hypothyroidism, GIB (from PUD in 2019), colon cancer, prostate cancer admitted for pancytopenia.      89M PMH HTN, HLD, CKD4, chronic HFrEF (LVEF 30% Dec 2021), CAD s/p PCI, aortic stenosis s/p AVR, chronic atrial fibrillation s/p PPM, hypothyroidism, GIB (now off warfarin x 8 months), colon cancer, prostate cancer, and ?coagulation d/o who presented from Dr. Chandler's office for wbc 1.4 found to be pancytopenic/neutropenic, pending BM Bx.

## 2022-06-28 NOTE — DISCHARGE NOTE PROVIDER - HOSPITAL COURSE
#Discharge: do not delete    Patient is 89 yo man with past medical history of HTN, HLD, CKD4, chronic HFrEF (LVEF 30% Dec 2021), CAD s/p PCI, aortic stenosis s/p AVR, chronic atrial fibrillation s/p PPM, hypothyroidism, GIB (now off warfarin x 8 months), colon cancer, prostate cancer, and reactive increase in NK cells on flow cytometry from 5/31/22 presented from Dr. Chandler's office for wbc 1.4, found to have pancytopenia/neutropenia.    Hospital course (by problem):   #Pancytopenia   - Hematology oncology consulted and recommended IR guided bone marrow biopsy           Patient was discharged to: (home/[facility]/acute rehab/hospice, etc, and with what services – home health PT/RN? Home O2?)    New medications:   Changes to old medications:  Medications that were stopped:    Items to follow up as outpatient:    Physical exam at the time of discharge:       #Discharge: do not delete    Patient is 89 yo man with past medical history of HTN, HLD, CKD4, chronic HFrEF (LVEF 30% Dec 2021), CAD s/p PCI, aortic stenosis s/p AVR, chronic atrial fibrillation s/p PPM, hypothyroidism, GIB (now off warfarin x 8 months), colon cancer, prostate cancer, and reactive increase in NK cells on flow cytometry from 5/31/22 presented from Dr. Chandler's office for wbc 1.4, found to have pancytopenia/neutropenia.    Hospital course (by problem):   #Pancytopenia   - Hematology oncology consulted and recommended IR guided bone marrow biopsy   - Started on Filgrastim 300 microgram for 5 days while monitoring the CBC           Patient was discharged to: Assisted living facility     New medications: Filgrastim 300 micrograms SubQ for 5 days   Changes to old medications:  Medications that were stopped:    Items to follow up as outpatient:    Physical exam at the time of discharge:       #Discharge: do not delete    Patient is 89 yo man with past medical history of HTN, HLD, CKD4, chronic HFrEF (LVEF 30% Dec 2021), CAD s/p PCI, aortic stenosis s/p AVR, chronic atrial fibrillation s/p PPM, hypothyroidism, GIB (now off warfarin x 8 months), colon cancer, prostate cancer, and reactive increase in NK cells on flow cytometry from 5/31/22 presented from Dr. Chandler's office for wbc 1.4, found to have pancytopenia/neutropenia.    Hospital course (by problem):   #Pancytopenia   - Hematology oncology consulted and recommended IR guided bone marrow biopsy   - Started on Filgrastim 300 microgram for 5 days while monitoring the CBC       Patient was discharged to: Assisted living facility     New medications: Filgrastim 300 micrograms SubQ for 5 days   Changes to old medications: None   Medications that were stopped: None    Items to follow up as outpatient:  - Bone marrow biopsy results     Physical exam at the time of discharge:    General: WDWN  HEENT: NCAT; PERRL, anicteric sclera; MMM  Neck: supple, trachea midline, no lymphadenopathy   Cardiovascular: S1, S2 normal; RRR, no M/G/R  Respiratory: CTABL; no W/R/R  Gastrointestinal: soft, nontender, nondistended. bowel sounds present.  Skin: no ulcerations or visible rashes appreciated  Extremities: WWP; no edema, clubbing or cyanosis  Vascular: 2+ radial, DP/PT pulses B/L  Neurological: AAOx3; no focal deficits, no meningeal signs        #Discharge: do not delete    Patient is 89 yo man with past medical history of HTN, HLD, CKD4, chronic HFrEF (LVEF 30% Dec 2021), CAD s/p PCI, aortic stenosis s/p AVR, chronic atrial fibrillation s/p PPM, hypothyroidism, GIB (now off warfarin x 8 months), colon cancer, prostate cancer, and reactive increase in NK cells on flow cytometry from 5/31/22 presented from Dr. Chandler's office for wbc 1.4, found to have pancytopenia/neutropenia.    Hospital course (by problem):   #Pancytopenia   - Hematology oncology consulted and recommended IR guided bone marrow biopsy   - s/p 1 dose of filgrastrim 300 mcg with improvement in ANC___  - received 1 additional dose inpatient with plans for f/u with Dr. Chandler o/p     Patient was discharged to: Assisted living facility     New medications: None  Changes to old medications: None   Medications that were stopped: None    Items to follow up as outpatient:  - Bone marrow biopsy results     Physical exam at the time of discharge:    General: WDWN  HEENT: NCAT; PERRL, anicteric sclera; MMM  Neck: supple, trachea midline, no lymphadenopathy   Cardiovascular: S1, S2 normal; RRR, no M/G/R  Respiratory: CTABL; no W/R/R  Gastrointestinal: soft, nontender, nondistended. bowel sounds present.  Skin: no ulcerations or visible rashes appreciated  Extremities: WWP; no edema, clubbing or cyanosis  Vascular: 2+ radial, DP/PT pulses B/L  Neurological: AAOx3; no focal deficits, no meningeal signs        #Discharge: do not delete    Patient is 89 yo man with past medical history of HTN, HLD, CKD4, chronic HFrEF (LVEF 30% Dec 2021), CAD s/p PCI, aortic stenosis s/p AVR, chronic atrial fibrillation s/p PPM, hypothyroidism, GIB (now off warfarin x 8 months), colon cancer, prostate cancer, and reactive increase in NK cells on flow cytometry from 5/31/22 presented from Dr. Chandler's office for wbc 1.4, found to have pancytopenia/neutropenia.    Hospital course (by problem):   #Pancytopenia   - Hematology oncology consulted and recommended IR guided bone marrow biopsy   - s/p 1 dose of filgrastrim 300 mcg with improvement in ANC (215)  - received 1 additional dose inpatient with plans for f/u with Dr. Chandler o/p     Patient was discharged to: Assisted living facility     New medications: None  Changes to old medications: None   Medications that were stopped: None    Items to follow up as outpatient:  - Bone marrow biopsy results     Physical exam at the time of discharge:    General: WDWN  HEENT: NCAT; PERRL, anicteric sclera; MMM  Neck: supple, trachea midline, no lymphadenopathy   Cardiovascular: S1, S2 normal; RRR, no M/G/R  Respiratory: CTABL; no W/R/R  Gastrointestinal: soft, nontender, nondistended. bowel sounds present.  Skin: no ulcerations or visible rashes appreciated  Extremities: WWP; no edema, clubbing or cyanosis  Vascular: 2+ radial, DP/PT pulses B/L  Neurological: AAOx3; no focal deficits, no meningeal signs

## 2022-06-28 NOTE — PROGRESS NOTE ADULT - PROBLEM SELECTOR PLAN 9
-c/w home Trazadone 100mg qhs  -interchange eszopiclone for ambien inpatient    #Gout  -c/w home allopurinol 100mg qd    #dementia:   -c/w home memantine and donepezil

## 2022-06-28 NOTE — PHYSICAL THERAPY INITIAL EVALUATION ADULT - ADDITIONAL COMMENTS
Pt. resides in [facility] with elevator access, he has assistance with meals, laundry, showering. He uses rollator for ambulation.

## 2022-06-28 NOTE — CONSULT NOTE ADULT - SUBJECTIVE AND OBJECTIVE BOX
Patient is a 89y old  Male who presents with a chief complaint of neutropenia (28 Jun 2022 13:01)        HPI:  89M PMH HTN, HLD, CKD4, chronic HFrEF (LVEF 30% Dec 2021), CAD s/p PCI, aortic stenosis s/p AVR, chronic atrial fibrillation s/p PPM, hypothyroidism, GIB (now off warfarin x 8 months), colon cancer, prostate cancer, and ?coagulation d/o who presented from Dr. Chandler's office for wbc 1.4. Patient reports no acute sx. Has chronic non-productive cough and insomnia, both unchanged. Denies wt loss, fevers, chills, HA, dizziness, CP, palp, SOB, sore throat, congestion, abd pain, n/v/d/c, dysuria, blood in urine/stool.    In the ED:  VS: Afebrile, HR 78, /76, RR 18, SpO2 98% RA  Labs: wbc 0.60, , HGB 9.8, , Cr 1.68, PT 13.6, COVID negative  ECG: Paced  Imaging: CXR unchanged from last admission  Interventions: None (27 Jun 2022 16:53)      PAST MEDICAL & SURGICAL HISTORY:  Aortic valve replaced      Atrial fibrillation      CAD (coronary artery disease)      HTN (hypertension)      PUD (peptic ulcer disease)      Constipation      Iron deficiency anemia      Diverticulosis      High cholesterol      Carotid arterial disease      Internal hemorrhoids      Endocarditis      Colon cancer      Hypothyroidism      S/P AVR  2013 @ [facility]  by Dr. Young      Cardiac pacemaker  3yrs ago      H/O inguinal hernia repair      H/O right hemicolectomy      History of appendectomy          MEDICATIONS  (STANDING):  allopurinol 100 milliGRAM(s) Oral daily  atorvastatin 20 milliGRAM(s) Oral at bedtime  cholecalciferol 1000 Unit(s) Oral every 24 hours  donepezil 5 milliGRAM(s) Oral <User Schedule>  folic acid 1 milliGRAM(s) Oral every 24 hours  furosemide    Tablet 20 milliGRAM(s) Oral every 24 hours  levothyroxine 25 MICROGram(s) Oral every 24 hours  memantine 5 milliGRAM(s) Oral every 24 hours  pantoprazole    Tablet 40 milliGRAM(s) Oral before breakfast  polyethylene glycol 3350 17 Gram(s) Oral daily  senna 2 Tablet(s) Oral at bedtime  tamsulosin 0.4 milliGRAM(s) Oral at bedtime  traZODone 100 milliGRAM(s) Oral at bedtime    MEDICATIONS  (PRN):  zolpidem 5 milliGRAM(s) Oral at bedtime PRN Insomnia  zolpidem 5 milliGRAM(s) Oral at bedtime PRN Insomnia      FAMILY HISTORY:    CBC Full  -  ( 28 Jun 2022 08:53 )  WBC Count : 0.80 K/uL  RBC Count : 2.83 M/uL  Hemoglobin : 8.9 g/dL  Hematocrit : 29.1 %  Platelet Count - Automated : 110 K/uL  Mean Cell Volume : 102.8 fl  Mean Cell Hemoglobin : 31.4 pg  Mean Cell Hemoglobin Concentration : 30.6 gm/dL  Auto Neutrophil # : 0.02 K/uL  Auto Lymphocyte # : 0.39 K/uL  Auto Monocyte # : 0.26 K/uL  Auto Eosinophil # : 0.07 K/uL  Auto Basophil # : 0.06 K/uL  Auto Neutrophil % : 2.6 %  Auto Lymphocyte % : 48.4 %  Auto Monocyte % : 32.0 %  Auto Eosinophil % : 9.2 %  Auto Basophil % : 7.8 %      06-28    145  |  107  |  22  ----------------------------<  88  4.0   |  28  |  1.64<H>    Ca    9.0      28 Jun 2022 08:53  Phos  3.6     06-28  Mg     1.9     06-28    TPro  5.2<L>  /  Alb  3.6  /  TBili  0.4  /  DBili  x   /  AST  14  /  ALT  6<L>  /  AlkPhos  70  06-28            Radiology :    < from: Xray Chest 1 View- PORTABLE-Urgent (Xray Chest 1 View- PORTABLE-Urgent .) (06.27.22 @ 15:04) >  ACC: [id number] EXAM:  XR CHEST PORTABLE URGENT 1V                          PROCEDURE DATE:  06/27/2022          INTERPRETATION:  Portable chest    HISTORY: Cough    IMPRESSION:    Cardiomegaly left-sided implanted cardiac device postop change. Small  bilateral pleural effusions. No lung infiltrate lung consolidation or   pneumothorax. No acute bone abnormality.              Vital Signs Last 24 Hrs  T(C): 36.6 (28 Jun 2022 05:33), Max: 36.6 (28 Jun 2022 05:33)  T(F): 97.9 (28 Jun 2022 05:33), Max: 97.9 (28 Jun 2022 05:33)  HR: 79 (28 Jun 2022 05:33) (77 - 80)  BP: 143/79 (28 Jun 2022 05:33) (111/67 - 151/82)  BP(mean): --  RR: 18 (28 Jun 2022 05:33) (18 - 18)  SpO2: 93% (28 Jun 2022 05:33) (93% - 98%)        REVIEW OF SYSTEMS:      CONSTITUTIONAL: No fever, weight loss, or fatigue  EYES: No eye pain, visual disturbances, or discharge  ENMT:  No difficulty hearing, tinnitus, vertigo; No sinus or throat pain  NECK: No pain or stiffness  BREASTS: No pain, masses, or nipple discharge  RESPIRATORY: No cough, wheezing, chills or hemoptysis; No shortness of breath  CARDIOVASCULAR: No chest pain, palpitations, dizziness, or leg swelling  GASTROINTESTINAL: No abdominal or epigastric pain. No nausea, vomiting, or hematemesis; No diarrhea or constipation. No melena or hematochezia.  GENITOURINARY: No dysuria, frequency, hematuria, or incontinence  NEUROLOGICAL: No headaches, memory loss, loss of strength, numbness, or tremors  SKIN: No itching, burning, rashes, or lesions   LYMPH NODES: No enlarged glands  ENDOCRINE: No heat or cold intolerance; No hair loss  MUSCULOSKELETAL: No joint pain or swelling; No muscle, back, or extremity pain  PSYCHIATRIC: No depression, anxiety, mood swings, or difficulty sleeping  HEME/LYMPH:  per HPI  ALLERGY AND IMMUNOLOGIC: No hives or eczema  VASCULAR: no swelling , erythema          Physical Exam:  frail 89 y o  manlying in semi Frances's position , awake , alert , feels tired      Neurologic Exam:    Alert and oriented  x 3     Motor Exam:    Right UE:           no focal weakness ,  > 3+/5 throughout      negative pronator drift                               Left UE:             no focal weakness,  > 3+/5 throughout      negative pronator drift        Right LE:            no focal weakness,  > 3+/5 throughout    Left LE:              no focal weakness,  > 3+/5 throughout        Sensation:         intact to light touch x 4 extremities                       DTR :                     biceps/brachioradialis : equal                                              patella/ankle : equal     Gait :  not tested              PM&R Impression :     1) deconditioned    2) no focal weakness      Recommendations / Plan :     1) Physical / Occupational therapy focusing on therapeutic exercises , bed mobility/transfer out of bed evaluation , progressive ambulation with assistive       devices prn .    2) Anticipated Disposition Plan / Recs  :    pending functional progress

## 2022-06-28 NOTE — PROGRESS NOTE ADULT - ATTENDING COMMENTS
Normal B12/folate, iron panel with no evidence of ATIF, Hep C non-reactive on office labs from 5/31/22. Flow cytometry from 5/31/22 with reactive increase in NK cells with no diagnostic immunophenotypic abnormalities detected.     Patient was seen and examined after bone marrow biopsy. She tolerated the procedure very well.     # Pancytopenia:   Normal B12/folate, iron panel with no evidence of ATIF, Hep C non-reactive.   Follow bone marrow biopsy result.   If develop fever, obtain pan-culture and start wide-spectrum antibiotics.

## 2022-06-28 NOTE — CONSULT NOTE ADULT - ASSESSMENT
per Internal Medicine    89 y o M PMH HTN, HLD, CKD4, chronic HFrEF (LVEF 30% Dec 2021), CAD s/p PCI, aortic stenosis s/p AVR, chronic atrial fibrillation s/p PPM, hypothyroidism, GIB (now off warfarin x 8 months), colon cancer, prostate cancer, and ?coagulation d/o who presented from Dr. Chandler's office for wbc 1.4 found to be pancytopenic/neutropenic, pending BM Bx.    Problem/Plan - 1   ·  Problem: Pancytopenia.   ·  Plan: Patient of Dr. Chandler with hx prostate ca, colon ca. Also hx of ATIF. Baseline hgb approx 8. New found leukopenia to 1.4 outpatient, 0.60 on admission. Also with mild thrombocytopenia. Admitted for BMBx.  Leukopenia/Neutropenia  -  -neutropenic precautions  -BM BX with IR, NPO at MN  -f/u h/o recs    Anemia: above baseline  -maintain active t&s  -f/u iron studies in AM    Thrombocytopenia: mild  -f/u BM Bx.    Problem/Plan - 2   ·  Problem: Atrial fibrillation.   ·  Plan: Patient with hx a-fib s/p PPM. Unclear if on metoprolol (was stopped last admission, filled recently per surescripts but patient claims no changes in meds since last admission). Off Coumadin since GIB 8mo ago.  -formal med rec in AM  -NO A/C as hx of GIB.    Problem/Plan - 3   ·  Problem: Chronic systolic congestive heart failure.   ·  Plan: -per surescipts, appears patient was restarted on Lasix either 20mg or 40mg since d/c  -Lasix 20mg for now, formal med rec in AM.    Problem/Plan - 4   ·  Problem: CAD (coronary artery disease).   ·  Plan: s/p stent. Not on ASA 2/2 GIB 8mo ago.  -c/w lipitor 20mg qhs.    Problem/Plan - 5   ·  Problem: HTN (hypertension).   ·  Plan: Patient with hx of HTN. Normotensive.  -Lasix and metoprolol as mentioned above    #CKD: Cr 1.68 on admission. Baseline approx 2.2  -continue to monitor  -avoid nephrotoxic agents.    Problem/Plan - 6   ·  Problem: PUD (peptic ulcer disease).   ·  Plan: -interchange omeprazole 20mg for protonix 40mg inpatient.    Problem/Plan - 7   ·  Problem: Constipation.   ·  Plan: -miralax and senna inpatient    #BPH  -c/w home flomax 0.4mg qhs.    Problem/Plan - 8   ·  Problem: Hypothyroidism.   ·  Plan: -c/w home synthroid 25mcg.    Problem/Plan - 9   ·  Problem: Insomnia.   ·  Plan: -c/w home Trazadone 100mg qhs  -interchange eszopiclone for ambien inpatient    #Gout  -c/w home allopurinol 100mg qd    #dementia:   -c/w home memantine and donepezil.    Problem/Plan - 10   ·  Problem: Healthcare maintenance.   ·  Plan; F: None  E: replete prn  N: Regular  GI: Protonix  A/c: None  Code: FULL CODE - confirmed with patient on admission  Dispo: [facility].

## 2022-06-28 NOTE — PROGRESS NOTE ADULT - PROBLEM SELECTOR PLAN 1
- Follow up on BM Biopsy  - Follow Heme/Onc's recs (Monitor CBC w/ diff daily and CMP, transfuse for Hb <7, Plt count < 10k, <20k + febrile, <50K+ bleeding, no need for G-CSF, if he spikes temp send sepsis workup and start broad spectrum antibiotics) - Follow up on BM Biopsy  - Normal B12/folate, iron panel with no evidence of ATIF, Hep C non-reactive.   - Follow Heme/Onc's recs (Monitor CBC w/ diff daily and CMP, transfuse for Hb <7, Plt count < 10k, <20k + febrile, <50K+ bleeding, no need for G-CSF, if he spikes temp send sepsis workup and start broad spectrum antibiotics)

## 2022-06-28 NOTE — DIETITIAN INITIAL EVALUATION ADULT - NS FNS DIET ORDER
Diet, Regular (06-27-22 @ 17:58)  Diet, NPO after Midnight:      NPO Start Date: 27-Jun-2022,   NPO Start Time: 23:59  Except Medications (06-27-22 @ 16:29)

## 2022-06-28 NOTE — DISCHARGE NOTE PROVIDER - NSDCCPCAREPLAN_GEN_ALL_CORE_FT
PRINCIPAL DISCHARGE DIAGNOSIS  Diagnosis: Pancytopenia  Assessment and Plan of Treatment: You were referred to [facility] due to a lower than normal number of red cells, white cells and platelets. This may be an indication of a lack of production of these cells in your bone marrow due to a blood cancer. For that reason you were admitted to the hospital for further work up of the cause of your low blood cells. You have received a bone marrow biopsy which was recommended by the hematology-oncology team and was perfomed by the interventional radiology team.       PRINCIPAL DISCHARGE DIAGNOSIS  Diagnosis: Pancytopenia  Assessment and Plan of Treatment: You were referred to [facility] y[facility] due to a lower than normal number of red cells, white cells and platelets. This may be an indication of a lack of production of these cells in your bone marrow due to a blood cancer. For that reason you were admitted to the hospital for further work up of the cause of your low blood cells. You have received a bone marrow biopsy which was recommended by the hematology-oncology team and was perfomed by the interventional radiology team.  You received 2 doses of a medication called filgrastim to help bring your white blood cell count up and there was improvement.  Please follow up with Dr. Chandler (hematology/oncology) once you leave the hospital.

## 2022-06-28 NOTE — PROGRESS NOTE ADULT - SUBJECTIVE AND OBJECTIVE BOX
OVERNIGHT EVENTS: No acute events overnight     SUBJECTIVE / INTERVAL HPI: Patient seen and examined at bedside.     VITAL SIGNS:  Vital Signs Last 24 Hrs  T(C): 36.6 (28 Jun 2022 05:33), Max: 36.6 (28 Jun 2022 05:33)  T(F): 97.9 (28 Jun 2022 05:33), Max: 97.9 (28 Jun 2022 05:33)  HR: 79 (28 Jun 2022 05:33) (77 - 80)  BP: 143/79 (28 Jun 2022 05:33) (111/67 - 151/82)  BP(mean): --  RR: 18 (28 Jun 2022 05:33) (18 - 18)  SpO2: 93% (28 Jun 2022 05:33) (93% - 98%)    PHYSICAL EXAM:    General: WDWN  HEENT: NCAT; PERRL, anicteric sclera; MMM  Neck: supple, trachea midline  Cardiovascular: S1, S2 normal; RRR, no M/G/R  Respiratory: CTABL; no W/R/R  Gastrointestinal: soft, nontender, nondistended. bowel sounds present.  Skin: no ulcerations or visible rashes appreciated  Extremities: WWP; no edema, clubbing or cyanosis  Vascular: 2+ radial, DP/PT pulses B/L  Neurological: AAOx3; CN II-XII grossly intact; no focal deficits    MEDICATIONS:  MEDICATIONS  (STANDING):  allopurinol 100 milliGRAM(s) Oral daily  ascorbic acid 500 milliGRAM(s) Oral daily  atorvastatin 20 milliGRAM(s) Oral at bedtime  cholecalciferol 1000 Unit(s) Oral every 24 hours  donepezil 5 milliGRAM(s) Oral <User Schedule>  folic acid 1 milliGRAM(s) Oral every 24 hours  furosemide    Tablet 20 milliGRAM(s) Oral every 24 hours  levothyroxine 25 MICROGram(s) Oral every 24 hours  memantine 5 milliGRAM(s) Oral every 24 hours  pantoprazole    Tablet 40 milliGRAM(s) Oral before breakfast  polyethylene glycol 3350 17 Gram(s) Oral daily  senna 2 Tablet(s) Oral at bedtime  tamsulosin 0.4 milliGRAM(s) Oral at bedtime  traZODone 100 milliGRAM(s) Oral at bedtime  zinc sulfate 220 milliGRAM(s) Oral daily    MEDICATIONS  (PRN):  zolpidem 5 milliGRAM(s) Oral at bedtime PRN Insomnia  zolpidem 5 milliGRAM(s) Oral at bedtime PRN Insomnia      ALLERGIES:  Allergies    No Known Allergies    Intolerances        LABS:                        8.9    0.80  )-----------( 110      ( 28 Jun 2022 08:53 )             29.1     06-28    145  |  107  |  22  ----------------------------<  88  4.0   |  28  |  1.64<H>    Ca    9.0      28 Jun 2022 08:53  Phos  3.6     06-28  Mg     1.9     06-28    TPro  5.2<L>  /  Alb  3.6  /  TBili  0.4  /  DBili  x   /  AST  14  /  ALT  6<L>  /  AlkPhos  70  06-28    PT/INR - ( 28 Jun 2022 08:53 )   PT: 15.0 sec;   INR: 1.26          PTT - ( 28 Jun 2022 08:53 )  PTT:29.3 sec    CAPILLARY BLOOD GLUCOSE          RADIOLOGY & ADDITIONAL TESTS: Reviewed. OVERNIGHT EVENTS: No acute events overnight     SUBJECTIVE / INTERVAL HPI: The patient was seen this AM at bedside, resting comfortably in bed and is not in any acute distress. He was inquiring about     VITAL SIGNS:  Vital Signs Last 24 Hrs  T(C): 36.6 (28 Jun 2022 05:33), Max: 36.6 (28 Jun 2022 05:33)  T(F): 97.9 (28 Jun 2022 05:33), Max: 97.9 (28 Jun 2022 05:33)  HR: 79 (28 Jun 2022 05:33) (77 - 80)  BP: 143/79 (28 Jun 2022 05:33) (111/67 - 151/82)  BP(mean): --  RR: 18 (28 Jun 2022 05:33) (18 - 18)  SpO2: 93% (28 Jun 2022 05:33) (93% - 98%)    PHYSICAL EXAM:    General: WDWN  HEENT: NCAT; PERRL, anicteric sclera; MMM  Neck: supple, trachea midline  Cardiovascular: S1, S2 normal; RRR, no M/G/R  Respiratory: CTABL; no W/R/R  Gastrointestinal: soft, nontender, nondistended. bowel sounds present.  Skin: no ulcerations or visible rashes appreciated  Extremities: WWP; no edema, clubbing or cyanosis  Vascular: 2+ radial, DP/PT pulses B/L  Neurological: AAOx3; CN II-XII grossly intact; no focal deficits    MEDICATIONS:  MEDICATIONS  (STANDING):  allopurinol 100 milliGRAM(s) Oral daily  ascorbic acid 500 milliGRAM(s) Oral daily  atorvastatin 20 milliGRAM(s) Oral at bedtime  cholecalciferol 1000 Unit(s) Oral every 24 hours  donepezil 5 milliGRAM(s) Oral <User Schedule>  folic acid 1 milliGRAM(s) Oral every 24 hours  furosemide    Tablet 20 milliGRAM(s) Oral every 24 hours  levothyroxine 25 MICROGram(s) Oral every 24 hours  memantine 5 milliGRAM(s) Oral every 24 hours  pantoprazole    Tablet 40 milliGRAM(s) Oral before breakfast  polyethylene glycol 3350 17 Gram(s) Oral daily  senna 2 Tablet(s) Oral at bedtime  tamsulosin 0.4 milliGRAM(s) Oral at bedtime  traZODone 100 milliGRAM(s) Oral at bedtime  zinc sulfate 220 milliGRAM(s) Oral daily    MEDICATIONS  (PRN):  zolpidem 5 milliGRAM(s) Oral at bedtime PRN Insomnia  zolpidem 5 milliGRAM(s) Oral at bedtime PRN Insomnia      ALLERGIES:  Allergies    No Known Allergies    Intolerances        LABS:                        8.9    0.80  )-----------( 110      ( 28 Jun 2022 08:53 )             29.1     06-28    145  |  107  |  22  ----------------------------<  88  4.0   |  28  |  1.64<H>    Ca    9.0      28 Jun 2022 08:53  Phos  3.6     06-28  Mg     1.9     06-28    TPro  5.2<L>  /  Alb  3.6  /  TBili  0.4  /  DBili  x   /  AST  14  /  ALT  6<L>  /  AlkPhos  70  06-28    PT/INR - ( 28 Jun 2022 08:53 )   PT: 15.0 sec;   INR: 1.26          PTT - ( 28 Jun 2022 08:53 )  PTT:29.3 sec    CAPILLARY BLOOD GLUCOSE          RADIOLOGY & ADDITIONAL TESTS: Reviewed. OVERNIGHT EVENTS: No acute events overnight     SUBJECTIVE / INTERVAL HPI: The patient was seen this AM at bedside, resting comfortably in bed and is not in any acute distress. He was inquiring about     VITAL SIGNS:  Vital Signs Last 24 Hrs  T(C): 36.6 (28 Jun 2022 05:33), Max: 36.6 (28 Jun 2022 05:33)  T(F): 97.9 (28 Jun 2022 05:33), Max: 97.9 (28 Jun 2022 05:33)  HR: 79 (28 Jun 2022 05:33) (77 - 80)  BP: 143/79 (28 Jun 2022 05:33) (111/67 - 151/82)  BP(mean): --  RR: 18 (28 Jun 2022 05:33) (18 - 18)  SpO2: 93% (28 Jun 2022 05:33) (93% - 98%)    PHYSICAL EXAM:    General: WDWN  HEENT: NCAT; PERRL, anicteric sclera; MMM  Neck: supple, trachea midline  Cardiovascular: S1, S2 normal; RRR,   Respiratory: Normal breath sounds bilaterally   Gastrointestinal: soft, nontender, nondistended. bowel sounds present.  Skin: no ulcerations or visible rashes appreciated  Extremities: WWP; no edema, clubbing or cyanosis  Vascular: 2+ radial, DP/PT pulses B/L  Neurological: AAOx3; no focal deficits    MEDICATIONS:  MEDICATIONS  (STANDING):  allopurinol 100 milliGRAM(s) Oral daily  ascorbic acid 500 milliGRAM(s) Oral daily  atorvastatin 20 milliGRAM(s) Oral at bedtime  cholecalciferol 1000 Unit(s) Oral every 24 hours  donepezil 5 milliGRAM(s) Oral <User Schedule>  folic acid 1 milliGRAM(s) Oral every 24 hours  furosemide    Tablet 20 milliGRAM(s) Oral every 24 hours  levothyroxine 25 MICROGram(s) Oral every 24 hours  memantine 5 milliGRAM(s) Oral every 24 hours  pantoprazole    Tablet 40 milliGRAM(s) Oral before breakfast  polyethylene glycol 3350 17 Gram(s) Oral daily  senna 2 Tablet(s) Oral at bedtime  tamsulosin 0.4 milliGRAM(s) Oral at bedtime  traZODone 100 milliGRAM(s) Oral at bedtime  zinc sulfate 220 milliGRAM(s) Oral daily    MEDICATIONS  (PRN):  zolpidem 5 milliGRAM(s) Oral at bedtime PRN Insomnia  zolpidem 5 milliGRAM(s) Oral at bedtime PRN Insomnia      ALLERGIES:  Allergies    No Known Allergies    LABS:                        8.9    0.80  )-----------( 110      ( 28 Jun 2022 08:53 )             29.1     06-28    145  |  107  |  22  ----------------------------<  88  4.0   |  28  |  1.64<H>    Ca    9.0      28 Jun 2022 08:53  Phos  3.6     06-28  Mg     1.9     06-28    TPro  5.2<L>  /  Alb  3.6  /  TBili  0.4  /  DBili  x   /  AST  14  /  ALT  6<L>  /  AlkPhos  70  06-28    PT/INR - ( 28 Jun 2022 08:53 )   PT: 15.0 sec;   INR: 1.26       PTT - ( 28 Jun 2022 08:53 )  PTT:29.3 sec

## 2022-06-28 NOTE — DISCHARGE NOTE PROVIDER - ATTENDING DISCHARGE PHYSICAL EXAMINATION:
Patient was seen and examined at bedside on 7/5/2022 at 1230 pm. He has no acute complaints. Denies SOB, CP. ROS is otherwise negative. Vitals, labwork and pertinent imaging reviewed - worsening Cr. Physical exam - NAD, AAO x 4, PERRLA, HEENT, EOMI, MMM, supple neck, chest - CTA b/l, CV - rrr, s1s2, no m/r/g, abd - soft, NTND, + BS, ext -  wwp,psych - normal affect, skin - extensive sloughing of the skin - mostly on back but extending to upper and lower extremities    Plan  -Transfer to burn center at

## 2022-06-28 NOTE — PROGRESS NOTE ADULT - PROBLEM SELECTOR PLAN 5
Patient with hx of HTN. Normotensive.  -Lasix and metoprolol as mentioned above    #CKD: Cr 1.68 on admission. Baseline approx 2.2  -continue to monitor  -avoid nephrotoxic agents

## 2022-06-28 NOTE — CONSULT NOTE ADULT - ASSESSMENT
Assessment: 89M PMH HTN, HLD, CKD4, chronic HFrEF (LVEF 30% Dec 2021), CAD s/p PCI, aortic stenosis s/p AVR, chronic atrial fibrillation s/p PPM, hypothyroidism, GIB (now off warfarin x 8 months), colon cancer, prostate cancer, and ?coagulation d/o who presented from Dr. Chandler's office for wbc 1.4. Patient reports no acute sx. Has chronic non-productive cough and insomnia, both unchanged. Found to be pancytopenic on admission. IR consulted for bone marrow biopsy. Case reviewed with Dr. Parks, plan for procedure with sedation on Tuesday.    Recommendations: NPO at midnight prior to procedure with sedation. Please ensure Covid swab is up to date (within last 72 hours).    Communicated with: primary team

## 2022-06-28 NOTE — DIETITIAN INITIAL EVALUATION ADULT - OTHER CALCULATIONS
%IBW: 108; ABW used to calculate estimated needs d/t pt being between 80 and 120% IBW; adjusted for possible malignancy, advanced age, CKD stage 4. Fluids per team.

## 2022-06-28 NOTE — CONSULT NOTE ADULT - ASSESSMENT
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 Admitted for pancytopenia, hematology consulted for further management.       Pancytopenia, on admission CBC with WBC 0.60 (ANC 10, , basophilia 8.9%), Hb 9.8 (macrocytic),  c/f primary bone marrow process likely chronic. PBS .... Normal B12/folate, iron panel with no evidence of ATIF, Hep C non-reactive on office labs from 5/31/22. Flow cytometry from 5/31/22 with reactive increase in NK cells with no diagnostic immunophenotypic abnormalities detected.     Recommend:   - IR guided bone marrow biopsy   - Monitor CBC w/ diff daily and CMP   - Transfuse for hb <7, Plt count <10K, <20K + febrile, <50K+ bleeding   - No need for G-CSF at this time.  - High risk for infection given severe neutropenia. If he spikes a temp, send sepsis workup and start broad spectrum antibiotics.     D/w Dr. 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 Admitted for pancytopenia, hematology consulted for further management.       Pancytopenia, on admission CBC with WBC 0.60 (ANC 10, , basophilia 8.9% and monocytosis), Hb 9.8 (macrocytic),  c/f primary bone marrow process likely chronic. PBS reviewed with atypical lymphocytes. Normal B12/folate, iron panel with no evidence of ATIF, Hep C non-reactive on office labs from 5/31/22. Flow cytometry from 5/31/22 with reactive increase in NK cells with no diagnostic immunophenotypic abnormalities detected.     Recommend:   - IR guided bone marrow biopsy   - Monitor CBC w/ diff daily and CMP   - Transfuse for hb <7, Plt count <10K, <20K + febrile, <50K+ bleeding   - No need for G-CSF at this time.  - High risk for infection given severe neutropenia. If he spikes a temp, send sepsis workup and start broad spectrum antibiotics.     D/w Dr. Chandler

## 2022-06-28 NOTE — CONSULT NOTE ADULT - SUBJECTIVE AND OBJECTIVE BOX
Hematology Oncology Consult Note (Dr. Chandler)  Discussed with Dr. Chandler and recommendations reviewed with the primary team.      HPI: 89 year old male with PMH of HTN, HLD, CKD stage 4 (Cr 2, eGFR 28), HF (EF 30-35%) s/p PPM, CAD (s/p PCI for RCA stenting 2007), aortic stenosis (s/p aortic valve replacement 2016), Afib not on AC due to h/o GIB, hypothyroidism, peptic ulcer disease, internal hemorrhoids, prostate and colon cancers (s/p R hemicolectomy 2016). Last hospital admission in 1/2022 for severe anemia 2/2 left IM hematoma and coagulopathy. 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 Now off all therapy. Admitted for pancytopenia. Hematology consulted for further evaluation.       PAST MEDICAL & SURGICAL HISTORY:  Aortic valve replaced 2013  Atrial fibrillation  CAD (coronary artery disease)  HTN (hypertension)  PUD (peptic ulcer disease)  Constipation  Iron deficiency anemia  Diverticulosis  High cholesterol  Carotid arterial disease  Internal hemorrhoids  Endocarditis  Colon cancer  Hypothyroidism  Cardiac pacemaker, 3yrs ago  H/O inguinal hernia repair  H/O right hemicolectomy  History of appendectomy      Allergies: NKDA       Medications:  Home Medications:  Align 4 mg oral capsule: 1 cap(s) orally once a day (24 Dec 2021 21:23)  allopurinol 100 mg oral tablet: 1 tab(s) orally once a day (24 Dec 2021 21:23)  atorvastatin 20 mg oral tablet: 1 tab(s) orally once a day (at bedtime) (24 Dec 2021 21:23)  B Complex 50 oral tablet, extended release: 1 tab(s) orally once a day (24 Dec 2021 21:23)  Citrucel 2 g/19 g oral powder for reconstitution: 1 packet(s) orally once a day (24 Dec 2021 21:23)  CoQ10 300 mg oral capsule: 1 cap(s) orally once a day (24 Dec 2021 21:23)  donepezil 5 mg oral tablet: 1 tab(s) orally 2 times a day (24 Dec 2021 21:23)  eszopiclone 3 mg oral tablet: 1 tab(s) orally once a day (at bedtime) (27 Jun 2022 17:56)  folic acid 1 mg oral tablet: 1 tab(s) orally once a day (24 Dec 2021 21:23)  Lasix 20 mg oral tablet: 1 tab(s) orally once a day (27 Jun 2022 17:55)  levothyroxine 25 mcg (0.025 mg) oral tablet: 1 tab(s) orally once a day (24 Dec 2021 21:23)  memantine 5 mg oral tablet: 1 tab(s) orally once a day (24 Dec 2021 21:23)  omeprazole 20 mg oral delayed release capsule: 1 cap(s) orally once a day (24 Dec 2021 21:23)  tamsulosin 0.4 mg oral capsule: 1 tab(s) orally once a day (24 Dec 2021 21:23)  traZODone 100 mg oral tablet: 1 tab(s) orally once a day (at bedtime) (27 Jun 2022 17:56)  Vitamin D3 1000 intl units oral tablet: 1 tab(s) orally once a day (24 Dec 2021 21:23)      PHYSICAL EXAM:    T(F): 97.9 (06-28-22 @ 05:33), Max: 97.9 (06-28-22 @ 05:33)  HR: 79 (06-28-22 @ 05:33) (77 - 80)  BP: 143/79 (06-28-22 @ 05:33) (111/67 - 151/82)  RR: 18 (06-28-22 @ 05:33) (18 - 18)  SpO2: 93% (06-28-22 @ 05:33) (93% - 98%)    Gen: in NAD   HEENT: normocephalic/atraumatic, no conjunctival pallor, no scleral icterus, no oral thrush/mucosal bleeding/mucositis  Neck: supple  Cardiovascular: RR, nl S1S2, no murmurs  Respiratory: clear air entry b/l  Gastrointestinal: BS+, soft, NT/ND  Extremities: no edema, no calf tenderness  Neurological: AAOx3, no focal deficits  Skin: no rash on visible skin  Musculoskeletal:  full ROM  Psychiatric:  mood stable        Labs:                          8.9    0.80  )-----------( 110      ( 28 Jun 2022 08:53 )             29.1     CBC Full  -  ( 28 Jun 2022 08:53 )  WBC Count : 0.80 K/uL  RBC Count : 2.83 M/uL  Hemoglobin : 8.9 g/dL  Hematocrit : 29.1 %  Platelet Count - Automated : 110 K/uL  Mean Cell Volume : 102.8 fl  Mean Cell Hemoglobin : 31.4 pg  Mean Cell Hemoglobin Concentration : 30.6 gm/dL  Auto Neutrophil # : 0.02 K/uL  Auto Lymphocyte # : 0.39 K/uL  Auto Monocyte # : 0.26 K/uL  Auto Eosinophil # : 0.07 K/uL  Auto Basophil # : 0.06 K/uL  Auto Neutrophil % : 2.6 %  Auto Lymphocyte % : 48.4 %  Auto Monocyte % : 32.0 %  Auto Eosinophil % : 9.2 %  Auto Basophil % : 7.8 %    PT/INR - ( 28 Jun 2022 08:53 )   PT: 15.0 sec;   INR: 1.26        PTT - ( 28 Jun 2022 08:53 )  PTT:29.3 sec    06-28    145  |  107  |  22  ----------------------------<  88  4.0   |  28  |  1.64<H>    Ca    9.0      28 Jun 2022 08:53  Phos  3.6     06-28  Mg     1.9     06-28    TPro  5.2<L>  /  Alb  3.6  /  TBili  0.4  /  DBili  x   /  AST  14  /  ALT  6<L>  /  AlkPhos  70  06-28           Hematology Oncology Consult Note (Dr. Chandler)  Discussed with Dr. Chandler and recommendations reviewed with the primary team.      HPI: 89 year old male with PMH of HTN, HLD, CKD stage 4 (Cr 2, eGFR 28), HF (EF 30-35%) s/p PPM, CAD (s/p PCI for RCA stenting 2007), aortic stenosis (s/p aortic valve replacement 2016), Afib not on AC due to h/o GIB, hypothyroidism, peptic ulcer disease, internal hemorrhoids, prostate and colon cancers (s/p R hemicolectomy 2016). Last hospital admission in 1/2022 for severe anemia 2/2 left IM hematoma and coagulopathy. 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 Now off all therapy. Admitted for pancytopenia. Hematology consulted for further evaluation. Denies any fever, night sweats, weight loss, decreased appetite, chest pain, sob, cough, abdominal pain, n/v/d, leg pain or swelling.       PAST MEDICAL & SURGICAL HISTORY:  Aortic valve replaced 2013  Atrial fibrillation  CAD (coronary artery disease)  HTN (hypertension)  PUD (peptic ulcer disease)  Constipation  Iron deficiency anemia  Diverticulosis  High cholesterol  Carotid arterial disease  Internal hemorrhoids  Endocarditis  Colon cancer  Hypothyroidism  Cardiac pacemaker, 3yrs ago  H/O inguinal hernia repair  H/O right hemicolectomy  History of appendectomy      Allergies: NKDA       Medications:  Home Medications:  Align 4 mg oral capsule: 1 cap(s) orally once a day (24 Dec 2021 21:23)  allopurinol 100 mg oral tablet: 1 tab(s) orally once a day (24 Dec 2021 21:23)  atorvastatin 20 mg oral tablet: 1 tab(s) orally once a day (at bedtime) (24 Dec 2021 21:23)  B Complex 50 oral tablet, extended release: 1 tab(s) orally once a day (24 Dec 2021 21:23)  Citrucel 2 g/19 g oral powder for reconstitution: 1 packet(s) orally once a day (24 Dec 2021 21:23)  CoQ10 300 mg oral capsule: 1 cap(s) orally once a day (24 Dec 2021 21:23)  donepezil 5 mg oral tablet: 1 tab(s) orally 2 times a day (24 Dec 2021 21:23)  eszopiclone 3 mg oral tablet: 1 tab(s) orally once a day (at bedtime) (27 Jun 2022 17:56)  folic acid 1 mg oral tablet: 1 tab(s) orally once a day (24 Dec 2021 21:23)  Lasix 20 mg oral tablet: 1 tab(s) orally once a day (27 Jun 2022 17:55)  levothyroxine 25 mcg (0.025 mg) oral tablet: 1 tab(s) orally once a day (24 Dec 2021 21:23)  memantine 5 mg oral tablet: 1 tab(s) orally once a day (24 Dec 2021 21:23)  omeprazole 20 mg oral delayed release capsule: 1 cap(s) orally once a day (24 Dec 2021 21:23)  tamsulosin 0.4 mg oral capsule: 1 tab(s) orally once a day (24 Dec 2021 21:23)  traZODone 100 mg oral tablet: 1 tab(s) orally once a day (at bedtime) (27 Jun 2022 17:56)  Vitamin D3 1000 intl units oral tablet: 1 tab(s) orally once a day (24 Dec 2021 21:23)      PHYSICAL EXAM:    T(F): 97.9 (06-28-22 @ 05:33), Max: 97.9 (06-28-22 @ 05:33)  HR: 79 (06-28-22 @ 05:33) (77 - 80)  BP: 143/79 (06-28-22 @ 05:33) (111/67 - 151/82)  RR: 18 (06-28-22 @ 05:33) (18 - 18)  SpO2: 93% (06-28-22 @ 05:33) (93% - 98%)    Gen: in NAD   HEENT: normocephalic/atraumatic, no conjunctival pallor, no scleral icterus, no oral thrush/mucosal bleeding/mucositis  Neck: supple, no cervical or supraclavicular LAD   Cardiovascular: RR, nl S1S2, no murmurs  Respiratory: clear air entry b/l  Gastrointestinal: BS+, soft, NT/ND  Extremities: no edema, no calf tenderness  Neurological: AAOx3, no focal deficits  Skin: no rash on visible skin  Musculoskeletal:  full ROM  Psychiatric:  mood stable        Labs:                          8.9    0.80  )-----------( 110      ( 28 Jun 2022 08:53 )             29.1     CBC Full  -  ( 28 Jun 2022 08:53 )  WBC Count : 0.80 K/uL  RBC Count : 2.83 M/uL  Hemoglobin : 8.9 g/dL  Hematocrit : 29.1 %  Platelet Count - Automated : 110 K/uL  Mean Cell Volume : 102.8 fl  Mean Cell Hemoglobin : 31.4 pg  Mean Cell Hemoglobin Concentration : 30.6 gm/dL  Auto Neutrophil # : 0.02 K/uL  Auto Lymphocyte # : 0.39 K/uL  Auto Monocyte # : 0.26 K/uL  Auto Eosinophil # : 0.07 K/uL  Auto Basophil # : 0.06 K/uL  Auto Neutrophil % : 2.6 %  Auto Lymphocyte % : 48.4 %  Auto Monocyte % : 32.0 %  Auto Eosinophil % : 9.2 %  Auto Basophil % : 7.8 %    PT/INR - ( 28 Jun 2022 08:53 )   PT: 15.0 sec;   INR: 1.26        PTT - ( 28 Jun 2022 08:53 )  PTT:29.3 sec    06-28    145  |  107  |  22  ----------------------------<  88  4.0   |  28  |  1.64<H>    Ca    9.0      28 Jun 2022 08:53  Phos  3.6     06-28  Mg     1.9     06-28    TPro  5.2<L>  /  Alb  3.6  /  TBili  0.4  /  DBili  x   /  AST  14  /  ALT  6<L>  /  AlkPhos  70  06-28

## 2022-06-28 NOTE — DIETITIAN INITIAL EVALUATION ADULT - NSFNSPHYEXAMSKINFT_GEN_A_CORE
Pressure Injury 1: Left:,heel, Unstageable  Pressure Injury 2: sacrum, Stage I  Pressure Injury 3: none, none  Pressure Injury 4: none, none  Pressure Injury 5: none, none  Pressure Injury 6: none, none  Pressure Injury 7: none, none  Pressure Injury 8: none, none  Pressure Injury 9: none, none  Pressure Injury 10: none, none  Pressure Injury 11: none, none, Pressure Injury 1: Left:,heel, Unstageable  Pressure Injury 2: sacrum, Stage I  Pressure Injury 3: none, none  Pressure Injury 4: none, none  Pressure Injury 5: none, none  Pressure Injury 6: none, none  Pressure Injury 7: none, none  Pressure Injury 8: none, none  Pressure Injury 9: none, none  Pressure Injury 10: none, none  Pressure Injury 11: none, none

## 2022-06-28 NOTE — CONSULT NOTE ADULT - SUBJECTIVE AND OBJECTIVE BOX
89M PMH HTN, HLD, CKD4, chronic HFrEF (LVEF 30% Dec 2021), CAD s/p PCI, aortic stenosis s/p AVR, chronic atrial fibrillation s/p PPM, hypothyroidism, GIB (now off warfarin x 8 months), colon cancer, prostate cancer, and ?coagulation d/o who presented from Dr. Chandler's office for wbc 1.4. Patient reports no acute sx. Has chronic non-productive cough and insomnia, both unchanged. Found to be pancytopenic on admission. IR consulted for bone marrow biopsy.     Clinical History: NEUTROPENIA    H/o or current diagnosis of HF- ACEI/ARB contraindication unknown    No pertinent family history in first degree relatives    Handoff    MEWS Score    Aortic valve replaced    Atrial fibrillation    CAD (coronary artery disease)    HTN (hypertension)    PUD (peptic ulcer disease)    Constipation    Iron deficiency anemia    Diverticulosis    High cholesterol    Carotid arterial disease    Internal hemorrhoids    Colon cancer    Endocarditis    Colon cancer    Hypothyroidism    Neutropenia    Atrial fibrillation    HTN (hypertension)    CAD (coronary artery disease)    PUD (peptic ulcer disease)    Constipation    Hypothyroidism    Insomnia    Pancytopenia    Healthcare maintenance    Chronic systolic congestive heart failure    S/P AVR    Cardiac pacemaker    H/O inguinal hernia repair    History of bowel resection    H/O right hemicolectomy    History of appendectomy    ABNORMAL LABS    90+    SysAdmin_VisitLink        Meds:allopurinol 100 milliGRAM(s) Oral daily  atorvastatin 20 milliGRAM(s) Oral at bedtime  cholecalciferol 1000 Unit(s) Oral every 24 hours  donepezil 5 milliGRAM(s) Oral <User Schedule>  folic acid 1 milliGRAM(s) Oral every 24 hours  furosemide    Tablet 20 milliGRAM(s) Oral every 24 hours  levothyroxine 25 MICROGram(s) Oral every 24 hours  memantine 5 milliGRAM(s) Oral every 24 hours  pantoprazole    Tablet 40 milliGRAM(s) Oral before breakfast  polyethylene glycol 3350 17 Gram(s) Oral daily  senna 2 Tablet(s) Oral at bedtime  tamsulosin 0.4 milliGRAM(s) Oral at bedtime  traZODone 100 milliGRAM(s) Oral at bedtime  zolpidem 5 milliGRAM(s) Oral at bedtime PRN  zolpidem 5 milliGRAM(s) Oral at bedtime PRN      Allergies:No Known Allergies        Labs:                           9.8    0.60  )-----------( 140      ( 27 Jun 2022 13:15 )             33.2     PT/INR - ( 27 Jun 2022 13:15 )   PT: 13.6 sec;   INR: 1.14          PTT - ( 27 Jun 2022 13:15 )  PTT:29.5 sec  06-27    143  |  102  |  23  ----------------------------<  188<H>  4.0   |  32<H>  |  1.68<H>    Ca    9.3      27 Jun 2022 13:15  Phos  3.4     06-27  Mg     2.0     06-27    TPro  5.9<L>  /  Alb  4.0  /  TBili  0.5  /  DBili  <0.2  /  AST  17  /  ALT  7<L>  /  AlkPhos  78  06-27

## 2022-06-28 NOTE — DISCHARGE NOTE PROVIDER - CARE PROVIDER_API CALL
Lori Chandler)  HematologyOncology; Internal Medicine; Medical Oncology  [address] [place] [address]  Phone: [phone]  Fax: [phone]  Scheduled Appointment: 07/08/2022 01:00 PM

## 2022-06-28 NOTE — DIETITIAN INITIAL EVALUATION ADULT - NSICDXPASTSURGICALHX_GEN_ALL_CORE_FT
PAST SURGICAL HISTORY:  Cardiac pacemaker 3yrs ago    H/O inguinal hernia repair     H/O right hemicolectomy     History of appendectomy     S/P AVR 2013 @ [facility]  by Dr. Young

## 2022-06-28 NOTE — DIETITIAN INITIAL EVALUATION ADULT - PERTINENT LABORATORY DATA
06-28    145  |  107  |  22  ----------------------------<  88  4.0   |  28  |  1.64<H>    Ca    9.0      28 Jun 2022 08:53  Phos  3.6     06-28  Mg     1.9     06-28    TPro  5.2<L>  /  Alb  3.6  /  TBili  0.4  /  DBili  x   /  AST  14  /  ALT  6<L>  /  AlkPhos  70  06-28  A1C with Estimated Average Glucose Result: 5.6 % (12-30-21 @ 08:39)

## 2022-06-28 NOTE — DIETITIAN INITIAL EVALUATION ADULT - ADD RECOMMEND
1. Continue with current diet order    >>Ensure enlive 1x/day (350kcal, 20g pro)   2. Encourage pt to meet nutritional needs as able   3. Encourage adherence to diet education (reinforce as able)   4. Pain and bowel regimen per team   5. Will continue to assess/honor preferences as able   6. Align nutrition interventions with GOC at all times  1. Continue with current diet order    >>Ensure enlive 1x/day (350kcal, 20g pro)   2. Encourage pt to meet nutritional needs as able   3. Encourage adherence to diet education (reinforce as able)   4. Continue folic acid supplementation, continue vitamin D supplementation  >>Recommend Zinc 220mg/day and Vitamin C 500mg daily for wound healing   5. Pain and bowel regimen per team   6. Will continue to assess/honor preferences as able   7. Align nutrition interventions with GOC at all times

## 2022-06-28 NOTE — PHYSICAL THERAPY INITIAL EVALUATION ADULT - PERTINENT HX OF CURRENT PROBLEM, REHAB EVAL
Pt. is a89 y.o male referred from OP oncologist's office for BM biopsy i/s/o pancytopenia/neutropenia.

## 2022-06-28 NOTE — DIETITIAN INITIAL EVALUATION ADULT - PERTINENT MEDS FT
MEDICATIONS  (STANDING):  allopurinol 100 milliGRAM(s) Oral daily  atorvastatin 20 milliGRAM(s) Oral at bedtime  cholecalciferol 1000 Unit(s) Oral every 24 hours  donepezil 5 milliGRAM(s) Oral <User Schedule>  folic acid 1 milliGRAM(s) Oral every 24 hours  furosemide    Tablet 20 milliGRAM(s) Oral every 24 hours  levothyroxine 25 MICROGram(s) Oral every 24 hours  memantine 5 milliGRAM(s) Oral every 24 hours  pantoprazole    Tablet 40 milliGRAM(s) Oral before breakfast  polyethylene glycol 3350 17 Gram(s) Oral daily  senna 2 Tablet(s) Oral at bedtime  tamsulosin 0.4 milliGRAM(s) Oral at bedtime  traZODone 100 milliGRAM(s) Oral at bedtime    MEDICATIONS  (PRN):  zolpidem 5 milliGRAM(s) Oral at bedtime PRN Insomnia  zolpidem 5 milliGRAM(s) Oral at bedtime PRN Insomnia

## 2022-06-28 NOTE — DISCHARGE NOTE PROVIDER - NSDCMRMEDTOKEN_GEN_ALL_CORE_FT
Align 4 mg oral capsule: 1 cap(s) orally once a day  allopurinol 100 mg oral tablet: 1 tab(s) orally once a day  atorvastatin 20 mg oral tablet: 1 tab(s) orally once a day (at bedtime)  B Complex 50 oral tablet, extended release: 1 tab(s) orally once a day  Citrucel 2 g/19 g oral powder for reconstitution: 1 packet(s) orally once a day  CoQ10 300 mg oral capsule: 1 cap(s) orally once a day  donepezil 5 mg oral tablet: 1 tab(s) orally 2 times a day  eszopiclone 3 mg oral tablet: 1 tab(s) orally once a day (at bedtime)  folic acid 1 mg oral tablet: 1 tab(s) orally once a day  Lasix 20 mg oral tablet: 1 tab(s) orally once a day  levothyroxine 25 mcg (0.025 mg) oral tablet: 1 tab(s) orally once a day  memantine 5 mg oral tablet: 1 tab(s) orally once a day  omeprazole 20 mg oral delayed release capsule: 1 cap(s) orally once a day  tamsulosin 0.4 mg oral capsule: 1 tab(s) orally once a day  traZODone 100 mg oral tablet: 1 tab(s) orally once a day (at bedtime)  Vitamin D3 1000 intl units oral tablet: 1 tab(s) orally once a day   allopurinol 100 mg oral tablet: 1 tab(s) orally once a day  atorvastatin 20 mg oral tablet: 1 tab(s) orally once a day (at bedtime)  Colace 100 mg oral capsule: 1 tab(s) orally once a day  donepezil 10 mg oral tablet: 1 tab(s) orally once a day (at bedtime)  eszopiclone 3 mg oral tablet: 1 tab(s) orally once a day (at bedtime)  folic acid 1 mg oral tablet: 1 tab(s) orally once a day  furosemide 40 mg oral tablet: On monday, wednesday and friday   Lasix 20 mg oral tablet: On tuesday, thursday, saturday and sunday  levothyroxine 25 mcg (0.025 mg) oral tablet: 1 tab(s) orally once a day  omeprazole 20 mg oral delayed release capsule: orally 2 times a day  tamsulosin 0.4 mg oral capsule: 1 tab(s) orally once a day  traZODone 100 mg oral tablet: 1 tab(s) orally once a day (at bedtime)  Tylenol 325 mg oral tablet: 1 tab(s) orally every 6 hours, As Needed  Vitamin D3 1250 mcg (50,000 intl units) oral capsule: 1 cap(s) orally once a week  Zofran 4 mg oral tablet: 1 tab(s) orally once a day, As Needed   allopurinol 100 mg oral tablet: 1 tab(s) orally once a day  atorvastatin 20 mg oral tablet: 1 tab(s) orally once a day (at bedtime)  Colace 100 mg oral capsule: 1 tab(s) orally once a day  donepezil 10 mg oral tablet: 1 tab(s) orally once a day (at bedtime)  eszopiclone 3 mg oral tablet: 1 tab(s) orally once a day (at bedtime)  folic acid 1 mg oral tablet: 1 tab(s) orally once a day  furosemide 40 mg oral tablet: orally once a day on monday, wednesday and friday  Lasix 20 mg oral tablet: Once a day tuesday, thursday, saturday and sunday  levothyroxine 25 mcg (0.025 mg) oral tablet: 1 tab(s) orally once a day  omeprazole 20 mg oral delayed release capsule: orally 2 times a day  tamsulosin 0.4 mg oral capsule: 1 tab(s) orally once a day  traZODone 100 mg oral tablet: 1 tab(s) orally once a day (at bedtime)  Tylenol 325 mg oral tablet: 1 tab(s) orally every 6 hours, As Needed  Vitamin D3 1250 mcg (50,000 intl units) oral capsule: 1 cap(s) orally once a week  Zofran 4 mg oral tablet: 1 tab(s) orally once a day, As Needed

## 2022-06-28 NOTE — DIETITIAN INITIAL EVALUATION ADULT - OTHER INFO
89M PMH HTN, HLD, CKD4, chronic HFrEF (LVEF 30% Dec 2021), CAD s/p PCI, aortic stenosis s/p AVR, chronic atrial fibrillation s/p PPM, hypothyroidism, GIB (now off warfarin x 8 months), colon cancer, prostate cancer, and ?coagulation d/o who presented from Dr. Chandler's office for wbc 1.4 found to be pancytopenic/neutropenic, pending BM Bx.    Pt seen at bedside for initial assessment. Denies nausea/vomiting at this time. Reports last bowel movement 6/27. Confirms NKFA. Denies change in appetite PTA; endorses 3 meals/day at home. Of note, pt drinks one ensure enlive/day at home. RD will discuss with team about providing pt with Ensure during hospital stay.  Verbalizes no recent weight loss. However, according to [facility] record's pt weighed 138lbs in December '21, reflecting a 4lb, 2% wt loss in 6 months, not significant). No edema documented at this time. Unstageable heel pressure injury and St I sacral pressure injury. Mitul score=15. Nutrition focused physical exam significant for mild clavicle, temporal, shoulder wasting. However, wasting is likely age-related. Based on ASPEN guidelines, pt does not meet criteria for malnutrition at this time. Discussed current diet order Regular diet; provided pt with diet education regarding adequate PO intake; amenable to education. Pt reports feeling hungry during hospital stay, able to complete % of meals when he enjoys what is being served. Pt is not ordering his food and receiving non-selects. Communicated with  to make pt RSA as he has AMS 2/2 dementia and will need assistance. Made aware RD remains available. RD to follow up per protocol. See nutrition recommendations below.

## 2022-06-29 NOTE — PROGRESS NOTE ADULT - SUBJECTIVE AND OBJECTIVE BOX
Hematology Oncology Progress Note (Dr. Chandler)  Discussed with Dr. Chandler and recommendations reviewed with the primary team.      Interval HPI: Patient seen and examined. No new complaints. Denies any fever, chills, cough, dysuria, abdominal pain, diarrhea or leg pain/swelling.       Vital Signs Last 24 Hrs  T(C): 36.9 (29 Jun 2022 05:28), Max: 37.3 (28 Jun 2022 20:51)  T(F): 98.5 (29 Jun 2022 05:28), Max: 99.2 (28 Jun 2022 20:51)  HR: 79 (29 Jun 2022 05:28) (79 - 80)  BP: 116/73 (29 Jun 2022 05:28) (116/73 - 132/72)  RR: 18 (29 Jun 2022 05:28) (17 - 18)  SpO2: 93% (29 Jun 2022 05:28) (93% - 97%)    Gen: in NAD   HEENT: normocephalic/atraumatic  Neck: supple  Cardiovascular: RR, nl S1S2, no murmurs  Respiratory: clear air entry b/l  Gastrointestinal: BS+, soft, NT/ND  Extremities: no edema, no calf tenderness  Neurological: AAOx3, no focal deficits  Skin: no rash on visible skin  Musculoskeletal:  full ROM  Psychiatric:  mood stable        Labs:                          8.9    0.80  )-----------( 110      ( 28 Jun 2022 08:53 )             29.1     06-28    142  |  103  |  17  ----------------------------<  87  3.9   |  30  |  1.58<H>    Ca    8.9      28 Jun 2022 22:20  Phos  3.6     06-28  Mg     1.9     06-28    TPro  5.2<L>  /  Alb  3.6  /  TBili  0.6  /  DBili  x   /  AST  14  /  ALT  6<L>  /  AlkPhos  72  06-28    PT/INR - ( 28 Jun 2022 08:53 )   PT: 15.0 sec;   INR: 1.26       PTT - ( 28 Jun 2022 08:53 )  PTT:29.3 sec

## 2022-06-29 NOTE — PROGRESS NOTE ADULT - ASSESSMENT
89M PMH HTN, HLD, CKD4, chronic HFrEF (LVEF 30% Dec 2021), CAD s/p PCI, aortic stenosis s/p AVR, chronic atrial fibrillation s/p PPM, hypothyroidism, GIB (now off warfarin x 8 months), colon cancer, prostate cancer who presented from Dr. Chandler's office for wbc 1.4 found to be pancytopenic/neutropenic, pending BM Bx.

## 2022-06-29 NOTE — PROGRESS NOTE ADULT - PROBLEM SELECTOR PLAN 1
Assessment/Plan: BHS approached pt with assigned BHS, Tammi Subramanian, to get acquainted along with exploring pt's emotional, cognitive and behavioral displays' stability  During today's contact, pt disclosed being "ok", suggesting pt to share the definition of his statement  Pt described struggling with addressing marriage, family and financial issues which seemed to be triggering his anxiety levels  It was explored pt's willingness to address the identified issues within [facility] interventions which he agreed to benefit from on a bi-weekly basis  Assigned BHS Diana Bolton committed to contact pt to coordinate services once her training is completed  At the end of contact, pt seemed relieved of the services provided  No SI or HI were disclosed, nor displayed throughout contact  No signs of helplessness or hopelessness were manifested throughout contact  formerly [facility]     Today patient present with   Chief Complaint   Patient presents with    Follow-up     pt offers no c/o at this time  Patient would likely benefit from: Address core difficulties regarding disclosed issues within ongoing [facility] interventions  Consider/focus/continue: Monitoring pt's emotional, cognitive and behavioral displays' stability  Stage of change: Contemplation  Plan/ Behavioral Recommendations: Ongoing  interventions per pt's coordinated care, and/or pt's request of services  Diagnoses and all orders for this visit:    Symptomatic HIV infection ([facility] [address] )  -     CBC and differential  -     T-helper cells CD4/CD8 %  -     Comprehensive metabolic panel  -     HIV-1 RNA, quantitative, PCR  -     Dolutegravir-lamiVUDine (Dovato)  MG TABS; Take 1 tablet by mouth in the morning    Essential hypertension    Obesity (BMI 30 0-34  9)    Dyslipidemia    Moderate episode of recurrent major depressive disorder [facility])          Discussion:     Patient can reach out to [facility] if they experiences changes in their behavioral health  Subjective:     Patient ID: Hina Crowder is a 50 y o  male  HPI    History of Present Illness: The patient has no acute behavioral health concerns    Review of Systems      Objective:     Physical Exam      [facility]    Orientation     Person: yes    Place: yes    Time: yes    Appearance    Well Developed: yes healthy    Uncomfortable: yes    Normal Body Odor: yes    Smells of Feces: no    Smells of Urine: no    Disheveled: no    Well Nourished: yes overweight    Grooming Unkempt: no    Poor Eye Contact: no    Hirsute: no    Looks Tired: yes    Acutely Exhausted: noappearance reflects stated age    Mood and Affect:     Appropriate: yes    Euthymicyes    Irritable: no    Angry: no    Anxious: yes    Depressed:no    Blunted:no    Labile: no    Restricted: yes    Harm to Self or Others: No SI or HI were disclosed nor displayed throughout contact  Substance Abuse: None reported by pt  - Heme-onc recs appreciated  - Monitor CBC w/ diff daily and CMP   - Transfuse for hb <7, Plt count <10K, <20K + febrile, <50K+ bleeding   - No need for G-CSF at this time.  - High risk for infection given severe neutropenia. If he spikes a temp, send sepsis workup and start broad spectrum antibiotics.   - Would favor inpatient monitoring until we have prelim results on bone marrow biopsy.

## 2022-06-29 NOTE — PROGRESS NOTE ADULT - ATTENDING COMMENTS
89M PMH HTN, HLD, CKD4, chronic HFrEF (LVEF 30% Dec 2021), CAD s/p PCI, aortic stenosis s/p AVR, chronic atrial fibrillation s/p PPM, hypothyroidism, GIB (now off warfarin x 8 months), colon cancer, prostate cancer, and ?coagulation d/o who presented from Dr. Chandler's office for wbc 1.4 found to be pancytopenic/neutropenic, pending BM Bx.      Patient was seen and examined after bone marrow biopsy. She tolerated the procedure very well.     # Pancytopenia:   Normal B12/folate, iron panel with no evidence of ATIF, Hep C non-reactive.   Follow bone marrow biopsy result.   If develop fever, obtain pan-culture and start wide-spectrum antibiotics.  Hematology team would like to monitor the patient in the hospital till biopsy result available.

## 2022-06-29 NOTE — PROGRESS NOTE ADULT - ASSESSMENT
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 Admitted for pancytopenia, hematology consulted for further management.       Pancytopenia, on admission CBC with WBC 0.60 (ANC 10, , basophilia 8.9% and monocytosis), Hb 9.8 (macrocytic),  c/f primary bone marrow process likely chronic. PBS reviewed with atypical lymphocytes. Normal B12/folate, iron panel with no evidence of ATIF, Hep C non-reactive on office labs from 5/31/22. Flow cytometry from 5/31/22 with reactive increase in NK cells with no diagnostic immunophenotypic abnormalities detected.     Recommend:   - s/p IR guided bone marrow biopsy on 6/28/22, will await pathology   - Monitor CBC w/ diff daily and CMP   - Transfuse for hb <7, Plt count <10K, <20K + febrile, <50K+ bleeding   - No need for G-CSF at this time.  - High risk for infection given severe neutropenia. If he spikes a temp, send sepsis workup and start broad spectrum antibiotics.   - Would favor inpatient monitoring until we have prelim results on bone marrow biopsy.     D/w Dr. Chandler

## 2022-06-29 NOTE — PROGRESS NOTE ADULT - SUBJECTIVE AND OBJECTIVE BOX
OVERNIGHT EVENTS: No overnight events     SUBJECTIVE / INTERVAL HPI: The patient is well with no new complaints. He denied SOB, cough, chest pain, fever, diarrhea or abdominal pain      VITAL SIGNS:  Vital Signs Last 24 Hrs  T(C): 36.4 (29 Jun 2022 16:00), Max: 37.3 (28 Jun 2022 20:51)  T(F): 97.5 (29 Jun 2022 16:00), Max: 99.2 (28 Jun 2022 20:51)  HR: 80 (29 Jun 2022 16:00) (79 - 80)  BP: 118/68 (29 Jun 2022 16:00) (116/73 - 151/77)  BP(mean): --  RR: 18 (29 Jun 2022 16:00) (17 - 18)  SpO2: 94% (29 Jun 2022 16:00) (93% - 96%)    PHYSICAL EXAM:    General: WDWN  HEENT: NCAT; PERRL, anicteric sclera; MMM  Neck: supple, trachea midline  Cardiovascular: S1, S2 normal; RRR, no M/G/R  Respiratory: CTABL; no W/R/R  Gastrointestinal: soft, nontender, nondistended. bowel sounds present.  Skin: no ulcerations or visible rashes appreciated  Extremities: WWP; no edema, clubbing or cyanosis  Vascular: 2+ radial, DP/PT pulses B/L  Neurological: AAOx3; CN II-XII grossly intact; no focal deficits    MEDICATIONS:  MEDICATIONS  (STANDING):  allopurinol 100 milliGRAM(s) Oral daily  ascorbic acid 500 milliGRAM(s) Oral daily  atorvastatin 20 milliGRAM(s) Oral at bedtime  cholecalciferol 1000 Unit(s) Oral every 24 hours  donepezil 5 milliGRAM(s) Oral <User Schedule>  folic acid 1 milliGRAM(s) Oral every 24 hours  furosemide    Tablet 20 milliGRAM(s) Oral every 24 hours  levothyroxine 25 MICROGram(s) Oral every 24 hours  memantine 5 milliGRAM(s) Oral every 24 hours  pantoprazole    Tablet 40 milliGRAM(s) Oral before breakfast  polyethylene glycol 3350 17 Gram(s) Oral daily  senna 2 Tablet(s) Oral at bedtime  tamsulosin 0.4 milliGRAM(s) Oral at bedtime  traZODone 100 milliGRAM(s) Oral at bedtime  zinc sulfate 220 milliGRAM(s) Oral daily    MEDICATIONS  (PRN):  zolpidem 5 milliGRAM(s) Oral at bedtime PRN Insomnia  zolpidem 5 milliGRAM(s) Oral at bedtime PRN Insomnia      ALLERGIES:  Allergies    No Known Allergies    Intolerances        LABS:                        9.3    0.85  )-----------( 120      ( 29 Jun 2022 10:10 )             31.1     06-29    141  |  101  |  19  ----------------------------<  95  3.8   |  30  |  1.55<H>    Ca    9.2      29 Jun 2022 10:10  Phos  3.6     06-29  Mg     1.8     06-29    TPro  5.2<L>  /  Alb  3.6  /  TBili  0.6  /  DBili  x   /  AST  14  /  ALT  6<L>  /  AlkPhos  72  06-28    PT/INR - ( 29 Jun 2022 10:10 )   PT: 13.8 sec;   INR: 1.16          PTT - ( 29 Jun 2022 10:10 )  PTT:30.9 sec    CAPILLARY BLOOD GLUCOSE

## 2022-06-29 NOTE — CHART NOTE - NSCHARTNOTEFT_GEN_A_CORE
Admitting Diagnosis:   Patient is a 89y old  Male who presents with a chief complaint of neutropenia (29 Jun 2022 09:11)    PAST MEDICAL & SURGICAL HISTORY:  Aortic valve replaced  Atrial fibrillation  CAD (coronary artery disease)  HTN (hypertension)  PUD (peptic ulcer disease)  Constipation  Iron deficiency anemia  Diverticulosis  High cholesterol  Carotid arterial disease  Internal hemorrhoids  Endocarditis  Colon cancer  Hypothyroidism  S/P AVR  2013 @ [facility]  by Dr. Young  Cardiac pacemaker  3yrs ago  H/O inguinal hernia repair  H/O right hemicolectomy  History of appendectomy    Current Nutrition Order: Regular diet, with Ensure Enlive supplement QD   PO Intake: Good (%) [   ]  Fair (50-75%) [ x ] Poor (<25%) [   ]  GI Issues: N/V/D/C noted per pt; per flowsheets data, within normal limits at this time. Last BM noted on 06/27/22 per flowsheets data.    Pain: denies pain/discomfort per flowsheets data   Skin Integrity: Mitul: 15; bruised (ecchymosis); no edema noted    Labs:   06-29    141  |  101  |  19  ----------------------------<  95  3.8   |  30  |  1.55<H>    Ca    9.2      29 Jun 2022 10:10  Phos  3.6     06-29  Mg     1.8     06-29    TPro  5.2<L>  /  Alb  3.6  /  TBili  0.6  /  DBili  x   /  AST  14  /  ALT  6<L>  /  AlkPhos  72  06-28    CAPILLARY BLOOD GLUCOSE    Medications:  MEDICATIONS  (STANDING):  allopurinol 100 milliGRAM(s) Oral daily  ascorbic acid 500 milliGRAM(s) Oral daily  atorvastatin 20 milliGRAM(s) Oral at bedtime  cholecalciferol 1000 Unit(s) Oral every 24 hours  donepezil 5 milliGRAM(s) Oral <User Schedule>  folic acid 1 milliGRAM(s) Oral every 24 hours  furosemide    Tablet 20 milliGRAM(s) Oral every 24 hours  levothyroxine 25 MICROGram(s) Oral every 24 hours  memantine 5 milliGRAM(s) Oral every 24 hours  pantoprazole    Tablet 40 milliGRAM(s) Oral before breakfast  polyethylene glycol 3350 17 Gram(s) Oral daily  senna 2 Tablet(s) Oral at bedtime  tamsulosin 0.4 milliGRAM(s) Oral at bedtime  traZODone 100 milliGRAM(s) Oral at bedtime  zinc sulfate 220 milliGRAM(s) Oral daily    MEDICATIONS  (PRN):  zolpidem 5 milliGRAM(s) Oral at bedtime PRN Insomnia  zolpidem 5 milliGRAM(s) Oral at bedtime PRN Insomnia    Anthropometrics:   Height for BMI (FEET)	5 Feet  Height for BMI (INCHES)	3 Inch(s)  Height for BMI (CENTIMETERS) 160.02 Centimeter(s)  Weight for BMI (lbs)	134 lb  Weight for BMI (kg)	60.8 kg  Body Mass Index	23.7    Weight Change: none noted as of yet     Estimated energy needs:   Weight used for calculations	current weight  Estimated Energy Needs Weight (lbs)	134 lb  Estimated Energy Needs Weight (kg)	60.7 kg  Estimated Energy Needs From (bill/kg) 25  Estimated Energy Needs To (bill/kg)	30  Estimated Energy Needs Calculated From (bill/kg) 1517  Estimated Energy Needs Calculated To (bill/kg)	1821  Weight used for calculations	current weight  Estimated Protein Needs Weight (lbs)	134 lb  Estimated Protein Needs Weight (kg)	60.7 kg  Estimated Protein Needs From (g/kg)	1  Estimated Protein Needs To (g/kg)	1.2  Estimated Protein Needs Calculated From (g/kg) 60.7  Estimated Protein Needs Calculated To (g/kg)	72.84  Other Calculations	%IBW: 108; ABW used to calculate estimated needs d/t pt being between 80 and 120% IBW; adjusted for possible malignancy, advanced age, CKD stage 4. Fluids per team.    Subjective: This is a 89M PMH HTN, HLD, CKD4, chronic HFrEF (LVEF 30% Dec 2021), CAD s/p PCI, aortic stenosis s/p AVR, chronic atrial fibrillation s/p PPM, hypothyroidism, GIB (now off warfarin x 8 months), colon cancer, prostate cancer, and ?coagulation d/o who presented from Dr. Chandler's office for wbc 1.4 found to be pancytopenic/neutropenic, pending BM Bx. 06/28/22: pt went for IR BM Mx.     Pt interviewed for follow up assessment. Pt stated his appetite is currently good. Pt stated he has been eating fair overall (~50% or more PO intakes), however sometimes it depends on the meal. Pt stated no current GI s/s such as N/V/D/C noted, no current issues chewing or swallowing noted. Pt stated    Previous Nutrition Diagnosis: Nutrition Diagnositc Terminology #1	Increased Nutrient Needs (kcal/pro) RT hypermetabolic state for wound healing AEB stage I sacral Pressure Injury and Unstageable Heel Pressure Injury	    Active [ x ]  Resolved [   ]    If resolved, new PES:     Goal: Pt to consistently meet >75% of EER during hospital stay    Recommendations:  1. Continue with current diet order    >>Ensure enlive 1x/day (350kcal, 20g pro)   2. Encourage pt to meet nutritional needs as able   3.  Monitor PO intakes, trend weights (weekly), monitor skin integrity, monitor labs (electrolytes, CMP)   4. Encourage adherence to diet education (reinforce as able)   5. Continue folic acid supplementation, continue vitamin D supplementation  >>Recommend Zinc 220mg/day and Vitamin C 500mg daily for wound healing   6. Pain and bowel regimen per team   7. Will continue to assess/honor preferences as able   8. Align nutrition interventions with GOC at all times    Education: RD provided brief education in regards to consuming adequate PO intake, macronutrients, micronutrients and hydration to support ADLs, energy levels and functional status; provided additional education and emphasized an increase in protein intake to expedite the wound healing process; RD noted to be aware of protein overall (when wounds are healed) r/t pt's CKD stage 4 status.     Risk Level: High [   ] Moderate [ x ] Low [   ] Admitting Diagnosis:   Patient is a 89y old  Male who presents with a chief complaint of neutropenia (29 Jun 2022 09:11)    PAST MEDICAL & SURGICAL HISTORY:  Aortic valve replaced  Atrial fibrillation  CAD (coronary artery disease)  HTN (hypertension)  PUD (peptic ulcer disease)  Constipation  Iron deficiency anemia  Diverticulosis  High cholesterol  Carotid arterial disease  Internal hemorrhoids  Endocarditis  Colon cancer  Hypothyroidism  S/P AVR  2013 @ [facility]  by Dr. Young  Cardiac pacemaker  3yrs ago  H/O inguinal hernia repair  H/O right hemicolectomy  History of appendectomy    Current Nutrition Order: Regular diet, with Ensure Enlive supplement QD   PO Intake: Good (%) [   ]  Fair (50-75%) [ x ] Poor (<25%) [   ]  GI Issues: N/V/D/C noted per pt; per flowsheets data, within normal limits at this time. Last BM noted on 06/27/22 per flowsheets data.    Pain: denies pain/discomfort per flowsheets data   Skin Integrity: Mitul: 15; bruised (ecchymosis); no edema noted    Labs:   06-29    141  |  101  |  19  ----------------------------<  95  3.8   |  30  |  1.55<H>    Ca    9.2      29 Jun 2022 10:10  Phos  3.6     06-29  Mg     1.8     06-29    TPro  5.2<L>  /  Alb  3.6  /  TBili  0.6  /  DBili  x   /  AST  14  /  ALT  6<L>  /  AlkPhos  72  06-28    CAPILLARY BLOOD GLUCOSE    Medications:  MEDICATIONS  (STANDING):  allopurinol 100 milliGRAM(s) Oral daily  ascorbic acid 500 milliGRAM(s) Oral daily  atorvastatin 20 milliGRAM(s) Oral at bedtime  cholecalciferol 1000 Unit(s) Oral every 24 hours  donepezil 5 milliGRAM(s) Oral <User Schedule>  folic acid 1 milliGRAM(s) Oral every 24 hours  furosemide    Tablet 20 milliGRAM(s) Oral every 24 hours  levothyroxine 25 MICROGram(s) Oral every 24 hours  memantine 5 milliGRAM(s) Oral every 24 hours  pantoprazole    Tablet 40 milliGRAM(s) Oral before breakfast  polyethylene glycol 3350 17 Gram(s) Oral daily  senna 2 Tablet(s) Oral at bedtime  tamsulosin 0.4 milliGRAM(s) Oral at bedtime  traZODone 100 milliGRAM(s) Oral at bedtime  zinc sulfate 220 milliGRAM(s) Oral daily    MEDICATIONS  (PRN):  zolpidem 5 milliGRAM(s) Oral at bedtime PRN Insomnia  zolpidem 5 milliGRAM(s) Oral at bedtime PRN Insomnia    Anthropometrics:   Height for BMI (FEET)	5 Feet  Height for BMI (INCHES)	3 Inch(s)  Height for BMI (CENTIMETERS) 160.02 Centimeter(s)  Weight for BMI (lbs)	134 lb  Weight for BMI (kg)	60.8 kg  Body Mass Index	23.7    Weight Change: none noted as of yet since admission; will f/u with NSG in regards to potential updated wt status     Estimated energy needs:   Weight used for calculations	current weight  Estimated Energy Needs Weight (lbs)	134 lb  Estimated Energy Needs Weight (kg)	60.7 kg  Estimated Energy Needs From (bill/kg) 25  Estimated Energy Needs To (bill/kg)	30  Estimated Energy Needs Calculated From (bill/kg) 1517  Estimated Energy Needs Calculated To (bill/kg)	1821  Weight used for calculations	current weight  Estimated Protein Needs Weight (lbs)	134 lb  Estimated Protein Needs Weight (kg)	60.7 kg  Estimated Protein Needs From (g/kg)	1  Estimated Protein Needs To (g/kg)	1.2  Estimated Protein Needs Calculated From (g/kg) 60.7  Estimated Protein Needs Calculated To (g/kg)	72.84  Other Calculations	%IBW: 108; ABW used to calculate estimated needs d/t pt being between 80 and 120% IBW; adjusted for possible malignancy, advanced age, CKD stage 4. Fluids per team.    Subjective: This is a 89M PMH HTN, HLD, CKD4, chronic HFrEF (LVEF 30% Dec 2021), CAD s/p PCI, aortic stenosis s/p AVR, chronic atrial fibrillation s/p PPM, hypothyroidism, GIB (now off warfarin x 8 months), colon cancer, prostate cancer, and ?coagulation d/o who presented from Dr. Chandler's office for wbc 1.4 found to be pancytopenic/neutropenic, pending BM Bx. 06/28/22: pt went for IR BM Mx.     Pt interviewed for follow up assessment. Pt stated his appetite is currently good. Pt stated he has been eating fair overall (~50% or more PO intakes), however sometimes it depends on the meal. Pt stated no current GI s/s such as N/V/D/C noted, no current issues chewing or swallowing noted. RD provided brief education in regards to consuming adequate PO intake, macronutrients, micronutrients and hydration to support ADLs, energy levels and functional status; provided additional education and emphasized an increase in protein intake to expedite the wound healing process; RD noted to be aware of protein overall (when wounds are healed) r/t pt's CKD stage 4 status. Pt was receptive and verbalized understanding. RD to remain available. Additional nutritional recommendations below.     Previous Nutrition Diagnosis: Nutrition Diagnositc Terminology #1	Increased Nutrient Needs (kcal/pro) RT hypermetabolic state for wound healing AEB stage I sacral Pressure Injury and Unstageable Heel Pressure Injury	    Active [ x ]  Resolved [   ]    If resolved, new PES:     Goal: Pt to consistently meet >75% of EER during hospital stay    Recommendations:  1. Continue with current diet order    >>Ensure enlive 1x/day (350kcal, 20g pro)   2. Encourage pt to meet nutritional needs as able   3.  Monitor PO intakes, trend weights (weekly), monitor skin integrity, monitor labs (electrolytes, CMP)   4. Encourage adherence to diet education (reinforce as able)   5. Continue folic acid supplementation, continue vitamin D supplementation  >>Recommend Zinc 220mg/day and Vitamin C 500mg daily for wound healing   6. Pain and bowel regimen per team   7. Will continue to assess/honor preferences as able   8. Align nutrition interventions with GOC at all times    Education: RD provided brief education in regards to consuming adequate PO intake, macronutrients, micronutrients and hydration to support ADLs, energy levels and functional status; provided additional education and emphasized an increase in protein intake to expedite the wound healing process; RD noted to be aware of protein overall (when wounds are healed) r/t pt's CKD stage 4 status. Pt was receptive and verbalized understanding.     Risk Level: High [   ] Moderate [ x ] Low [   ]

## 2022-06-30 NOTE — PROGRESS NOTE ADULT - PROBLEM SELECTOR PLAN 2
Patient with hx a-fib s/p PPM. Unclear if on metoprolol (was stopped last admission, filled recently per surescripts but patient claims no changes in meds since last admission). Off Coumadin since GIB 8mo ago.  - Formal med rec in AM  - No A/C as hx of GIB

## 2022-06-30 NOTE — PROGRESS NOTE ADULT - PROBLEM SELECTOR PLAN 1
- Heme-onc recs appreciated  - Monitor CBC w/ diff daily and CMP   - Transfuse for hb <7, Plt count <10K, <20K + febrile, <50K+ bleeding   - No need for G-CSF at this time.  - High risk for infection given severe neutropenia. If he spikes a temp, send sepsis workup and start broad spectrum antibiotics.   - Would favor inpatient monitoring until we have prelim results on bone marrow biopsy.

## 2022-06-30 NOTE — PROGRESS NOTE ADULT - SUBJECTIVE AND OBJECTIVE BOX
Physical Medicine and Rehabilitation Progress Note :    Patient is a 89y old  Male who presents with a chief complaint of neutropenia (30 Jun 2022 05:55)      HPI:  89M PMH HTN, HLD, CKD4, chronic HFrEF (LVEF 30% Dec 2021), CAD s/p PCI, aortic stenosis s/p AVR, chronic atrial fibrillation s/p PPM, hypothyroidism, GIB (now off warfarin x 8 months), colon cancer, prostate cancer, and ?coagulation d/o who presented from Dr. Chandler's office for wbc 1.4. Patient reports no acute sx. Has chronic non-productive cough and insomnia, both unchanged. Denies wt loss, fevers, chills, HA, dizziness, CP, palp, SOB, sore throat, congestion, abd pain, n/v/d/c, dysuria, blood in urine/stool.    In the ED:  VS: Afebrile, HR 78, /76, RR 18, SpO2 98% RA  Labs: wbc 0.60, , HGB 9.8, , Cr 1.68, PT 13.6, COVID negative  ECG: Paced  Imaging: CXR unchanged from last admission  Interventions: None (27 Jun 2022 16:53)                            8.8    0.95  )-----------( 119      ( 30 Jun 2022 05:30 )             28.5       06-30    141  |  101  |  25<H>  ----------------------------<  97  4.1   |  31  |  1.71<H>    Ca    8.9      30 Jun 2022 05:30  Phos  3.6     06-30  Mg     2.2     06-30    TPro  5.2<L>  /  Alb  3.6  /  TBili  0.6  /  DBili  x   /  AST  14  /  ALT  6<L>  /  AlkPhos  72  06-28    Vital Signs Last 24 Hrs  T(C): 36.7 (30 Jun 2022 09:11), Max: 37 (29 Jun 2022 21:45)  T(F): 98 (30 Jun 2022 09:11), Max: 98.6 (29 Jun 2022 21:45)  HR: 80 (30 Jun 2022 09:11) (80 - 80)  BP: 108/62 (30 Jun 2022 09:11) (108/62 - 150/80)  BP(mean): --  RR: 18 (30 Jun 2022 09:11) (18 - 18)  SpO2: 92% (30 Jun 2022 09:11) (92% - 94%)    MEDICATIONS  (STANDING):  allopurinol 100 milliGRAM(s) Oral daily  ascorbic acid 500 milliGRAM(s) Oral daily  atorvastatin 20 milliGRAM(s) Oral at bedtime  cholecalciferol 1000 Unit(s) Oral every 24 hours  donepezil 5 milliGRAM(s) Oral <User Schedule>  folic acid 1 milliGRAM(s) Oral every 24 hours  furosemide    Tablet 20 milliGRAM(s) Oral every 24 hours  levothyroxine 25 MICROGram(s) Oral every 24 hours  memantine 5 milliGRAM(s) Oral every 24 hours  pantoprazole    Tablet 40 milliGRAM(s) Oral before breakfast  polyethylene glycol 3350 17 Gram(s) Oral daily  senna 2 Tablet(s) Oral at bedtime  tamsulosin 0.4 milliGRAM(s) Oral at bedtime  traZODone 100 milliGRAM(s) Oral at bedtime  zinc sulfate 220 milliGRAM(s) Oral daily    MEDICATIONS  (PRN):  zolpidem 5 milliGRAM(s) Oral at bedtime PRN Insomnia  zolpidem 5 milliGRAM(s) Oral at bedtime PRN Insomnia    Currently Undergoing Physical/ Occupational Therapy at bedside    Initial PT/OT Functional Status Assessment :       Previous Level of Function:     · Ambulation Skills	needed assist; rollator  · Transfer Skills	needed assist; rollator  · ADL Skills	needed assist  · Additional Comments	Pt. resides in senior care with elevator access, he has assistance with meals, laundry, showering. He uses rollator for ambulation.    Cognitive Status Examination:   · Orientation	oriented to person, place, time and situation  · Level of Consciousness	alert  · Follows Commands and Answers Questions	100% of the time    Range of Motion Exam:   · Range of Motion Examination	bilateral lower extremity ROM was WFL (within functional limits); bilateral upper extremity ROM was WFL (within functional limits)    Manual Muscle Testing:   · Manual Muscle Testing Results	>3/5 throughout by functional assessment against gravity    MMT Knee:     · Knee Flexion	4+ = good plus  4+ = good plus  · Knee Extension	(4) good, left  (4) good, right    Bed Mobility: Rolling/Turning:     · Level of [place]	independent  · Assistive Device	bed rails    Bed Mobility: Scooting/Bridging:     · Level of [place]	contact guard  · Physical Assist/Nonphysical Assist	1 person assist    Bed Mobility: Sit to Supine:     · Level of [place]	minimum assist (75% patients effort)  · Physical Assist/Nonphysical Assist	1 person assist    Bed Mobility: Supine to Sit:     · Level of [place]	minimum assist (75% patients effort)  · Physical Assist/Nonphysical Assist	1 person assist    Transfer: Sit to Stand:     · Level of [place]	contact guard  · Physical Assist/Nonphysical Assist	1 person assist; verbal cues  · Weight-Bearing Restrictions	weight-bearing as tolerated  · Assistive Device	rolling walker    Transfer: Stand to Sit:     · Level of [place]	contact guard  · Physical Assist/Nonphysical Assist	verbal cues; 1 person assist  · Weight-Bearing Restrictions	weight-bearing as tolerated  · Assistive Device	rolling walker    Sit/Stand Transfer Safety Analysis:     · Transfer Safety Concerns Noted	decreased weight-shifting ability  · Impairments Contributing to Impaired Transfers	impaired balance; decreased strength    Gait Skills:     · Level of [place]	contact guard  · Physical Assist/Nonphysical Assist	1 person assist; verbal cues  · Weight-Bearing Restrictions	weight-bearing as tolerated  · Assistive Device	rolling walker  · Gait Distance	35'x2    Gait Analysis:     · Gait Deviations Noted	decreased rosita  · Impairments Contributing to Gait Deviations	impaired balance    Balance Skills Assessment:     · Sitting Balance: Static	good balance  · Sitting Balance: Dynamic	good balance  · Sit-to-Stand Balance	fair balance  · Standing Balance: Static	fair balance  · Standing Balance: Dynamic	fair balance    Sensory Examination:   Sensory Examination:    Grossly Intact:   · Gross Sensory Examination	Grossly Intact; to light touch throughout      Clinical Impressions:   · Impairments Found (describe specific impairments)	aerobic capacity/endurance; gait, locomotion, and balance  · Functional Limitations in Following Categories (describe specific limitations)	home management; self-care  · Rehab Potential	good, to achieve stated therapy goals  · Therapy Frequency	2-3x/week  · Predicted Duration of Therapy Intervention (days/wks)	Pt. would benefit from cont. PT f/u to improve gait, transfers, strength, balance endurance.  · Anticipated Equipment Needs at Discharge	Pt owns a rollator          PM&R Impression : as above    Current Disposition Plan Recommendations :    d/c home to senior care

## 2022-06-30 NOTE — PROGRESS NOTE ADULT - ASSESSMENT
89M PMH HTN, HLD, CKD4, chronic HFrEF (LVEF 30% Dec 2021), CAD s/p PCI, aortic stenosis s/p AVR, chronic atrial fibrillation s/p PPM, hypothyroidism, GIB (now off warfarin x 8 months), colon cancer, prostate cancer who presented from Dr. Chandler's office for wbc 1.4 found to be pancytopenic/neutropenic, pending BM Bx results

## 2022-06-30 NOTE — PROGRESS NOTE ADULT - ASSESSMENT
per Internal Medicine    89 y o M PMH HTN, HLD, CKD4, chronic HFrEF (LVEF 30% Dec 2021), CAD s/p PCI, aortic stenosis s/p AVR, chronic atrial fibrillation s/p PPM, hypothyroidism, GIB (now off warfarin x 8 months), colon cancer, prostate cancer who presented from Dr. Chandler's office for wbc 1.4 found to be pancytopenic/neutropenic, pending BM Bx.      Problem/Plan - 1:  ·  Problem: Pancytopenia.   ·  Plan: - Heme-onc recs appreciated  - Monitor CBC w/ diff daily and CMP   - Transfuse for hb <7, Plt count <10K, <20K + febrile, <50K+ bleeding   - No need for G-CSF at this time.  - High risk for infection given severe neutropenia. If he spikes a temp, send sepsis workup and start broad spectrum antibiotics.   - Would favor inpatient monitoring until we have prelim results on bone marrow biopsy.    Problem/Plan - 2:  ·  Problem: Atrial fibrillation.   ·  Plan: Patient with hx a-fib s/p PPM. Unclear if on metoprolol (was stopped last admission, filled recently per surescripts but patient claims no changes in meds since last admission). Off Coumadin since GIB 8mo ago.  -formal med rec in AM  -No A/C as hx of GIB.    Problem/Plan - 3:  ·  Problem: Chronic systolic congestive heart failure.   ·  Plan: -per surescipts, appears patient was restarted on Lasix either 20mg or 40mg since d/c  -Lasix 20mg for now, formal med rec in AM.    Problem/Plan - 4:  ·  Problem: CAD (coronary artery disease).   ·  Plan: s/p stent. Not on ASA 2/2 GIB 8mo ago.  -c/w lipitor 20mg qhs.    Problem/Plan - 5:  ·  Problem: HTN (hypertension).   ·  Plan: Patient with hx of HTN. Normotensive.  -Lasix and metoprolol as mentioned above    #CKD: Cr 1.68 on admission. Baseline approx 2.2  -continue to monitor  -avoid nephrotoxic agents.    Problem/Plan - 6:  ·  Problem: PUD (peptic ulcer disease).   ·  Plan: - Continue protonix 40mg inpatient.    Problem/Plan - 7:  ·  Problem: Constipation.   ·  Plan: -miralax and senna inpatient    #BPH  -c/w home flomax 0.4mg qhs.    Problem/Plan - 8:  ·  Problem: Hypothyroidism.   ·  Plan: -c/w home synthroid 25mcg.    Problem/Plan - 9:  ·  Problem: Insomnia.   ·  Plan: -c/w home Trazadone 100mg qhs  -interchange eszopiclone for ambien inpatient    #Gout  -c/w home allopurinol 100mg qd    #dementia:   -c/w home memantine and donepezil.    Problem/Plan - 10:  ·  Problem: Healthcare maintenance.   ·  Plan; F: None  E: replete prn  N: Regular  GI: Protonix  A/c: None  Code: FULL CODE - confirmed with patient on admission  Dispo: [facility].

## 2022-06-30 NOTE — PROGRESS NOTE ADULT - SUBJECTIVE AND OBJECTIVE BOX
OVERNIGHT EVENTS: No overnight events     SUBJECTIVE / INTERVAL HPI: The patient feels well he denies any shortness of breath, fever, chest pain, abdominal pain, diarrhea or vomiting.    VITAL SIGNS:  Vital Signs Last 24 Hrs  T(C): 36.9 (30 Jun 2022 15:15), Max: 37 (29 Jun 2022 21:45)  T(F): 98.4 (30 Jun 2022 15:15), Max: 98.6 (29 Jun 2022 21:45)  HR: 80 (30 Jun 2022 15:15) (80 - 80)  BP: 114/61 (30 Jun 2022 15:15) (108/62 - 150/80)  BP(mean): --  RR: 18 (30 Jun 2022 15:15) (18 - 18)  SpO2: 95% (30 Jun 2022 15:15) (92% - 95%)    PHYSICAL EXAM:    General: WDWN  HEENT: NCAT; PERRL, anicteric sclera; MMM  Neck: supple, trachea midline  Cardiovascular: S1, S2 normal; RRR, no M/G/R  Respiratory: CTABL; no W/R/R  Gastrointestinal: soft, nontender, nondistended. bowel sounds present.  Skin: no ulcerations or visible rashes appreciated  Extremities: WWP; no edema, clubbing or cyanosis  Vascular: 2+ radial, DP/PT pulses B/L  Neurological: AAOx3; no focal deficits    MEDICATIONS:  MEDICATIONS  (STANDING):  allopurinol 100 milliGRAM(s) Oral daily  ascorbic acid 500 milliGRAM(s) Oral daily  atorvastatin 20 milliGRAM(s) Oral at bedtime  cholecalciferol 1000 Unit(s) Oral every 24 hours  donepezil 5 milliGRAM(s) Oral <User Schedule>  folic acid 1 milliGRAM(s) Oral every 24 hours  furosemide    Tablet 20 milliGRAM(s) Oral every 24 hours  levothyroxine 25 MICROGram(s) Oral every 24 hours  memantine 5 milliGRAM(s) Oral every 24 hours  pantoprazole    Tablet 40 milliGRAM(s) Oral before breakfast  polyethylene glycol 3350 17 Gram(s) Oral daily  senna 2 Tablet(s) Oral at bedtime  tamsulosin 0.4 milliGRAM(s) Oral at bedtime  traZODone 100 milliGRAM(s) Oral at bedtime  zinc sulfate 220 milliGRAM(s) Oral daily    MEDICATIONS  (PRN):  zolpidem 5 milliGRAM(s) Oral at bedtime PRN Insomnia  zolpidem 5 milliGRAM(s) Oral at bedtime PRN Insomnia      ALLERGIES:  Allergies    No Known Allergies    LABS:                        8.8    0.95  )-----------( 119      ( 30 Jun 2022 05:30 )             28.5     06-30    141  |  101  |  25<H>  ----------------------------<  97  4.1   |  31  |  1.71<H>    Ca    8.9      30 Jun 2022 05:30  Phos  3.6     06-30  Mg     2.2     06-30    TPro  5.2<L>  /  Alb  3.6  /  TBili  0.6  /  DBili  x   /  AST  14  /  ALT  6<L>  /  AlkPhos  72  06-28    PT/INR - ( 29 Jun 2022 10:10 )   PT: 13.8 sec;   INR: 1.16          PTT - ( 29 Jun 2022 10:10 )  PTT:30.9 sec    CAPILLARY BLOOD GLUCOSE

## 2022-07-01 NOTE — PROGRESS NOTE ADULT - ATTENDING COMMENTS
89M PMH HTN, HLD, CKD4, chronic HFrEF (LVEF 30% Dec 2021), CAD s/p PCI, aortic stenosis s/p AVR, chronic atrial fibrillation s/p PPM, hypothyroidism, GIB (now off warfarin x 8 months), colon cancer, prostate cancer, and ?coagulation d/o who presented from Dr. Chandler's office for wbc 1.4 found to be pancytopenic/neutropenic, pending BM Bx.      Patient was seen and examined today. Denied any complaints.     # Pancytopenia:   Normal B12/folate, iron panel with no evidence of ATIF, Hep C non-reactive.   Follow bone marrow biopsy result.   If develop fever, obtain pan-culture and start wide-spectrum antibiotics.  Hematology team would like to monitor the patient in the hospital till biopsy result available.  Start Filgrastim (300 mg), monitor for response.

## 2022-07-01 NOTE — PROGRESS NOTE ADULT - PROBLEM SELECTOR PLAN 3
-per surescipts, appears patient was restarted on Lasix either 20mg or 40mg since d/c  -Lasix 20mg for now, formal med rec in AM -per surescipts, appears patient was restarted on Lasix either 20mg and 40mg since d/c  -Lasix 20mg for now, switch back to regular regimen on discharge

## 2022-07-01 NOTE — PROGRESS NOTE ADULT - PROBLEM SELECTOR PLAN 2
Patient with hx a-fib s/p PPM. Unclear if on metoprolol (was stopped last admission, filled recently per surescripts but patient claims no changes in meds since last admission). Off Coumadin since GIB 8mo ago.  - Formal med rec in AM  - No A/C as hx of GIB Patient with hx a-fib s/p PPM. Unclear if on metoprolol (was stopped last admission, filled recently per surescripts but patient claims no changes in meds since last admission). Off Coumadin since GIB 8mo ago.  - No A/C as hx of GIB

## 2022-07-01 NOTE — PROGRESS NOTE ADULT - SUBJECTIVE AND OBJECTIVE BOX
Hematology Oncology Progress Note (Dr. Chandler)  Discussed with Dr. Chandler and recommendations reviewed with the primary team.      Interval HPI: Patient seen and examined. No new complaints. Denies any fever, chills, cough, dysuria, abdominal pain, diarrhea or leg pain/swelling.       Vital Signs Last 24 Hrs  T(C): 36.9 (29 Jun 2022 05:28), Max: 37.3 (28 Jun 2022 20:51)  T(F): 98.5 (29 Jun 2022 05:28), Max: 99.2 (28 Jun 2022 20:51)  HR: 79 (29 Jun 2022 05:28) (79 - 80)  BP: 116/73 (29 Jun 2022 05:28) (116/73 - 132/72)  RR: 18 (29 Jun 2022 05:28) (17 - 18)  SpO2: 93% (29 Jun 2022 05:28) (93% - 97%)    Gen: in NAD   HEENT: normocephalic/atraumatic  Neck: supple  Cardiovascular: RR, nl S1S2, no murmurs  Respiratory: clear air entry b/l  Gastrointestinal: BS+, soft, NT/ND  Extremities: no edema, no calf tenderness  Neurological: AAOx3, no focal deficits  Skin: no rash on visible skin  Musculoskeletal:  full ROM  Psychiatric:  mood stable        Labs:                          8.9    0.80  )-----------( 110      ( 28 Jun 2022 08:53 )             29.1     06-28    142  |  103  |  17  ----------------------------<  87  3.9   |  30  |  1.58<H>    Ca    8.9      28 Jun 2022 22:20  Phos  3.6     06-28  Mg     1.9     06-28    TPro  5.2<L>  /  Alb  3.6  /  TBili  0.6  /  DBili  x   /  AST  14  /  ALT  6<L>  /  AlkPhos  72  06-28    PT/INR - ( 28 Jun 2022 08:53 )   PT: 15.0 sec;   INR: 1.26       PTT - ( 28 Jun 2022 08:53 )  PTT:29.3 sec       Hematology Oncology Progress Note (Dr. Chandler)  Discussed with Dr. Chandler and recommendations reviewed with the primary team.      Interval HPI: Patient seen and examined. No new complaints. Denies any fever, chills, cough, dysuria, abdominal pain, diarrhea or leg pain/swelling.     ICU Vital Signs Last 24 Hrs  T(C): 37.1 (01 Jul 2022 20:37), Max: 37.4 (01 Jul 2022 15:00)  T(F): 98.8 (01 Jul 2022 20:37), Max: 99.3 (01 Jul 2022 15:00)  HR: 80 (01 Jul 2022 20:37) (76 - 80)  BP: 116/64 (01 Jul 2022 20:37) (102/61 - 122/74)  BP(mean): --  ABP: --  ABP(mean): --  RR: 17 (01 Jul 2022 20:37) (16 - 18)  SpO2: 93% (01 Jul 2022 20:37) (92% - 95%)      Gen: in NAD   HEENT: normocephalic/atraumatic  Neck: supple  Cardiovascular: RR, nl S1S2, no murmurs  Respiratory: clear air entry b/l  Gastrointestinal: BS+, soft, NT/ND  Extremities: no edema, no calf tenderness  Neurological: AAOx3, no focal deficits  Skin: no rash on visible skin  Musculoskeletal:  full ROM  Psychiatric:  mood stable        Labs:                            9.0    1.23  )-----------( 112      ( 01 Jul 2022 08:27 )             30.7     07-01    140  |  100  |  26<H>  ----------------------------<  99  4.1   |  30  |  1.87<H>    Ca    9.1      01 Jul 2022 08:27  Phos  3.4     07-01  Mg     2.1     07-01        TPro  5.2<L>  /  Alb  3.6  /  TBili  0.6  /  DBili  x   /  AST  14  /  ALT  6<L>  /  AlkPhos  72  06-28    PT/INR - ( 28 Jun 2022 08:53 )   PT: 15.0 sec;   INR: 1.26       PTT - ( 28 Jun 2022 08:53 )  PTT:29.3 sec

## 2022-07-01 NOTE — PROGRESS NOTE ADULT - ASSESSMENT
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 Admitted for pancytopenia, hematology consulted for further management.       Pancytopenia, on admission CBC with WBC 0.60 (ANC 10, , basophilia 8.9% and monocytosis), Hb 9.8 (macrocytic),  c/f primary bone marrow process likely chronic. PBS reviewed with atypical lymphocytes. Normal B12/folate, iron panel with no evidence of ATIF, Hep C non-reactive on office labs from 5/31/22. Flow cytometry from 5/31/22 with reactive increase in NK cells with no diagnostic immunophenotypic abnormalities detected.     Recommend:   - s/p IR guided bone marrow biopsy on 6/28/22, will await pathology   - Monitor CBC w/ diff daily and CMP   - Transfuse for hb <7, Plt count <10K, <20K + febrile, <50K+ bleeding   - No need for G-CSF at this time.  - High risk for infection given severe neutropenia. If he spikes a temp, send sepsis workup and start broad spectrum antibiotics.   - Would favor inpatient monitoring until we have prelim results on bone marrow biopsy.     D/w Dr. 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 Admitted for pancytopenia, hematology consulted for further management.       Pancytopenia, on admission CBC with WBC 0.60 (ANC 10, , basophilia 8.9% and monocytosis), Hb 9.8 (macrocytic),  c/f primary bone marrow process likely chronic. PBS reviewed with atypical lymphocytes. Normal B12/folate, iron panel with no evidence of ATIF, Hep C non-reactive on office labs from 5/31/22. Flow cytometry from 5/31/22 with reactive increase in NK cells with no diagnostic immunophenotypic abnormalities detected.     Recommend:   - s/p IR guided bone marrow biopsy on 6/28/22  - Case was discussed with heme path, verbal prelim, no evidence of myelogenous process, look like T-cell neoplasm, but flow so far in negative   - Monitor CBC w/ diff daily and CMP   - Transfuse for hb <7, Plt count <10K, <20K + febrile, <50K+ bleeding   - Start G-CSF at 300mcg daily, monitor counts   - High risk for infection given severe neutropenia. If he spikes a temp, send sepsis workup and start broad spectrum antibiotics.   - Would favor inpatient monitoring until we have prelim results on bone marrow biopsy.     Case was discussed with housestaff.

## 2022-07-01 NOTE — PROGRESS NOTE ADULT - PROBLEM SELECTOR PLAN 1
Thought to be T cell process (per discussion with Dr. Canas), though bone marrow biopsy results are not available yet.  - Monitor CBC w/ diff daily and CMP   - Transfuse for hb <7, Plt count <10K, <20K + febrile, <50K+ bleeding   - Start Filgrastim (300 mg)   - High risk for infection given severe neutropenia. If he spikes a temp, send sepsis workup and start broad spectrum antibiotics.   - Would favor inpatient monitoring until we have prelim results on bone marrow biopsy.

## 2022-07-01 NOTE — PROGRESS NOTE ADULT - SUBJECTIVE AND OBJECTIVE BOX
OVERNIGHT EVENTS: No acute events overnight    SUBJECTIVE / INTERVAL HPI: He did not have any new issues and denied fever, abdominal pain, diarrhea, shortness of breath, cough, chest pain     VITAL SIGNS:  Vital Signs Last 24 Hrs  T(C): 36.8 (01 Jul 2022 08:37), Max: 37.4 (30 Jun 2022 21:05)  T(F): 98.2 (01 Jul 2022 08:37), Max: 99.4 (30 Jun 2022 21:05)  HR: 80 (01 Jul 2022 08:37) (80 - 80)  BP: 102/61 (01 Jul 2022 08:37) (102/61 - 114/61)  BP(mean): --  RR: 16 (01 Jul 2022 08:37) (16 - 18)  SpO2: 92% (01 Jul 2022 08:37) (92% - 95%)    PHYSICAL EXAM:    General: WDWN  HEENT: NCAT; PERRL, anicteric sclera; MMM  Neck: supple, trachea midline  Cardiovascular: S1, S2 normal; RRR, no M/G/R  Respiratory: CTABL; no W/R/R  Gastrointestinal: soft, nontender, nondistended. bowel sounds present.  Skin: no ulcerations or visible rashes appreciated  Extremities: WWP; no edema, clubbing or cyanosis  Vascular: 2+ radial, DP/PT pulses B/L  Neurological: AAOx3; no focal deficits, no meningeal signs     MEDICATIONS:  MEDICATIONS  (STANDING):  allopurinol 100 milliGRAM(s) Oral daily  ascorbic acid 500 milliGRAM(s) Oral daily  atorvastatin 20 milliGRAM(s) Oral at bedtime  cholecalciferol 1000 Unit(s) Oral every 24 hours  donepezil 5 milliGRAM(s) Oral <User Schedule>  filgrastim-sndz (ZARXIO) Injectable 300 MICROGram(s) SubCutaneous daily  folic acid 1 milliGRAM(s) Oral every 24 hours  furosemide    Tablet 20 milliGRAM(s) Oral every 24 hours  levothyroxine 25 MICROGram(s) Oral every 24 hours  memantine 5 milliGRAM(s) Oral every 24 hours  pantoprazole    Tablet 40 milliGRAM(s) Oral before breakfast  polyethylene glycol 3350 17 Gram(s) Oral daily  senna 2 Tablet(s) Oral at bedtime  tamsulosin 0.4 milliGRAM(s) Oral at bedtime  traZODone 100 milliGRAM(s) Oral at bedtime  zinc sulfate 220 milliGRAM(s) Oral daily    MEDICATIONS  (PRN):  zolpidem 5 milliGRAM(s) Oral at bedtime PRN Insomnia  zolpidem 5 milliGRAM(s) Oral at bedtime PRN Insomnia      ALLERGIES:  Allergies    No Known Allergies    LABS:                        9.0    1.23  )-----------( 112      ( 01 Jul 2022 08:27 )             30.7     07-01    140  |  100  |  26<H>  ----------------------------<  99  4.1   |  30  |  1.87<H>    Ca    9.1      01 Jul 2022 08:27  Phos  3.4     07-01  Mg     2.1     07-01          CAPILLARY BLOOD GLUCOSE          RADIOLOGY & ADDITIONAL TESTS: Reviewed.

## 2022-07-02 NOTE — PROGRESS NOTE ADULT - SUBJECTIVE AND OBJECTIVE BOX
Patient was seen and examined today. Pt was lying in bed comfortably. Denied any abd pain, nausea, vomiting or diarrhea. Denied any chest pain, SOB or difficulty breathing.   No acute events reported overnight.     Labs, vitals and charts reviewed.     Physical exam:   General: No distress.   Chest: clear breathing sounds BL, no wheezing or rhonchi.   Heart: +s1/s2, no murmur or gallops.   Abd: Soft, non tender, non distended.   Extremity: No pedal edema.   Neuro: AAO x3.

## 2022-07-02 NOTE — PROGRESS NOTE ADULT - ASSESSMENT
89M PMH HTN, HLD, CKD4, chronic HFrEF (LVEF 30% Dec 2021), CAD s/p PCI, aortic stenosis s/p AVR, chronic atrial fibrillation s/p PPM, hypothyroidism, GIB (now off warfarin x 8 months), colon cancer, prostate cancer, and ?coagulation d/o who presented from Dr. Chandler's office for wbc 1.4 found to be pancytopenic/neutropenic, pending BM Bx.      Patient was seen and examined today. Denied any complaints. However later during the afternoon he developed fever and found to be hypotensive.     # Pancytopenia:   Normal B12/folate, iron panel with no evidence of ATIF, Hep C non-reactive.   Follow bone marrow biopsy result.   If develop fever, obtain pan-culture and start wide-spectrum antibiotics.  Hematology team would like to monitor the patient in the hospital till biopsy result available.  Start Filgrastim (300 mg), monitor for response.     # Neutropenic fever: T 101.3.   Started on broad spectrum antibiotics vancomycin and Cefepime   Follow blood and urine culture.     # Afib: not on AC due to GIB.   # CAD: not on ASA due to GIB  # Hypothyroidism: c/w synthroid 25 mcg.   # Gout: c/w allopurinol 100 mg daily.

## 2022-07-03 NOTE — PROGRESS NOTE ADULT - PROBLEM SELECTOR PLAN 6
Patient with hx of HTN. Normotensive.  -Lasix and metoprolol as mentioned above    #CKD: Cr 1.68 on admission. Baseline approx 2.2  -continue to monitor  -avoid nephrotoxic agents s/p stent. Not on ASA 2/2 GIB 8mo ago.  -c/w lipitor 20mg qhs

## 2022-07-03 NOTE — CHART NOTE - NSCHARTNOTEFT_GEN_A_CORE
Infectious Diseases Anti-infective Approval Note    Medication: cefepime  Dose: 2g  Route: IV  Frequency: q24h   Duration**: 3 days    *THIS IS NOT AN INFECTIOUS DISEASES CONSULTATION*    **Indicates duration of approval, not necessarily duration of treatment**

## 2022-07-03 NOTE — PROGRESS NOTE ADULT - PROBLEM SELECTOR PLAN 12
F: None  E: replete prn  N: Regular  GI: Protonix  DVT proph: heparin subq  Code: FULL CODE - confirmed with patient on admission  Dispo: [facility]

## 2022-07-03 NOTE — PROGRESS NOTE ADULT - PROBLEM SELECTOR PLAN 10
-c/w home Trazadone 100mg qhs  -interchange eszopiclone for ambien inpatient    #Gout  -c/w home allopurinol 100mg qd    #dementia:   -c/w home memantine and donepezil -c/w home synthroid 25mcg -c/w home Trazadone 100mg qhs  -interchange eszopiclone for ambien inpatient    #Gout  -c/w home allopurinol 100mg qd i/s/o BRITT    #dementia:   -c/w home memantine and donepezil

## 2022-07-03 NOTE — PROGRESS NOTE ADULT - PROBLEM SELECTOR PLAN 3
Patient with hx a-fib s/p PPM. Unclear if on metoprolol (was stopped last admission, filled recently per surescripts but patient claims no changes in meds since last admission). Off Coumadin since GIB 8mo ago.  - No A/C as hx of GIB Thought to be T cell process (per discussion with Dr. Canas), though bone marrow biopsy results are not available yet.  - Monitor CBC w/ diff daily and CMP   - Transfuse for hb <7, Plt count <10K, <20K + febrile, <50K+ bleeding   - Start Filgrastim (300 mg)   - High risk for infection given severe neutropenia. If he spikes a temp, send sepsis workup and start broad spectrum antibiotics.   - Would favor inpatient monitoring until we have prelim results on bone marrow biopsy.

## 2022-07-03 NOTE — PROGRESS NOTE ADULT - SUBJECTIVE AND OBJECTIVE BOX
OVERNIGHT EVENTS: The patient was febrile overnight and was given tylenol which reduced his fever     SUBJECTIVE / INTERVAL HPI: He has no new complaints. Does not have shortness of breath, abdominal pain, cough or chest pain.    VITAL SIGNS:  Vital Signs Last 24 Hrs  T(C): 37 (2022 11:53), Max: 38.4 (2022 16:05)  T(F): 98.6 (2022 11:53), Max: 101.2 (2022 16:05)  HR: 80 (2022 11:53) (62 - 80)  BP: 88/39 (2022 11:53) (88/39 - 117/60)  BP(mean): --  RR: 18 (2022 11:53) (16 - 18)  SpO2: 92% (2022 11:53) (91% - 93%)    PHYSICAL EXAM:    General: looked slightly sleepier than usual   HEENT: NCAT; PERRL, anicteric sclera; MMM  Neck: supple, trachea midline  Cardiovascular: S1, S2 normal; RRR, no M/G/R  Respiratory: CTABL; no W/R/R  Gastrointestinal: soft, nontender, nondistended. bowel sounds present.  Skin: no ulcerations or visible rashes appreciated  Extremities: WWP; no edema, clubbing or cyanosis  Vascular: 2+ radial, DP/PT pulses B/L  Neurological: AAOx3; no focal deficits    MEDICATIONS:  MEDICATIONS  (STANDING):  allopurinol 100 milliGRAM(s) Oral daily  ascorbic acid 500 milliGRAM(s) Oral daily  atorvastatin 20 milliGRAM(s) Oral at bedtime  cefepime   IVPB 2000 milliGRAM(s) IV Intermittent every 24 hours  cholecalciferol 1000 Unit(s) Oral every 24 hours  donepezil 10 milliGRAM(s) Oral at bedtime  filgrastim-sndz (ZARXIO) Injectable 300 MICROGram(s) SubCutaneous daily  folic acid 1 milliGRAM(s) Oral every 24 hours  furosemide    Tablet 20 milliGRAM(s) Oral every 24 hours  heparin   Injectable 5000 Unit(s) SubCutaneous every 12 hours  levothyroxine 25 MICROGram(s) Oral every 24 hours  pantoprazole    Tablet 40 milliGRAM(s) Oral before breakfast  sodium chloride 0.9% Bolus 1000 milliLiter(s) IV Bolus once  tamsulosin 0.4 milliGRAM(s) Oral at bedtime  traZODone 100 milliGRAM(s) Oral at bedtime  vancomycin  IVPB 750 milliGRAM(s) IV Intermittent every 24 hours  zinc sulfate 220 milliGRAM(s) Oral daily    MEDICATIONS  (PRN):  acetaminophen     Tablet .. 650 milliGRAM(s) Oral every 6 hours PRN Temp greater or equal to 38C (100.4F), Mild Pain (1 - 3)  zaleplon 5 milliGRAM(s) Oral at bedtime PRN Insomnia      ALLERGIES:  Allergies    No Known Allergies    Intolerances        LABS:                        9.8    4.21  )-----------( 95       ( 2022 07:45 )             32.6     07-03    135  |  96  |  37<H>  ----------------------------<  105<H>  4.8   |  27  |  2.18<H>    Ca    9.2      2022 07:45  Phos  2.7     07-03  Mg     1.7     07-03        Urinalysis Basic - ( 2022 01:57 )    Color: Yellow / Appearance: Clear / S.015 / pH: x  Gluc: x / Ketone: Trace mg/dL  / Bili: Negative / Urobili: 0.2 E.U./dL   Blood: x / Protein: NEGATIVE mg/dL / Nitrite: NEGATIVE   Leuk Esterase: NEGATIVE / RBC: x / WBC x   Sq Epi: x / Non Sq Epi: x / Bacteria: x      CAPILLARY BLOOD GLUCOSE    Blood cultures negative at 12 hours    Imaging:   Portable CXR:   Prominent bronchovascular markings. Congestion   and/or infiltrates cannot be excluded    Portable CXR 7/3:   Left effusion  OVERNIGHT EVENTS: The patient was febrile overnight and was given tylenol which reduced his fever     SUBJECTIVE / INTERVAL HPI: He has no new complaints. Does not have shortness of breath, abdominal pain, cough or chest pain.    VITAL SIGNS:  Vital Signs Last 24 Hrs  T(C): 37 (2022 11:53), Max: 38.4 (2022 16:05)  T(F): 98.6 (2022 11:53), Max: 101.2 (2022 16:05)  HR: 80 (2022 11:53) (62 - 80)  BP: 88/39 (2022 11:53) (88/39 - 117/60)  BP(mean): --  RR: 18 (2022 11:53) (16 - 18)  SpO2: 92% (2022 11:53) (91% - 93%)    PHYSICAL EXAM:    General: NAD, slightly warm to touch  HEENT: NCAT; PERRL, anicteric sclera; MMM  Neck: supple  Cardiovascular: S1, S2 normal; RRR, no M/G/R  Respiratory: CTABL; no W/R/R  Gastrointestinal: soft, nontender, nondistended. bowel sounds present.  Skin: no ulcerations or visible rashes appreciated  Extremities: WWP  Vascular: 2+ radial, DP pulses b/l  Neurological: AAOx3; no focal deficits    MEDICATIONS:  MEDICATIONS  (STANDING):  allopurinol 100 milliGRAM(s) Oral daily  ascorbic acid 500 milliGRAM(s) Oral daily  atorvastatin 20 milliGRAM(s) Oral at bedtime  cefepime   IVPB 2000 milliGRAM(s) IV Intermittent every 24 hours  cholecalciferol 1000 Unit(s) Oral every 24 hours  donepezil 10 milliGRAM(s) Oral at bedtime  filgrastim-sndz (ZARXIO) Injectable 300 MICROGram(s) SubCutaneous daily  folic acid 1 milliGRAM(s) Oral every 24 hours  furosemide    Tablet 20 milliGRAM(s) Oral every 24 hours  heparin   Injectable 5000 Unit(s) SubCutaneous every 12 hours  levothyroxine 25 MICROGram(s) Oral every 24 hours  pantoprazole    Tablet 40 milliGRAM(s) Oral before breakfast  sodium chloride 0.9% Bolus 1000 milliLiter(s) IV Bolus once  tamsulosin 0.4 milliGRAM(s) Oral at bedtime  traZODone 100 milliGRAM(s) Oral at bedtime  vancomycin  IVPB 750 milliGRAM(s) IV Intermittent every 24 hours  zinc sulfate 220 milliGRAM(s) Oral daily    MEDICATIONS  (PRN):  acetaminophen     Tablet .. 650 milliGRAM(s) Oral every 6 hours PRN Temp greater or equal to 38C (100.4F), Mild Pain (1 - 3)  zaleplon 5 milliGRAM(s) Oral at bedtime PRN Insomnia      ALLERGIES:  Allergies    No Known Allergies    Intolerances        LABS:                        9.8    4.21  )-----------( 95       ( 2022 07:45 )             32.6     07-03    135  |  96  |  37<H>  ----------------------------<  105<H>  4.8   |  27  |  2.18<H>    Ca    9.2      2022 07:45  Phos  2.7     07-03  Mg     1.7     07-03        Urinalysis Basic - ( 2022 01:57 )    Color: Yellow / Appearance: Clear / S.015 / pH: x  Gluc: x / Ketone: Trace mg/dL  / Bili: Negative / Urobili: 0.2 E.U./dL   Blood: x / Protein: NEGATIVE mg/dL / Nitrite: NEGATIVE   Leuk Esterase: NEGATIVE / RBC: x / WBC x   Sq Epi: x / Non Sq Epi: x / Bacteria: x      CAPILLARY BLOOD GLUCOSE    Blood cultures negative at 12 hours    Imaging:   Portable CXR:   Prominent bronchovascular markings. Congestion   and/or infiltrates cannot be excluded    Portable CXR 7/3:   Left effusion  OVERNIGHT EVENTS: The patient was febrile overnight and was given tylenol which reduced his fever     SUBJECTIVE / INTERVAL HPI: He has no new complaints. Does not have shortness of breath, abdominal pain, cough or chest pain.    VITAL SIGNS:  Vital Signs Last 24 Hrs  T(C): 37 (2022 11:53), Max: 38.4 (2022 16:05)  T(F): 98.6 (2022 11:53), Max: 101.2 (2022 16:05)  HR: 80 (2022 11:53) (62 - 80)  BP: 88/39 (2022 11:53) (88/39 - 117/60)  BP(mean): --  RR: 18 (2022 11:53) (16 - 18)  SpO2: 92% (2022 11:53) (91% - 93%)    PHYSICAL EXAM:    General: slightly more tired than yesterday  HEENT: NCAT; PERRL, anicteric sclera; MMM  Neck: supple  Cardiovascular: S1, S2 normal; RRR, no M/G/R  Respiratory: CTABL; no W/R/R  Gastrointestinal: soft, nontender, nondistended. bowel sounds present.  Skin: no ulcerations or visible rashes appreciated  Extremities: WWP  Vascular: 2+ radial, DP pulses b/l  Neurological: AAOx3; no focal deficits    MEDICATIONS:  MEDICATIONS  (STANDING):  allopurinol 100 milliGRAM(s) Oral daily  ascorbic acid 500 milliGRAM(s) Oral daily  atorvastatin 20 milliGRAM(s) Oral at bedtime  cefepime   IVPB 2000 milliGRAM(s) IV Intermittent every 24 hours  cholecalciferol 1000 Unit(s) Oral every 24 hours  donepezil 10 milliGRAM(s) Oral at bedtime  filgrastim-sndz (ZARXIO) Injectable 300 MICROGram(s) SubCutaneous daily  folic acid 1 milliGRAM(s) Oral every 24 hours  furosemide    Tablet 20 milliGRAM(s) Oral every 24 hours  heparin   Injectable 5000 Unit(s) SubCutaneous every 12 hours  levothyroxine 25 MICROGram(s) Oral every 24 hours  pantoprazole    Tablet 40 milliGRAM(s) Oral before breakfast  sodium chloride 0.9% Bolus 1000 milliLiter(s) IV Bolus once  tamsulosin 0.4 milliGRAM(s) Oral at bedtime  traZODone 100 milliGRAM(s) Oral at bedtime  vancomycin  IVPB 750 milliGRAM(s) IV Intermittent every 24 hours  zinc sulfate 220 milliGRAM(s) Oral daily    MEDICATIONS  (PRN):  acetaminophen     Tablet .. 650 milliGRAM(s) Oral every 6 hours PRN Temp greater or equal to 38C (100.4F), Mild Pain (1 - 3)  zaleplon 5 milliGRAM(s) Oral at bedtime PRN Insomnia      ALLERGIES:  Allergies    No Known Allergies    Intolerances        LABS:                        9.8    4.21  )-----------( 95       ( 2022 07:45 )             32.6     07-03    135  |  96  |  37<H>  ----------------------------<  105<H>  4.8   |  27  |  2.18<H>    Ca    9.2      2022 07:45  Phos  2.7     07-03  Mg     1.7     07-03        Urinalysis Basic - ( 2022 01:57 )    Color: Yellow / Appearance: Clear / S.015 / pH: x  Gluc: x / Ketone: Trace mg/dL  / Bili: Negative / Urobili: 0.2 E.U./dL   Blood: x / Protein: NEGATIVE mg/dL / Nitrite: NEGATIVE   Leuk Esterase: NEGATIVE / RBC: x / WBC x   Sq Epi: x / Non Sq Epi: x / Bacteria: x      CAPILLARY BLOOD GLUCOSE    Blood cultures negative at 12 hours    Imaging:   Portable CXR:   Prominent bronchovascular markings. Congestion   and/or infiltrates cannot be excluded    Portable CXR 7/3:   Left effusion

## 2022-07-03 NOTE — PROGRESS NOTE ADULT - PROBLEM SELECTOR PLAN 4
-per surescipts, appears patient was restarted on Lasix either 20mg and 40mg since d/c  -Lasix 20mg for now, switch back to regular regimen on discharge Patient with hx a-fib s/p PPM. Unclear if on metoprolol (was stopped last admission, filled recently per surescripts but patient claims no changes in meds since last admission). Off Coumadin since GIB 8mo ago.  - No A/C as hx of GIB -per surescipts, appears patient was restarted on Lasix either 20mg and 40mg since d/c  - c/w home lasix 20mg qd

## 2022-07-03 NOTE — PROGRESS NOTE ADULT - PROBLEM SELECTOR PLAN 11
F: None  E: replete prn  N: Regular  GI: Protonix  A/c: None  Code: FULL CODE - confirmed with patient on admission  Dispo: [facility] -c/w home Trazadone 100mg qhs  -interchange eszopiclone for ambien inpatient    #Gout  -hold home allopurinol 100mg qd i/s/o BRITT    #dementia:   -c/w home memantine and donepezil F: None  E: replete prn  N: Regular  GI: Protonix  DVT proph: heparin subq  Code: FULL CODE - confirmed with patient on admission  Dispo: [facility]

## 2022-07-03 NOTE — PROGRESS NOTE ADULT - PROBLEM SELECTOR PLAN 5
s/p stent. Not on ASA 2/2 GIB 8mo ago.  -c/w lipitor 20mg qhs -per surescipts, appears patient was restarted on Lasix either 20mg and 40mg since d/c  -dc'd lasix 20mg i/s/o BRITT

## 2022-07-03 NOTE — PROGRESS NOTE ADULT - PROBLEM SELECTOR PLAN 1
Thought to be T cell process (per discussion with Dr. Canas), though bone marrow biopsy results are not available yet.  - Monitor CBC w/ diff daily and CMP   - Transfuse for hb <7, Plt count <10K, <20K + febrile, <50K+ bleeding   - Start Filgrastim (300 mg)   - High risk for infection given severe neutropenia. If he spikes a temp, send sepsis workup and start broad spectrum antibiotics.   - Would favor inpatient monitoring until we have prelim results on bone marrow biopsy. Spiked fever to 101.2 on 7/2.  Patient and PE w/o localizing symptoms.  - BCx NGTD  - UA negative  - GI PCR ordered although patient w/o diarrhea  - c/w empiric vanc and cefepime

## 2022-07-03 NOTE — PROGRESS NOTE ADULT - ASSESSMENT
89M PMH HTN, HLD, CKD4, chronic HFrEF (LVEF 30% Dec 2021), CAD s/p PCI, aortic stenosis s/p AVR, chronic atrial fibrillation s/p PPM, hypothyroidism, GIB (now off warfarin x 8 months), colon cancer, prostate cancer who presented from Dr. Chandler's office for wbc 1.4 found to be pancytopenic/neutropenic, pending BM Bx results 89M PMH HTN, HLD, CKD4, chronic HFrEF (LVEF 30% Dec 2021), CAD s/p PCI, aortic stenosis s/p AVR, chronic atrial fibrillation s/p PPM, hypothyroidism, GIB (now off warfarin x 8 months), colon cancer, prostate cancer who presented from Dr. Chandler's office for wbc 1.4 found to be pancytopenic/neutropenic.  Course c/b neutropenic fever of unknown source.

## 2022-07-04 NOTE — PROGRESS NOTE ADULT - PROBLEM SELECTOR PLAN 2
CKD 4 with baseline Cr ~1.5 CKD 4 with baseline Cr ~1.5, currently ~3.  Maintaining PO intake.  Spiked fever, so perhaps 2/2 sepsis.  - obtain urine lytes and bladder scan  - consider small fluid bolus  - dc lasix and allopurinol and avoid other nephrotoxic meds  - renally dose vancomycin CKD 4 with baseline Cr ~1.5, currently ~3.  Maintaining PO intake.  Spiked fever, so perhaps 2/2 sepsis.  - obtain urine lytes and bladder scan  - consider small fluid bolus  - dc lasix and allopurinol and avoid other nephrotoxic meds  - renally dose vancomycin  - obtain random vanc level

## 2022-07-04 NOTE — PROGRESS NOTE ADULT - ASSESSMENT
89M PMH HTN, HLD, CKD4, chronic HFrEF (LVEF 30% Dec 2021), CAD s/p PCI, aortic stenosis s/p AVR, chronic atrial fibrillation s/p PPM, hypothyroidism, GIB (now off warfarin x 8 months), colon cancer, prostate cancer who presented from Dr. Chandler's office for wbc 1.4 found to be pancytopenic/neutropenic.  Course c/b neutropenic fever of unknown source.

## 2022-07-04 NOTE — PROGRESS NOTE ADULT - PROBLEM SELECTOR PLAN 3
Thought to be T cell process (per discussion with Dr. Canas), though bone marrow biopsy results are not available yet.  - Monitor CBC w/ diff daily and CMP   - Transfuse for hb <7, Plt count <10K, <20K + febrile, <50K+ bleeding   - dc Filgrastim (300 mg) as ANC >5k   - High risk for infection given severe neutropenia. If he spikes a temp, send sepsis workup and start broad spectrum antibiotics.   - Would favor inpatient monitoring until we have prelim results on bone marrow biopsy. Thought to be T cell process (per discussion with Dr. Canas), though bone marrow biopsy results are not available yet.  - Monitor CBC w/ diff daily and CMP   - Transfuse for hb <7, Plt count <10K, <20K + febrile, <50K+ bleeding   - dc Filgrastim (300 mg) as ANC >5k   - Would favor inpatient monitoring until we have prelim results on bone marrow biopsy.

## 2022-07-04 NOTE — PROGRESS NOTE ADULT - PROBLEM SELECTOR PLAN 5
-per surescipts, appears patient was restarted on Lasix either 20mg and 40mg since d/c  - c/w home lasix 20mg qd -per surescipts, appears patient was restarted on Lasix either 20mg and 40mg since d/c  - dc home lasix 20mg qd i/s/o BRITT

## 2022-07-04 NOTE — PROGRESS NOTE ADULT - PROBLEM SELECTOR PLAN 11
-c/w home Trazadone 100mg qhs  -interchange eszopiclone for ambien inpatient    #Gout  -c/w home allopurinol 100mg qd i/s/o BRITT    #dementia:   -c/w home memantine and donepezil -c/w home Trazadone 100mg qhs  -interchange eszopiclone for ambien inpatient    #Gout  -dc home allopurinol 100mg qd i/s/o BRITT    #dementia:   -c/w home memantine and donepezil

## 2022-07-04 NOTE — PROGRESS NOTE ADULT - ATTENDING COMMENTS
89M PMH HTN, HLD, CKD4, chronic HFrEF (LVEF 30% Dec 2021), CAD s/p PCI, aortic stenosis s/p AVR, chronic atrial fibrillation s/p PPM, hypothyroidism, GIB (now off warfarin x 8 months), colon cancer, prostate cancer, and ?coagulation d/o who presented from Dr. Chandler's office for wbc 1.4 found to be pancytopenic/neutropenic, pending BM Bx.      Patient was seen and examined today. Denied any complaints.     # Pancytopenia:   Normal B12/folate, iron panel with no evidence of ATIF, Hep C non-reactive.   Follow bone marrow biopsy result.   If develop fever, obtain pan-culture and start wide-spectrum antibiotics.  Hematology team would like to monitor the patient in the hospital till biopsy result available.  Start Filgrastim (300 mg), monitor for response.     # Neutropenic fever: T 101.3.   Started on broad spectrum antibiotics vancomycin and Cefepime   Blood culture without any growth to date, creatinine is trending up, pt does not have any sings or symptoms of infection, will stop all antibiotics and monitor kidney function and temperature curve.     # BRITT on CKD: possibly due to vancomycin and decrease oral intake.   Will hold Lasix today.   Will stop antibiotics as no signs or symptoms of infection.     # Afib: not on AC due to GIB.   # CAD: not on ASA due to GIB  # Hypothyroidism: c/w synthroid 25 mcg.   # Gout: c/w allopurinol 100 mg daily. 89M PMH HTN, HLD, CKD4, chronic HFrEF (LVEF 30% Dec 2021), CAD s/p PCI, aortic stenosis s/p AVR, chronic atrial fibrillation s/p PPM, hypothyroidism, GIB (now off warfarin x 8 months), colon cancer, prostate cancer, and ?coagulation d/o who presented from Dr. Chandler's office for wbc 1.4 found to be pancytopenic/neutropenic, pending BM Bx.      Patient was seen and examined today. Denied any complaints.     # Pancytopenia:   Normal B12/folate, iron panel with no evidence of ATIF, Hep C non-reactive.   Follow bone marrow biopsy result.   If develop fever, obtain pan-culture and start wide-spectrum antibiotics.  Hematology team would like to monitor the patient in the hospital till biopsy result available.  Stop Filgrastim today given WBC> 10k as per hematology recs    # Neutropenic fever: T 101.3.   Started on broad spectrum antibiotics vancomycin and Cefepime   Blood culture without any growth to date, creatinine is trending up, pt does not have any sings or symptoms of infection, will stop all antibiotics and monitor kidney function and temperature curve.     # BRITT on CKD: possibly due to vancomycin and decrease oral intake.   Will hold Lasix today.   Will stop antibiotics as no signs or symptoms of infection.     # Afib: not on AC due to GIB.   # CAD: not on ASA due to GIB  # Hypothyroidism: c/w synthroid 25 mcg.   # Gout: c/w allopurinol 100 mg daily.

## 2022-07-04 NOTE — PROGRESS NOTE ADULT - PROBLEM SELECTOR PLAN 4
Patient with hx a-fib s/p PPM. Unclear if on metoprolol (was stopped last admission, filled recently per surescripts but patient claims no changes in meds since last admission). Off Coumadin since GIB 8mo ago.  - No A/C as hx of GIB

## 2022-07-04 NOTE — PROGRESS NOTE ADULT - SUBJECTIVE AND OBJECTIVE BOX
OVERNIGHT EVENTS: FABIENNE    SUBJECTIVE / INTERVAL HPI: Patient seen and examined at bedside.  No complaints except wants to go home.  denies any s&s of infection--sob, cough, abdominal pain, diarrhea, dysuria, hematuria, open sores on skin.    VITAL SIGNS:  Vital Signs Last 24 Hrs  T(C): 36.6 (2022 10:22), Max: 37.3 (2022 15:35)  T(F): 97.8 (2022 10:22), Max: 99.1 (2022 15:35)  HR: 81 (2022 10:22) (77 - 82)  BP: 97/52 (2022 10:22) (88/39 - 100/51)  BP(mean): --  RR: 18 (2022 10:22) (18 - 18)  SpO2: 94% (2022 10:22) (92% - 97%)    PHYSICAL EXAM:    General: NAD, slightly warm to touch  HEENT: NCAT; PERRL, anicteric sclera; MMM  Neck: supple  Cardiovascular: S1, S2 normal; RRR, no M/G/R  Respiratory: CTABL; no W/R/R  Gastrointestinal: soft, nontender, nondistended. bowel sounds present.  Skin: no ulcerations or visible rashes appreciated  Extremities: WWP  Vascular: 2+ radial, DP pulses b/l  Neurological: AAOx3; no focal deficits      MEDICATIONS:  MEDICATIONS  (STANDING):  ascorbic acid 500 milliGRAM(s) Oral daily  atorvastatin 20 milliGRAM(s) Oral at bedtime  cefepime   IVPB 2000 milliGRAM(s) IV Intermittent every 24 hours  cholecalciferol 1000 Unit(s) Oral every 24 hours  donepezil 10 milliGRAM(s) Oral at bedtime  filgrastim-sndz (ZARXIO) Injectable 300 MICROGram(s) SubCutaneous daily  folic acid 1 milliGRAM(s) Oral every 24 hours  heparin   Injectable 5000 Unit(s) SubCutaneous every 12 hours  levothyroxine 25 MICROGram(s) Oral every 24 hours  pantoprazole    Tablet 40 milliGRAM(s) Oral before breakfast  tamsulosin 0.4 milliGRAM(s) Oral at bedtime  traZODone 100 milliGRAM(s) Oral at bedtime  vancomycin  IVPB 750 milliGRAM(s) IV Intermittent every 24 hours  zinc sulfate 220 milliGRAM(s) Oral daily    MEDICATIONS  (PRN):  acetaminophen     Tablet .. 650 milliGRAM(s) Oral every 6 hours PRN Temp greater or equal to 38C (100.4F), Mild Pain (1 - 3)  zaleplon 5 milliGRAM(s) Oral at bedtime PRN Insomnia      ALLERGIES:  Allergies    No Known Allergies    Intolerances        LABS:                        8.6    10.50 )-----------( 114      ( 2022 06:41 )             28.6     07-04    134<L>  |  94<L>  |  56<H>  ----------------------------<  93  5.0   |  27  |  2.99<H>    Ca    9.2      2022 06:41  Phos  3.7     07-04  Mg     2.3     07-04        Urinalysis Basic - ( 2022 01:57 )    Color: Yellow / Appearance: Clear / S.015 / pH: x  Gluc: x / Ketone: Trace mg/dL  / Bili: Negative / Urobili: 0.2 E.U./dL   Blood: x / Protein: NEGATIVE mg/dL / Nitrite: NEGATIVE   Leuk Esterase: NEGATIVE / RBC: x / WBC x   Sq Epi: x / Non Sq Epi: x / Bacteria: x      CAPILLARY BLOOD GLUCOSE          RADIOLOGY & ADDITIONAL TESTS: Reviewed.

## 2022-07-04 NOTE — PROGRESS NOTE ADULT - PROBLEM SELECTOR PLAN 1
Spiked fever to 101.2 on 7/2.  Patient and PE w/o localizing symptoms.  - BCx NGTD  - UA negative  - GI PCR ordered although patient w/o diarrhea  - c/w empiric vanc and cefepime  - prn tylenol

## 2022-07-05 NOTE — CONSULT NOTE ADULT - ATTENDING COMMENTS
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   Currently with persistent pancytopenia, severe neutropenia, etiology unclear for BMBx today, would recommend waiting for prelim results.
Derm consult appreciated. 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 IVF resuscitation begun and pt transferred to  burn unit for continued care.

## 2022-07-05 NOTE — PROGRESS NOTE ADULT - PROBLEM SELECTOR PLAN 6
s/p stent. Not on ASA 2/2 GIB 8mo ago.  -c/w lipitor 20mg qhs -per surescipts, appears patient was restarted on Lasix either 20mg and 40mg since d/c  - dc home lasix 20mg qd i/s/o BRITT

## 2022-07-05 NOTE — PATIENT PROFILE ADULT - FALL HARM RISK - HARM RISK INTERVENTIONS

## 2022-07-05 NOTE — PROGRESS NOTE ADULT - PROBLEM SELECTOR PLAN 2
CKD 4 with baseline Cr ~1.5, currently ~3.  Maintaining PO intake.  Spiked fever, so perhaps 2/2 sepsis.  - obtain urine lytes and U/A  - consider small fluid bolus  - dc lasix and allopurinol and avoid other nephrotoxic meds  - D/cony Vanc

## 2022-07-05 NOTE — CONSULT NOTE ADULT - SUBJECTIVE AND OBJECTIVE BOX
89 yo M with PMH HTN, HLD, CKD4 (baseline Cr 1.5), chronic HFrEF (LVEF 30% Dec 2021), CAD s/p PCI in , aortic stenosis s/p AVR in , chronic atrial fibrillation s/p PPM, hypothyroidism, GIB (now off warfarin x 8 months), colon cancer s/p R hemicolectomy in , prostate cancer who presented on  from Dr. Chandler's office for pancytopenic/neutropenic s/p BM biopsy pending final path report, started on Neupogen on - with current ANC >5k, febrile episode on  and given vanc and cefepime, now with skin sloughing of posterior right shoulder which started on , rapidly progressing to include         SUBJECTIVE / INTERVAL HPI: Patient seen and examined at bedside. Denies f/c, n/v, HA, chest pain, SOB, abdominal pain, diarrhea, constipation, melena, hematochezia, hematuria, dysuria    MEDICATIONS  (STANDING):  ascorbic acid 500 milliGRAM(s) Oral daily  atorvastatin 20 milliGRAM(s) Oral at bedtime  cholecalciferol 1000 Unit(s) Oral every 24 hours  donepezil 10 milliGRAM(s) Oral at bedtime  folic acid 1 milliGRAM(s) Oral every 24 hours  heparin   Injectable 5000 Unit(s) SubCutaneous every 12 hours  levothyroxine 25 MICROGram(s) Oral every 24 hours  pantoprazole    Tablet 40 milliGRAM(s) Oral before breakfast  sodium chloride 0.9%. 1000 milliLiter(s) (100 mL/Hr) IV Continuous <Continuous>  tamsulosin 0.4 milliGRAM(s) Oral at bedtime  traZODone 100 milliGRAM(s) Oral at bedtime  zinc sulfate 220 milliGRAM(s) Oral daily    MEDICATIONS  (PRN):  acetaminophen     Tablet .. 650 milliGRAM(s) Oral every 6 hours PRN Temp greater or equal to 38C (100.4F), Mild Pain (1 - 3)  zaleplon 5 milliGRAM(s) Oral at bedtime PRN Insomnia    Allergies    No Known Allergies    Intolerances        VITAL SIGNS:  Vital Signs Last 24 Hrs  T(C): 36.4 (2022 15:05), Max: 36.6 (2022 21:32)  T(F): 97.5 (2022 15:05), Max: 97.8 (2022 21:32)  HR: 80 (2022 15:05) (80 - 80)  BP: 108/51 (2022 15:05) (95/54 - 109/59)  BP(mean): --  RR: 18 (2022 15:05) (16 - 18)  SpO2: 95% (2022 15:05) (94% - 97%)        PHYSICAL EXAM:  General: No acute distress  HEENT: NCAT; PERRL, anicteric sclera; MMM  Neck: supple, trachea midline  Cardiovascular: S1, S2 normal; RRR, no M/G/R  Respiratory: CTABL; no W/R/R  Gastrointestinal: soft, nontender, nondistended. bowel sounds present.  Skin: sloughing off of the skin of his back with blistering. Blistering is also present on the patient's arms (bilaterally) and lower extremities on the thighs. He had no mucosal membrane involvement (eyes and buccal mucosa), positive Nikolsky sign  Extremities: WWP; no edema, clubbing or cyanosis  Vascular: 2+ radial, DP/PT pulses B/L  Neurological: AAOx3; CN II-XII grossly intact; no focal deficits      LABS:                        8.8    22.11 )-----------( 106      ( 2022 05:30 )             29.0     07-05    137  |  98  |  67<H>  ----------------------------<  93  4.8   |  27  |  3.32<H>    Ca    9.0      2022 05:30  Phos  3.3     07-05  Mg     2.1     07-05    TPro  x   /  Alb  2.9<L>  /  TBili  x   /  DBili  x   /  AST  x   /  ALT  x   /  AlkPhos  x   07-05      Urinalysis Basic - ( 2022 11:35 )    Color: Yellow / Appearance: Clear / S.020 / pH: x  Gluc: x / Ketone: NEGATIVE  / Bili: Negative / Urobili: 0.2 E.U./dL   Blood: x / Protein: Trace mg/dL / Nitrite: NEGATIVE   Leuk Esterase: NEGATIVE / RBC: < 5 /HPF / WBC < 5 /HPF   Sq Epi: x / Non Sq Epi: 0-5 /HPF / Bacteria: Present /HPF      CAPILLARY BLOOD GLUCOSE              RADIOLOGY & ADDITIONAL TESTS: Reviewed. 89 yo M with PMH HTN, HLD, CKD4 (baseline Cr 1.5), chronic HFrEF (LVEF 30% Dec 2021), CAD s/p PCI in , aortic stenosis s/p AVR in , chronic atrial fibrillation s/p PPM, hypothyroidism, GIB (now off warfarin x 8 months), colon cancer s/p R hemicolectomy in , prostate cancer who presented on  from Dr. Chandler's office for pancytopenic/neutropenic s/p BM biopsy pending final path report, started on Neupogen on - with current ANC >5k, febrile episode on  s/p vanc and cefepime, now with skin sloughing of posterior right shoulder which started on , rapidly progressing to include back, bilateral UE and thighs, and worsening BRITT.     SUBJECTIVE / INTERVAL HPI: Patient seen and examined at bedside. Denies f/c, n/v, HA, chest pain, SOB, abdominal pain, diarrhea, constipation, melena, hematochezia, hematuria, dysuria    MEDICATIONS  (STANDING):  ascorbic acid 500 milliGRAM(s) Oral daily  atorvastatin 20 milliGRAM(s) Oral at bedtime  cholecalciferol 1000 Unit(s) Oral every 24 hours  donepezil 10 milliGRAM(s) Oral at bedtime  folic acid 1 milliGRAM(s) Oral every 24 hours  heparin   Injectable 5000 Unit(s) SubCutaneous every 12 hours  levothyroxine 25 MICROGram(s) Oral every 24 hours  pantoprazole    Tablet 40 milliGRAM(s) Oral before breakfast  sodium chloride 0.9%. 1000 milliLiter(s) (100 mL/Hr) IV Continuous <Continuous>  tamsulosin 0.4 milliGRAM(s) Oral at bedtime  traZODone 100 milliGRAM(s) Oral at bedtime  zinc sulfate 220 milliGRAM(s) Oral daily    MEDICATIONS  (PRN):  acetaminophen     Tablet .. 650 milliGRAM(s) Oral every 6 hours PRN Temp greater or equal to 38C (100.4F), Mild Pain (1 - 3)  zaleplon 5 milliGRAM(s) Oral at bedtime PRN Insomnia    Allergies    No Known Allergies    Intolerances        VITAL SIGNS:  Vital Signs Last 24 Hrs  T(C): 36.4 (2022 15:05), Max: 36.6 (2022 21:32)  T(F): 97.5 (2022 15:05), Max: 97.8 (2022 21:32)  HR: 80 (2022 15:05) (80 - 80)  BP: 108/51 (2022 15:05) (95/54 - 109/59)  BP(mean): --  RR: 18 (2022 15:05) (16 - 18)  SpO2: 95% (2022 15:05) (94% - 97%)        PHYSICAL EXAM:  General: No acute distress  HEENT: NCAT; PERRL, anicteric sclera; MMM  Neck: supple, trachea midline  Cardiovascular: S1, S2 normal; RRR, no M/G/R  Respiratory: CTABL; no W/R/R  Gastrointestinal: soft, nontender, nondistended. bowel sounds present.  Skin: sloughing off of the skin of his back with blistering. Blistering is also present on the patient's arms (bilaterally) and lower extremities on the thighs. He had no mucosal membrane involvement (eyes and buccal mucosa), positive Nikolsky sign  Extremities: WWP; no edema, clubbing or cyanosis  Vascular: 2+ radial, DP/PT pulses B/L  Neurological: AAOx3; CN II-XII grossly intact; no focal deficits      LABS:                        8.8    22.11 )-----------( 106      ( 2022 05:30 )             29.0     07-05    137  |  98  |  67<H>  ----------------------------<  93  4.8   |  27  |  3.32<H>    Ca    9.0      2022 05:30  Phos  3.3     07-05  Mg     2.1     07-05    TPro  x   /  Alb  2.9<L>  /  TBili  x   /  DBili  x   /  AST  x   /  ALT  x   /  AlkPhos  x   07-05      Urinalysis Basic - ( 2022 11:35 )    Color: Yellow / Appearance: Clear / S.020 / pH: x  Gluc: x / Ketone: NEGATIVE  / Bili: Negative / Urobili: 0.2 E.U./dL   Blood: x / Protein: Trace mg/dL / Nitrite: NEGATIVE   Leuk Esterase: NEGATIVE / RBC: < 5 /HPF / WBC < 5 /HPF   Sq Epi: x / Non Sq Epi: 0-5 /HPF / Bacteria: Present /HPF      CAPILLARY BLOOD GLUCOSE              RADIOLOGY & ADDITIONAL TESTS: Reviewed.

## 2022-07-05 NOTE — PROGRESS NOTE ADULT - PROBLEM SELECTOR PLAN 3
Resolved Thought to be T cell process (per discussion with Dr. Canas), though bone marrow biopsy results are not available yet.  - Monitor CBC w/ diff daily and CMP   - Transfuse for hb <7, Plt count <10K, <20K + febrile, <50K+ bleeding   - dc Filgrastim (300 mg) as ANC >5k   - Would favor inpatient monitoring until we have prelim results on bone marrow biopsy.

## 2022-07-05 NOTE — PROGRESS NOTE ADULT - PROBLEM SELECTOR PLAN 12
-c/w home Trazadone 100mg qhs  -interchange eszopiclone for ambien inpatient    #Gout  -dc home allopurinol 100mg qd i/s/o RBITT    #dementia:   -c/w home memantine and donepezil

## 2022-07-05 NOTE — CHART NOTE - NSCHARTNOTEFT_GEN_A_CORE
[facility]  Salvador Peña Palliative Care Attending  Contact Info: Call [phone] (HEAL Line) or message on Microsoft Teams    HPI:  89M PMH HTN, HLD, CKD4, chronic HFrEF (LVEF 30% Dec 2021), CAD s/p PCI, aortic stenosis s/p AVR, chronic atrial fibrillation s/p PPM, hypothyroidism, GIB (now off warfarin x 8 months), colon cancer, prostate cancer, and ?coagulation d/o who presented from Dr. Chandler's office for wbc 1.4. Patient reports no acute sx. Has chronic non-productive cough and insomnia, both unchanged. Denies wt loss, fevers, chills, HA, dizziness, CP, palp, SOB, sore throat, congestion, abd pain, n/v/d/c, dysuria, blood in urine/stool.    In the ED:  VS: Afebrile, HR 78, /76, RR 18, SpO2 98% RA  Labs: wbc 0.60, , HGB 9.8, , Cr 1.68, PT 13.6, COVID negative  ECG: Paced  Imaging: CXR unchanged from last admission  Interventions: None (27 Jun 2022 16:53)    PERTINENT PM/SXH:   Aortic valve replaced    Atrial fibrillation    CAD (coronary artery disease)    HTN (hypertension)    PUD (peptic ulcer disease)    Constipation    Iron deficiency anemia    Diverticulosis    High cholesterol    Carotid arterial disease    Internal hemorrhoids    Colon cancer    Endocarditis    Colon cancer    Hypothyroidism      S/P AVR    Cardiac pacemaker    H/O inguinal hernia repair    History of bowel resection    H/O right hemicolectomy    History of appendectomy      FAMILY HISTORY:    ITEMS NOT CHECKED ARE NOT PRESENT    SOCIAL HISTORY:   Significant other/partner:  []  Children:  []   Substance hx:  []   Tobacco hx:  [x]   Alcohol hx: []   Home Opioid hx:  [] I-Stop Reference No:  - no active Rx's / see chart note  Living Situation: [x]Home  []Long term care  []Rehab []Other  Jain/Spiritual practice: ; Role of organized [employer] [ ] important [x ] some [ ] unable to assess  Coping: [ ] well [ ] with difficulty [ x] poor coping [ ] unable to assess  Support system: [ ] strong [ ] adequate [x ] inadequate    ADVANCE DIRECTIVES:    []MOLST  []Living Will  DECISION MAKER(s):  [] Health Care Proxy(s)  [x] Surrogate(s)  [] Guardian           Name(s)/Phone Number(s):  Esteban Cardenas (brother)    BASELINE (I)ADLs (prior to admission):  [place]: []Total  [x] Moderate []Dependent    ALLERGIES:  cefepime (Loya-Zane)  Neupogen (Loya-Zane)  vancomycin (Loya-Zane)    MEDICATIONS  (STANDING):    MEDICATIONS  (PRN):    PRESENT SYMPTOMS: []Unable to obtain due to poor mentation/encephalopathy  Source if other than patient:  []Family   []Team     Pain: [ ] yes [x ] no  QOL impact -   Location -                    Aggravating Factors -  Quality -  Radiation -  Timing -  Severity (0-10 scale) -   Minimal Acceptable Level (0-10 scale) -    PAIN AD Score:  [email or web address] (press ctrl +  left click to view)    Dyspnea:                           [ x]Mild  [ ]Moderate [ ]Severe  Anxiety:                             [ ]Mild [ x]Moderate [ ]Severe  Fatigue:                             [ ]Mild [ ]Moderate [ x]Severe  Nausea:                             [ ]Mild [ ]Moderate [ ]Severe  Loss of Appetite:             [ ]Mild [ x]Moderate [ ]Severe  Constipation:                   [x ]Mild [ ]Moderate [ ]Severe    Other Symptoms:  [x ]All other review of systems negative     Palliative Performance Status Version 2:  50 %    http://npcrc.org/files/news/palliative_performance_scale_ppsv2.pdf    PHYSICAL EXAM:  GENERAL:  [ x]Alert  [ x]Oriented x   [ ]Lethargic  [ ]Cachexia  [ ]Unarousable  [ ]Verbal  [ ]Non-Verbal  Behavioral:   [ ]Anxiety  [ ]Delirium [ ]Agitation [x ]Cooperative  HEENT:  [ ]Normal   [x]Dry mouth   [ ]ET Tube/Trach  [ ]Oral lesions  PULMONARY:   [x ]Clear []Tachypnea  []Audible excessive secretions   [ ]Rhonchi        [ ]Right [ ]Left [ ]Bilateral  [ ]Crackles        [ ]Right [ ]Left [ ]Bilateral  [ ]Wheezing     [ ]Right [ ]Left [ ]Bilateral  CARDIOVASCULAR:    [x ]Regular [ ]Irregular [ ]Tachy  [ ]Maximus [ ]Murmur [ ]Other  GASTROINTESTINAL:  [ x]Soft  [ ]Distended   [ x]+BS  [ x]Non tender [ ]Tender  [ ]PEG [ ]OGT/ NGT  Last BM:   GENITOURINARY:  [x ]Normal [ ] Incontinent   [ ]Oliguria/Anuria   [ ]Norman  MUSCULOSKELETAL:   [ ]Normal   [ x]Weakness  [ x]Bed/Wheelchair bound [ ]Edema  NEUROLOGIC:   [ x]No focal deficits  [ ]Cognitive impairment  [ ]Dysphagia [ ]Dysarthria [ ]Paresis [ ]Encephalopathic   SKIN:    [ ]Normal   [ ]Pressure ulcer(s)  [ ]Rash [x] Extensive skin sloughing, Nikolsky sign +    CRITICAL CARE:  [ ]Shock Present  [ ]Septic [ ]Cardiogenic [ ]Neurologic [ ]Hypovolemic  [ ]Vasopressors [ ]Inotropes   [ ]Respiratory failure present [ ]Mechanical Ventilation [ ]Non-invasive ventilatory support [ ]High-Flow  [ ]Acute  [ ]Chronic [ ]Hypoxic  [ ]Hypercarbic  [ ]Other organ failure    Vital Signs Last 24 Hrs  T(C): --  T(F): -- 97.3  HR: -- 84  BP: -- 108/80  BP(mean): --  RR: -- 18  SpO2: -- 98     I&O's Summary      LABS:    Reviewed      RADIOLOGY & ADDITIONAL STUDIES:    Reviewed    PROTEIN CALORIE MALNUTRITION PRESENT: [ ]mild [ ]moderate [ ]severe [ ]underweight [ ]morbid obesity  [ ]PPSV2 < or = to 30% [ ]significant weight loss  [ ]poor nutritional intake [ ]catabolic state [ ]anasarca     Artificial Nutrition [ ]     REFERRALS:  [x]Social Work  [ ]Case management [ ]PT/OT [ ]Chaplaincy  [ ]Hospice  [ ]Patient/Family Support    DISCUSSION OF CASE: Family - to obtain additional history and to provide emotional support; ( ) -     Care Coordination/Goals of Care Document:          PALLIATIVE MEDICINE COORDINATION OF CARE DOCUMENTATION: [x] Inpatient Consult  Non-Face-to-Face prolonged service provided that relates to (face-to-face) care that has or will occur and ongoing patient management, including one or more of the following: - Reviewed documentation from other physicians and other health care professional services - Reviewed medical records and diagnostic / radiology study results - Coordination with patient's support system  ************************************************************************  MEDICATION REVIEW:  - See Medication List Above    ISTOP REFERENCE:   - no active Rx's / see ISTOP Chart Note  - PRN usage: NO PRN'S  ------------------------------------------------------------------------  COORDINATION OF CARE:  - Palliative Care consulted for: GOC / Symptom Management  - Patient (to be) assessed: 7/5/22  - Patient previously seen by Palliative Care service: NO    ADVANCE CARE PLANNING  - Code status: FULL  - MOLST reviewed in chart: NONE; None found on Alpha  - HCP/Surrogate: Esteban Cardenas (brother)  - [facility] documents: NONE found on [place]  - HCP/Living will/Other Advanced Directives in Alpha: NONE found on Alpha  ------------------------------------------------------------------------  CARE PROVIDER DOCUMENTATION:  - VITALY/SEUN notes: Remains medically active  -   -   -     PLAN OF CARE  - Known admissions in past year: 3  - Current admit date: 6/27/22  - LOS: 7  - LACE score: 14   - Current dispo plan: TO BE DETERMINED    06-27-22 (8d)  ------------------------------------------------------------------------  - Time Spent/Chart reviewed: 41 Minutes [including time used to gather, review and transfer data]  - Start: 0900  - End: 0941    Prolonged services rendered, as part of this patient's care provided by Palliative Medicine, include: i. chart review for provider and ancillary service documentation, ii. pertinent diagnostics including laboratory and imaging studies, iii. medication review including PRN use, iv. admission history including previous palliative care encounters and GOC notes, v. advance care planning documents including HCP and MOLST forms in Alpha. Part of Palliative Medicine extended evaluation and management also involves coordination of care with our IDT, the primary and consulting teams, and unit CM/SW and Hospice if eligible. Recommendations based on the information gathered and discussed are outlined in the A/P of Palliative notes.      AP: 89 M with numerous comorbidities, currently with generalized skin sloughing, pancytopenia, BRITT on CKD debility, encounter for palliative care.   1) Generalized Skin Sloughing:  - Patient with generalized skin sloughing, unclear whether SSS. However, given age, comorbidities, prognosis is extremely guarded.  - Patient being transferred over to  burn unit for further care.  2) Pancytopenia:  - Followed by Hematology. Unclear etiology. Actively being workup up for leukemia.  3) BRITT on CKD:  - Extremely concerning. given rapidly progressive desquamation.  - Supportive care.  4) Encounter for Palliative Care:  - Patient with concern for SSS, being transferred to  burn unit.   - Goals of care discussions initiated.  - Attempted to contact patients brother without success.  - Will give handoff to [facility] Palliative Care team to continue GOC discussions.

## 2022-07-05 NOTE — PROGRESS NOTE ADULT - PROBLEM SELECTOR PLAN 8
Patient with hx of HTN. Normotensive.  -Lasix and metoprolol as mentioned above    #CKD: Cr 1.68 on admission. Baseline approx 2.2. Now trending up 3.32 now platoing   - U/A and urine lytes   -continue to monitor  -avoid nephrotoxic agents

## 2022-07-05 NOTE — CONSULT NOTE ADULT - ASSESSMENT
Skin detachment and bullae in patient with CKD, pancytopenia, and recent fevers concerning for SSSS (Staph scalded skin syndrome) vs less likely widespread bullous impetigo or toxic shock (would expect hemodynamic instability) or SJS/TEN (typically would have deeper involvement of skin/mucous membranes and longer exposure to offending medication)    -skin biopsy to be done by derm team, will try to expedite results  -primary team to consult ID for antibiotics but needs coverage for MSSA/MRSA  -please send bacterial cultures from nose and throat and base of eroded area  -vaseline and vaseline impregnated gauze to eroded areas  -wetting eye gtt  -maintain hydration, nutritional status  -consider ICU or burn unit admission as needed for nursing care  -place patient on contact precautions

## 2022-07-05 NOTE — PROGRESS NOTE ADULT - PROBLEM SELECTOR PLAN 1
- Back skin is sloughing off with erythema and some punctate hemorrhage  - The patient was started on Vanc (july 2nd), Cefepime (July 2nd) and Filgrastim (July 1st)   - Possible reaction to Abx or the Filgrastrim   - Differential includes SJS and DRESS syndrome   - Dermatology consulted - Back skin is sloughing off with erythema and some punctate hemorrhage  - The patient was started on Vanc (july 2nd), Cefepime (July 2nd) and Filgrastim (July 1st)   - Possible reaction to Abx or the Filgrastrim   - Differential includes SJS, DRESS or Scalded skin syndrome  - F/u Dermatology consulted  - Swab wounds, nose and throat  - Give a one time dose of Vanc and check Vanc level in the morning   - Started on Albumin 5% 250 ml

## 2022-07-05 NOTE — CONSULT NOTE ADULT - ASSESSMENT
89 yo M with PMH HTN, HLD, CKD4 (baseline Cr 1.5), chronic HFrEF (LVEF 30% Dec 2021), CAD s/p PCI, aortic stenosis s/p AVR, chronic atrial fibrillation s/p PPM, hypothyroidism, colon cancer, prostate cancer presented with pancytopenic s/p neupogen on 7/1 and neutropenic fever s/p vancomycin 7/2 and cefepime 7/3 now with rapidly progressing skin sloughing starting on 7/3, critical care consulted for concern of SJS/TEN 2/2 drug reaction    DERM  #SJS/TEN  Pancytopenic s/p neupogen on 7/1 and neutropenic fever s/p vancomycin 7/2 and cefepime 7/3 now with rapidly progressing skin sloughing starting on 7/3  Physical exam significant for sloughing off of the skin of his back with blistering. Blistering is also present on the patient's arms (bilaterally) and lower extremities on the thighs. He had no mucosal membrane involvement (eyes and buccal mucosa), positive Nikolsky sign  High concern for SJS/TEN given timing of onset of skin findings, physical exam findings  low concern for DRESS syndrome given mild eosinophilia and lack of systemic symptoms  - Transfer to burn center  - c/w fluid resuscitation --> c/w NS 100cc/h, c/w 250cc albumin 5% to run over 3h    CVS  #Chronic systolic CHF  -per surescipts, appears patient was restarted on Lasix either 20mg and 40mg since d/c  - Hold home lasix 20mg qd i/s/o BRITT.  - Lunch check given fluid resuscitation     RENAL  #BRITT on CKD4  CKD 4 with baseline Cr ~1.5, currently ~3.  Maintaining PO intake.    FeNa shows prerenal picture, however possible superimposed tubuloinsterstitial nephritis given SJS/TEN  - obtain urine lytes and U/A  - Hold lasix and allopurinol and avoid other nephrotoxic meds    HEME  #Pantycopenia  Thought to be T cell process (per discussion with Dr. Canas), though bone marrow biopsy results are not available yet.  - Monitor CBC w/ diff daily and CMP   - Transfuse for hb <7, Plt count <10K, <20K + febrile, <50K+ bleeding   - Hold Filgrastim (300 mg) as ANC >5k   - Follow up final path results of bone marrow biopsy           89 yo M with PMH HTN, HLD, CKD4 (baseline Cr 1.5), chronic HFrEF (LVEF 30% Dec 2021), CAD s/p PCI, aortic stenosis s/p AVR, chronic atrial fibrillation s/p PPM, hypothyroidism, colon cancer, prostate cancer presented with pancytopenic s/p neupogen on 7/1 and neutropenic fever s/p vancomycin 7/2 and cefepime 7/3 now with rapidly progressing skin sloughing starting on 7/3, critical care consulted for concern of SJS/TEN 2/2 drug reaction    DERM  #SJS/TEN  Pancytopenic s/p neupogen on 7/1 and neutropenic fever s/p vancomycin 7/2 and cefepime 7/3 now with rapidly progressing skin sloughing starting on 7/3  Physical exam significant for sloughing off of the skin of his back with blistering. Blistering is also present on the patient's arms (bilaterally) and lower extremities on the thighs. He had no mucosal membrane involvement (eyes and buccal mucosa), positive Nikolsky sign  High concern for SJS/TEN given timing of onset of skin findings, physical exam findings  low concern for DRESS syndrome given mild eosinophilia and lack of systemic symptoms  - Transfer to burn center  - c/w fluid resuscitation --> c/w NS 100cc/h, c/w 250cc albumin 5% to run over 3h    CVS  #Chronic systolic CHF  -per surescipts, appears patient was restarted on Lasix either 20mg and 40mg since d/c  - Hold home lasix 20mg qd i/s/o BRITT.  - Lunch check given fluid resuscitation     RENAL  #BRITT on CKD4  CKD 4 with baseline Cr ~1.5, currently ~3.  Maintaining PO intake.    FeNa shows prerenal picture, however possible superimposed tubuloinsterstitial nephritis given SJS/TEN  - obtain urine lytes and U/A  - Hold lasix and allopurinol and avoid other nephrotoxic meds    HEME  #Pantycopenia  Thought to be T cell process (per discussion with Dr. Canas), though bone marrow biopsy results are not available yet.  - Monitor CBC w/ diff daily and CMP   - Transfuse for hb <7, Plt count <10K, <20K + febrile, <50K+ bleeding   - Hold Filgrastim (300 mg) as ANC >5k   - Follow up final path results of bone marrow biopsy      Disposition: Transfer to Burn Center

## 2022-07-05 NOTE — PROGRESS NOTE ADULT - PROBLEM SELECTOR PLAN 2
CKD 4 with baseline Cr ~1.5, currently ~3.  Maintaining PO intake.  Spiked fever, so perhaps 2/2 sepsis.  - obtain urine lytes and U/A  - consider small fluid bolus  - dc lasix and allopurinol and avoid other nephrotoxic meds  - D/cony Vanc CKD 4 with baseline Cr ~1.5, currently ~3.  Maintaining PO intake.  Spiked fever, so perhaps 2/2 sepsis.  - obtain urine lytes and U/A  - consider small fluid bolus  - dc lasix and allopurinol and avoid other nephrotoxic meds

## 2022-07-05 NOTE — PROGRESS NOTE ADULT - PROBLEM SELECTOR PLAN 4
Patient with hx a-fib s/p PPM. Unclear if on metoprolol (was stopped last admission, filled recently per surescripts but patient claims no changes in meds since last admission). Off Coumadin since GIB 8mo ago.  - No A/C as hx of GIB Thought to be T cell process (per discussion with Dr. Canas), though bone marrow biopsy results are not available yet.  - Monitor CBC w/ diff daily and CMP   - Transfuse for hb <7, Plt count <10K, <20K + febrile, <50K+ bleeding   - dc Filgrastim (300 mg) as ANC >5k   - Would favor inpatient monitoring until we have prelim results on bone marrow biopsy.

## 2022-07-05 NOTE — CONSULT NOTE ADULT - SUBJECTIVE AND OBJECTIVE BOX
HPI: 89 yo M hx CKD, prostate and colon cancer (not currently on treatment), CHF, a fib s/p pacemaker, HTN, HLD, GI bleed admitted for pancytopenia, with bone marrow biopsy now pending. Developed fever during admission and treated with IV vancomycin and cefepime July 2-3. Last fever 100.4 on July 3. Developed skin sloughing on back 2 days ago now progressing to proximal extremities. Patient reports asymptomatic and denies pain or itch. Denies pain with swallowing, urination, or in eyes.     Physical exam:  erythema with large superficially eroded plaques and areas of flaccid clear fluid filled bullae on back extending to proximal arms and thighs, also with periocular and cheek erythema and conjunctival injection  purpuric geometric patches in shape of blood pressure cuff upper extremities       HPI: 89 yo M hx CKD, prostate and colon cancer (not currently on treatment), CHF, a fib s/p pacemaker, HTN, HLD, GI bleed admitted for pancytopenia, with bone marrow biopsy now pending. Developed fever during admission and treated with IV vancomycin and cefepime July 2-3. Last fever 100.4 on July 3. Developed skin sloughing on back 2 days ago now progressing to proximal extremities. Patient reports asymptomatic and denies pain or itch. Denies pain with swallowing, urination, or in eyes.     Blood cx x 2 NGTD x 3 days    Physical exam:  erythema with large superficially eroded plaques and areas of flaccid clear fluid filled bullae on back extending to proximal arms and thighs, also with periocular and cheek erythema and conjunctival injection  purpuric geometric patches in shape of blood pressure cuff upper extremities       HPI: 89 yo M hx CKD, prostate and colon cancer (not currently on treatment), CHF, a fib s/p pacemaker, HTN, HLD, GI bleed admitted for pancytopenia, with bone marrow biopsy now pending. Developed fever during admission (Tmax 101.2 on July 2) and treated with IV vancomycin and cefepime July 2-3. Last fever 100.4 on July 3. Developed skin sloughing on back 2 days ago now progressing to proximal extremities. Patient reports asymptomatic and denies pain or itch. Denies pain with swallowing, urination, or in eyes.     Blood cx x 2 NGTD x 3 days    Physical exam:  erythema with large superficially eroded plaques and areas of flaccid clear fluid filled bullae on back extending to proximal arms and thighs, also with periocular and cheek erythema and conjunctival injection  purpuric geometric patches in shape of blood pressure cuff upper extremities

## 2022-07-05 NOTE — PROGRESS NOTE ADULT - PROBLEM SELECTOR PLAN 5
-per surescipts, appears patient was restarted on Lasix either 20mg and 40mg since d/c  - dc home lasix 20mg qd i/s/o BRITT Patient with hx a-fib s/p PPM. Unclear if on metoprolol (was stopped last admission, filled recently per surescripts but patient claims no changes in meds since last admission). Off Coumadin since GIB 8mo ago.  - No A/C as hx of GIB

## 2022-07-05 NOTE — PROGRESS NOTE ADULT - PROBLEM SELECTOR PLAN 6
-per surescipts, appears patient was restarted on Lasix either 20mg and 40mg since d/c  - dc home lasix 20mg qd i/s/o BRITT

## 2022-07-05 NOTE — PROGRESS NOTE ADULT - SUBJECTIVE AND OBJECTIVE BOX
OVERNIGHT EVENTS: Skin sloughing off on the back of the right shoulder.     SUBJECTIVE / INTERVAL HPI: No new complaints. No chest pain, SOB, watery stools/diarrhea, abdominal pain or tenderness.     VITAL SIGNS:  Vital Signs Last 24 Hrs  T(C): 36.3 (2022 09:39), Max: 36.6 (2022 21:32)  T(F): 97.3 (2022 09:39), Max: 97.8 (2022 21:32)  HR: 80 (2022 09:39) (80 - 80)  BP: 109/59 (2022 09:39) (95/54 - 109/59)  BP(mean): --  RR: 18 (2022 09:39) (16 - 18)  SpO2: 94% (2022 09:39) (94% - 97%)    PHYSICAL EXAM:    General: No acute distress  HEENT: NCAT; PERRL, anicteric sclera; MMM  Neck: supple, trachea midline  Cardiovascular: S1, S2 normal; RRR, no M/G/R  Respiratory: CTABL; no W/R/R  Gastrointestinal: soft, nontender, nondistended. bowel sounds present.  Skin: sloughing off of the skin of his back with blistering. Blistering is also present on the patient's arms (bilaterally) and lower extremities on the thighs. He had no mucosal membrane involvement (eyes and buccal mucosa)   Extremities: WWP; no edema, clubbing or cyanosis  Vascular: 2+ radial, DP/PT pulses B/L  Neurological: AAOx3; CN II-XII grossly intact; no focal deficits    MEDICATIONS:  MEDICATIONS  (STANDING):  ascorbic acid 500 milliGRAM(s) Oral daily  atorvastatin 20 milliGRAM(s) Oral at bedtime  cholecalciferol 1000 Unit(s) Oral every 24 hours  donepezil 10 milliGRAM(s) Oral at bedtime  folic acid 1 milliGRAM(s) Oral every 24 hours  heparin   Injectable 5000 Unit(s) SubCutaneous every 12 hours  levothyroxine 25 MICROGram(s) Oral every 24 hours  pantoprazole    Tablet 40 milliGRAM(s) Oral before breakfast  sodium chloride 0.9%. 1000 milliLiter(s) (100 mL/Hr) IV Continuous <Continuous>  tamsulosin 0.4 milliGRAM(s) Oral at bedtime  traZODone 100 milliGRAM(s) Oral at bedtime  zinc sulfate 220 milliGRAM(s) Oral daily    MEDICATIONS  (PRN):  acetaminophen     Tablet .. 650 milliGRAM(s) Oral every 6 hours PRN Temp greater or equal to 38C (100.4F), Mild Pain (1 - 3)  zaleplon 5 milliGRAM(s) Oral at bedtime PRN Insomnia      ALLERGIES:  Allergies    No Known Allergies    Intolerances        LABS:                        8.8    22.11 )-----------( 106      ( 2022 05:30 )             29.0     07-05    137  |  98  |  67<H>  ----------------------------<  93  4.8   |  27  |  3.32<H>    Ca    9.0      2022 05:30  Phos  3.3     07-05  Mg     2.1     07-05    TPro  x   /  Alb  2.9<L>  /  TBili  x   /  DBili  x   /  AST  x   /  ALT  x   /  AlkPhos  x   07-05      Urinalysis Basic - ( 2022 11:35 )    Color: Yellow / Appearance: Clear / S.020 / pH: x  Gluc: x / Ketone: NEGATIVE  / Bili: Negative / Urobili: 0.2 E.U./dL   Blood: x / Protein: Trace mg/dL / Nitrite: NEGATIVE   Leuk Esterase: NEGATIVE / RBC: < 5 /HPF / WBC < 5 /HPF   Sq Epi: x / Non Sq Epi: 0-5 /HPF / Bacteria: Present /HPF   Transfer from [facility] to Burn unit  89M PMH HTN, HLD, CKD4, chronic HFrEF (LVEF 30% Dec 2021), CAD s/p PCI, aortic stenosis s/p AVR, chronic atrial fibrillation s/p PPM, hypothyroidism, GIB (now off warfarin x 8 months), colon cancer, prostate cancer, who presented from Dr. Chandler's office for wbc 1.4.  He was admitted and received a bone marrow biopsy. He remained in the hospital pending biopsy results to confirm whether he had leukemia or not and ensure he begins treatment if leukemic. He was placed on filgrastim to increase his WBC count. His c/b fever and was started on vancomycin and cefepime which was discontinued as blood cultures were negative and CXR had an effusion but no evidence of consolidation. The patient was no longer spiking fevers and was ready for discharge but his course was complicated by a trending Cr from his baseline of 2.2. His BRITT is of prerenal nature based on urine lytes (FeNA and FeUrea were calculated). His course was also complicated by sloughing off of his skin on the back which progressed rapidly over the course of 24 hours and he developed blisters on his upper extremities and his thighs. He was unstable to remain on the [facility] and is planned for transfer to the Burn unit.       OVERNIGHT EVENTS: Skin sloughing off on the back of the right shoulder.     SUBJECTIVE / INTERVAL HPI: No new complaints. No chest pain, SOB, watery stools/diarrhea, abdominal pain or tenderness.     VITAL SIGNS:  Vital Signs Last 24 Hrs  T(C): 36.3 (2022 09:39), Max: 36.6 (2022 21:32)  T(F): 97.3 (2022 09:39), Max: 97.8 (2022 21:32)  HR: 80 (2022 09:39) (80 - 80)  BP: 109/59 (2022 09:39) (95/54 - 109/59)  BP(mean): --  RR: 18 (2022 09:39) (16 - 18)  SpO2: 94% (2022 09:39) (94% - 97%)    PHYSICAL EXAM:    General: No acute distress  HEENT: NCAT; PERRL, anicteric sclera; MMM  Neck: supple, trachea midline  Cardiovascular: S1, S2 normal; RRR, no M/G/R  Respiratory: CTABL; no W/R/R  Gastrointestinal: soft, nontender, nondistended. bowel sounds present.  Skin: sloughing off of the skin of his back with blistering. Blistering is also present on the patient's arms (bilaterally) and lower extremities on the thighs. He had no mucosal membrane involvement (eyes and buccal mucosa)   Extremities: WWP; no edema, clubbing or cyanosis  Vascular: 2+ radial, DP/PT pulses B/L  Neurological: AAOx3; CN II-XII grossly intact; no focal deficits    MEDICATIONS:  MEDICATIONS  (STANDING):  ascorbic acid 500 milliGRAM(s) Oral daily  atorvastatin 20 milliGRAM(s) Oral at bedtime  cholecalciferol 1000 Unit(s) Oral every 24 hours  donepezil 10 milliGRAM(s) Oral at bedtime  folic acid 1 milliGRAM(s) Oral every 24 hours  heparin   Injectable 5000 Unit(s) SubCutaneous every 12 hours  levothyroxine 25 MICROGram(s) Oral every 24 hours  pantoprazole    Tablet 40 milliGRAM(s) Oral before breakfast  sodium chloride 0.9%. 1000 milliLiter(s) (100 mL/Hr) IV Continuous <Continuous>  tamsulosin 0.4 milliGRAM(s) Oral at bedtime  traZODone 100 milliGRAM(s) Oral at bedtime  zinc sulfate 220 milliGRAM(s) Oral daily    MEDICATIONS  (PRN):  acetaminophen     Tablet .. 650 milliGRAM(s) Oral every 6 hours PRN Temp greater or equal to 38C (100.4F), Mild Pain (1 - 3)  zaleplon 5 milliGRAM(s) Oral at bedtime PRN Insomnia      ALLERGIES:  Allergies    No Known Allergies    Intolerances        LABS:                        8.8    22.11 )-----------( 106      ( 2022 05:30 )             29.0     07-05    137  |  98  |  67<H>  ----------------------------<  93  4.8   |  27  |  3.32<H>    Ca    9.0      2022 05:30  Phos  3.3     07-05  Mg     2.1     07-05    TPro  x   /  Alb  2.9<L>  /  TBili  x   /  DBili  x   /  AST  x   /  ALT  x   /  AlkPhos  x   -      Urinalysis Basic - ( 2022 11:35 )    Color: Yellow / Appearance: Clear / S.020 / pH: x  Gluc: x / Ketone: NEGATIVE  / Bili: Negative / Urobili: 0.2 E.U./dL   Blood: x / Protein: Trace mg/dL / Nitrite: NEGATIVE   Leuk Esterase: NEGATIVE / RBC: < 5 /HPF / WBC < 5 /HPF   Sq Epi: x / Non Sq Epi: 0-5 /HPF / Bacteria: Present /HPF

## 2022-07-05 NOTE — CONSULT NOTE ADULT - CONSULT REASON
Rehab evaluation
Skin Sloughing
pancytopenia
request for bone marrow biopsy
Rapidly progressive skin sloughing

## 2022-07-05 NOTE — PROGRESS NOTE ADULT - PROBLEM SELECTOR PLAN 11
-c/w home Trazadone 100mg qhs  -interchange eszopiclone for ambien inpatient    #Gout  -dc home allopurinol 100mg qd i/s/o BRITT    #dementia:   -c/w home memantine and donepezil -c/w home synthroid 25mcg

## 2022-07-05 NOTE — PROGRESS NOTE ADULT - PROBLEM SELECTOR PLAN 7
Patient with hx of HTN. Normotensive.  -Lasix and metoprolol as mentioned above    #CKD: Cr 1.68 on admission. Baseline approx 2.2. Now trending up 3.32 now platoing   - U/A and urine lytes   -continue to monitor  -avoid nephrotoxic agents s/p stent. Not on ASA 2/2 GIB 8mo ago.  -c/w lipitor 20mg qhs

## 2022-07-05 NOTE — PROGRESS NOTE ADULT - PROBLEM SELECTOR PLAN 8
- Continue protonix 40mg inpatient Patient with hx of HTN. Normotensive.  -Lasix and metoprolol as mentioned above    #CKD: Cr 1.68 on admission. Baseline approx 2.2. Now trending up 3.32 now platoing   - U/A and urine lytes   -continue to monitor  -avoid nephrotoxic agents

## 2022-07-05 NOTE — PROGRESS NOTE ADULT - PROBLEM SELECTOR PLAN 12
F: None  E: replete prn  N: Regular  GI: Protonix  DVT proph: heparin subq  Code: FULL CODE - confirmed with patient on admission  Dispo: [facility] -c/w home Trazadone 100mg qhs  -interchange eszopiclone for ambien inpatient    #Gout  -dc home allopurinol 100mg qd i/s/o BRITT    #dementia:   -c/w home memantine and donepezil

## 2022-07-05 NOTE — PROGRESS NOTE ADULT - PROBLEM SELECTOR PLAN 1
Resolved - Back skin is sloughing off with erythema and some punctate hemorrhage  - The patient was started on Vanc (july 2nd), Cefepime (July 2nd) and Filgrastim (July 1st)   - Possible reaction to Abx or the Filgratrim   - Dermatology consulted

## 2022-07-05 NOTE — PROGRESS NOTE ADULT - SUBJECTIVE AND OBJECTIVE BOX
OVERNIGHT EVENTS: Skin sloughing off on the back of the right shoulder     SUBJECTIVE / INTERVAL HPI: No new conmplaint. No chest pain, SOB, watery stools/diarrhea, abdominal pain or tenderness.     VITAL SIGNS:  Vital Signs Last 24 Hrs  T(C): 36.3 (05 Jul 2022 09:39), Max: 36.6 (04 Jul 2022 15:05)  T(F): 97.3 (05 Jul 2022 09:39), Max: 97.9 (04 Jul 2022 15:05)  HR: 80 (05 Jul 2022 09:39) (79 - 80)  BP: 109/59 (05 Jul 2022 09:39) (95/54 - 109/59)  BP(mean): --  RR: 18 (05 Jul 2022 09:39) (16 - 18)  SpO2: 94% (05 Jul 2022 09:39) (94% - 97%)    PHYSICAL EXAM:    General: WDWN  HEENT: NCAT; PERRL, anicteric sclera; MMM  Neck: supple, trachea midline  Cardiovascular: S1, S2 normal; RRR, no M/G/R  Respiratory: CTABL; no W/R/R  Gastrointestinal: soft, nontender, nondistended. bowel sounds present.  Skin: sloughing off of the skin behind the right shoulder  Extremities: WWP; no edema, clubbing or cyanosis  Vascular: 2+ radial, DP/PT pulses B/L  Neurological: AAOx3; CN II-XII grossly intact; no focal deficits    MEDICATIONS:  MEDICATIONS  (STANDING):  ascorbic acid 500 milliGRAM(s) Oral daily  atorvastatin 20 milliGRAM(s) Oral at bedtime  cholecalciferol 1000 Unit(s) Oral every 24 hours  donepezil 10 milliGRAM(s) Oral at bedtime  filgrastim-sndz (ZARXIO) Injectable 300 MICROGram(s) SubCutaneous daily  folic acid 1 milliGRAM(s) Oral every 24 hours  heparin   Injectable 5000 Unit(s) SubCutaneous every 12 hours  levothyroxine 25 MICROGram(s) Oral every 24 hours  pantoprazole    Tablet 40 milliGRAM(s) Oral before breakfast  sodium chloride 0.9%. 1000 milliLiter(s) (100 mL/Hr) IV Continuous <Continuous>  sodium chloride 0.9%. 1000 milliLiter(s) (100 mL/Hr) IV Continuous <Continuous>  tamsulosin 0.4 milliGRAM(s) Oral at bedtime  traZODone 100 milliGRAM(s) Oral at bedtime  zinc sulfate 220 milliGRAM(s) Oral daily    MEDICATIONS  (PRN):  acetaminophen     Tablet .. 650 milliGRAM(s) Oral every 6 hours PRN Temp greater or equal to 38C (100.4F), Mild Pain (1 - 3)  zaleplon 5 milliGRAM(s) Oral at bedtime PRN Insomnia      ALLERGIES:  Allergies    No Known Allergies    Intolerances        LABS:                        8.8    22.11 )-----------( 106      ( 05 Jul 2022 05:30 )             29.0     07-05    137  |  98  |  67<H>  ----------------------------<  93  4.8   |  27  |  3.32<H>    Ca    9.0      05 Jul 2022 05:30  Phos  3.3     07-05  Mg     2.1     07-05     OVERNIGHT EVENTS: Skin sloughing off on the back of the right shoulder.     SUBJECTIVE / INTERVAL HPI: No new complaints. No chest pain, SOB, watery stools/diarrhea, abdominal pain or tenderness.     VITAL SIGNS:  Vital Signs Last 24 Hrs  T(C): 36.3 (05 Jul 2022 09:39), Max: 36.6 (04 Jul 2022 15:05)  T(F): 97.3 (05 Jul 2022 09:39), Max: 97.9 (04 Jul 2022 15:05)  HR: 80 (05 Jul 2022 09:39) (79 - 80)  BP: 109/59 (05 Jul 2022 09:39) (95/54 - 109/59)  BP(mean): --  RR: 18 (05 Jul 2022 09:39) (16 - 18)  SpO2: 94% (05 Jul 2022 09:39) (94% - 97%)    PHYSICAL EXAM:    General: No acute distress  HEENT: NCAT; PERRL, anicteric sclera; MMM  Neck: supple, trachea midline  Cardiovascular: S1, S2 normal; RRR, no M/G/R  Respiratory: CTABL; no W/R/R  Gastrointestinal: soft, nontender, nondistended. bowel sounds present.  Skin: sloughing off of the skin of his back with some punctate hemorrhage    Extremities: WWP; no edema, clubbing or cyanosis  Vascular: 2+ radial, DP/PT pulses B/L  Neurological: AAOx3; CN II-XII grossly intact; no focal deficits    MEDICATIONS:  MEDICATIONS  (STANDING):  ascorbic acid 500 milliGRAM(s) Oral daily  atorvastatin 20 milliGRAM(s) Oral at bedtime  cholecalciferol 1000 Unit(s) Oral every 24 hours  donepezil 10 milliGRAM(s) Oral at bedtime  filgrastim-sndz (ZARXIO) Injectable 300 MICROGram(s) SubCutaneous daily  folic acid 1 milliGRAM(s) Oral every 24 hours  heparin   Injectable 5000 Unit(s) SubCutaneous every 12 hours  levothyroxine 25 MICROGram(s) Oral every 24 hours  pantoprazole    Tablet 40 milliGRAM(s) Oral before breakfast  sodium chloride 0.9%. 1000 milliLiter(s) (100 mL/Hr) IV Continuous <Continuous>  sodium chloride 0.9%. 1000 milliLiter(s) (100 mL/Hr) IV Continuous <Continuous>  tamsulosin 0.4 milliGRAM(s) Oral at bedtime  traZODone 100 milliGRAM(s) Oral at bedtime  zinc sulfate 220 milliGRAM(s) Oral daily    MEDICATIONS  (PRN):  acetaminophen     Tablet .. 650 milliGRAM(s) Oral every 6 hours PRN Temp greater or equal to 38C (100.4F), Mild Pain (1 - 3)  zaleplon 5 milliGRAM(s) Oral at bedtime PRN Insomnia      ALLERGIES:  Allergies    No Known Allergies    Intolerances        LABS:                        8.8    22.11 )-----------( 106      ( 05 Jul 2022 05:30 )             29.0     07-05    137  |  98  |  67<H>  ----------------------------<  93  4.8   |  27  |  3.32<H>    Ca    9.0      05 Jul 2022 05:30  Phos  3.3     07-05  Mg     2.1     07-05     OVERNIGHT EVENTS: Skin sloughing off on the back of the right shoulder.     SUBJECTIVE / INTERVAL HPI: No new complaints. No chest pain, SOB, watery stools/diarrhea, abdominal pain or tenderness.     VITAL SIGNS:  Vital Signs Last 24 Hrs  T(C): 36.3 (05 Jul 2022 09:39), Max: 36.6 (04 Jul 2022 15:05)  T(F): 97.3 (05 Jul 2022 09:39), Max: 97.9 (04 Jul 2022 15:05)  HR: 80 (05 Jul 2022 09:39) (79 - 80)  BP: 109/59 (05 Jul 2022 09:39) (95/54 - 109/59)  BP(mean): --  RR: 18 (05 Jul 2022 09:39) (16 - 18)  SpO2: 94% (05 Jul 2022 09:39) (94% - 97%)    PHYSICAL EXAM:    General: No acute distress  HEENT: NCAT; PERRL, anicteric sclera; MMM  Neck: supple, trachea midline  Cardiovascular: S1, S2 normal; RRR, no M/G/R  Respiratory: CTABL; no W/R/R  Gastrointestinal: soft, nontender, nondistended. bowel sounds present.  Skin: sloughing off of the skin of his back with blistering. Blistering is also present on the patient's arms (bilaterally) and lower extremities on the thighs   Extremities: WWP; no edema, clubbing or cyanosis  Vascular: 2+ radial, DP/PT pulses B/L  Neurological: AAOx3; CN II-XII grossly intact; no focal deficits    MEDICATIONS:  MEDICATIONS  (STANDING):  ascorbic acid 500 milliGRAM(s) Oral daily  atorvastatin 20 milliGRAM(s) Oral at bedtime  cholecalciferol 1000 Unit(s) Oral every 24 hours  donepezil 10 milliGRAM(s) Oral at bedtime  filgrastim-sndz (ZARXIO) Injectable 300 MICROGram(s) SubCutaneous daily  folic acid 1 milliGRAM(s) Oral every 24 hours  heparin   Injectable 5000 Unit(s) SubCutaneous every 12 hours  levothyroxine 25 MICROGram(s) Oral every 24 hours  pantoprazole    Tablet 40 milliGRAM(s) Oral before breakfast  sodium chloride 0.9%. 1000 milliLiter(s) (100 mL/Hr) IV Continuous <Continuous>  sodium chloride 0.9%. 1000 milliLiter(s) (100 mL/Hr) IV Continuous <Continuous>  tamsulosin 0.4 milliGRAM(s) Oral at bedtime  traZODone 100 milliGRAM(s) Oral at bedtime  zinc sulfate 220 milliGRAM(s) Oral daily    MEDICATIONS  (PRN):  acetaminophen     Tablet .. 650 milliGRAM(s) Oral every 6 hours PRN Temp greater or equal to 38C (100.4F), Mild Pain (1 - 3)  zaleplon 5 milliGRAM(s) Oral at bedtime PRN Insomnia      ALLERGIES:  Allergies    No Known Allergies    Intolerances        LABS:                        8.8    22.11 )-----------( 106      ( 05 Jul 2022 05:30 )             29.0     07-05    137  |  98  |  67<H>  ----------------------------<  93  4.8   |  27  |  3.32<H>    Ca    9.0      05 Jul 2022 05:30  Phos  3.3     07-05  Mg     2.1     07-05     Transfer from [facility]            OVERNIGHT EVENTS: Skin sloughing off on the back of the right shoulder.     SUBJECTIVE / INTERVAL HPI: No new complaints. No chest pain, SOB, watery stools/diarrhea, abdominal pain or tenderness.     VITAL SIGNS:  Vital Signs Last 24 Hrs  T(C): 36.3 (05 Jul 2022 09:39), Max: 36.6 (04 Jul 2022 15:05)  T(F): 97.3 (05 Jul 2022 09:39), Max: 97.9 (04 Jul 2022 15:05)  HR: 80 (05 Jul 2022 09:39) (79 - 80)  BP: 109/59 (05 Jul 2022 09:39) (95/54 - 109/59)  BP(mean): --  RR: 18 (05 Jul 2022 09:39) (16 - 18)  SpO2: 94% (05 Jul 2022 09:39) (94% - 97%)    PHYSICAL EXAM:    General: No acute distress  HEENT: NCAT; PERRL, anicteric sclera; MMM  Neck: supple, trachea midline  Cardiovascular: S1, S2 normal; RRR, no M/G/R  Respiratory: CTABL; no W/R/R  Gastrointestinal: soft, nontender, nondistended. bowel sounds present.  Skin: sloughing off of the skin of his back with blistering. Blistering is also present on the patient's arms (bilaterally) and lower extremities on the thighs   Extremities: WWP; no edema, clubbing or cyanosis  Vascular: 2+ radial, DP/PT pulses B/L  Neurological: AAOx3; CN II-XII grossly intact; no focal deficits    MEDICATIONS:  MEDICATIONS  (STANDING):  ascorbic acid 500 milliGRAM(s) Oral daily  atorvastatin 20 milliGRAM(s) Oral at bedtime  cholecalciferol 1000 Unit(s) Oral every 24 hours  donepezil 10 milliGRAM(s) Oral at bedtime  filgrastim-sndz (ZARXIO) Injectable 300 MICROGram(s) SubCutaneous daily  folic acid 1 milliGRAM(s) Oral every 24 hours  heparin   Injectable 5000 Unit(s) SubCutaneous every 12 hours  levothyroxine 25 MICROGram(s) Oral every 24 hours  pantoprazole    Tablet 40 milliGRAM(s) Oral before breakfast  sodium chloride 0.9%. 1000 milliLiter(s) (100 mL/Hr) IV Continuous <Continuous>  sodium chloride 0.9%. 1000 milliLiter(s) (100 mL/Hr) IV Continuous <Continuous>  tamsulosin 0.4 milliGRAM(s) Oral at bedtime  traZODone 100 milliGRAM(s) Oral at bedtime  zinc sulfate 220 milliGRAM(s) Oral daily    MEDICATIONS  (PRN):  acetaminophen     Tablet .. 650 milliGRAM(s) Oral every 6 hours PRN Temp greater or equal to 38C (100.4F), Mild Pain (1 - 3)  zaleplon 5 milliGRAM(s) Oral at bedtime PRN Insomnia      ALLERGIES:  Allergies    No Known Allergies    Intolerances        LABS:                        8.8    22.11 )-----------( 106      ( 05 Jul 2022 05:30 )             29.0     07-05    137  |  98  |  67<H>  ----------------------------<  93  4.8   |  27  |  3.32<H>    Ca    9.0      05 Jul 2022 05:30  Phos  3.3     07-05  Mg     2.1     07-05

## 2022-07-05 NOTE — CONSULT NOTE ADULT - CONSULT REQUESTED BY NAME
Dr Alex Camargo
Alex Camargo
Hospital Medicine
Dr. Chandler
house staff
see above for Axis I, II, III

## 2022-07-05 NOTE — PROGRESS NOTE ADULT - PROBLEM SELECTOR PLAN 3
Thought to be T cell process (per discussion with Dr. Canas), though bone marrow biopsy results are not available yet.  - Monitor CBC w/ diff daily and CMP   - Transfuse for hb <7, Plt count <10K, <20K + febrile, <50K+ bleeding   - dc Filgrastim (300 mg) as ANC >5k   - Would favor inpatient monitoring until we have prelim results on bone marrow biopsy. Resolved

## 2022-07-05 NOTE — PATIENT PROFILE ADULT - IS THERE A SUSPICION OF ABUSE/NEGLIGENCE?
How Did Your Itching Occur?: sudden in onset (over a period of weeks to a few months)
How Severe Is Your Itching?: moderate
no

## 2022-07-06 NOTE — DIETITIAN INITIAL EVALUATION ADULT - PERTINENT MEDS FT
MEDICATIONS  (STANDING):  ascorbic acid 500 milliGRAM(s) Oral daily  atorvastatin 20 milliGRAM(s) Oral at bedtime  cholecalciferol 1000 Unit(s) Oral every 24 hours  donepezil 10 milliGRAM(s) Oral at bedtime  folic acid 1 milliGRAM(s) Oral daily  heparin   Injectable 5000 Unit(s) SubCutaneous every 12 hours  levothyroxine 25 MICROGram(s) Oral daily  multivitamin 1 Tablet(s) Oral daily  pantoprazole    Tablet 40 milliGRAM(s) Oral before breakfast  sodium chloride 0.9%. 1000 milliLiter(s) (100 mL/Hr) IV Continuous <Continuous>  tamsulosin 0.4 milliGRAM(s) Oral at bedtime  traZODone 100 milliGRAM(s) Oral at bedtime  vitamin A &amp; D Ointment 1 Application(s) Topical daily  zinc sulfate 220 milliGRAM(s) Oral daily    MEDICATIONS  (PRN):  acetaminophen     Tablet .. 650 milliGRAM(s) Oral every 6 hours PRN Mild Pain (1 - 3)  morphine  - Injectable 2 milliGRAM(s) IV Push every 6 hours PRN Severe Pain (7 - 10)  morphine  - Injectable 2 milliGRAM(s) IV Push two times a day PRN wound care  ondansetron    Tablet 4 milliGRAM(s) Oral three times a day PRN Nausea and/or Vomiting  oxycodone    5 mG/acetaminophen 325 mG 1 Tablet(s) Oral every 4 hours PRN Moderate Pain (4 - 6)  senna 2 Tablet(s) Oral at bedtime PRN Constipation  zolpidem 5 milliGRAM(s) Oral at bedtime PRN Insomnia  -

## 2022-07-06 NOTE — DIETITIAN INITIAL EVALUATION ADULT - REASON FOR ADMISSION
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
Transfer from [facility] for possible SJS

## 2022-07-06 NOTE — H&P ADULT - PROBLEM SELECTOR PLAN 2
CKD 4 with baseline Cr ~1.5, currently ~3.  Maintaining PO intake.   Lasix and allopurinol held  avoid other nephrotoxic meds.  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

## 2022-07-06 NOTE — PHYSICAL THERAPY INITIAL EVALUATION ADULT - PERTINENT HX OF CURRENT PROBLEM, REHAB EVAL
89 yo M with PMH HTN, HLD, CKD4 (baseline Cr 1.5), chronic HFrEF (LVEF 30% Dec 2021), CAD s/p PCI in 2007, aortic stenosis s/p AVR in 2013, chronic atrial fibrillation s/p PPM, hypothyroidism, BPH, insomnia, GIB (now off warfarin x 8 months), colon cancer s/p R hemicolectomy in 2016, prostate cancer who presented on 06/27 for pancytopenic/neutropenic WBC 1.4 transferred to [facility] Burn ICU for concern of SJS/TEN 2/2 drug reaction

## 2022-07-06 NOTE — DIETITIAN INITIAL EVALUATION ADULT - NSFNSGIIOFT_GEN_A_CORE
-  I&O's Detail    05 Jul 2022 07:01  -  06 Jul 2022 07:00  --------------------------------------------------------  IN:    sodium chloride 0.9%: 200 mL  Total IN: 200 mL    OUT:  Total OUT: 0 mL    Total NET: 200 mL

## 2022-07-06 NOTE — H&P ADULT - NSICDXPASTSURGICALHX_GEN_ALL_CORE_FT
PAST SURGICAL HISTORY:  Cardiac pacemaker 3yrs ago    H/O inguinal hernia repair     H/O right hemicolectomy     History of appendectomy     S/P AVR 2013 @ [facility]  by Dr. Young

## 2022-07-06 NOTE — CONSULT NOTE ADULT - SUBJECTIVE AND OBJECTIVE BOX
Hematology Consult Note    HPI:  89 yo M with PMH HTN, HLD, CKD4 (baseline Cr 1.5), chronic HFrEF (LVEF 30% Dec 2021), CAD s/p PCI in 2007, aortic stenosis s/p AVR in 2013, chronic atrial fibrillation s/p PPM, hypothyroidism, BPH, insomnia, GIB (now off warfarin x 8 months), colon cancer s/p R hemicolectomy in 2016, prostate cancer who presented on 06/27 from Dr. Chandler's office for pancytopenic/neutropenic WBC 1.4 . At the time patient was admitted and received a BM biopsy pending final path report. Patient remained in the hospital pending biopsy results to confirm whether he had leukemia or not and ensure he begins treatment if leukemic. He was placed on filgrastim to increase his WBC count. Patient was started on Neupogen on 07/01-07/04  with current ANC >5k. Patient had a febrile episode on 07/02 for which he received vancomycin and cefepime, both of which were discontinued as blood cultures were negative and CXR had an effusion but no evidence of consolidation. The patient was no longer spiking fevers and was ready for discharge but his course was complicated by a trending Cr from his baseline of 2.2. His BRITT is of prerenal nature based on urine lytes (FeNA and FeUrea were calculated). Patient then developed skin sloughing of posterior right shoulder which started on 07/04, rapidly progressing over 24 hours to include back, bilateral UE and thighs, and worsening BRITT. [facility] reached out to [facility] for transfer to Burn ICU for suspected SJS.    Patient seen and examined at bedside. Denies f/c, n/v, HA, chest pain, SOB, abdominal pain, diarrhea, constipation, melena, hematochezia, hematuria, dysuria. Patient only complaint is pain on his back from the skin sloughing.      (06 Jul 2022 01:28)      Allergies    No Known Allergies    Intolerances        MEDICATIONS  (STANDING):  ascorbic acid 500 milliGRAM(s) Oral daily  atorvastatin 20 milliGRAM(s) Oral at bedtime  cholecalciferol 1000 Unit(s) Oral every 24 hours  donepezil 10 milliGRAM(s) Oral at bedtime  folic acid 1 milliGRAM(s) Oral daily  heparin   Injectable 5000 Unit(s) SubCutaneous every 12 hours  levothyroxine 25 MICROGram(s) Oral daily  multivitamin 1 Tablet(s) Oral daily  pantoprazole    Tablet 40 milliGRAM(s) Oral before breakfast  sodium chloride 0.9%. 1000 milliLiter(s) (100 mL/Hr) IV Continuous <Continuous>  tamsulosin 0.4 milliGRAM(s) Oral at bedtime  traZODone 100 milliGRAM(s) Oral at bedtime  vitamin A &amp; D Ointment 1 Application(s) Topical daily  zinc sulfate 220 milliGRAM(s) Oral daily    MEDICATIONS  (PRN):  acetaminophen     Tablet .. 650 milliGRAM(s) Oral every 6 hours PRN Mild Pain (1 - 3)  morphine  - Injectable 2 milliGRAM(s) IV Push every 6 hours PRN Severe Pain (7 - 10)  morphine  - Injectable 2 milliGRAM(s) IV Push two times a day PRN wound care  ondansetron    Tablet 4 milliGRAM(s) Oral three times a day PRN Nausea and/or Vomiting  oxycodone    5 mG/acetaminophen 325 mG 1 Tablet(s) Oral every 4 hours PRN Moderate Pain (4 - 6)  senna 2 Tablet(s) Oral at bedtime PRN Constipation  zolpidem 5 milliGRAM(s) Oral at bedtime PRN Insomnia      PAST MEDICAL & SURGICAL HISTORY:  Aortic valve replaced      Atrial fibrillation      CAD (coronary artery disease)      HTN (hypertension)      PUD (peptic ulcer disease)      Constipation      Iron deficiency anemia      Diverticulosis      High cholesterol      Carotid arterial disease      Internal hemorrhoids      Endocarditis      Colon cancer      Hypothyroidism      S/P AVR  2013 @ [facility]  by Dr. Young      Cardiac pacemaker  3yrs ago      H/O inguinal hernia repair      H/O right hemicolectomy      History of appendectomy          FAMILY HISTORY:      SOCIAL HISTORY: No EtOH, no tobacco    REVIEW OF SYSTEMS:    CONSTITUTIONAL: No weakness, fevers or chills  EYES/ENT: No visual changes;  No vertigo or throat pain   NECK: No pain or stiffness  RESPIRATORY: No cough, wheezing, hemoptysis; No shortness of breath  CARDIOVASCULAR: No chest pain or palpitations  GASTROINTESTINAL: No abdominal or epigastric pain. No nausea, vomiting, or hematemesis; No diarrhea or constipation. No melena or hematochezia.  GENITOURINARY: No dysuria, frequency or hematuria  NEUROLOGICAL: No numbness or weakness  SKIN: No itching, burning, rashes, or lesions   All other review of systems is negative unless indicated above.    Height (cm): 180.3 (07-05 @ 22:50)  Weight (kg): 60.2 (07-05 @ 22:50)  BMI (kg/m2): 18.5 (07-05 @ 22:50)  BSA (m2): 1.77 (07-05 @ 22:50)    T(F): 97.6 (07-06-22 @ 08:00), Max: 98.2 (07-06-22 @ 06:45)  HR: 79 (07-06-22 @ 08:00)  BP: 139/59 (07-06-22 @ 08:00)  RR: 18 (07-06-22 @ 08:00)  SpO2: 99% (07-06-22 @ 08:00)  Wt(kg): --    GENERAL: NAD, well-developed  HEAD:  Atraumatic, Normocephalic  EYES: EOMI, PERRLA, conjunctiva and sclera clear  NECK: Supple, No JVD  CHEST/LUNG: Clear to auscultation bilaterally; No wheeze  HEART: Regular rate and rhythm; No murmurs, rubs, or gallops  ABDOMEN: Soft, Nontender, Nondistended; Bowel sounds present  EXTREMITIES:  2+ Peripheral Pulses, No clubbing, cyanosis, or edema  NEUROLOGY: non-focal  SKIN: No rashes or lesions                          8.8    22.11 )-----------( 106      ( 05 Jul 2022 05:30 )             29.0       07-05    137  |  98  |  67<H>  ----------------------------<  93  4.8   |  27  |  3.32<H>    Ca    9.0      05 Jul 2022 05:30  Phos  3.3     07-05  Mg     2.1     07-05    TPro  4.9<L>  /  Alb  2.9<L>  /  TBili  0.4  /  DBili  0.2  /  AST  22  /  ALT  11  /  AlkPhos  90  07-05           Hematology Consult Note    HPI:  89 yo M with PMH HTN, HLD, CKD4 (baseline Cr 1.5), chronic HFrEF (LVEF 30% Dec 2021), CAD s/p PCI in 2007, aortic stenosis s/p AVR in 2013, chronic atrial fibrillation s/p PPM, hypothyroidism, BPH, insomnia, GIB (now off warfarin x 8 months), colon cancer s/p R hemicolectomy in 2016, prostate cancer who presented on 06/27 from Dr. Chandler's office for pancytopenic/neutropenic WBC 1.4 . At the time patient was admitted and received a BM biopsy pending final path report. Patient remained in the hospital pending biopsy results to confirm whether he had leukemia or not and ensure he begins treatment if leukemic. He was placed on filgrastim to increase his WBC count. Patient was started on Neupogen on 07/01-07/04  with current ANC >5k. Patient had a febrile episode on 07/02 for which he received vancomycin and cefepime, both of which were discontinued as blood cultures were negative and CXR had an effusion but no evidence of consolidation. The patient was no longer spiking fevers and was ready for discharge but his course was complicated by a trending Cr from his baseline of 2.2. His BRITT is of prerenal nature based on urine lytes (FeNA and FeUrea were calculated). Patient then developed skin sloughing of posterior right shoulder which started on 07/04, rapidly progressing over 24 hours to include back, bilateral UE and thighs, and worsening BRITT. [facility] reached out to [facility] for transfer to Burn ICU for suspected SJS.    Patient seen and examined at bedside. Denies f/c, n/v, HA, chest pain, SOB, abdominal pain, diarrhea, constipation, melena, hematochezia, hematuria, dysuria. Patient only complaint is pain on his back from the skin sloughing.      (06 Jul 2022 01:28)      Allergies    No Known Allergies    Intolerances        MEDICATIONS  (STANDING):  ascorbic acid 500 milliGRAM(s) Oral daily  atorvastatin 20 milliGRAM(s) Oral at bedtime  cholecalciferol 1000 Unit(s) Oral every 24 hours  donepezil 10 milliGRAM(s) Oral at bedtime  folic acid 1 milliGRAM(s) Oral daily  heparin   Injectable 5000 Unit(s) SubCutaneous every 12 hours  levothyroxine 25 MICROGram(s) Oral daily  multivitamin 1 Tablet(s) Oral daily  pantoprazole    Tablet 40 milliGRAM(s) Oral before breakfast  sodium chloride 0.9%. 1000 milliLiter(s) (100 mL/Hr) IV Continuous <Continuous>  tamsulosin 0.4 milliGRAM(s) Oral at bedtime  traZODone 100 milliGRAM(s) Oral at bedtime  vitamin A &amp; D Ointment 1 Application(s) Topical daily  zinc sulfate 220 milliGRAM(s) Oral daily    MEDICATIONS  (PRN):  acetaminophen     Tablet .. 650 milliGRAM(s) Oral every 6 hours PRN Mild Pain (1 - 3)  morphine  - Injectable 2 milliGRAM(s) IV Push every 6 hours PRN Severe Pain (7 - 10)  morphine  - Injectable 2 milliGRAM(s) IV Push two times a day PRN wound care  ondansetron    Tablet 4 milliGRAM(s) Oral three times a day PRN Nausea and/or Vomiting  oxycodone    5 mG/acetaminophen 325 mG 1 Tablet(s) Oral every 4 hours PRN Moderate Pain (4 - 6)  senna 2 Tablet(s) Oral at bedtime PRN Constipation  zolpidem 5 milliGRAM(s) Oral at bedtime PRN Insomnia      PAST MEDICAL & SURGICAL HISTORY:  Aortic valve replaced      Atrial fibrillation      CAD (coronary artery disease)      HTN (hypertension)      PUD (peptic ulcer disease)      Constipation      Iron deficiency anemia      Diverticulosis      High cholesterol      Carotid arterial disease      Internal hemorrhoids      Endocarditis      Colon cancer      Hypothyroidism      S/P AVR  2013 @ [facility]  by Dr. Young      Cardiac pacemaker  3yrs ago      H/O inguinal hernia repair      H/O right hemicolectomy      History of appendectomy          FAMILY HISTORY: cardiac Dz      SOCIAL HISTORY: No EtOH, no tobacco    REVIEW OF SYSTEMS:    CONSTITUTIONAL: No weakness, fevers or chills  EYES/ENT: No visual changes;  No vertigo or throat pain   NECK: No pain or stiffness  RESPIRATORY: No cough, wheezing, hemoptysis; No shortness of breath  CARDIOVASCULAR: No chest pain or palpitations  GASTROINTESTINAL: No abdominal or epigastric pain. No nausea, vomiting, or hematemesis; No diarrhea or constipation. No melena or hematochezia.  GENITOURINARY: No dysuria, frequency or hematuria  NEUROLOGICAL: No numbness or weakness  SKIN: bullous lesions over both arms and legs and over the back wrapped in bandage  All other review of systems is negative unless indicated above.    Height (cm): 180.3 (07-05 @ 22:50)  Weight (kg): 60.2 (07-05 @ 22:50)  BMI (kg/m2): 18.5 (07-05 @ 22:50)  BSA (m2): 1.77 (07-05 @ 22:50)    T(F): 97.6 (07-06-22 @ 08:00), Max: 98.2 (07-06-22 @ 06:45)  HR: 79 (07-06-22 @ 08:00)  BP: 139/59 (07-06-22 @ 08:00)  RR: 18 (07-06-22 @ 08:00)  SpO2: 99% (07-06-22 @ 08:00)  Wt(kg): --    GENERAL: NAD, well-developed  HEAD:  Atraumatic, Normocephalic  EYES: EOMI, PERRLA, conjunctiva and sclera clear  NECK: Supple, No JVD  CHEST/LUNG: Clear to auscultation bilaterally; No wheeze  HEART: Regular rate and rhythm; No murmurs, rubs, or gallops  ABDOMEN: Soft, Nontender, Nondistended; Bowel sounds present  EXTREMITIES:  2+ Peripheral Pulses, No clubbing, cyanosis, or edema  NEUROLOGY: non-focal  SKIN: unable to examine skin secondary to bandage covering legs and arms due to bullous lesions                          8.8    22.11 )-----------( 106      ( 05 Jul 2022 05:30 )             29.0       07-05    137  |  98  |  67<H>  ----------------------------<  93  4.8   |  27  |  3.32<H>    Ca    9.0      05 Jul 2022 05:30  Phos  3.3     07-05  Mg     2.1     07-05    TPro  4.9<L>  /  Alb  2.9<L>  /  TBili  0.4  /  DBili  0.2  /  AST  22  /  ALT  11  /  AlkPhos  90  07-05      f

## 2022-07-06 NOTE — DIETITIAN INITIAL EVALUATION ADULT - CONTINUE CURRENT NUTRITION CARE PLAN
Cont with current diet and Ensure Enlive BID (350kcal, 20g protein each). Encourage PO intake to support wound healing. Cont with vitamin C, D3 and folic acid.   *finish 14-day course of zinc sulfate and d/c on 7/12. Currently meeting needs via PO intake -cont with current diet and Ensure Enlive BID (350kcal, 20g protein each). Encourage PO intake to support wound healing. Cont with vitamin C, D3 and folic acid.   *finish 14-day course of zinc sulfate and d/c on 7/12.  Nutrition support is warranted if pt no longer able to maintain PO intake to meet nutrition needs.

## 2022-07-06 NOTE — PROGRESS NOTE ADULT - ASSESSMENT
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 Admitted for pancytopenia, hematology consulted for further management.       Pancytopenia, on admission CBC with WBC 0.60 (ANC 10, , basophilia 8.9% and monocytosis), Hb 9.8 (macrocytic),  c/f primary bone marrow process likely chronic. PBS reviewed with atypical lymphocytes. Normal B12/folate, iron panel with no evidence of ATIF, Hep C non-reactive on office labs from 22. Flow cytometry from 22 with reactive increase in NK cells with no diagnostic immunophenotypic abnormalities detected.     Recommend:   - s/p IR guided bone marrow biopsy on 22  - Case was discussed with kvng path, verbal prelim, no evidence of myelogenous process, look like T-cell neoplasm, but flow so far in negative   - Monitor CBC w/ diff daily and CMP   - Transfuse for hb <7, Plt count <10K, <20K + febrile, <50K+ bleeding   - S/p G-CSF at 300mcg daily, with good clinical response   - High risk for infection given severe neutropenia. If he spikes a temp, send sepsis workup and start broad spectrum antibiotics.   - Would favor inpatient monitoring until we have prelim results on bone marrow biopsy.     Desquamation   --? etiology, skin infection vs antibiotics reaction  --Plan to transfer to [facility] burn unit   -- was discussed with [facility] attending     Case was discussed with housestaff.

## 2022-07-06 NOTE — OCCUPATIONAL THERAPY INITIAL EVALUATION ADULT - ADDITIONAL COMMENTS
Pt lives in senior living residence ~1 year, + stall shower with seat and grab bar, + toilet with grab bar, + kitchenette for light snacks, dining area for residents, + elevator, + hospital bed, + RW

## 2022-07-06 NOTE — H&P ADULT - PROBLEM SELECTOR PLAN 4
Patient with hx a-fib s/p PPM. Unclear if on metoprolol (was stopped last admission, filled recently per surescripts but patient claims no changes in meds since last admission). Off Coumadin since GIB 8mo ago.  No A/C as hx of GIB.

## 2022-07-06 NOTE — H&P ADULT - PROBLEM SELECTOR PLAN 3
Thought to be T cell process (per discussion with Dr. Canas), though bone marrow biopsy results are not available yet.  Monitor CBC w/ diff daily and CMP   Transfuse for hb <7, Plt count <10K, <20K + febrile, <50K+ bleeding   Hold Filgrastim (300 mg) as ANC >5k   Follow up final path results of bone marrow biopsy

## 2022-07-06 NOTE — PROGRESS NOTE ADULT - ASSESSMENT
89 yo M with PMH HTN, HLD, CKD4 (baseline Cr 1.5), chronic HFrEF (LVEF 30% Dec 2021), CAD s/p PCI in 2007, aortic stenosis s/p AVR in 2013, chronic atrial fibrillation s/p PPM, hypothyroidism, BPH, insomnia, GIB (now off warfarin x 8 months), inguinal hernia repair, h/o appendectomy,  colon cancer s/p R hemicolectomy in 2016, prostate cancer who presented on 06/27 from Dr. Chandler's office for pancytopenic/neutropenic WBC 1.4,  s/p neupogen on 7/1 and neutropenic fever s/p vancomycin 7/2 and cefepime 7/3 now with rapidly progressing skin sloughing starting on 7/3, transferred to [facility] Burn ICU for concern of SJS/TEN due to drug reaction     Problem/Plan - 1:   SJS/TENS  -IV fluids  -Plan for IVIG 7/6  -Plan for Skin Biopsy 7/7  -Wound care of vit A&D, xeroform, gauze daily  -Pain control     Problem/Plan - 2:  BRITT (acute kidney injury).   -CKD 4 with baseline Cr about 1.5, currently about3.  Maintaining PO intake.   -Follow up nephrology consult 7/6  -Lasix and allopurinol held  -Avoid nephrotoxic meds.  -F/u urine lytes, UA now resulted       Problem/Plan - 3:  Pancytopenia.   -Thought to be T cell process (per discussion with Dr. Canas), though bone marrow biopsy results are not available yet.  -Monitor CBC w/ diff daily and CMP   -Transfuse for hb <7, Plt count <10K, <20K + febrile, <50K+ bleeding   -Hold Filgrastim (300 mg) as ANC >5k   -Follow up final path results of bone marrow biopsy.  -Pending hemonc consult 7/6     Problem/Plan - 4:  Atrial fibrillation.   -Patient with hx a-fib s/p PPM. Unclear if on metoprolol (was stopped last admission, filled recently per surescripts but patient claims no changes in meds since last admission). Off Coumadin since GIB 8mo ago.  -No A/C as hx of GIB.     Problem/Plan - 5:  Chronic systolic congestive heart failure.   -Hold home lasix 20mg and 40mg due to BRITT.     Problem/Plan - 6:  CAD (coronary artery disease).   -s/p stent. Not on ASA 2/2 GIB 8mo ago.  -continue lipitor 20mg PO qhs.     Problem/Plan - 7:  HTN (hypertension).   -Patient normotensive  -Lasix and metoprolol held at this time     Problem/Plan - 8:  Stage 3 chronic kidney disease.   -Cr 1.68 on admission. Baseline approx 2.2. Now trending up 3.32  -UA resulted, follow up urine lytes   -Continue to monitor urine output: decrease UOP, 500 cc NS bolus given  -Avoid nephrotoxic agents.     Problem/Plan - 9:  PUD (peptic ulcer disease).   -Continue protonix 40mg inpatient.     Problem/Plan - 10:  BPH (benign prostatic hyperplasia).   -Continue home flomax 0.4mg qhs.     Problem/Plan - 11:  Hypothyroidism.   Continue home synthroid 25mcg.     Problem/Plan - 12:  Insomnia.   -At home Trazadone 100mg qhs  -Inpatient will give ambien      Problem/Plan - 13:  Gout  -Hold allopurinol 100 mg PO at this time     Problem/Plan - 14:  Dementia  -Continue with home donepezil.  Plan of care discussed with Health Care Proxy Esteban at [phone]

## 2022-07-06 NOTE — H&P ADULT - NS ATTEND BILL GEN_ALL_CORE
2021      RE: Olga Lidia Segovia  [id number]  [facility] [address]   : 2017    Physician Orders        Date Issued: 2021 (all orders  one year after issue date)     Patient name: Olga Lidia Segovia  : 2017  [facility] MR: [id number]       Diagnosis Code:  Z87.441     Please obtain the following WEEKLY:    Routine Urinalysis with Microscopic Reflex to Culture  Urine Protein with Creatinine Ratio        Contact pediatric nephrology nurses with any questions at: [phone] (Elysia) or [phone] (Kailey).     Please fax results to [phone].  ** if obtaining imaging please push images to PACS system if possible**      Ordering Physician: Jayne Mar  Pediatric Nephrology  [facility]   Attending to bill

## 2022-07-06 NOTE — H&P ADULT - PROBLEM SELECTOR PLAN 1
Admit to Burn ICU s/p vancomycin 7/2 and cefepime 7/3 now with rapidly progressing skin sloughing starting on 7/3  Plan for Skin Biopsy 7/6  [facility] gave 250cc albumin 5%  IVF resuscitation  Wound care of vit A&D, xeroform, gauze

## 2022-07-06 NOTE — DIETITIAN INITIAL EVALUATION ADULT - NS FNS DIET ORDER
Diet, Regular:   Supplement Feeding Modality:  Oral  Ensure Enlive Cans or Servings Per Day:  1       Frequency:  Two Times a day (07-06-22 @ 06:52)  -

## 2022-07-06 NOTE — CONSULT NOTE ADULT - ASSESSMENT
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 Admitted to [facility] for pancytopenia after being sent from ambulatory clinic. Hospital course complicated with bullous skin lesions, patient [facility] our Burn-ICU for the suspicion of SJS.    Hematology has been consulted for to continue ongoing pancytopenia work up.    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  - Patient completed his weekly Retuximab infusions as well as cyclophosphamide in february 2022. he was on steroids till March 2022.  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 from Drugs vs Bullous impetigo  - planned for skin biopsy today  - started on IVIG   - management as per Burn team    #CAD  #HFrEF  AFib  - Coumadin DC'd 8 months ago 2/2 episode of GIB    # BRITT  - management as per nephro  - discuss the IVIG dosage with nephro given BRITT    Recommendations:    - f/u BM biopsy results  - Blood counts are reassuring at this point, just trend CBC w/ differentials daily  - we will closely follow the patient    For any questions or concerns please reach out via MS team or call [phone]           89 yo male with PMH of HTN, HLD, CKD stage 4 (Cr 2, eGFR 28), HF (EF 30-35%) s/p PPM, CAD (s/p PCI for RCA stenting 2007), aortic stenosis (s/p aortic valve replacement 2016), Afib not on AC due to h/o GIB, hypothyroidism, peptic ulcer disease, internal hemorrhoids, prostate and colon cancers (s/p R hemicolectomy 2016), h/o severe anemia 2/2 left IM hematoma, 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 Admitted to [facility] for pancytopenia after being sent from ambulatory clinic. Hospital course complicated with bullous skin lesions, patient transferred [place] our Burn-ICU for the suspicion of SJS.    Hematology has been consulted for to continue ongoing pancytopenia work up.    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  - Patient completed his weekly Retuximab infusions as well as cyclophosphamide in february 2022. he was on steroids till March 2022.  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

## 2022-07-06 NOTE — OCCUPATIONAL THERAPY INITIAL EVALUATION ADULT - PERTINENT HX OF CURRENT PROBLEM, REHAB EVAL
89 yo M with PMH HTN, HLD, CKD4 (baseline Cr 1.5), chronic HFrEF (LVEF 30% Dec 2021), CAD s/p PCI in 2007, aortic stenosis s/p AVR in 2013, chronic atrial fibrillation s/p PPM, hypothyroidism, BPH, insomnia, GIB (now off warfarin x 8 months), colon cancer s/p R hemicolectomy in 2016, prostate cancer who presented on 06/27 from Dr. Chandler's office for pancytopenic/neutropenic WBC 1.4

## 2022-07-06 NOTE — H&P ADULT - PROBLEM SELECTOR PLAN 8
Cr 1.68 on admission. Baseline approx 2.2. Now trending up 3.32  U/A and urine lytes   Continue to monitor  Avoid nephrotoxic agents.

## 2022-07-06 NOTE — PHYSICAL THERAPY INITIAL EVALUATION ADULT - SIT-TO-STAND BALANCE
fair minus Xolair Counseling:  Patient informed of potential adverse effects including but not limited to fever, muscle aches, rash and allergic reactions.  The patient verbalized understanding of the proper use and possible adverse effects of Xolair.  All of the patient's questions and concerns were addressed.

## 2022-07-06 NOTE — H&P ADULT - NSHPPHYSICALEXAM_GEN_ALL_CORE
General: No acute distress  HEENT: NCAT; PERRL, anicteric sclera; MMM  Neck: supple, trachea midline  Cardiovascular: S1, S2 normal; RRR, no M/G/R  Respiratory: CTABL; no W/R/R  Gastrointestinal: soft, nontender, nondistended. bowel sounds present.  Skin: sloughing off of the skin of his back with blistering. Blistering is also present on the patient's arms (bilaterally) and lower extremities on the thighs and L knee. He had no mucosal membrane involvement (eyes and buccal mucosa), positive Nikolsky sign  Extremities: WWP; no edema, clubbing or cyanosis  Vascular: 2+ radial, DP/PT pulses B/L  Neurological: AAOx3; CN II-XII grossly intact; no focal deficits

## 2022-07-06 NOTE — OCCUPATIONAL THERAPY INITIAL EVALUATION ADULT - GENERAL OBSERVATIONS, REHAB EVAL
Pt encountered semi alvarado in bed, + IV (disconnected by RN), +tele, + BP cuff, + pulse Ox, +O2 via NC + [place] catheter. Pt agreeable to bedside OT assessment, may be seen as confirmed with RN. Pt returned to bedside recliner in NAD, + IV, + tele, + Bp cuff, + O2 via NC, + pulse ox, + [place] catheter, + chair alarm, + PT Mohamed present throughout ,

## 2022-07-06 NOTE — H&P ADULT - NS ATTEND AMEND GEN_ALL_CORE FT
As above   Pt transferred from [facility] for management of blistering skin condition   History reviewed     EXAM   large open pink wounds and sloughing skin of back , BLE and UE with devitalized skin   sclera clear, no oral mucosal lesions     A/P   PT wounds ~ > 25 % TBSA   r/o SJS/ TENS   as abvoe plan continued wound care, iv hydration skin biopsy   BRITT on CKD - continue monitoring avoid nephrotoxins   Discuss IViG admin with heme- onc   Hx of CHF - ECHO and cardiology eval

## 2022-07-06 NOTE — H&P ADULT - PROBLEM SELECTOR PROBLEM 4
Discussion/Summary   H/H stable, cont oral iron TID   elevated renal panel - cautiously increase PO water intake   low TSH, f/u with Endo as scheduled        Verified Results  COMP METABOLIC PANEL WITH CBCA, LIPID PANEL AND TSH (CMP,CBCA,LIPFA,TSH) [id number] 01:20PM SALAS COPPOLA   [Aug 21, 2018 11:46PM SALAS COPPOLA]  H/H stable, cont oral iron TID  elevated renal panel - cautiously increase PO water intake  low TSH, f/u with Endo as scheduled     Test Name Result Flag Reference   WHITE BLOOD COUNT 4.8 K/mcL  4.2-11.0   RED CELL COUNT 3.21 mil/mcL L 4.00-5.20   HEMOGLOBIN 7.6 g/dl L 12.0-15.5   HEMATOCRIT 24.4 % L 36.0-46.5   MEAN CORPUSCULAR VOLUME 76.0 fL L 78.0-100.0   MEAN CORPUSCULAR HEMOGLOBIN 23.7 pg L 26.0-34.0   MEAN CORPUSCULAR HGB CONC 31.1 g/dl L 32.0-36.5   RDW-CV 17.5 % H 11.0-15.0   PLATELET COUNT 891 K/mcL  140-450   JULIO CÉSAR% 67 %     LYM% 25 %     MON% 6 %     EOS% 2 %     BASO% 0 %     JULIO CÉSAR ABS 3.2 K/mcL  1.8-7.7   LYM ABS 1.2 K/mcL  1.0-4.0   MON ABS 0.3 K/mcL  0.3-0.9   EOS ABS 0.1 K/mcL  0.1-0.5   BASO ABS 0.0 K/mcL  0.0-0.3   SODIUM 145 mmol/L  135-145   POTASSIUM 4.9 mmol/L  3.4-5.1   CHLORIDE 112 mmol/L H    CARBON DIOXIDE 24 mmol/L  21-32   ANION GAP 14 mmol/L  10-20   GLUCOSE 132 mg/dl H 65-99   BUN 18 mg/dl  6-20   CREATININE 1.21 mg/dl H 0.51-0.95   GFR EST.  AMER 48     eGFR 30-59 mL/min/1.73m2 = Moderate decrease in kidney function. Stage 3 CKD (chronic kidney disease) or moderate kidney disease. GFR EST. NONAFRI AMER 42     eGFR 30-59 mL/min/1.73m2 = Moderate decrease in kidney function. Stage 3 CKD (chronic kidney disease) or moderate kidney disease.    BUN/CREATININE RATIO 15  7-25   BILIRUBIN TOTAL 0.5 mg/dl  0.2-1.0   GOT/AST 17 Units/L  <38   ALKALINE PHOSPHATASE 83 Units/L     ALBUMIN 3.7 g/dl  3.6-5.1   TOTAL PROTEIN 6.9 g/dl  6.4-8.2   GLOBULIN (CALCULATED) 3.2 g/dl  2.0-4.0   A/G RATIO 1.2  1.0-2.4   CALCIUM 8.3 mg/dl L 8.4-10.2   GPT/ALT 17 Units/L  <79   FASTING STATUS UNKNOWN hrs     CHOLESTEROL 148 mg/dl  <200   Desirable            <200  Borderline High      200 to 239  High                 >=240   HDL CHOLESTEROL 50 mg/dl  >49   Low            <40  Borderline Low 40 to 49  Near Optimal   50 to 59  Optimal        >=60   TRIGLYCERIDES 140 mg/dl  <150   Normal                   <150  Borderline High          150 to 199  High                     200 to 499  Very High                >=500   LDL CHOLESTEROL (CALCULATED) 70 mg/dl  <130   OPTIMAL               <100  NEAR OPTIMAL          100-129  BORDERLINE HIGH       130-159  HIGH                  160-189  VERY HIGH             >=190   NON-HDL CHOLESTEROL 98 mg/dl     Therapeutic Target:  CHD and risk equivalents <130  Multiple risk factors    <160  0 to 1 risk factors      <190   CHOLESTEROL/HDL RATIO 3.0  <4.5   TSH 0.322 mcUnits/mL L 0.350-5.000   DIFF TYPE      AUTOMATED DIFFERENTIAL   FASTING STATUS UNKNOWN hrs     NRBC 0 /100 WBC  0   PERCENT IMMATURE GRANULOCYTES 0 %     ABSOLUTE IMMATURE GRANULOCYTES 0.0 K/mcL  0-0.2     VITAMIN D,25 HYDROXY [id number] 01:20PM SALAS COPPOLA   [Aug 21, 2018 11:46PM SALAS COPPOLA]  H/H stable, cont oral iron TID  elevated renal panel - cautiously increase PO water intake  low TSH, f/u with Endo as scheduled     Test Name Result Flag Reference   VIT D,25 HYDROXY 47.8 ng/ml  30.0-100.0   <20  ng/mL=Vitamin D deficiency  20-29  ng/mL=Vitamin D insufficiency   ng/mL=Optimal Vitamin D  >150 ng/mL=Possible toxicity     MICROALBUMIN, URINE [id number] 01:20PM SALAS COPPOLA   [Aug 21, 2018 11:46PM SALAS COPPOLA]  H/H stable, cont oral iron TID  elevated renal panel - cautiously increase PO water intake  low TSH, f/u with Endo as scheduled     Test Name Result Flag Reference   MICROALBUMIN 2.96 mg/dl     CREATININE RANDOM URINE 52.30 mg/dl     MICROALB/CREAT RATIO 56.6 mg/g H <30   Short-term hyperglycemia, exercise, urinary tract infections, marked hypertension, heart failure, and acute febrile illness can cause transient elevations in urinary albumin excretion. Due to marked day-to-day variability in albumin excretion, at least two of three collections done in a 3 to 6 month period should show elevated levels before initially designating a patient as having microalbuminuria. Atrial fibrillation

## 2022-07-06 NOTE — PROGRESS NOTE ADULT - PROVIDER SPECIALTY LIST ADULT
Internal Medicine
Hospitalist
Internal Medicine
Heme/Onc
Rehab Medicine
Heme/Onc
Heme/Onc
Internal Medicine
Internal Medicine

## 2022-07-06 NOTE — DIETITIAN INITIAL EVALUATION ADULT - PERSON TAUGHT/METHOD
[address] [facility].  [address] [facility] 140 [facility], [address]  [phone]                            NAME:  Ashley Maravilla   :   1944   MRN:   [id number]     Date/Time:  10/28/2019 3:35 PM    Esophagogastroduodenoscopy (EGD) Procedure Note    :  Vern Cespedes MD    Staff: Endoscopy Technician-1: Ce Dorado  Endoscopy RN-1: Sharath Poole     Implants: none    Referring Provider:  Kavya Daigle MD    Anethesia/Sedation:  MAC anesthesia    Procedure Details     After infom consent was obtained for the procedure, with all risks and benefits of procedure explained the patient was taken to the endoscopy suite and placed in the left lateral decubitus position. Following sequential administration of sedation as per above, the [id number] gastroscope was inserted into the mouth and advanced under direct vision to second portion of the duodenum. A careful inspection was made as the gastroscope was withdrawn, including a retroflexed view of the proximal stomach; findings and interventions are described below. Findings:  Esophagus:normal  Stomach: Patchy erythema was noted in gastric body. No ulcer or erosions were noted. It appeared as if she has had fundoplication but she does not give that history. This could be the slipped lap band at the GE junction. No obvious erosion was noted. Duodenum/jejunum:normal      Therapies:  biopsy of stomach body    Specimens: gastric body biopsy           EBL: None    Complications:   None; patient tolerated the procedure well. Impression:    See Postoperative diagnosis above    Recommendations:  -Continue acid suppression. , -Await pathology. , -Follow symptoms. , -CT scan abdomen to assess for the band    Discharge disposition:  Home in the company of  when able to ambulate    Vern Cespedes MD
[address] [facility].  217 [facility] 140 Serg Higuera, [address]  [phone]                              Colonoscopy Procedure Note      Indications:    Constipation     :  Alma Mayers MD    Surgical Assistant: None    Implants: none    Referring Provider: Scott Munguia MD    Sedation:  MAC anesthesia    Procedure Details:  After informed consent was obtained with all risks and benefits of procedure explained and preoperative exam completed, the patient was taken to the endoscopy suite and placed in the left lateral decubitus position. Upon sequential sedation as per above, a digital rectal exam was performed  And was normal.  The Olympus videocolonoscope  was inserted in the rectum and carefully advanced to the sigmoid colon. The quality of preparation was inadequate. The colonoscope was slowly withdrawn with careful evaluation between folds. Retroflexion in the rectum was performed and was normal..     Findings:   Rectum: no mucosal lesion appreciated  Grade 2 internal and external hemorrhoid(s); Sigmoid: stool present;;  Descending Colon: not intubated  Transverse Colon: not intubated  Ascending Colon: not intubated  Cecum: not intubated  Terminal Ileum: not intubated    Interventions:  none    Specimen Removed:    ID Type Source Tests Collected by Time Destination   1 : gastric bx Preservative Gastric  Romayne Pretty, MD 10/28/2019 1521 Pathology       Complications: None. EBL:  None. Recommendations:   -High fiber diet. Resume normal medication(s). Colonoscopy next available with double prep     Discharge Disposition:  Home in the company of a  when able to ambulate.     Alma Mayers MD  10/28/2019  3:38 PM
encouraged tray and Ensure intake to help meet needs for wound healing/verbal instruction/patient instructed

## 2022-07-06 NOTE — OCCUPATIONAL THERAPY INITIAL EVALUATION ADULT - RANGE OF MOTION EXAMINATION
R shoulder 3/4 AROM in all planes,  L shoulder 1/2 AROM, elbow 3/4 AROM/no Passive ROM deficits were identified

## 2022-07-06 NOTE — PHYSICAL THERAPY INITIAL EVALUATION ADULT - GENERAL OBSERVATIONS, REHAB EVAL
13:45-14:20. chart reviewed. Pt received semi-alvarado at B/S, alert, oriented, able to follow one step instructions and agreeable to PT evaluation. Ot Kayla was present, + O2 2 L via NC, + IV, + monitoring, + pouch cath, denies pain or discomfort. Pt is able to participate in sitting at EOB, transfer from bed to chair, NAD, VSS.

## 2022-07-06 NOTE — H&P ADULT - ASSESSMENT
89 yo M with PMH HTN, HLD, CKD4 (baseline Cr 1.5), chronic HFrEF (LVEF 30% Dec 2021), CAD s/p PCI in 2007, aortic stenosis s/p AVR in 2013, chronic atrial fibrillation s/p PPM, hypothyroidism, BPH, insomnia, GIB (now off warfarin x 8 months), colon cancer s/p R hemicolectomy in 2016, prostate cancer who presented on 06/27 from Dr. Chandler's office for pancytopenic/neutropenic WBC 1.4,  s/p neupogen on 7/1 and neutropenic fever s/p vancomycin 7/2 and cefepime 7/3 now with rapidly progressing skin sloughing starting on 7/3, transferred to [facility] Burn ICU for concern of SJS/TEN 2/2 drug reaction

## 2022-07-06 NOTE — PROGRESS NOTE ADULT - SUBJECTIVE AND OBJECTIVE BOX
Hematology Oncology Progress Note (Dr. Chandler)  Discussed with Dr. Chandler and recommendations reviewed with the primary team.      Interval HPI: Reports mild back discomfort.       ICU Vital Signs Last 24 Hrs  T(C): 35.6 (05 Jul 2022 22:50), Max: 36.4 (05 Jul 2022 05:34)  T(F): 96 (05 Jul 2022 22:50), Max: 97.5 (05 Jul 2022 05:34)  HR: 79 (06 Jul 2022 00:00) (79 - 88)  BP: 110/51 (06 Jul 2022 00:00) (102/50 - 115/71)  BP(mean): 73 (05 Jul 2022 17:30) (73 - 73)  ABP: --  ABP(mean): --  RR: 17 (06 Jul 2022 00:00) (17 - 18)  SpO2: 99% (06 Jul 2022 00:00) (94% - 99%)        Gen: in NAD   HEENT: normocephalic/atraumatic  Neck: supple  Cardiovascular: RR, nl S1S2, no murmurs  Respiratory: clear air entry b/l  Gastrointestinal: BS+, soft, NT/ND  Extremities: no edema, no calf tenderness  Neurological: AAOx3, no focal deficits  Skin: no rash on visible skin  Musculoskeletal:  full ROM  Psychiatric:  mood stable        Labs:                          8.8    22.11 )-----------( 106      ( 05 Jul 2022 05:30 )             29.0     07-05    137  |  98  |  67<H>  ----------------------------<  93  4.8   |  27  |  3.32<H>    Ca    9.0      05 Jul 2022 05:30  Phos  3.3     07-05  Mg     2.1     07-05    TPro  4.9<L>  /  Alb  2.9<L>  /  TBili  0.4  /  DBili  0.2  /  AST  22  /  ALT  11  /  AlkPhos  90  07-05          TPro  5.2<L>  /  Alb  3.6  /  TBili  0.6  /  DBili  x   /  AST  14  /  ALT  6<L>  /  AlkPhos  72  06-28    PT/INR - ( 28 Jun 2022 08:53 )   PT: 15.0 sec;   INR: 1.26       PTT - ( 28 Jun 2022 08:53 )  PTT:29.3 sec

## 2022-07-06 NOTE — DIETITIAN INITIAL EVALUATION ADULT - ENERGY INTAKE
Pt reports his PO intake remains good >75%. RD spoke with RNs in AM and PM and confirmed pt's report. Pt denies n/v/d/c. Pt reports his PO intake in-house remains good >75%. RD spoke with RNs in AM and PM and confirmed pt's report. Pt denies n/v/d/c.

## 2022-07-06 NOTE — DIETITIAN INITIAL EVALUATION ADULT - ORAL INTAKE PTA/DIET HISTORY
Hx obtained from pt at bedside. Reports good PO intake during hospitalization at [facility] and able to have 1-2 Ensure Enlive daily. Reports ht 5'3". Per EMR pt weighed 138lbs in December 2021. Observed edentulism and pt endorses he has dentures with him. Hx obtained from pt at bedside. Reports good PO intake during hospitalization at [facility] and able to have 1-2 Ensure Enlive daily. Per chart, takes Vitamin D3 and folic acid daily. Reports ht 5'3". Per EMR pt weighed 138lbs in December 2021. Observed edentulism and pt endorses he has dentures with him. Hx obtained from pt at bedside. Reports good PO intake during hospitalization at [facility] and able to have 1-2 Ensure Enlive daily. Per chart, takes Vitamin D3 and folic acid daily. Reports ht 5'3". Per EMR pt weighed 138lbs in December 2021. Observed edentulism and pt endorses he has dentures with him.  -

## 2022-07-06 NOTE — H&P ADULT - HISTORY OF PRESENT ILLNESS
89 yo M with PMH HTN, HLD, CKD4 (baseline Cr 1.5), chronic HFrEF (LVEF 30% Dec 2021), CAD s/p PCI in 2007, aortic stenosis s/p AVR in 2013, chronic atrial fibrillation s/p PPM, hypothyroidism, BPH, insomnia, GIB (now off warfarin x 8 months), colon cancer s/p R hemicolectomy in 2016, prostate cancer who presented on 06/27 from Dr. Chandler's office for pancytopenic/neutropenic WBC 1.4 . At the time patient was admitted and received a BM biopsy pending final path report. Patient remained in the hospital pending biopsy results to confirm whether he had leukemia or not and ensure he begins treatment if leukemic. He was placed on filgrastim to increase his WBC count. Patient was started on Neupogen on 07/01-07/04  with current ANC >5k. Patient had a febrile episode on 07/02 for which he received vancomycin and cefepime, both of which were discontinued as blood cultures were negative and CXR had an effusion but no evidence of consolidation. The patient was no longer spiking fevers and was ready for discharge but his course was complicated by a trending Cr from his baseline of 2.2. His BRITT is of prerenal nature based on urine lytes (FeNA and FeUrea were calculated). Patient then developed skin sloughing of posterior right shoulder which started on 07/04, rapidly progressing over 24 hours to include back, bilateral UE and thighs, and worsening BRITT. [facility] reached out to [facility] for transfer to Burn ICU for suspected SJS.    Patient seen and examined at bedside. Denies f/c, n/v, HA, chest pain, SOB, abdominal pain, diarrhea, constipation, melena, hematochezia, hematuria, dysuria. Patient only complaint is pain on his back from the skin sloughing.

## 2022-07-07 NOTE — PROGRESS NOTE ADULT - ASSESSMENT
89 yo M with PMH HTN, HLD, CKD4 (baseline Cr 1.5), chronic HFrEF (LVEF 30% Dec 2021), CAD s/p PCI in 2007, aortic stenosis s/p AVR in 2013, chronic atrial fibrillation s/p PPM, hypothyroidism, BPH, insomnia, GIB (now off warfarin x 8 months), inguinal hernia repair, h/o appendectomy,  colon cancer s/p R hemicolectomy in 2016, prostate cancer who presented on 06/27 from Dr. Chandler's office for pancytopenic/neutropenic WBC 1.4,  s/p neupogen on 7/1 and neutropenic fever s/p vancomycin 7/2 and cefepime 7/3 now with rapidly progressing skin sloughing starting on 7/3, transferred to [facility] Burn ICU for concern of SJS/TEN due to drug reaction     Problem/Plan - 1:   SJS/TENS  -IV fluids  -Plan for IVIG 7/6-given, 7/7,7/8  -Skin Biopsy 7/7 done, follow up results  -Wound care of vit A&D, xeroform, gauze daily  -Pain control     Problem/Plan - 2:  BRITT (acute kidney injury).   -CKD 4 with baseline Cr about 1.5, currently about3.  Maintaining PO intake.   -Lasix and allopurinol held  -Avoid nephrotoxic meds.  -Urine lytes within nl, UA now resulted  -Nephrology consult 7/6: recommending f/u urine creatinine and FeNa, c/w IVF at 100 ml/hr, patient responding to IVF with improve in UOP and BP continue to hold diuretics, BNP, repeat CXR, Strict I/Os, daily weight, will follow       Problem/Plan - 3:  Pancytopenia.   -Thought to be T cell process (per discussion with Dr. Canas), though bone marrow biopsy results are not available yet.  -Monitor CBC w/ diff daily and CMP   -Transfuse for hb <7, Plt count <10K, <20K + febrile, <50K+ bleeding   -Hold Filgrastim (300 mg) as ANC >5k   -Follow up final path results of bone marrow biopsy.  -Hemonc consult 7/6: Please check reticulocyte, Vitamin B12, can recheck as well as folic acid and check TSH granted may be inaccurate, can transfuse to keep hbg>7-8 given cardiac  history, monitor PTT and bleeding     Problem/Plan - 4:  Atrial fibrillation.   -Patient with hx a-fib s/p PPM. Unclear if on metoprolol (was stopped last admission, filled recently per surescripts but patient claims no changes in meds since last admission). Off Coumadin since GIB 8mo ago.  -No A/C as hx of GIB.  -Cardiology consult 7/7:  Given BRITT and volume status continue holding metoprolol and lasix, strict I/O, cardiac monitoring, IV hydration only for expected losses, no need for added maintenance rate, recommend palliative care consult, will follow      Problem/Plan - 5:  Chronic systolic congestive heart failure.   -Hold home lasix 20mg and 40mg due to BRITT.  -Follow up BNP 7/7     Problem/Plan - 6:  CAD (coronary artery disease).   -s/p stent. Not on ASA 2/2 GIB 8mo ago.  -continue lipitor 20mg PO qhs.     Problem/Plan - 7:  HTN (hypertension).   -Patient normotensive  -Lasix and metoprolol held at this time     Problem/Plan - 8:  Stage 3 chronic kidney disease.   -Cr 1.68 on admission. Baseline approx 2.2. Now trending up 3.32  -UA resulted, urine lytes within normal limits  -Continue to monitor urine output  -Avoid nephrotoxic agents.     Problem/Plan - 9:  PUD (peptic ulcer disease).   -Continue protonix 40mg inpatient.     Problem/Plan - 10:  BPH (benign prostatic hyperplasia).   -Continue home flomax 0.4mg qhs.     Problem/Plan - 11:  Hypothyroidism.   Continue home synthroid 25mcg.     Problem/Plan - 12:  Insomnia.   -At home Trazadone 100mg qhs  -Inpatient will give ambien      Problem/Plan - 13:  Gout  -Hold allopurinol 100 mg PO at this time     Problem/Plan - 14:  Dementia  -Continue with home donepezil.      Plan of care discussed with Health Care Proxy Esteban at [phone], consents obtained, in agreement with current treatment.  Esteban will 7/17-7/22. 89 yo M with PMH HTN, HLD, CKD4 (baseline Cr 1.5), chronic HFrEF (LVEF 30% Dec 2021), CAD s/p PCI in 2007, aortic stenosis s/p AVR in 2013, chronic atrial fibrillation s/p PPM, hypothyroidism, BPH, insomnia, GIB (now off warfarin x 8 months), inguinal hernia repair, h/o appendectomy,  colon cancer s/p R hemicolectomy in 2016, prostate cancer who presented on 06/27 from Dr. Chandler's office for pancytopenic/neutropenic WBC 1.4,  s/p neupogen on 7/1 and neutropenic fever s/p vancomycin 7/2 and cefepime 7/3 now with rapidly progressing skin sloughing starting on 7/3, transferred to [facility] Burn ICU for concern of SJS/TEN due to drug reaction     Problem/Plan - 1:   SJS/TENS  -IV fluids  -Plan for IVIG 7/6-given, 7/7,7/8  -Skin Biopsy 7/7 done, follow up results  -Wound care of vit A&D, xeroform, gauze daily  -Pain control  -Follow up nutrition consult 7/7     Problem/Plan - 2:  BRITT (acute kidney injury).   -CKD 4 with baseline Cr about 1.5, currently about3.  Maintaining PO intake.   -Lasix and allopurinol held  -Avoid nephrotoxic meds.  -Urine lytes within nl, UA now resulted  -Nephrology consult 7/6: recommending f/u urine creatinine and FeNa, c/w IVF at 100 ml/hr, patient responding to IVF with improve in UOP and BP continue to hold diuretics, BNP, repeat CXR, Strict I/Os, daily weight, will follow       Problem/Plan - 3:  Pancytopenia.   -Thought to be T cell process (per discussion with Dr. Canas), though bone marrow biopsy results are not available yet.  -Monitor CBC w/ diff daily and CMP   -Transfuse for hb <7, Plt count <10K, <20K + febrile, <50K+ bleeding   -Hold Filgrastim (300 mg) as ANC >5k   -Follow up final path results of bone marrow biopsy.  -Hemonc consult 7/6: Please check reticulocyte, Vitamin B12, can recheck as well as folic acid and check TSH granted may be inaccurate, can transfuse to keep hbg>7-8 given cardiac  history, monitor PTT and bleeding     Problem/Plan - 4:  Atrial fibrillation.   -Patient with hx a-fib s/p PPM. Unclear if on metoprolol (was stopped last admission, filled recently per surescripts but patient claims no changes in meds since last admission). Off Coumadin since GIB 8mo ago.  -No A/C as hx of GIB.  -Cardiology consult 7/7:  Given BRITT and volume status continue holding metoprolol and lasix, strict I/O, cardiac monitoring, IV hydration only for expected losses, no need for added maintenance rate, recommend palliative care consult, will follow      Problem/Plan - 5:  Chronic systolic congestive heart failure.   -Hold home lasix 20mg and 40mg due to BRITT.  -Follow up BNP 7/7     Problem/Plan - 6:  CAD (coronary artery disease).   -s/p stent. Not on ASA 2/2 GIB 8mo ago.  -continue lipitor 20mg PO qhs.     Problem/Plan - 7:  HTN (hypertension).   -Patient normotensive  -Lasix and metoprolol held at this time     Problem/Plan - 8:  Stage 3 chronic kidney disease.   -Cr 1.68 on admission. Baseline approx 2.2. Now trending up 3.32  -UA resulted, urine lytes within normal limits  -Continue to monitor urine output  -Avoid nephrotoxic agents.     Problem/Plan - 9:  PUD (peptic ulcer disease).   -Continue protonix 40mg inpatient.     Problem/Plan - 10:  BPH (benign prostatic hyperplasia).   -Continue home flomax 0.4mg qhs.     Problem/Plan - 11:  Hypothyroidism.   Continue home synthroid 25mcg.     Problem/Plan - 12:  Insomnia.   -At home Trazadone 100mg qhs  -Inpatient will give ambien      Problem/Plan - 13:  Gout  -Hold allopurinol 100 mg PO at this time     Problem/Plan - 14:  Dementia  -Continue with home donepezil.      Plan of care discussed with Health Care Proxy Esteban at [phone], consents obtained, in agreement with current treatment.  Esteban will 7/17-7/22.

## 2022-07-07 NOTE — PROCEDURE NOTE - GENERAL PROCEDURE DETAILS
Left thigh site injected with lido-epi, prepped in sterile fashion with chlorhexidine, sterile dressing applied, 15 blade used to remove skin sample about 1cmx.5cm , area closed with chromic gut 3-0, xeroform/gauze/tegaderm applied to incision site. No complications

## 2022-07-07 NOTE — CONSULT NOTE ADULT - SUBJECTIVE AND OBJECTIVE BOX
HPI:  89 yo M with PMH HTN, HLD, CKD4 (baseline Cr 1.5), chronic HFrEF (LVEF 30% Dec 2021), CAD s/p PCI in 2007, aortic stenosis s/p AVR in 2013, chronic atrial fibrillation s/p PPM, hypothyroidism, BPH, insomnia, GIB (now off warfarin x 8 months), colon cancer s/p R hemicolectomy in 2016, prostate cancer who presented on 06/27 from Dr. Chandler's office for pancytopenic/neutropenic WBC 1.4 . At the time patient was admitted and received a BM biopsy pending final path report. Patient remained in the hospital pending biopsy results to confirm whether he had leukemia or not and ensure he begins treatment if leukemic. He was placed on filgrastim to increase his WBC count. Patient was started on Neupogen on 07/01-07/04  with current ANC >5k. Patient had a febrile episode on 07/02 for which he received vancomycin and cefepime, both of which were discontinued as blood cultures were negative and CXR had an effusion but no evidence of consolidation. The patient was no longer spiking fevers and was ready for discharge but his course was complicated by a trending Cr from his baseline of 2.2. His BRITT is of prerenal nature based on urine lytes (FeNA and FeUrea were calculated). Patient then developed skin sloughing of posterior right shoulder which started on 07/04, rapidly progressing over 24 hours to include back, bilateral UE and thighs, and worsening BRITT. [facility] reached out to [facility] for transfer to Burn ICU for suspected SJS.    Patient seen and examined at bedside. Denies f/c, n/v, HA, chest pain, SOB, abdominal pain, diarrhea, constipation, melena, hematochezia, hematuria, dysuria. Patient only complaint is pain on his back from the skin sloughing.      (06 Jul 2022 01:28)      PAST MEDICAL & SURGICAL HISTORY  Aortic valve replaced  Atrial fibrillation  CAD (coronary artery disease)  HTN (hypertension)  PUD (peptic ulcer disease)  Constipation  Iron deficiency anemia  Diverticulosis  High cholesterol  Carotid arterial disease  Internal hemorrhoids  Endocarditis  Colon cancer  Hypothyroidism  S/P AVR  2013 @ [facility]  by Dr. Young  Cardiac pacemaker  3yrs ago  H/O inguinal hernia repair  H/O right hemicolectomy  History of appendectomy        FAMILY HISTORY:  FAMILY HISTORY:      SOCIAL HISTORY:  []smoker  []Alcohol  []Drug    ALLERGIES:  cefepime (Loya-Zane)  Neupogen (Loya-Zane)  vancomycin (Loya-Zane)      MEDICATIONS:  MEDICATIONS  (STANDING):  ascorbic acid 500 milliGRAM(s) Oral daily  atorvastatin 20 milliGRAM(s) Oral at bedtime  cholecalciferol 1000 Unit(s) Oral every 24 hours  donepezil 10 milliGRAM(s) Oral at bedtime  folic acid 1 milliGRAM(s) Oral daily  heparin   Injectable 5000 Unit(s) SubCutaneous every 8 hours  immune   globulin 10% (GAMMAGARD) IVPB 60 Gram(s) IV Intermittent daily  levothyroxine 25 MICROGram(s) Oral daily  midodrine. 10 milliGRAM(s) Oral every 8 hours  multivitamin 1 Tablet(s) Oral daily  pantoprazole    Tablet 40 milliGRAM(s) Oral before breakfast  sodium chloride 0.9% Bolus 500 milliLiter(s) IV Bolus once  sodium chloride 0.9%. 1000 milliLiter(s) (125 mL/Hr) IV Continuous <Continuous>  tamsulosin 0.4 milliGRAM(s) Oral at bedtime  traZODone 100 milliGRAM(s) Oral at bedtime  vitamin A &amp; D Ointment 1 Application(s) Topical daily  zinc sulfate 220 milliGRAM(s) Oral daily    MEDICATIONS  (PRN):  acetaminophen     Tablet .. 650 milliGRAM(s) Oral every 6 hours PRN Mild Pain (1 - 3)  morphine  - Injectable 2 milliGRAM(s) IV Push every 6 hours PRN Severe Pain (7 - 10)  morphine  - Injectable 2 milliGRAM(s) IV Push two times a day PRN wound care  ondansetron    Tablet 4 milliGRAM(s) Oral three times a day PRN Nausea and/or Vomiting  oxycodone    5 mG/acetaminophen 325 mG 1 Tablet(s) Oral every 4 hours PRN Moderate Pain (4 - 6)  senna 2 Tablet(s) Oral at bedtime PRN Constipation  zolpidem 5 milliGRAM(s) Oral at bedtime PRN Insomnia      HOME MEDICATIONS:  Home Medications:  allopurinol 100 mg oral tablet: 1 tab(s) orally once a day (06 Jul 2022 01:44)  atorvastatin 20 mg oral tablet: 1 tab(s) orally once a day (at bedtime) (06 Jul 2022 01:44)  Colace 100 mg oral capsule: 1 tab(s) orally once a day (06 Jul 2022 01:44)  donepezil 10 mg oral tablet: 1 tab(s) orally once a day (at bedtime) (06 Jul 2022 01:44)  eszopiclone 3 mg oral tablet: 1 tab(s) orally once a day (at bedtime) (06 Jul 2022 01:44)  folic acid 1 mg oral tablet: 1 tab(s) orally once a day (06 Jul 2022 01:44)  furosemide 40 mg oral tablet: orally once a day on monday, wednesday and friday (06 Jul 2022 01:44)  Lasix 20 mg oral tablet: Once a day tuesday, thursday, saturday and sunday (06 Jul 2022 01:44)  levothyroxine 25 mcg (0.025 mg) oral tablet: 1 tab(s) orally once a day (06 Jul 2022 01:44)  omeprazole 20 mg oral delayed release capsule: orally 2 times a day (06 Jul 2022 01:44)  tamsulosin 0.4 mg oral capsule: 1 tab(s) orally once a day (06 Jul 2022 01:44)  traZODone 100 mg oral tablet: 1 tab(s) orally once a day (at bedtime) (06 Jul 2022 01:44)  Tylenol 325 mg oral tablet: 1 tab(s) orally every 6 hours, As Needed (06 Jul 2022 01:44)  Vitamin D3 1250 mcg (50,000 intl units) oral capsule: 1 cap(s) orally once a week (06 Jul 2022 01:44)  Zofran 4 mg oral tablet: 1 tab(s) orally once a day, As Needed (06 Jul 2022 01:44)      VITALS:   T(F): 96.7 (07-07 @ 06:00), Max: 98.2 (07-06 @ 06:45)  HR: 80 (07-07 @ 10:30) (79 - 88)  BP: 114/52 (07-07 @ 10:30) (87/51 - 139/59)  BP(mean): 75 (07-07 @ 10:30) (61 - 103)  RR: 18 (07-07 @ 07:00) (15 - 19)  SpO2: 99% (07-07 @ 10:30) (91% - 100%)    I&O's Summary    06 Jul 2022 07:01  -  07 Jul 2022 07:00  --------------------------------------------------------  IN: 4267 mL / OUT: 360 mL / NET: 3907 mL    07 Jul 2022 07:01  -  07 Jul 2022 12:50  --------------------------------------------------------  IN: 325 mL / OUT: 100 mL / NET: 225 mL        REVIEW OF SYSTEMS:  CONSTITUTIONAL: No weakness, fevers or chills  EYES: No visual changes  ENT: No vertigo or throat pain   NECK: No pain or stiffness  RESPIRATORY: No cough, wheezing, hemoptysis; No shortness of breath  CARDIOVASCULAR: No chest pain or palpitations  GASTROINTESTINAL: No abdominal or epigastric pain. No nausea, vomiting, or hematemesis; No diarrhea or constipation. No melena or hematochezia.  GENITOURINARY: No dysuria, frequency or hematuria  NEUROLOGICAL: No numbness or weakness  SKIN: No itching, no rashes  MSK: no    PHYSICAL EXAM:  NEURO: patient is awake , alert and oriented  GEN: Not in acute distress  NECK: no thyroid enlargement, no JVD  LUNGS: Clear to auscultation bilaterally   CARDIOVASCULAR: S1/S2 present, RRR , no murmurs or rubs, no carotid bruits,  + PP bilaterally  ABD: Soft, non-tender, non-distended, +BS  EXT: No LATOSHA  SKIN: Intact    LABS:                        8.4    20.14 )-----------( 96       ( 06 Jul 2022 11:30 )             28.0     07-06    135  |  98  |  67<HH>  ----------------------------<  93  4.6   |  25  |  3.3<H>    Ca    9.1      06 Jul 2022 11:30  Phos  3.2     07-06  Mg     2.1     07-06    TPro  4.5<L>  /  Alb  3.2<L>  /  TBili  0.3  /  DBili  x   /  AST  16  /  ALT  11  /  AlkPhos  109  07-06              Troponin trend:            RADIOLOGY:  -CXR:   Frontal examination of the chest demonstrates cardiomegaly. Left   effusion. Discoid change left lung base. No interval change position   remaining support devices in comparison to prior examination of the chest   7/2/2022. Status post sternotomy.    -TTE: (12/25/21)   1. Mildly dilated left ventricular size.   2. Moderately-to-severely reduced left ventricular systolic function.   3. Normal right ventricular size and systolic function.   4. Biatrial enlargement.   5. Moderate mitral regurgitation.   6. Moderate-to-severe tricuspid regurgitation.   7. Pulmonary hypertension present, pulmonary artery systolic pressure is 47 mmHg.   8. No pericardial effusion.    Left Ventricle:  The left ventricle cavity size is mildly dilated. Abnormal septal motion seen due to abnormal conduction. Left ventricular systolic function is   moderately-to-severely reduced with a calculated ejection fraction of 30% with global hypokinesis. There is normal left ventricular diastolic   function and filling pressure.    Right Ventricle:  The right ventricle is normal in size. Right ventricular systolic function is normal. The tricuspid annular plane systolic excursion   (TAPSE) is 19.00 mm (normal >=17 mm). RV tissue Doppler S' is 13.00 cm/s (normal >10 cm/s). A device lead is noted in the right heart.    Inferior Vena Cava:  The inferior vena cava is dilated (>2.1cm) with abnormal inspiratory collapse (<50%) consistent with elevated right atrial pressure (15, range 10-20mmHg).    Pulmonic Valve:  Structurally normal pulmonic valve with normal leaflet excursion. There is trace pulmonic regurgitation.    Aorta:  The aortic root is normalin size. The aortic arch is normal without evidence of aortic coarctation.    Pericardium:  No pericardial effusion is seen.    -CCTA:  -STRESS TEST:  -CATHETERIZATION:    ECG:  Ventricular Rate 80 BPM  Atrial Rate 75 BPM  QRS Duration 148 ms  Q-T Interval 436 ms  QTC Calculation(Bazett) 502 ms  R Axis -83 degrees  T Axis 101 degrees  Diagnosis: Ventricular pacemaker with complete capture    TELEMETRY EVENTS:  N/a HPI:  89 yo M with PMH HTN, HLD, CKD4 (baseline Cr 1.5), chronic HFrEF (LVEF 30% Dec 2021), CAD s/p PCI in 2007, aortic stenosis s/p AVR in 2013, chronic atrial fibrillation s/p PPM, hypothyroidism, BPH, insomnia, GIB (now off warfarin x 8 months), colon cancer s/p R hemicolectomy in 2016, prostate cancer who presented on 06/27 from Dr. Chandler's office for pancytopenic/neutropenic WBC 1.4 . At the time patient was admitted and received a BM biopsy pending final path report. Patient remained in the hospital pending biopsy results to confirm whether he had leukemia or not and ensure he begins treatment if leukemic. He was placed on filgrastim to increase his WBC count. Patient was started on Neupogen on 07/01-07/04  with current ANC >5k. Patient had a febrile episode on 07/02 for which he received vancomycin and cefepime, both of which were discontinued as blood cultures were negative and CXR had an effusion but no evidence of consolidation. The patient was no longer spiking fevers and was ready for discharge but his course was complicated by a trending Cr from his baseline of 2.2. His BRITT is of prerenal nature based on urine lytes (FeNA and FeUrea were calculated). Patient then developed skin sloughing of posterior right shoulder which started on 07/04, rapidly progressing over 24 hours to include back, bilateral UE and thighs, and worsening BRITT. [facility] reached out to [facility] for transfer to Burn ICU for suspected SJS.    Patient seen and examined at bedside. Denies f/c, n/v, HA, chest pain, SOB, abdominal pain, diarrhea, constipation, melena, hematochezia, hematuria, dysuria. Patient only complaint is pain on his back from the skin sloughing.         PAST MEDICAL & SURGICAL HISTORY  Aortic valve replaced  Atrial fibrillation  CAD (coronary artery disease)  HTN (hypertension)  PUD (peptic ulcer disease)  Constipation  Iron deficiency anemia  Diverticulosis  High cholesterol  Carotid arterial disease  Internal hemorrhoids  Endocarditis  Colon cancer  Hypothyroidism  S/P AVR  2013 @ [facility]  by Dr. Young  Cardiac pacemaker  3yrs ago  H/O inguinal hernia repair  H/O right hemicolectomy  History of appendectomy      ALLERGIES:  cefepime (Loya-Zane)  Neupogen (Loya-Zane)  vancomycin (LoyaZane)      MEDICATIONS:  MEDICATIONS  (STANDING):  ascorbic acid 500 milliGRAM(s) Oral daily  atorvastatin 20 milliGRAM(s) Oral at bedtime  cholecalciferol 1000 Unit(s) Oral every 24 hours  donepezil 10 milliGRAM(s) Oral at bedtime  folic acid 1 milliGRAM(s) Oral daily  heparin   Injectable 5000 Unit(s) SubCutaneous every 8 hours  immune   globulin 10% (GAMMAGARD) IVPB 60 Gram(s) IV Intermittent daily  levothyroxine 25 MICROGram(s) Oral daily  midodrine. 10 milliGRAM(s) Oral every 8 hours  multivitamin 1 Tablet(s) Oral daily  pantoprazole    Tablet 40 milliGRAM(s) Oral before breakfast  sodium chloride 0.9% Bolus 500 milliLiter(s) IV Bolus once  sodium chloride 0.9%. 1000 milliLiter(s) (125 mL/Hr) IV Continuous <Continuous>  tamsulosin 0.4 milliGRAM(s) Oral at bedtime  traZODone 100 milliGRAM(s) Oral at bedtime  vitamin A &amp; D Ointment 1 Application(s) Topical daily  zinc sulfate 220 milliGRAM(s) Oral daily    MEDICATIONS  (PRN):  acetaminophen     Tablet .. 650 milliGRAM(s) Oral every 6 hours PRN Mild Pain (1 - 3)  morphine  - Injectable 2 milliGRAM(s) IV Push every 6 hours PRN Severe Pain (7 - 10)  morphine  - Injectable 2 milliGRAM(s) IV Push two times a day PRN wound care  ondansetron    Tablet 4 milliGRAM(s) Oral three times a day PRN Nausea and/or Vomiting  oxycodone    5 mG/acetaminophen 325 mG 1 Tablet(s) Oral every 4 hours PRN Moderate Pain (4 - 6)  senna 2 Tablet(s) Oral at bedtime PRN Constipation  zolpidem 5 milliGRAM(s) Oral at bedtime PRN Insomnia      HOME MEDICATIONS:  Home Medications:  allopurinol 100 mg oral tablet: 1 tab(s) orally once a day (06 Jul 2022 01:44)  atorvastatin 20 mg oral tablet: 1 tab(s) orally once a day (at bedtime) (06 Jul 2022 01:44)  Colace 100 mg oral capsule: 1 tab(s) orally once a day (06 Jul 2022 01:44)  donepezil 10 mg oral tablet: 1 tab(s) orally once a day (at bedtime) (06 Jul 2022 01:44)  eszopiclone 3 mg oral tablet: 1 tab(s) orally once a day (at bedtime) (06 Jul 2022 01:44)  folic acid 1 mg oral tablet: 1 tab(s) orally once a day (06 Jul 2022 01:44)  furosemide 40 mg oral tablet: orally once a day on monday, wednesday and friday (06 Jul 2022 01:44)  Lasix 20 mg oral tablet: Once a day tuesday, thursday, saturday and sunday (06 Jul 2022 01:44)  levothyroxine 25 mcg (0.025 mg) oral tablet: 1 tab(s) orally once a day (06 Jul 2022 01:44)  omeprazole 20 mg oral delayed release capsule: orally 2 times a day (06 Jul 2022 01:44)  tamsulosin 0.4 mg oral capsule: 1 tab(s) orally once a day (06 Jul 2022 01:44)  traZODone 100 mg oral tablet: 1 tab(s) orally once a day (at bedtime) (06 Jul 2022 01:44)  Tylenol 325 mg oral tablet: 1 tab(s) orally every 6 hours, As Needed (06 Jul 2022 01:44)  Vitamin D3 1250 mcg (50,000 intl units) oral capsule: 1 cap(s) orally once a week (06 Jul 2022 01:44)  Zofran 4 mg oral tablet: 1 tab(s) orally once a day, As Needed (06 Jul 2022 01:44)      VITALS:   T(F): 96.7 (07-07 @ 06:00), Max: 98.2 (07-06 @ 06:45)  HR: 80 (07-07 @ 10:30) (79 - 88)  BP: 114/52 (07-07 @ 10:30) (87/51 - 139/59)  BP(mean): 75 (07-07 @ 10:30) (61 - 103)  RR: 18 (07-07 @ 07:00) (15 - 19)  SpO2: 99% (07-07 @ 10:30) (91% - 100%)    I&O's Summary    06 Jul 2022 07:01  -  07 Jul 2022 07:00  --------------------------------------------------------  IN: 4267 mL / OUT: 360 mL / NET: 3907 mL    07 Jul 2022 07:01  -  07 Jul 2022 12:50  --------------------------------------------------------  IN: 325 mL / OUT: 100 mL / NET: 225 mL        REVIEW OF SYSTEMS:  CONSTITUTIONAL: See HPI  NECK: No pain or stiffness  RESPIRATORY: See HPI  CARDIOVASCULAR: See HPI  GASTROINTESTINAL: No abdominal/epigastric pain, nausea, vomiting, hematemesis, diarrhea, constipation, melena or hematochezia  GENITOURINARY: No dysuria, frequency, hematuria, incontinence  NEUROLOGICAL: No headaches, memory loss, loss of strength, numbness, tremors  SKIN: See HPI   ENDOCRINE: No heat/cold intolerance or hair loss  MUSCULOSKELETAL: No joint pain or swelling    PHYSICAL EXAM:  General: NAD  HEENT: NCAT  Neck: supple, no JVD  CV: RRR, S1S2 nl, no murmurs  Respiratory: CTA anterior  Abdomen: soft, NT/ND  Extremities: warm  Neuro: Nonfocal      LABS:                        8.4    20.14 )-----------( 96       ( 06 Jul 2022 11:30 )             28.0     07-06    135  |  98  |  67<HH>  ----------------------------<  93  4.6   |  25  |  3.3<H>    Ca    9.1      06 Jul 2022 11:30  Phos  3.2     07-06  Mg     2.1     07-06    TPro  4.5<L>  /  Alb  3.2<L>  /  TBili  0.3  /  DBili  x   /  AST  16  /  ALT  11  /  AlkPhos  109  07-06          RADIOLOGY:  -CXR:   Frontal examination of the chest demonstrates cardiomegaly. Left   effusion. Discoid change left lung base. No interval change position   remaining support devices in comparison to prior examination of the chest   7/2/2022. Status post sternotomy.    -TTE: (12/25/21)   1. Mildly dilated left ventricular size.   2. Moderately-to-severely reduced left ventricular systolic function.   3. Normal right ventricular size and systolic function.   4. Biatrial enlargement.   5. Moderate mitral regurgitation.   6. Moderate-to-severe tricuspid regurgitation.   7. Pulmonary hypertension present, pulmonary artery systolic pressure is 47 mmHg.   8. No pericardial effusion.    Left Ventricle:  The left ventricle cavity size is mildly dilated. Abnormal septal motion seen due to abnormal conduction. Left ventricular systolic function is   moderately-to-severely reduced with a calculated ejection fraction of 30% with global hypokinesis. There is normal left ventricular diastolic   function and filling pressure.    Right Ventricle:  The right ventricle is normal in size. Right ventricular systolic function is normal. The tricuspid annular plane systolic excursion   (TAPSE) is 19.00 mm (normal >=17 mm). RV tissue Doppler S' is 13.00 cm/s (normal >10 cm/s). A device lead is noted in the right heart.    Inferior Vena Cava:  The inferior vena cava is dilated (>2.1cm) with abnormal inspiratory collapse (<50%) consistent with elevated right atrial pressure (15, range 10-20mmHg).    Pulmonic Valve:  Structurally normal pulmonic valve with normal leaflet excursion. There is trace pulmonic regurgitation.    Aorta:  The aortic root is normalin size. The aortic arch is normal without evidence of aortic coarctation.    Pericardium:  No pericardial effusion is seen.      ECG:  Ventricular Rate 80 BPM  Atrial Rate 75 BPM  QRS Duration 148 ms  Q-T Interval 436 ms  QTC Calculation(Bazett) 502 ms  R Axis -83 degrees  T Axis 101 degrees  Diagnosis: Ventricular pacemaker with complete capture

## 2022-07-07 NOTE — CONSULT NOTE ADULT - SUBJECTIVE AND OBJECTIVE BOX
NEPHROLOGY CONSULTATION NOTE    89 yo M with PMH HTN, HLD, CKD4 (baseline Cr 1.5), chronic HFrEF (LVEF 30% Dec 2021), CAD s/p PCI in , aortic stenosis s/p AVR in , chronic atrial fibrillation s/p PPM, hypothyroidism, BPH, insomnia, GIB (now off warfarin x 8 months), colon cancer s/p R hemicolectomy in , prostate cancer who presented on  from Dr. Chandler's office for pancytopenic/neutropenic WBC 1.4,  s/p neupogen on  and neutropenic fever s/p vancomycin  and cefepime 7/3 now with rapidly progressing skin sloughing starting on 7/3, transferred to [facility] Burn ICU for concern of SJS/TEN 2/2 drug reaction    PAST MEDICAL & SURGICAL HISTORY:  Aortic valve replaced      Atrial fibrillation      CAD (coronary artery disease)      HTN (hypertension)      PUD (peptic ulcer disease)      Constipation      Iron deficiency anemia      Diverticulosis      High cholesterol      Carotid arterial disease      Internal hemorrhoids      Endocarditis      Colon cancer      Hypothyroidism      S/P AVR   @ [facility]  by Dr. Young      Cardiac pacemaker  3yrs ago      H/O inguinal hernia repair      H/O right hemicolectomy      History of appendectomy        Allergies:  cefepime (Loya-Zane)  Neupogen (Loya-Zane)  vancomycin (Loya-Zane)    Home Medications Reviewed  Hospital Medications:   MEDICATIONS  (STANDING):  ascorbic acid 500 milliGRAM(s) Oral daily  atorvastatin 20 milliGRAM(s) Oral at bedtime  cholecalciferol 1000 Unit(s) Oral every 24 hours  donepezil 10 milliGRAM(s) Oral at bedtime  folic acid 1 milliGRAM(s) Oral daily  heparin   Injectable 5000 Unit(s) SubCutaneous every 8 hours  immune   globulin 10% (GAMMAGARD) IVPB 60 Gram(s) IV Intermittent daily  levothyroxine 25 MICROGram(s) Oral daily  midodrine. 10 milliGRAM(s) Oral every 8 hours  multivitamin 1 Tablet(s) Oral daily  pantoprazole    Tablet 40 milliGRAM(s) Oral before breakfast  sodium chloride 0.9% Bolus 500 milliLiter(s) IV Bolus once  sodium chloride 0.9%. 1000 milliLiter(s) (125 mL/Hr) IV Continuous <Continuous>  tamsulosin 0.4 milliGRAM(s) Oral at bedtime  traZODone 100 milliGRAM(s) Oral at bedtime  vitamin A &amp; D Ointment 1 Application(s) Topical daily  zinc sulfate 220 milliGRAM(s) Oral daily    SOCIAL HISTORY:  Denies ETOH,Smoking,   FAMILY HISTORY:      REVIEW OF SYSTEMS:  CONSTITUTIONAL: No weakness, fevers or chills  EYES/ENT: No visual changes;  No vertigo or throat pain   NECK: No pain or stiffness  RESPIRATORY: mild cough  CARDIOVASCULAR: No chest pain or palpitations.  GASTROINTESTINAL: No abdominal or epigastric pain. No nausea, vomiting, or hematemesis; No diarrhea or constipation. No melena or hematochezia.  GENITOURINARY: No dysuria, frequency, foamy urine, urinary urgency, incontinence or hematuria  NEUROLOGICAL: No numbness or weakness  SKIN: dressing on the trunk and RUE  VASCULAR: No bilateral lower extremity edema.     VITALS:  Vital Signs Last 24 Hrs  T(C): 36.1 (2022 13:00), Max: 36.2 (2022 16:00)  T(F): 97 (2022 13:00), Max: 97.1 (2022 16:00)  HR: 79 (2022 15:00) (79 - 80)  BP: 97/50 (2022 15:00) (87/51 - 134/80)  BP(mean): 68 (2022 15:00) (61 - 103)  RR: 18 (2022 07:00) (16 - 19)  SpO2: 100% (2022 15:00) (92% - 100%)     @ : @ 07:00  --------------------------------------------------------  IN: 4267 mL / OUT: 360 mL / NET: 3907 mL     @ 07: @ 15:35  --------------------------------------------------------  IN: 325 mL / OUT: 100 mL / NET: 225 mL      Height (cm): 160 ( @ 07:00)  Weight (kg): 60.2 ( @ 18:15)  BMI (kg/m2): 23.5 ( @ 07:00)  BSA (m2): 1.62 ( @ 07:00)  PHYSICAL EXAM:  Constitutional: NAD  HEENT: anicteric sclera, oropharynx clear, MMM  Neck: No JVD  Respiratory: CTAB, mild faint crackles  Cardiovascular: S1, S2, RRR  Gastrointestinal: BS+, soft, NT/ND  Extremities: No cyanosis or clubbing. No peripheral edema  Neurological: A/O x 3, no focal deficits  Psychiatric: Normal mood, normal affect  : No CVA tenderness. No jerez.   Skin: skin sloughing involving the RUE and trunk    LABS:      134<L>  |  100  |  69<HH>  ----------------------------<  125<H>  4.6   |  21  |  3.1<H>    Ca    9.2      2022 11:43  Phos  3.6       Mg     2.0         TPro  5.5<L>  /  Alb  3.0<L>  /  TBili  0.2  /  DBili      /  AST  16  /  ALT  13  /  AlkPhos  119<H>      Creatinine Trend: 3.1 <--, 3.3 <--, 3.32 <--, 3.23 <--, 2.99 <--, 2.18 <--, 1.94 <--, 1.87 <--, 1.71 <--, 1.55 <--, 1.58 <--, 1.64 <--, 1.68 <--                        8.8    12.99 )-----------( 99       ( 2022 11:43 )             29.4     Urine Studies:  Urinalysis Basic - ( 2022 10:30 )    Color: Yellow / Appearance: Slightly Turbid / S.016 / pH:   Gluc:  / Ketone: Trace  / Bili: Negative / Urobili: <2 mg/dL   Blood:  / Protein: 100 mg/dL / Nitrite: Negative   Leuk Esterase: Large / RBC: 9 /HPF / WBC 75 /HPF   Sq Epi:  / Non Sq Epi: 13 /HPF / Bacteria: Negative      Sodium, Random Urine: 20.0 mmoL/L ( @ 10:30)  Osmolality, Random Urine: 367 mos/kg ( @ 10:30)  Potassium, Random Urine: 46 mmol/L ( @ 10:30)  Sodium, Random Urine: 28 mmol/L ( @ 11:35)  Creatinine, Random Urine: 94 mg/dL ( @ 11:35)  Protein/Creatinine Ratio Calculation: 0.3 Ratio ( @ 11:35)  Sodium, Random Urine: <20 mmol/L ( @ 10:29)  Creatinine, Random Urine: 130 mg/dL ( @ 10:29)

## 2022-07-07 NOTE — CONSULT NOTE ADULT - ASSESSMENT
89 yo M with PMH HTN, HLD, CKD4 (baseline Cr 1.5), chronic HFrEF (LVEF 30% Dec 2021), CAD s/p PCI in 2007, aortic stenosis s/p AVR in 2013, chronic atrial fibrillation s/p PPM, hypothyroidism, BPH, insomnia, GIB (now off warfarin x 8 months), colon cancer s/p R hemicolectomy in 2016, prostate cancer who presented on 06/27 from Dr. Chandler's office for pancytopenic/neutropenic WBC 1.4,  s/p neupogen on 7/1 and neutropenic fever s/p vancomycin 7/2 and cefepime 7/3 now with rapidly progressing skin sloughing starting on 7/3, transferred to [facility] Burn ICU for concern of SJS/TEN 2/2 drug reaction. 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/ pancytopenia r/o leukemia/ HFrEF/ Afib s/p PPM  BRITT secondary to prerenal with possible ATN component in the context of TEN and hypotension  f/u urine creatinine and FeNa  c/w IVF at 100 ml/hr, patient responding to IVF with improve in UOP and BP  continue to hold diuretics  BNP  repeat CXR  Strict I/Os  daily weight  will follow   89 yo M with PMH HTN, HLD, CKD4 (baseline Cr 1.5), chronic HFrEF (LVEF 30% Dec 2021), CAD s/p PCI in 2007, aortic stenosis s/p AVR in 2013, chronic atrial fibrillation s/p PPM, hypothyroidism, BPH, insomnia, GIB (now off warfarin x 8 months), colon cancer s/p R hemicolectomy in 2016, prostate cancer who presented on 06/27 from Dr. Chandler's office for pancytopenic/neutropenic WBC 1.4,  s/p neupogen on 7/1 and neutropenic fever s/p vancomycin 7/2 and cefepime 7/3 now with rapidly progressing skin sloughing starting on 7/3, transferred to [facility] Burn ICU for concern of SJS/TEN 2/2 drug reaction. 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

## 2022-07-07 NOTE — CONSULT NOTE ADULT - ASSESSMENT
This is a case of a 89 yo M KTH  1. HTN  2. HLD  3. CKD4 (baseline Cr 1.5)  4. chronic HFrEF (LVEF 30% Dec 2021)  5. CAD s/p RCA stent in 2007  6. Aortic stenosis s/p bioprostehtic AVR in 2013 and revision in 2016  7. Chronic atrial fibrillation s/p PPM not on AC   8. Hx of UGIB (now off warfarin and DAPT x 8 months)  9. Hypothyroidism  10. BPH  11. insomnia  12. Colon cancer s/p Right hemicolectomy in 2016  13. Prostate cancer     Patient initially admitted to [facility] due to pancytopenia. 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 Bone Marrow biopsy pending final path report.     During hospital stay patient became febrile and received vanc and cefepime empirically for febrile neutropenia which have since been discontinued as infectious workup was negative and fever subsided    However since then, patient has developed BRITT with skin sloughing that started on right shoulder which started on 07/04, rapidly progressing over 24 hours to include back, bilateral UE and thighs, and now is encompassing 30% of body surface area    Recs:   - On physical exam patient appears euvolemic  - No Chest pain, orthopnea, or PND  - SOB with minimal effort  - Clinical picture most consistent with SJS/TEN  - Less likley Sezary syndrome, or bullous pemphiguoid  - Skin Bx by BURN today  - Given BRITT and volume status continue holding metoprolol and lasix  - strict I/O  - cardiac monitoring  - SJS/TEN area covering 30% of body surface area --> IV hydration only for expected losses. no need for added maintenance rate  - will follow       *** This is a preliminary note. Final recs pending discussion with cardiology attending. ***   This is a case of a 89 yo M KTH  1. HTN  2. HLD  3. CKD4 (baseline Cr 1.5)  4. chronic HFrEF (LVEF 30% Dec 2021)  5. CAD s/p RCA stent in 2007  6. Aortic stenosis s/p bioprostehtic AVR in 2013 and revision in 2016  7. Chronic atrial fibrillation s/p PPM not on AC   8. Hx of UGIB (now off warfarin and DAPT x 8 months)  9. Hypothyroidism  10. BPH  11. insomnia  12. Colon cancer s/p Right hemicolectomy in 2016  13. Prostate cancer     Patient initially admitted to [facility] due to pancytopenia. 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 Bone Marrow biopsy pending final path report.     During hospital stay patient became febrile and received vanc and cefepime empirically for febrile neutropenia which have since been discontinued as infectious workup was negative and fever subsided    However since then, patient has developed BRITT with skin sloughing that started on right shoulder which started on 07/04, rapidly progressing over 24 hours to include back, bilateral UE and thighs, and now is encompassing 30% of body surface area    Recs:   - On physical exam patient appears euvolemic  - No Chest pain, orthopnea, or PND  - SOB with minimal effort  - Clinical picture most consistent with SJS/TEN  - Less likley Sezary syndrome, or bullous pemphiguoid  - Skin Bx by BURN today  - Given BRITT and volume status continue holding metoprolol and lasix  - strict I/O  - cardiac monitoring  - SJS/TEN area covering 30% of body surface area --> IV hydration only for expected losses. no need for added maintenance rate  - Recommend palliative care consult  - will follow      89 yo M  1. 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 CKD4 (baseline Cr 1.5)  4. chronic HFrEF (LVEF 30% Dec 2021)  5. CAD s/p RCA stent in 2007  6. Aortic stenosis s/p bioprostehtic AVR in 2013 and revision in 2016  7. Chronic atrial fibrillation s/p PPM not on AC   8. Hx of UGIB (now off warfarin and DAPT x 8 months)       Patient initially admitted to [facility] due to pancytopenia. 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 Bone Marrow biopsy pending final path report.     During hospital stay patient became febrile and received vanc and cefepime empirically for febrile neutropenia which have since been discontinued as infectious workup was negative and fever subsided    However since then, patient has developed BRITT with skin sloughing that started on right shoulder which started on 07/04, rapidly progressing over 24 hours to include back, bilateral UE and thighs, and now is encompassing 30% of body surface area    Recs:   - On physical exam patient appears euvolemic  - No Chest pain, orthopnea, or PND  - SOB with minimal effort  - Clinical picture most consistent with SJS/TEN  - Less likley Sezary syndrome, or bullous pemphiguoid  - Skin Bx by BURN today  - Given BRITT and volume status continue holding metoprolol and lasix  - strict I/O  - cardiac monitoring  - SJS/TEN area covering 30% of body surface area --> IV hydration only for expected losses. no need for added maintenance rate  - Recommend palliative care consult  - will follow

## 2022-07-08 NOTE — PROGRESS NOTE ADULT - ASSESSMENT
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 Admitted to [facility] for pancytopenia after being sent from ambulatory clinic. Hospital course complicated with bullous skin lesions, patient [place] [facility] Burn-ICU for the suspicion of SJS.    Hematology has been consulted for to continue ongoing pancytopenia work up.    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  - Patient completed his weekly Retuximab infusions as well as cyclophosphamide in february 2022. he was on steroids till March 2022.  - Last month he was found to be neutropenic and admitted to hospital : WBC 0.60 (ANC 10, , basophilia 8.9% and monocytosis), Hb 9.8 (macrocytic),   - patient was started on Abx and Filgastrim (later discontinued with improvement in ANC)  - IR guided bone marrow Biopsy on 6/28/22 - highly suggestive of T-cell lymphoproliferative disorder     # Bullous skin lesions sec to SSSS vs SJS from Drugs vs Bullous impetigo  - planned for skin biopsy today  - started on IVIG   - management as per Burn team    #CAD  #HFrEF  #AFib  #AVR  - Coumadin DC'd 8 months ago 2/2 episode of GIB    # BRITT  - management as per nephro  - discuss the IVIG dosage with nephro given BRITT    Recommendations:    - Blood counts are reassuring at this point, just trend CBC w/ differentials daily  - we will closely follow the patient    This is an incomplete note will be completed after consultation with Attending  For any questions or concerns please reach out via MS team or call [phone]

## 2022-07-08 NOTE — CONSULT NOTE ADULT - PROBLEM SELECTOR RECOMMENDATION 3
On multiple pressors, poor overall prognosis  -Now DNR only  -Ongoing medical management  -vent and pressors managed per burn ICU

## 2022-07-08 NOTE — PROGRESS NOTE ADULT - CRITICAL CARE ATTENDING COMMENT
As above   Overnight noted to have developed respiratory acidosis -and worsening oliguria. Diuretic administered as well as supplemental O2.   Team discussed with pt poss need for intubation.   Pt decompensated and  required emergent intubation. large amount of dark bloody aspirate- coffee grounds emesis within mouth and via OGT c/w UGI bleed . Code blue called with ROSC after I amp of epinephrine and chest compressions. Cl placed.   Following resusc pt was awake alert and responding to verbal stimuli.   Pt improved with Bicarb administration and pressor support   Discussion held with pt's brother in law as well as Palliative care- pt made DNR.  Pt subsequently decompensated further with worsening hypercarbia and hypoxia  and hotn on multiple pressors and eventually .

## 2022-07-08 NOTE — PROGRESS NOTE ADULT - ASSESSMENT
89 yo M with PMH HTN, HLD, CKD4 (baseline Cr 1.5), chronic HFrEF (LVEF 30% Dec 2021), CAD s/p PCI in 2007, aortic stenosis s/p AVR in 2013, chronic atrial fibrillation s/p PPM, hypothyroidism, BPH, insomnia, GIB (now off warfarin x 8 months), inguinal hernia repair, h/o appendectomy,  colon cancer s/p R hemicolectomy in 2016, prostate cancer who presented on 06/27 from Dr. Chandler's office for pancytopenic/neutropenic WBC 1.4,  s/p neupogen on 7/1 and neutropenic fever s/p vancomycin 7/2 and cefepime 7/3 now with rapidly progressing skin sloughing starting on 7/3, transferred to [facility] Burn ICU for concern of SJS/TEN due to drug reaction    # Code blue this morning at 8:06am:   - pt noted in respiratory distress, pulm edema, hypoxia, probably due to aspiration, no palpable pulses  - Patient intubated and ROSC achieved.  - CXR, labs, ABG done.  - required pressure support: levophed, dopamine Shawn-Synephrine  - OT placed, noted with dark brown emesis, started on PPI drip, GI consult ordered    # SJS/TENS  - s/p skin biopsy 7/7   - s/p VIG 7/6 and 7/7  - Continue Local Wound care daily: vit A&D, xeroform, gauze daily  - Pain control    # hx HTN  - hypotensive this am, requiring pressure support  - as per card recommendations started on levophed, dopamine, Shawn-Synephrine   - Lasix and metoprolol held on this admission    # CAD s/p PCI in 2007, HLD  - Not on ASA due to hx of GIB 8months ago.  - on Lipitor 20mg PO qhs.    # Atrial fibrillation, s/p PPM:  - Metoprolol held on admission for hypotension  - off  Coumadin since GIB 8mo ago.    # chronic HFrEF, now in pulmonary edema:   - LVEF 30% Dec 2021, repeat Echo - ordered   - BNP > 20.000  - on admission home lasix held due to hypotension and BRITT  - currently in pulmonary edema, given lasix 40mg -> then 60mg IV overnight with minimal UO  - as per nephrology start Lasix 80mg bid -> f/u UO, Norman in place     # BRITT, hx CKD   - baseline Cr about 1.5, currently about 3.5.    - Nephrology recommending  Lasix 80mg IV bid, Strict I/Os, daily weight,      # Hematology:   Thought to be T cell process (per discussion with Dr. Canas), though bone marrow biopsy results are not available yet.  -Monitor CBC w/ diff daily and CMP   -Transfuse for hb <7, Plt count <10K, <20K + febrile, <50K+ bleeding   -Hold Filgrastim (300 mg) as ANC >5k   -Follow up final path results of bone marrow biopsy.    # PUD, hx GIB:  - now noted with brown emesis suctioning to OG tube   - Protonix drip started.   - f/u GI consult     # BPH (benign prostatic hyperplasia).   -Continue home flomax 0.4mg qhs.    # Hypothyroidism.   Continue home synthroid 25mcg.    #  Gout  -Hold allopurinol 100 mg PO at this time    # Dementia  -Continue with home donepezil.      Discussed with Health Care Proxy Esteban at [phone],   Palliative consult placed.

## 2022-07-08 NOTE — CONSULT NOTE ADULT - SUBJECTIVE AND OBJECTIVE BOX
Gastroenterology Consultation:    Patient is a 89y old  Male who presents with a chief complaint of Transfer from [facility] for possible SJS (08 Jul 2022 13:49)        Admitted on: 07-05-22      HPI:  89 yo M with PMH HTN, HLD, CKD4 (baseline Cr 1.5), chronic HFrEF (LVEF 30% Dec 2021), CAD s/p PCI in 2007, aortic stenosis s/p AVR in 2013, chronic atrial fibrillation s/p PPM, hypothyroidism, BPH, insomnia, GIB (now off warfarin x 8 months), colon cancer s/p R hemicolectomy in 2016, prostate cancer who presented on 06/27 from Dr. Chandler's office for pancytopenic/neutropenic WBC 1.4 . At the time patient was admitted and received a BM biopsy pending final path report. Patient remained in the hospital pending biopsy results to confirm whether he had leukemia or not and ensure he begins treatment if leukemic. He was placed on filgrastim to increase his WBC count. Patient was started on Neupogen on 07/01-07/04  with current ANC >5k. Patient had a febrile episode on 07/02 for which he received vancomycin and cefepime, both of which were discontinued as blood cultures were negative and CXR had an effusion but no evidence of consolidation. The patient was no longer spiking fevers and was ready for discharge but his course was complicated by a trending Cr from his baseline of 2.2. His BRITT is of prerenal nature based on urine lytes (FeNA and FeUrea were calculated). Patient then developed skin sloughing of posterior right shoulder which started on 07/04, rapidly progressing over 24 hours to include back, bilateral UE and thighs, and worsening BRITT. [facility] reached out to [facility] for transfer to Burn ICU for suspected SJS.    Patient seen and examined at bedside. Denies f/c, n/v, HA, chest pain, SOB, abdominal pain, diarrhea, constipation, melena, hematochezia, hematuria, dysuria. Patient only complaint is pain on his back from the skin sloughing.      (06 Jul 2022 01:28)        Prior EGD:    Prior Colonoscopy:      PAST MEDICAL & SURGICAL HISTORY:  Aortic valve replaced      Atrial fibrillation      CAD (coronary artery disease)      HTN (hypertension)      PUD (peptic ulcer disease)      Constipation      Iron deficiency anemia      Diverticulosis      High cholesterol      Carotid arterial disease      Internal hemorrhoids      Endocarditis      Colon cancer      Hypothyroidism      S/P AVR  2013 @ [facility]  by Dr. Young      Cardiac pacemaker  3yrs ago      H/O inguinal hernia repair      H/O right hemicolectomy      History of appendectomy            FAMILY HISTORY: non contriburary      no etoh, tob (former, 30pyhx), drug use  lives at a senior living center    Home Medications:  allopurinol 100 mg oral tablet: 1 tab(s) orally once a day (06 Jul 2022 01:44)  atorvastatin 20 mg oral tablet: 1 tab(s) orally once a day (at bedtime) (06 Jul 2022 01:44)  Colace 100 mg oral capsule: 1 tab(s) orally once a day (06 Jul 2022 01:44)  donepezil 10 mg oral tablet: 1 tab(s) orally once a day (at bedtime) (06 Jul 2022 01:44)  eszopiclone 3 mg oral tablet: 1 tab(s) orally once a day (at bedtime) (06 Jul 2022 01:44)  folic acid 1 mg oral tablet: 1 tab(s) orally once a day (06 Jul 2022 01:44)  furosemide 40 mg oral tablet: orally once a day on monday, wednesday and friday (06 Jul 2022 01:44)  Lasix 20 mg oral tablet: Once a day tuesday, thursday, saturday and sunday (06 Jul 2022 01:44)  levothyroxine 25 mcg (0.025 mg) oral tablet: 1 tab(s) orally once a day (06 Jul 2022 01:44)  omeprazole 20 mg oral delayed release capsule: orally 2 times a day (06 Jul 2022 01:44)  tamsulosin 0.4 mg oral capsule: 1 tab(s) orally once a day (06 Jul 2022 01:44)  traZODone 100 mg oral tablet: 1 tab(s) orally once a day (at bedtime) (06 Jul 2022 01:44)  Tylenol 325 mg oral tablet: 1 tab(s) orally every 6 hours, As Needed (06 Jul 2022 01:44)  Vitamin D3 1250 mcg (50,000 intl units) oral capsule: 1 cap(s) orally once a week (06 Jul 2022 01:44)  Zofran 4 mg oral tablet: 1 tab(s) orally once a day, As Needed (06 Jul 2022 01:44)        MEDICATIONS  (STANDING):  ascorbic acid 500 milliGRAM(s) Oral daily  atorvastatin 20 milliGRAM(s) Oral at bedtime  cholecalciferol 1000 Unit(s) Oral every 24 hours  donepezil 10 milliGRAM(s) Oral at bedtime  DOPamine Infusion 2 MICROgram(s)/kG/Min (4.52 mL/Hr) IV Continuous <Continuous>  folic acid 1 milliGRAM(s) Oral daily  furosemide   Injectable 80 milliGRAM(s) IV Push every 12 hours  immune   globulin 10% (GAMMAGARD) IVPB 60 Gram(s) IV Intermittent daily  levothyroxine 25 MICROGram(s) Oral daily  midazolam Infusion 0.02 mG/kG/Hr (1.2 mL/Hr) IV Continuous <Continuous>  midodrine. 10 milliGRAM(s) Oral every 8 hours  multivitamin 1 Tablet(s) Oral daily  norepinephrine Infusion 0.05 MICROgram(s)/kG/Min (2.82 mL/Hr) IV Continuous <Continuous>  pantoprazole Infusion 8 mG/Hr (10 mL/Hr) IV Continuous <Continuous>  phenylephrine    Infusion 0.1 MICROgram(s)/kG/Min (1.13 mL/Hr) IV Continuous <Continuous>  sodium bicarbonate  Infusion 0.125 mEq/kG/Hr (50 mL/Hr) IV Continuous <Continuous>  tamsulosin 0.4 milliGRAM(s) Oral at bedtime  traZODone 100 milliGRAM(s) Oral at bedtime  vasopressin Infusion 0.02 Unit(s)/Min (1.2 mL/Hr) IV Continuous <Continuous>  vitamin A &amp; D Ointment 1 Application(s) Topical daily  zinc sulfate 220 milliGRAM(s) Oral daily    MEDICATIONS  (PRN):  acetaminophen     Tablet .. 650 milliGRAM(s) Oral every 6 hours PRN Mild Pain (1 - 3)  albuterol/ipratropium for Nebulization 3 milliLiter(s) Nebulizer every 6 hours PRN Shortness of Breath and/or Wheezing  morphine  - Injectable 2 milliGRAM(s) IV Push every 6 hours PRN Severe Pain (7 - 10)  morphine  - Injectable 2 milliGRAM(s) IV Push two times a day PRN wound care  ondansetron    Tablet 4 milliGRAM(s) Oral three times a day PRN Nausea and/or Vomiting  oxycodone    5 mG/acetaminophen 325 mG 1 Tablet(s) Oral every 4 hours PRN Moderate Pain (4 - 6)  senna 2 Tablet(s) Oral at bedtime PRN Constipation  zolpidem 5 milliGRAM(s) Oral at bedtime PRN Insomnia      Allergies  cefepime ([employer]  Neupogen ([employer]  vancomycin ([employer]      Review of Systems: limited           Physical Examination:  T(C): 36.2 (07-08-22 @ 11:00), Max: 36.2 (07-08-22 @ 11:00)  HR: 80 (07-08-22 @ 13:15) (79 - 97)  BP: 110/48 (07-08-22 @ 13:00) (67/40 - 204/77)  RR: 30 (07-08-22 @ 12:00) (17 - 30)  SpO2: 77% (07-08-22 @ 13:15) (72% - 100%)  Height (cm): 160 (07-07-22 @ 20:23)  Weight (kg): 60.2 (07-07-22 @ 20:23)    07-06-22 @ 07:01  -  07-07-22 @ 07:00  --------------------------------------------------------  IN: 4267 mL / OUT: 360 mL / NET: 3907 mL    07-07-22 @ 07:01  -  07-08-22 @ 07:00  --------------------------------------------------------  IN: 1945 mL / OUT: 512 mL / NET: 1433 mL    07-08-22 @ 07:01  -  07-08-22 @ 18:49  --------------------------------------------------------  IN: 0 mL / OUT: 10 mL / NET: -10 mL          GENERAL: intubated, sedated   HEAD:  Atraumatic, Normocephalic  EYES: conjunctiva and sclera clear  NECK: Supple, no JVD or thyromegaly  CHEST/LUNG: decrease breath sounds bilaterally   HEART: Regular rate and rhythm; normal S1, S2, No murmurs.  ABDOMEN: Soft, nontender, nondistended; Bowel sounds present  NEUROLOGY: No asterixis or tremor.   SKIN: Intact, no jaundice        Data:                        8.3    53.97 )-----------( 130      ( 08 Jul 2022 08:50 )             28.8     Hgb Trend:  8.3  07-08-22 @ 08:50  8.4  07-08-22 @ 04:40  8.8  07-07-22 @ 11:43  8.4  07-06-22 @ 11:30        07-08    132<L>  |  98  |  76<HH>  ----------------------------<  200<H>  5.3<H>   |  17  |  3.7<H>    Ca    8.9      08 Jul 2022 08:50  Phos  5.9     07-08  Mg     2.0     07-08    TPro  5.5<L>  /  Alb  2.4<L>  /  TBili  0.2  /  DBili  <0.2  /  AST  17  /  ALT  12  /  AlkPhos  122<H>  07-08    Liver panel trend:  TBili 0.2   /   AST 17   /   ALT 12   /   AlkP 122   /   Tptn 5.5   /   Alb 2.4    /   DBili <0.2      07-08  TBili 0.2   /   AST 12   /   ALT 12   /   AlkP 104   /   Tptn 6.3   /   Alb 2.9    /   DBili <0.2      07-08  TBili 0.2   /   AST 16   /   ALT 13   /   AlkP 119   /   Tptn 5.5   /   Alb 3.0    /   DBili --      07-07  TBili 0.3   /   AST 16   /   ALT 11   /   AlkP 109   /   Tptn 4.5   /   Alb 3.2    /   DBili --      07-06  TBili 0.4   /   AST 22   /   ALT 11   /   AlkP 90   /   Tptn 4.9   /   Alb 2.9    /   DBili 0.2      07-05  TBili 0.6   /   AST 14   /   ALT 6   /   AlkP 72   /   Tptn 5.2   /   Alb 3.6    /   DBili --      06-28      PT/INR - ( 08 Jul 2022 08:50 )   PT: 14.90 sec;   INR: 1.30 ratio         PTT - ( 08 Jul 2022 08:50 )  PTT:61.5 sec    Culture - Urine (collected 06 Jul 2022 10:30)  Source: Catheterized Catheterized  Final Report (08 Jul 2022 17:23):    10,000 - 49,000 CFU/mL Enterococcus faecium (vancomycin resistant)    <10,000 CFU/ml Normal Urogenital prasanth present  Organism: Enterococcus faecium (vancomycin resistant) (08 Jul 2022 17:23)  Organism: Enterococcus faecium (vancomycin resistant) (08 Jul 2022 17:23)    Culture - Ear, Nose and Throat (ENT) (collected 05 Jul 2022 19:13)  Source: .Aspirate Aspirate  Preliminary Report (08 Jul 2022 12:52):    Few Acinetobacter ursingii    Moderate Enterococcus faecalis Susceptibility to follow.    Candida albicans    Rare Klebsiella pneumoniae Susceptibility to follow.  Organism: Acinetobacter ursingii (08 Jul 2022 09:07)  Organism: Acinetobacter ursingii (08 Jul 2022 09:07)    Culture - Nose (collected 05 Jul 2022 19:13)  Source: .Nose Nose  Final Report (08 Jul 2022 09:07):    Rare Staphylococcus aureus  Organism: Staphylococcus aureus (08 Jul 2022 09:07)  Organism: Staphylococcus aureus (08 Jul 2022 09:07)    Culture - Other (collected 05 Jul 2022 19:13)  Source: Wound Wound  Gram Stain (05 Jul 2022 21:34):    No organisms seen    No WBC's seen.  Final Report (08 Jul 2022 09:08):    Rare Staphylococcus epidermidis  Organism: Staphylococcus epidermidis (08 Jul 2022 09:08)  Organism: Staphylococcus epidermidis (08 Jul 2022 09:08)

## 2022-07-08 NOTE — CONSULT NOTE ADULT - CONVERSATION DETAILS
Called and spoke with patient's brother in law, Esteban. Palliative care introduced.  Patient with cardiac arrest earlier in the day. He had received a medical update from the Burn team earlier in the day.  In line with the patient's living will, he wished to make the patient DNR with ongoing medical management. All questions answered.

## 2022-07-08 NOTE — CONSULT NOTE ADULT - REASON FOR ADMISSION
Transfer from [facility] for possible SJS
Transfer from [facility] for possible SJS
Transfer from [facility] for possible SJS
Transfer from [facility] for possible SJS
Transfer from [facility] for possible SJS

## 2022-07-08 NOTE — CONSULT NOTE ADULT - SUBJECTIVE AND OBJECTIVE BOX
HPI:  89 yo M with PMH HTN, HLD, CKD4 (baseline Cr 1.5), chronic HFrEF (LVEF 30% Dec 2021), CAD s/p PCI in , aortic stenosis s/p AVR in , chronic atrial fibrillation s/p PPM, hypothyroidism, BPH, insomnia, GIB (now off warfarin x 8 months), colon cancer s/p R hemicolectomy in , prostate cancer who presented on  from Dr. Chandler's office for pancytopenic/neutropenic WBC 1.4 . At the time patient was admitted and received a BM biopsy pending final path report. Patient remained in the hospital pending biopsy results to confirm whether he had leukemia or not and ensure he begins treatment if leukemic. He was placed on filgrastim to increase his WBC count. Patient was started on Neupogen on -  with current ANC >5k. Patient had a febrile episode on  for which he received vancomycin and cefepime, both of which were discontinued as blood cultures were negative and CXR had an effusion but no evidence of consolidation. The patient was no longer spiking fevers and was ready for discharge but his course was complicated by a trending Cr from his baseline of 2.2. His BRITT is of prerenal nature based on urine lytes (FeNA and FeUrea were calculated). Patient then developed skin sloughing of posterior right shoulder which started on , rapidly progressing over 24 hours to include back, bilateral UE and thighs, and worsening BRITT. [facility] reached out to [facility] for transfer to Burn ICU for suspected SJS.    Patient seen and examined at bedside. Denies f/c, n/v, HA, chest pain, SOB, abdominal pain, diarrhea, constipation, melena, hematochezia, hematuria, dysuria. Patient only complaint is pain on his back from the skin sloughing.      (2022 01:28)    PERTINENT PM/SXH:   Aortic valve replaced    Atrial fibrillation    CAD (coronary artery disease)    HTN (hypertension)    PUD (peptic ulcer disease)    Constipation    Iron deficiency anemia    Diverticulosis    High cholesterol    Carotid arterial disease    Internal hemorrhoids    Colon cancer    Endocarditis    Colon cancer    Hypothyroidism      S/P AVR    Cardiac pacemaker    H/O inguinal hernia repair    History of bowel resection    H/O right hemicolectomy    History of appendectomy      FAMILY HISTORY:    ITEMS NOT CHECKED ARE NOT PRESENT    SOCIAL HISTORY:   Significant other/partner[ ]  Children[ ]  Episcopal/Spirituality:  Substance hx:  [ ]   Tobacco hx:  [ ]   Alcohol hx: [ ]   Living Situation: [ ]Home  [ ]Long term care  [ ]Rehab [ ]Other * transferred from [facility]   Home Services: [ ] HHA [ ] Visting RN [ ] Hospice  Occupation:  Home Opioid hx:  [ ] Y [x ] N [x ] I-Stop Reference No:   This report was requested by: Lela Nettles | Reference #: [id number]    You have not added a LIBIA number. Keeping your LIBIA number(s) up to date on the My LIBIA # page will enable the separation of your prescriptions from others in the search results.    Others' Prescriptions  Patient Name: Wilber KyleBirth Date: 1929  Address: [address] [place], [place] [address]Sex: Male  Rx Written	Rx Dispensed	Drug	Quantity	Days Supply	Prescriber Name	Prescriber Libia #	Payment Method	Dispenser  2022	eszopiclone 3 mg tablet	30	30	Declan Russo MD	[id number]	Insurance	[facility]  2022	eszopiclone 3 mg tablet	30	30	AhDeclan saavedra MD	[id number]	Insurance	[facility]  2022	eszopiclone 3 mg tablet	10	30	AhDeclan saavedra MD	[id number]	Amin	[facility]  02/10/2022	2022	eszopiclone 2 mg tablet	30	30	AhDeclan saavedra MD	[id number]	Insurance	[facility]  * - Drugs marked with an asterisk are compound drugs. If the compound drug is made up of more than one controlled substance, then each controlled substance will be a separate row in the table.    There are no results for the search terms that you entered.  ADVANCE DIRECTIVES:    DNR  MOLST  [ ]  Living Will  [ ]   DECISION MAKER(s):  [ ] Health Care Proxy(s)  [x ] Surrogate(s)  [ ] Guardian           Name(s): Phone Number(s):  brother Esteban Day [phone]    BASELINE (I)ADL(s) (prior to admission):  Gregory: [ ]Total  [ ] Moderate [ ]Dependent  Palliative Performance Status Version 2:         %    [email or web address]/[email or web address]_[email or web address]    Allergies    cefepime (Innoviti)  Neupogen (Innoviti)  vancomycin (Innoviti)    Intolerances    MEDICATIONS  (STANDING):  ascorbic acid 500 milliGRAM(s) Oral daily  atorvastatin 20 milliGRAM(s) Oral at bedtime  cholecalciferol 1000 Unit(s) Oral every 24 hours  donepezil 10 milliGRAM(s) Oral at bedtime  DOPamine Infusion 2 MICROgram(s)/kG/Min (4.52 mL/Hr) IV Continuous <Continuous>  folic acid 1 milliGRAM(s) Oral daily  heparin   Injectable 5000 Unit(s) SubCutaneous every 8 hours  immune   globulin 10% (GAMMAGARD) IVPB 60 Gram(s) IV Intermittent daily  levothyroxine 25 MICROGram(s) Oral daily  midodrine. 10 milliGRAM(s) Oral every 8 hours  multivitamin 1 Tablet(s) Oral daily  norepinephrine Infusion 0.05 MICROgram(s)/kG/Min (2.82 mL/Hr) IV Continuous <Continuous>  pantoprazole Infusion 8 mG/Hr (10 mL/Hr) IV Continuous <Continuous>  phenylephrine    Infusion 0.1 MICROgram(s)/kG/Min (1.13 mL/Hr) IV Continuous <Continuous>  sodium bicarbonate  Infusion 0.125 mEq/kG/Hr (50 mL/Hr) IV Continuous <Continuous>  sodium bicarbonate  Injectable 150 milliEquivalent(s) IV Push once  tamsulosin 0.4 milliGRAM(s) Oral at bedtime  traZODone 100 milliGRAM(s) Oral at bedtime  vitamin A &amp; D Ointment 1 Application(s) Topical daily  zinc sulfate 220 milliGRAM(s) Oral daily    MEDICATIONS  (PRN):  acetaminophen     Tablet .. 650 milliGRAM(s) Oral every 6 hours PRN Mild Pain (1 - 3)  albuterol/ipratropium for Nebulization 3 milliLiter(s) Nebulizer every 6 hours PRN Shortness of Breath and/or Wheezing  morphine  - Injectable 2 milliGRAM(s) IV Push every 6 hours PRN Severe Pain (7 - 10)  morphine  - Injectable 2 milliGRAM(s) IV Push two times a day PRN wound care  ondansetron    Tablet 4 milliGRAM(s) Oral three times a day PRN Nausea and/or Vomiting  oxycodone    5 mG/acetaminophen 325 mG 1 Tablet(s) Oral every 4 hours PRN Moderate Pain (4 - 6)  senna 2 Tablet(s) Oral at bedtime PRN Constipation  zolpidem 5 milliGRAM(s) Oral at bedtime PRN Insomnia    PRESENT SYMPTOMS: [ ]Unable to obtain due to poor mentation   Source if other than patient:  [ ]Family   [ ]Team     Pain: [ ]yes [ ]no  QOL impact -   Location -                    Aggravating factors -  Quality -  Radiation -  Timing-  Severity (0-10 scale):  Minimal acceptable level (0-10 scale):     CPOT:    https://www.[email or web address]/getattachment/[email or web address]/Critical-Care-Pain-Observation-Tool-(CPOT)      PAIN AD Score:     [email or web address]/cog/painad.pdf (press ctrl +  left click to view)    Dyspnea:                           [ ]Mild [ ]Moderate [ ]Severe  Anxiety:                             [ ]Mild [ ]Moderate [ ]Severe  Fatigue:                             [ ]Mild [ ]Moderate [ ]Severe  Nausea:                             [ ]Mild [ ]Moderate [ ]Severe  Loss of appetite:              [ ]Mild [ ]Moderate [ ]Severe  Constipation:                    [ ]Mild [ ]Moderate [ ]Severe    Other Symptoms:  [ ]All other review of systems negative     Palliative Performance Status Version 2:         %    http://[email or web address]/files/news/palliative_performance_scale_[email or web address].pdf  PHYSICAL EXAM:  Vital Signs Last 24 Hrs  T(C): 35.6 (2022 04:00), Max: 36.1 (2022 13:00)  T(F): 96.1 (2022 04:00), Max: 97 (2022 13:00)  HR: 80 (2022 07:45) (79 - 83)  BP: 146/61 (2022 06:00) (84/45 - 146/61)  BP(mean): 88 (2022 06:00) (58 - 91)  RR: 20 (2022 07:15) (17 - 21)  SpO2: 90% (2022 07:45) (90% - 100%)    Parameters below as of 2022 07:45  Patient On (Oxygen Delivery Method): nasal cannula, high flow  O2 Flow (L/min): 60  O2 Concentration (%): 80 I&O's Summary    2022 07:01  -  2022 07:00  --------------------------------------------------------  IN: 1945 mL / OUT: 512 mL / NET: 1433 mL      GENERAL:  [ ]Alert  [ ]Oriented x   [ ]Lethargic  [ ]Cachexia  [ ]Unarousable  [ ]Verbal  [ ]Non-Verbal  Behavioral:   [ ] Anxiety  [ ] Delirium [ ] Agitation [ ] Other  HEENT:  [ ]Normal   [ ]Dry mouth   [ ]ET Tube/Trach  [ ]Oral lesions  PULMONARY:   [ ]Clear [ ]Tachypnea  [ ]Audible excessive secretions   [ ]Rhonchi        [ ]Right [ ]Left [ ]Bilateral  [ ]Crackles        [ ]Right [ ]Left [ ]Bilateral  [ ]Wheezing     [ ]Right [ ]Left [ ]Bilateral  [ ]Diminished breath sounds [ ]right [ ]left [ ]bilateral  CARDIOVASCULAR:    [ ]Regular [ ]Irregular [ ]Tachy  [ ]Maximus [ ]Murmur [ ]Other  GASTROINTESTINAL:  [ ]Soft  [ ]Distended   [ ]+BS  [ ]Non tender [ ]Tender  [ ]PEG [ ]OGT/ NGT  Last BM:   GENITOURINARY:  [ ]Normal [ ] Incontinent   [ ]Oliguria/Anuria   [ ]Norman  MUSCULOSKELETAL:   [ ]Normal   [ ]Weakness  [ ]Bed/Wheelchair bound [ ]Edema  NEUROLOGIC:   [ ]No focal deficits  [ ]Cognitive impairment  [ ]Dysphagia [ ]Dysarthria [ ]Paresis [ ]Other   SKIN:   [ ]Normal    [ ]Rash  [ ]Pressure ulcer(s)       Present on admission [ ]y [ ]n    CRITICAL CARE:  [ ] Shock Present  [ ]Septic [ ]Cardiogenic [ ]Neurologic [ ]Hypovolemic  [ ]  Vasopressors [ ]  Inotropes   [ ]Respiratory failure present [ ]Mechanical ventilation [ ]Non-invasive ventilatory support [ ]High flow  [ ]Acute  [ ]Chronic [ ]Hypoxic  [ ]Hypercarbic [ ]Other  [ ]Other organ failure     LABS:                        8.4    17.22 )-----------( 96       ( 2022 04:40 )             28.0   07-08    132<L>  |  99  |  73<HH>  ----------------------------<  148<H>  4.6   |  22  |  3.5<H>    Ca    9.5      2022 04:40  Phos  4.9     07-08  Mg     1.9     07-08    TPro  6.3  /  Alb  2.9<L>  /  TBili  0.2  /  DBili  <0.2  /  AST  12  /  ALT  12  /  AlkPhos  104  07-08      Urinalysis Basic - ( 2022 10:30 )    Color: Yellow / Appearance: Slightly Turbid / S.016 / pH: x  Gluc: x / Ketone: Trace  / Bili: Negative / Urobili: <2 mg/dL   Blood: x / Protein: 100 mg/dL / Nitrite: Negative   Leuk Esterase: Large / RBC: 9 /HPF / WBC 75 /HPF   Sq Epi: x / Non Sq Epi: 13 /HPF / Bacteria: Negative      RADIOLOGY & ADDITIONAL STUDIES:    PROTEIN CALORIE MALNUTRITION PRESENT: [ ]mild [ ]moderate [ ]severe [ ]underweight [ ]morbid obesity  https://www.andeal.org/vault/2440/web/files/ONC/Table_Clinical%20Characteristics%20to%20Document%20Malnutrition-White%20JV%20et%20al%2020.pdf    Height (cm): 160 (22 @ 20:23), 160 (22 @ 12:04), 160 (21 @ 11:08)  Weight (kg): 60.2 (22 @ 20:23), 60.8 (22 @ 12:04), 62.6 (21 @ 11:08)  BMI (kg/m2): 23.5 (22 @ 20:23), 23.8 (22 @ 12:04), 24.5 (21 @ 11:08)    [ ]PPSV2 < or = to 30% [ ]significant weight loss  [ ]poor nutritional intake  [ ]anasarca      [ ]Artificial Nutrition      REFERRALS:   [ ]Chaplaincy  [ ]Hospice  [ ]Child Life  [ ]Social Work  [ ]Case management [ ]Holistic Therapy     Goals of Care Document:     Lela Nettles NP [facility]  Nurse Practitioner  Palliative Care   [phone]     CC: pancytopenia    HPI:  89 yo M with PMH HTN, HLD, CKD4 (baseline Cr 1.5), chronic HFrEF (LVEF 30% Dec 2021), CAD s/p PCI in , aortic stenosis s/p AVR in , chronic atrial fibrillation s/p PPM, hypothyroidism, BPH, insomnia, GIB (now off warfarin x 8 months), colon cancer s/p R hemicolectomy in , prostate cancer who presented on  from Dr. Chandler's office for pancytopenic/neutropenic WBC 1.4 . At the time patient was admitted and received a BM biopsy pending final path report. Patient remained in the hospital pending biopsy results to confirm whether he had leukemia or not and ensure he begins treatment if leukemic. He was placed on filgrastim to increase his WBC count. Patient was started on Neupogen on -  with current ANC >5k. Patient had a febrile episode on  for which he received vancomycin and cefepime, both of which were discontinued as blood cultures were negative and CXR had an effusion but no evidence of consolidation. The patient was no longer spiking fevers and was ready for discharge but his course was complicated by a trending Cr from his baseline of 2.2. His BRITT is of prerenal nature based on urine lytes (FeNA and FeUrea were calculated). Patient then developed skin sloughing of posterior right shoulder which started on , rapidly progressing over 24 hours to include back, bilateral UE and thighs, and worsening BRITT. [facility] reached out to [facility] for transfer to Burn ICU for suspected SJS.    Patient seen and examined at bedside. Denies f/c, n/v, HA, chest pain, SOB, abdominal pain, diarrhea, constipation, melena, hematochezia, hematuria, dysuria. Patient only complaint is pain on his back from the skin sloughing.      (2022 01:28)    PERTINENT PM/SXH:   Aortic valve replaced    Atrial fibrillation    CAD (coronary artery disease)    HTN (hypertension)    PUD (peptic ulcer disease)    Constipation    Iron deficiency anemia    Diverticulosis    High cholesterol    Carotid arterial disease    Internal hemorrhoids    Colon cancer    Endocarditis    Colon cancer    Hypothyroidism      S/P AVR    Cardiac pacemaker    H/O inguinal hernia repair    History of bowel resection    H/O right hemicolectomy    History of appendectomy      FAMILY HISTORY:  No pertinent history in mother or father    ITEMS NOT CHECKED ARE NOT PRESENT    SOCIAL HISTORY:   Significant other/partner[ ]  Children[ ]  Jainism/Spirituality:  Substance hx:  [ ]   Tobacco hx:  [ ]   Alcohol hx: [ ]   Living Situation: [ x]Home  [ ]Long term care  [ ]Rehab [ ]Other * transferred from [facility]   Home Services: [ ] HHA [ ] Visting RN [ ] Hospice  Occupation:  Home Opioid hx:  [ ] Y [x ] N [x ] I-Stop Reference No:   This report was requested by: Lela Nettles | Reference #: [id number]    You have not added a LIBIA number. Keeping your LIBIA number(s) up to date on the My LIBIA # page will enable the separation of your prescriptions from others in the search results.    Others' Prescriptions  Patient Name: Wilber Akers Date: 1929  Address: [address] [place], [place] [address]Sex: Male  Rx Written	Rx Dispensed	Drug	Quantity	Days Supply	Prescriber Name	Prescriber Libia #	Payment Method	Dispenser  2022	eszopiclone 3 mg tablet	30	30	Declan Russo MD	[id number]	Insurance	[facility]  2022	eszopiclone 3 mg tablet	30	30	Declan Russo MD	[id number]	Insurance	[facility]  2022	eszopiclone 3 mg tablet	10	30	AhDeclan saavedra MD	[id number]	Amin	[facility]  02/10/2022	2022	eszopiclone 2 mg tablet	30	30	Declan Russo MD	[id number]	Insurance	[facility]  * - Drugs marked with an asterisk are compound drugs. If the compound drug is made up of more than one controlled substance, then each controlled substance will be a separate row in the table.    There are no results for the search terms that you entered.  ADVANCE DIRECTIVES:    DNR  MOLST  [ ]  Living Will  [ ]   DECISION MAKER(s):  [ ] Health Care Proxy(s)  [x ] Surrogate(s)  [ ] Guardian           Name(s): Phone Number(s):  brother in law Esteban Day [phone]    Palliative Performance Status Version 2:         Unknown%    [email or web address]/files/[email or web address]_performance_scale_[email or web address].pdf    Allergies  cefepime (Guaranteach)  Neupogen (Guaranteach)  vancomycin (Guaranteach)    MEDICATIONS  (STANDING):  ascorbic acid 500 milliGRAM(s) Oral daily  atorvastatin 20 milliGRAM(s) Oral at bedtime  cholecalciferol 1000 Unit(s) Oral every 24 hours  donepezil 10 milliGRAM(s) Oral at bedtime  DOPamine Infusion 2 MICROgram(s)/kG/Min (4.52 mL/Hr) IV Continuous <Continuous>  folic acid 1 milliGRAM(s) Oral daily  heparin   Injectable 5000 Unit(s) SubCutaneous every 8 hours  immune   globulin 10% (GAMMAGARD) IVPB 60 Gram(s) IV Intermittent daily  levothyroxine 25 MICROGram(s) Oral daily  midodrine. 10 milliGRAM(s) Oral every 8 hours  multivitamin 1 Tablet(s) Oral daily  norepinephrine Infusion 0.05 MICROgram(s)/kG/Min (2.82 mL/Hr) IV Continuous <Continuous>  pantoprazole Infusion 8 mG/Hr (10 mL/Hr) IV Continuous <Continuous>  phenylephrine    Infusion 0.1 MICROgram(s)/kG/Min (1.13 mL/Hr) IV Continuous <Continuous>  sodium bicarbonate  Infusion 0.125 mEq/kG/Hr (50 mL/Hr) IV Continuous <Continuous>  sodium bicarbonate  Injectable 150 milliEquivalent(s) IV Push once  tamsulosin 0.4 milliGRAM(s) Oral at bedtime  traZODone 100 milliGRAM(s) Oral at bedtime  vitamin A &amp; D Ointment 1 Application(s) Topical daily  zinc sulfate 220 milliGRAM(s) Oral daily    MEDICATIONS  (PRN):  acetaminophen     Tablet .. 650 milliGRAM(s) Oral every 6 hours PRN Mild Pain (1 - 3)  albuterol/ipratropium for Nebulization 3 milliLiter(s) Nebulizer every 6 hours PRN Shortness of Breath and/or Wheezing  morphine  - Injectable 2 milliGRAM(s) IV Push every 6 hours PRN Severe Pain (7 - 10)  morphine  - Injectable 2 milliGRAM(s) IV Push two times a day PRN wound care  ondansetron    Tablet 4 milliGRAM(s) Oral three times a day PRN Nausea and/or Vomiting  oxycodone    5 mG/acetaminophen 325 mG 1 Tablet(s) Oral every 4 hours PRN Moderate Pain (4 - 6)  senna 2 Tablet(s) Oral at bedtime PRN Constipation  zolpidem 5 milliGRAM(s) Oral at bedtime PRN Insomnia    PRESENT SYMPTOMS: [x ]Unable to obtain due to poor mentation   Source if other than patient:  [ ]Family   [ ]Team     Pain: [ ]yes [ ]no  QOL impact -   Location -                    Aggravating factors -  Quality -  Radiation -  Timing-  Severity (0-10 scale):  Minimal acceptable level (0-10 scale):       PAIN AD Score:  0      Dyspnea:                           [ ]Mild [ ]Moderate [ ]Severe  Anxiety:                             [ ]Mild [ ]Moderate [ ]Severe  Fatigue:                             [ ]Mild [ ]Moderate [ ]Severe  Nausea:                             [ ]Mild [ ]Moderate [ ]Severe  Loss of appetite:              [ ]Mild [ ]Moderate [ ]Severe  Constipation:                    [ ]Mild [ ]Moderate [ ]Severe    Other Symptoms:  [x ]All other review of systems negative     Palliative Performance Status Version 2:         10%    http://npcrc.org/files/news/palliative_performance_scale_ppsv2.pdf    PHYSICAL EXAM:  Vital Signs Last 24 Hrs  T(C): 35.6 (2022 04:00), Max: 36.1 (2022 13:00)  T(F): 96.1 (2022 04:00), Max: 97 (2022 13:00)  HR: 80 (2022 07:45) (79 - 83)  BP: 146/61 (2022 06:00) (84/45 - 146/61)  BP(mean): 88 (2022 06:00) (58 - 91)  RR: 20 (2022 07:15) (17 - 21)  SpO2: 90% (2022 07:45) (90% - 100%)    Parameters below as of 2022 07:45  Patient On (Oxygen Delivery Method): nasal cannula, high flow  O2 Flow (L/min): 60  O2 Concentration (%): 80 I&O's Summary    2022 07:01  -  2022 07:00  --------------------------------------------------------  IN: 1945 mL / OUT: 512 mL / NET: 1433 mL      GENERAL:  [ ]Alert  [ ]Oriented x   [ ]Lethargic  [ ]Cachexia  [ x]Unarousable  [ ]Verbal  [x ]Non-Verbal  Behavioral:   [ ] Anxiety  [ ] Delirium [ ] Agitation [ ] Other  HEENT:  [ ]Normal   [ ]Dry mouth   [x ]ET Tube/Trach  [ x] No Oral lesions  PULMONARY:   [ ]Clear [ ]Tachypnea  [x ]No Audible excessive secretions   [ ]Rhonchi        [ ]Right [ ]Left [ ]Bilateral  [ ]Crackles        [ ]Right [ ]Left [ ]Bilateral  [ ]Wheezing     [ ]Right [ ]Left [ ]Bilateral  [ ]Diminished breath sounds [ ]right [ ]left [ ]bilateral  CARDIOVASCULAR:    [ ]Regular [ ]Irregular [x ]Not Tachy  [ ]Maximus [ ]Murmur [ ]Other  GASTROINTESTINAL:  [ ]Soft  [ ]Distended   [ ]+BS  [ ]Non tender [ ]Tender  [ ]PEG [ ]OGT/ NGT  Last BM:   GENITOURINARY:  [ ]Normal [ ] Incontinent   [ ]Oliguria/Anuria   [ ]Norman  MUSCULOSKELETAL:   [ ]Normal   [ ]Weakness  [ x]Bed/Wheelchair bound [ ]Edema  NEUROLOGIC:   [ ]No focal deficits  [x ]Cognitive impairment  [ ]Dysphagia [ ]Dysarthria [ ]Paresis [ ]Other   SKIN:   [ ]Normal    [ x]Rash  [ ]Pressure ulcer(s)       Present on admission [ ]y [ ]n    CRITICAL CARE:  [ xShock Present  [ ]Septic [ ]Cardiogenic [ ]Neurologic [ ]Hypovolemic  [ ]  Vasopressors [ ]  Inotropes   [x ]Respiratory failure present [x ]Mechanical ventilation [ ]Non-invasive ventilatory support [ ]High flow  [ ]Acute  [ ]Chronic [ ]Hypoxic  [ ]Hypercarbic [ ]Other  [x ]Other organ failure     LABS:                        8.4    17.22 )-----------( 96       ( 2022 04:40 )             28.0   07-08    132<L>  |  99  |  73<HH>  ----------------------------<  148<H>  4.6   |  22  |  3.5<H>    Ca    9.5      2022 04:40  Phos  4.9     07-08  Mg     1.9     07-08    TPro  6.3  /  Alb  2.9<L>  /  TBili  0.2  /  DBili  <0.2  /  AST  12  /  ALT  12  /  AlkPhos  104  07-08      Urinalysis Basic - ( 2022 10:30 )    Color: Yellow / Appearance: Slightly Turbid / S.016 / pH: x  Gluc: x / Ketone: Trace  / Bili: Negative / Urobili: <2 mg/dL   Blood: x / Protein: 100 mg/dL / Nitrite: Negative   Leuk Esterase: Large / RBC: 9 /HPF / WBC 75 /HPF   Sq Epi: x / Non Sq Epi: 13 /HPF / Bacteria: Negative      RADIOLOGY & ADDITIONAL STUDIES:    < from: Xray Chest 1 View- PORTABLE-Urgent (Xray Chest 1 View- PORTABLE-Urgent .) (22 @ 08:44) >  Impression:    Bilateral opacifications. Support devices as described    Unchanged.    < end of copied text >    < from: 12 Lead ECG (22 @ 12:37) >    Ventricular Rate 80 BPM    Atrial Rate 75 BPM    QRS Duration 148 ms    Q-T Interval 436 ms    QTC Calculation(Bazett) 502 ms    R Axis -83 degrees    T Axis 101 degrees    Diagnosis Line Ventricular pacemaker with complete capture    < end of copied text >    REFERRALS:   [ ]Chaplaincy  [ ]Hospice  [ ]Child Life  [ ]Social Work  [ ]Case management [ ]Holistic Therapy     Goals of Care Document:

## 2022-07-08 NOTE — CONSULT NOTE ADULT - PROBLEM SELECTOR RECOMMENDATION 9
-DNR only  -New MOLST filled out and placed in chart  -Ongoing medical management  -Patient with very poor overall prognosis  -will be available for GOC discussions as appropriate

## 2022-07-08 NOTE — PROGRESS NOTE ADULT - NS ATTEND AMEND GEN_ALL_CORE FT
As above   AM rounds - pt seen in hydrotherapy     EXAM   awake alert with appropriate verbal responses   large open wounds- exposed dermis and increased skin sloughing - BLE, BUE some intact blisters   no facial or mucosal involvement     A/P   r/o SJS   continue hydration and wound care  uo- borderline - with significant fluid loss via open wounds low continue judicious boluses and hydration   begin IVig with monitoring   venous duplex   ECHO and cardiol eval   BRITT  on CKD - nephrology consultation  skin biopsy tomorrow
see below
As above   Pt with episodes of hotn. 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

## 2022-07-08 NOTE — CONSULT NOTE ADULT - ASSESSMENT
89yMale being evaluated for      MEDD (morphine equivalent daily dose):      See Recs below.    Please call [phone] with questions or concerns 24/7.   We will continue to follow.    89yMale with history of HTN, CKD, HFrEF, CAD, colon cancer, prostate cancer persented pancytopenic. Hospital course complicated by  concern for SJS or other drug reaction, BRITT and cardiac arrest with ROSC. Patient being evaluated for GOC.    Called and spoke with patient's brother in law, Esteban. Palliative care introduced.  Patient with cardiac arrest earlier in the day. He had received a medical update from the Burn team earlier in the day.  In line with the patient's living will, he wished to make the patient DNR with ongoing medical management. All questions answered.      MEDD (morphine equivalent daily dose): NA      See Recs below.    Please call [phone] with questions or concerns 24/7.   We will continue to follow.

## 2022-07-08 NOTE — CHART NOTE - NSCHARTNOTEFT_GEN_A_CORE
NUTRITION SUPPORT CONSULTATION    HPI:  89 yo M with PMH HTN, HLD, CKD4 (baseline Cr 1.5), chronic HFrEF (LVEF 30% Dec 2021), CAD s/p PCI in 2007, aortic stenosis s/p AVR in 2013, chronic atrial fibrillation s/p PPM, hypothyroidism, BPH, insomnia, GIB (now off warfarin x 8 months), colon cancer s/p R hemicolectomy in 2016, prostate cancer who presented on 06/27 from Dr. Chandler's office for pancytopenic/neutropenic WBC 1.4 . At the time patient was admitted and received a BM biopsy pending final path report. Patient remained in the hospital pending biopsy results to confirm whether he had leukemia or not and ensure he begins treatment if leukemic. He was placed on filgrastim to increase his WBC count. Patient was started on Neupogen on 07/01-07/04  with current ANC >5k. Patient had a febrile episode on 07/02 for which he received vancomycin and cefepime, both of which were discontinued as blood cultures were negative and CXR had an effusion but no evidence of consolidation. The patient was no longer spiking fevers and was ready for discharge but his course was complicated by a trending Cr from his baseline of 2.2. His BRITT is of prerenal nature based on urine lytes (FeNA and FeUrea were calculated). Patient then developed skin sloughing of posterior right shoulder which started on 07/04, rapidly progressing over 24 hours to include back, bilateral UE and thighs, and worsening BRITT. [facility] reached out to [facility] for transfer to Burn ICU for suspected SJS.    Patient seen and examined at bedside. Denies f/c, n/v, HA, chest pain, SOB, abdominal pain, diarrhea, constipation, melena, hematochezia, hematuria, dysuria. Patient only complaint is pain on his back from the skin sloughing.    (06 Jul 2022 01:28)      PAST MEDICAL & SURGICAL HISTORY:  Aortic valve replaced  Atrial fibrillation  CAD (coronary artery disease)  HTN (hypertension)  PUD (peptic ulcer disease)  Constipation  Iron deficiency anemia  Diverticulosis  High cholesterol  Carotid arterial disease  Internal hemorrhoids  Endocarditis  Colon cancer  Hypothyroidism  S/P AVR  2013 @ [facility]  by Dr. Young  Cardiac pacemaker  3yrs ago  H/O inguinal hernia repair  H/O right hemicolectomy  History of appendectomy    Allergies  cefepime (LoyaBiotz)  Neupogen (FastCustomer)  vancomycin (FastCustomer)    MEDICATIONS  (STANDING):  ascorbic acid 500 milliGRAM(s) Oral daily  atorvastatin 20 milliGRAM(s) Oral at bedtime  cholecalciferol 1000 Unit(s) Oral every 24 hours  donepezil 10 milliGRAM(s) Oral at bedtime  folic acid 1 milliGRAM(s) Oral daily  heparin   Injectable 5000 Unit(s) SubCutaneous every 8 hours  immune   globulin 10% (GAMMAGARD) IVPB 60 Gram(s) IV Intermittent daily  levothyroxine 25 MICROGram(s) Oral daily  midodrine. 5 milliGRAM(s) Oral three times a day  multivitamin 1 Tablet(s) Oral daily  pantoprazole    Tablet 40 milliGRAM(s) Oral before breakfast  sodium chloride 0.9% Bolus 500 milliLiter(s) IV Bolus once  sodium chloride 0.9%. 1000 milliLiter(s) (100 mL/Hr) IV Continuous <Continuous>  tamsulosin 0.4 milliGRAM(s) Oral at bedtime  traZODone 100 milliGRAM(s) Oral at bedtime  vitamin A &amp; D Ointment 1 Application(s) Topical daily  zinc sulfate 220 milliGRAM(s) Oral daily    MEDICATIONS  (PRN):  acetaminophen     Tablet .. 650 milliGRAM(s) Oral every 6 hours PRN Mild Pain (1 - 3)  morphine  - Injectable 2 milliGRAM(s) IV Push every 6 hours PRN Severe Pain (7 - 10)  morphine  - Injectable 2 milliGRAM(s) IV Push two times a day PRN wound care  ondansetron    Tablet 4 milliGRAM(s) Oral three times a day PRN Nausea and/or Vomiting  oxycodone    5 mG/acetaminophen 325 mG 1 Tablet(s) Oral every 4 hours PRN Moderate Pain (4 - 6)  senna 2 Tablet(s) Oral at bedtime PRN Constipation  zolpidem 5 milliGRAM(s) Oral at bedtime PRN Insomnia    ICU Vital Signs Last 24 Hrs  T(C): 35.9 (07 Jul 2022 06:00), Max: 36.5 (06 Jul 2022 12:00)  T(F): 96.7 (07 Jul 2022 06:00), Max: 97.7 (06 Jul 2022 12:00)  HR: 80 (07 Jul 2022 10:30) (80 - 85)  BP: 114/52 (07 Jul 2022 10:30) (87/51 - 134/80)  BP(mean): 75 (07 Jul 2022 10:30) (61 - 103)  RR: 18 (07 Jul 2022 07:00) (16 - 19)  SpO2: 99% (07 Jul 2022 10:30) (97% - 100%)    Drug Dosing Weight  Height (cm): 160 (07 Jul 2022 07:00)  Weight (kg): 60.2 (06 Jul 2022 18:15)  BMI (kg/m2): 23.5 (07 Jul 2022 07:00)  BSA (m2): 1.62 (07 Jul 2022 07:00)    EXAM     Abd:    LE:   Enteral access:  IV access:    LABS  07-06    135  |  98  |  67<HH>  ----------------------------<  93  4.6   |  25  |  3.3<H>    Ca    9.1      06 Jul 2022 11:30  Phos  3.2     07-06  Mg     2.1     07-06    TPro  4.5<L>  /  Alb  3.2<L>  /  TBili  0.3  /  DBili  x   /  AST  16  /  ALT  11  /  AlkPhos  109  07-06                          8.4    20.14 )-----------( 96       ( 06 Jul 2022 11:30 )             28.0       Diet, Regular:   Supplement Feeding Modality:  Oral  Ensure Enlive Cans or Servings Per Day:  1       Frequency:  Two Times a day (07-06-22 @ 06:52)      ASSESSMENT        PLAN
PALLIATIVE MEDICINE INTERDISCIPLINARY TEAM NOTE    Provider:                                         [ X  ] Initial visit        Met with: [   ] Patient  [  X ] Family  [   ] Other:    Primary Language: [ X  ] English [   ] Other*:                      *Interpretation provided by:    SUPPORT DIAGNOSES            (Check all that apply)    [   ] EOL issues  [ X  ] Advanced Illness  [   ] Cultural / spiritual concerns  [   ] Pain / suffering  [   ] Dementia / AMS  [   ] Other:  [ X  ] AD issues  [   ] Grief / loss / sadness  [   ] Discharge issues  [  X ] Distress / coping    PSYCHOSOCIAL ASSESSMENT OF PATIENT         (Check all that apply)    [ X  ] Initial Assessment            [   ] Reassessment          [   ] Not Applicable this visit    Pain/suffering acuity:  [  X ] None to mild (0-3)           [   ] Moderate (4-6)        [   ] High (7-10)         Mental Status:  [   ] Alert/oriented (x3)          [   ] Confused/Altered(x2/x1)         [ X  ] Non-resp: intubated    Functional status:  [   ] Independent w ADLs      [   ] Needs Assistance             [ X ] Bedbound/Full Care    Coping:  [ X  ] Coping well                     [   ] Coping w/difficulty            [   ] Poor coping    Support system:  [ X  ] Strong                              [   ] Adequate                        [   ] Inadequate      Past history and medications for:     [ ] Anxiety       [ ] Depression    [ ] Sleep disorders       SERVICE PROVIDED  [   ]Discharge support / facilitation  [ X ]AD / goals of care counseling                                  [   ]EOL / death / bereavement counseling  [ X ]Counseling / support                                                [   ] Family meeting  [   ]Prayer / sacrament / ritual                                      [   ] Referral   [   ]Other                                                                       NOTE and Plan of Care (PoC):    patient is a 89 y/o M intubated this AM with complicated pmhx. palliative team spk with patient's HCP Esteban Day at [phone], discussed advance care planning and patient's wishes. Discussed code status. Esteban wished to make patient DNR. MOLST completed and placed in chart. all questions answered. [phone]
Nurse called stating pt is desaturating to low 90's while on nasal cannula 2L and appears tachypneic. Patient found to have upper airway secretions, audible rales and bilateral crackles. Patient A&Ox3 entire time. No complaint of SOB. CXR, ABG and BNP, and Duoneb ordered. Nasal cannula increased to 6L. AB.19/59/186/22, BNP 96623. CXR (my interpretation- appears larger left effusion and new basilar right effusion). Lasix 20mg IV given- no adequate response in UO or saturation. ~1hr after Lasix 20mg IVP given when patient began desaturating again. Patient then placed on high flow nasal cannula @ 80%, SpO2 increased to %. Repeat AB.25/50/138/22 on 80%. HFNC decreased to 60% based upon ABG. Patient appears more comfortable and is breathing easier. Bilateral crackles still present but audible rales decreased. Stat labs sent. Will repeat ABG in 2 hours.

## 2022-07-08 NOTE — CONSULT NOTE ADULT - ASSESSMENT
89 yo M with PMH HTN, HLD, CKD4 (baseline Cr 1.5), chronic HFrEF (LVEF 30% Dec 2021), CAD s/p PCI in 2007, aortic stenosis s/p AVR in 2013, chronic atrial fibrillation s/p PPM, hypothyroidism, BPH, insomnia, GIB (now off warfarin x 8 months), colon cancer s/p R hemicolectomy in 2016, prostate cancer who presented on 06/27 from Dr. Chandler's office for pancytopenic/neutropenic WBC 1.4     # Coffee ground emesis r/o Upper GI Bleed:  - at the time of exam pt was intubated sedated  - OG tube with dark liquid concern for coffee ground emesis  - Hb is stable  - no evidence of active GI bleed     #Rec  - Keep NPO    - Maintain active Type and screen  - Trend H&H BID  - Please place x2 18G IVs  - Please start IV fluids (SBP >90)   - Please start pantoprazole 40 IV BID  - Please target Hb  >7   - Please avoid any NSAIDs       89 yo M with PMH HTN, HLD, CKD4 (baseline Cr 1.5), chronic HFrEF (LVEF 30% Dec 2021), CAD s/p PCI in 2007, aortic stenosis s/p AVR in 2013, chronic atrial fibrillation s/p PPM, hypothyroidism, BPH, insomnia, GIB (now off warfarin x 8 months), colon cancer s/p R hemicolectomy in 2016, prostate cancer who presented on 06/27 from Dr. Chandlre's office for pancytopenic/neutropenic WBC 1.4     # Coffee ground emesis r/o Upper GI Bleed:  - pt was seen and examined 9:30 AM today, at the time of exam pt was intubated sedated  - OG tube with dark liquid concern for coffee ground emesis  - Hb is stable  - no evidence of active GI bleed     #Rec  - Keep NPO    - Maintain active Type and screen  - Trend H&H BID  - Please place x2 18G IVs  - Please start IV fluids (SBP >90)   - Please start pantoprazole 40 IV BID  - Please target Hb  >7   - Please avoid any NSAIDs

## 2022-07-08 NOTE — CONSULT NOTE ADULT - ATTENDING COMMENTS
Acute on chronic HFrEF / pulmonary edema is likely in the next 24-72 hrs on aggressive IV hydration with Cr  3.3    - If acute respiratory distress, start Lasix 80 mg IV  - Palliative care consult
Patient was seen and evaluated today.  He was not much of a historian so I spoke to his treating physician Dr. rosales  as outpatient we also reviewed records that are available from outside facility.    He had dressing on so spoke to his nurse who informed me he has about 37% rash which is somebody like with him losing his top layer of skin which is present on his back his arms and his inner thighs and appears to be spreading.    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  -In remission if patient develops bleeding check PTT     #Agranulocytosis was possibly due to delayed Rituxan reaction versus autoimmunity? LGL? T cell Lymphoma?  -Patient underwent bone marrow biopsy results are pending so far possible  T-cell process, NHL? will follow up bone marrow biopsy   -Responded to  [facility]     #Macrocytic anemia this is now multifactorial possibly due to CKD heart failure, bone marrow suppression due to underlying skin issue,  T Cell process,  NHL?  -Ferritin is 161 in end of June  -Please check reticulocyte  -Vitamin B12 was 624 in December 2021 unlikely to become deficient but can recheck as well as folic acid and check TSH granted may be inaccurate  -can trasnfsue to keep hbg>7-8 given cardaicc history     #Mild thrombocytopenia this may be consumption due to skin issue vs T Cell procees NHL, ITP, Medications, recent antibiotics  -Platelet count relatively stable, can keep >10,000, or >20,000 if febrile greater than 50,000 if  bleeding    #SJS/TENS  -Likely secondary to recent antimicrobial therapy for possible infection  -Blood cultures have been negative and he remains afebrile  -We will follow-up results of skin biopsy  -No objections with IVIG  -Wound care as per burn    DVT ppx on Hep SC    Monitor CBC and CMP, PTT/PT on occasion as well and if bleeding
Pt with Nidhi on CKD, CHF, admitted with neutropenic fever and skin sloughing SJS or TEN post ABX.    NIDHI -prerenal vs ATN  urine lytes, creat  imaging studies  -cont IVF  lasix prn if worsening respiratory status  will follow
I edited the note

## 2022-07-08 NOTE — PROGRESS NOTE ADULT - REASON FOR ADMISSION
Transfer from [facility] for possible SJS
Transfer from [facility] for possible SJS
Transfer from [facility] for possible SJS
Transfer from [facility] for possible SJS
Transfer from [facility] for possible SJS

## 2022-07-08 NOTE — PROGRESS NOTE ADULT - ASSESSMENT
Pt with NIDHI on CKD3, HFrEF, admitted with neutropenic fever, was on antibiotics, developed TEN.    Worsening volume status and hypotension overnight. Received lasix IV, started on Levophed.    Nidhi - initially prerenal (by FeNA) is now likely an ATN, not responding to IVF  - cont pressors, maintain MAP> 65  -lasix IV 80 q12 when BP improves  -repeat CXR  -renal sono r/o obstruction  =r/o Sepsis - WBC up to 17x  f/u BCx UCx  - may need intubation  -palliative are consult placed  -overall prognosis is poor

## 2022-07-08 NOTE — PROGRESS NOTE ADULT - SUBJECTIVE AND OBJECTIVE BOX
Patient is a 89y old  Male who presented from [facility] with a chief complaint of for possible SJS. Pt seen in the hydrotherapy room, more skin slough noted today. Pt had no complains at that moment. No acute events overnight, pt afebrile.    Allergies    cefepime (Loya-Zane)  Neupogen (Loya-Zane)  vancomycin (Loya-Zane)      INTERVAL HPI/OVERNIGHT EVENTS:  ICU Vital Signs Last 24 Hrs  T(C): 36.2 (2022 16:00), Max: 36.8 (2022 06:45)  T(F): 97.1 (2022 16:00), Max: 98.2 (2022 06:45)  HR: 80 (2022 16:00) (79 - 88)  BP: 134/80 (2022 16:00) (98/51 - 139/59)  BP(mean): 103 (2022 16:00) (64 - 103)  RR: 18 (2022 12:00) (15 - 18)  SpO2: 99% (2022 16:00) (91% - 100%)    I&O's Summary    2022 07:01  -  2022 07:00  --------------------------------------------------------  IN: 200 mL / OUT: 0 mL / NET: 200 mL    2022 07:01  -  2022 18:16  --------------------------------------------------------  IN: 1837 mL / OUT: 190 mL / NET: 1647 mL          LABS:                        8.4    20.14 )-----------( 96       ( 2022 11:30 )             28.0     07-06    135  |  98  |  67<HH>  ----------------------------<  93  4.6   |  25  |  3.3<H>    Ca    9.1      2022 11:30  Phos  3.2     07-06  Mg     2.1     07-06    TPro  4.5<L>  /  Alb  3.2<L>  /  TBili  0.3  /  DBili  x   /  AST  16  /  ALT  11  /  AlkPhos  109  07-06      Urinalysis Basic - ( 2022 10:30 )    Color: Yellow / Appearance: Slightly Turbid / S.016 / pH: x  Gluc: x / Ketone: Trace  / Bili: Negative / Urobili: <2 mg/dL   Blood: x / Protein: 100 mg/dL / Nitrite: Negative   Leuk Esterase: Large / RBC: 9 /HPF / WBC 75 /HPF   Sq Epi: x / Non Sq Epi: 13 /HPF / Bacteria: Negative        Consultant(s) Notes Reviewed:  [x ] YES  [ ] NO    MEDICATIONS  (STANDING):  ascorbic acid 500 milliGRAM(s) Oral daily  atorvastatin 20 milliGRAM(s) Oral at bedtime  cholecalciferol 1000 Unit(s) Oral every 24 hours  donepezil 10 milliGRAM(s) Oral at bedtime  folic acid 1 milliGRAM(s) Oral daily  heparin   Injectable 5000 Unit(s) SubCutaneous every 8 hours  immune   globulin 10% (GAMMAGARD) IVPB 60 Gram(s) IV Intermittent daily  levothyroxine 25 MICROGram(s) Oral daily  multivitamin 1 Tablet(s) Oral daily  pantoprazole    Tablet 40 milliGRAM(s) Oral before breakfast  sodium chloride 0.9%. 1000 milliLiter(s) (100 mL/Hr) IV Continuous <Continuous>  tamsulosin 0.4 milliGRAM(s) Oral at bedtime  traZODone 100 milliGRAM(s) Oral at bedtime  vitamin A &amp; D Ointment 1 Application(s) Topical daily  zinc sulfate 220 milliGRAM(s) Oral daily    MEDICATIONS  (PRN):  acetaminophen     Tablet .. 650 milliGRAM(s) Oral every 6 hours PRN Mild Pain (1 - 3)  morphine  - Injectable 2 milliGRAM(s) IV Push every 6 hours PRN Severe Pain (7 - 10)  morphine  - Injectable 2 milliGRAM(s) IV Push two times a day PRN wound care  ondansetron    Tablet 4 milliGRAM(s) Oral three times a day PRN Nausea and/or Vomiting  oxycodone    5 mG/acetaminophen 325 mG 1 Tablet(s) Oral every 4 hours PRN Moderate Pain (4 - 6)  senna 2 Tablet(s) Oral at bedtime PRN Constipation  zolpidem 5 milliGRAM(s) Oral at bedtime PRN Insomnia      PHYSICAL EXAM:  General: Lying on the bed in no acute distress  Skin: partial thickness wounds throughout the body, sloughing off of the skin of left arm and shoulder, left thigh and buttock, right thigh and buttock, right back and arm. All blisters now open. TBSA at this time about 25-30%.All wound clean, moist, pink, no pus, no malodor, no erythema or edema. No mucosal involvement noted at this time    Dressing changed in hydrotherapy room  Care Discussed with Consultants/Other Providers [ x] YES  [ ] NO
Nephrology progress note    Patient is seen and examined, events over the last 24 h noted .  Worsenign fluid overload on IVF, received Lasix, BP low  Being started on Levo.    Allergies:  cefepime ([employer])  Neupogen ([employer])  vancomycin ([employer])    Hospital Medications:   MEDICATIONS  (STANDING):  ascorbic acid 500 milliGRAM(s) Oral daily  atorvastatin 20 milliGRAM(s) Oral at bedtime  cholecalciferol 1000 Unit(s) Oral every 24 hours  donepezil 10 milliGRAM(s) Oral at bedtime  folic acid 1 milliGRAM(s) Oral daily  heparin   Injectable 5000 Unit(s) SubCutaneous every 8 hours  immune   globulin 10% (GAMMAGARD) IVPB 60 Gram(s) IV Intermittent daily  levothyroxine 25 MICROGram(s) Oral daily  midodrine. 10 milliGRAM(s) Oral every 8 hours  multivitamin 1 Tablet(s) Oral daily  pantoprazole    Tablet 40 milliGRAM(s) Oral before breakfast  phenylephrine    Infusion 0.1 MICROgram(s)/kG/Min (2.26 mL/Hr) IV Continuous <Continuous>  tamsulosin 0.4 milliGRAM(s) Oral at bedtime  traZODone 100 milliGRAM(s) Oral at bedtime  vitamin A &amp; D Ointment 1 Application(s) Topical daily  zinc sulfate 220 milliGRAM(s) Oral daily        VITALS:  T(F): 96.1 (22 @ 04:00), Max: 97 (22 @ 13:00)  HR: 80 (22 @ 07:45)  BP: 146/61 (22 @ 06:00)  RR: 20 (22 @ 07:15)  SpO2: 90% (22 @ 07:45)  Wt(kg): --     @ :  -   @ 07:00  --------------------------------------------------------  IN: 4267 mL / OUT: 360 mL / NET: 3907 mL     @ 07:01  -   @ 07:00  --------------------------------------------------------  IN: 1945 mL / OUT: 512 mL / NET: 1433 mL      Height (cm): 160 ( @ 20:23)  Weight (kg): 60.2 ( @ 20:23)  BMI (kg/m2): 23.5 ( @ 20:23)  BSA (m2): 1.62 ( @ 20:23)    PHYSICAL EXAM:  Constitutional: NAD  HEENT: anicteric sclera,   Respiratory: b /l rales   Cardiovascular: S1, S2,   Gastrointestinal: BS+, soft, NT/ND  Extremities: + peripheral edema  Neurological: Awake  : Has jerez.   Skin: skin lesions covered in dressing  Vascular Access:    LABS:      132<L>  |  99  |  73<HH>  ----------------------------<  148<H>  4.6   |  22  |  3.5<H>    Ca    9.5      2022 04:40  Phos  4.9       Mg     1.9         TPro  6.3  /  Alb  2.9<L>  /  TBili  0.2  /  DBili  <0.2  /  AST  12  /  ALT  12  /  AlkPhos  104                            8.4    17.22 )-----------( 96       ( 2022 04:40 )             28.0       Urine Studies:  Urinalysis Basic - ( 2022 10:30 )    Color: Yellow / Appearance: Slightly Turbid / S.016 / pH:   Gluc:  / Ketone: Trace  / Bili: Negative / Urobili: <2 mg/dL   Blood:  / Protein: 100 mg/dL / Nitrite: Negative   Leuk Esterase: Large / RBC: 9 /HPF / WBC 75 /HPF   Sq Epi:  / Non Sq Epi: 13 /HPF / Bacteria: Negative      Sodium, Random Urine: 20.0 mmoL/L ( @ 10:30)  Osmolality, Random Urine: 367 mos/kg ( @ 10:30)  Potassium, Random Urine: 46 mmol/L ( @ 10:30)  Sodium, Random Urine: 28 mmol/L ( @ 11:35)  Creatinine, Random Urine: 94 mg/dL ( @ 11:35)  Protein/Creatinine Ratio Calculation: 0.3 Ratio ( @ 11:35)  Sodium, Random Urine: <20 mmol/L ( @ 10:29)  Creatinine, Random Urine: 130 mg/dL ( @ 10:29)    RADIOLOGY & ADDITIONAL STUDIES:  < from: Xray Chest 1 View- PORTABLE-Routine (Xray Chest 1 View- PORTABLE-Routine in AM.) (22 @ 06:23) >    Frontal examination of the chest demonstrates cardiomegaly. Left   effusion. Discoid change left lung base. No interval change position   remaining support devices in comparison to prior examination of the chest   2022. Status post sternotomy.    < end of copied text >  
Patient is a 89y old  Male who presents with a chief complaint of Transfer from [facility] for possible SJS (08 Jul 2022 13:49)      AM rounds:  sob, hypoxia, intubated this am for respiratory distress, pulmonary edema, currently on vent  Left fem central line placed.     ICU Vital Signs Last 24 Hrs  T(C): 36.2 (08 Jul 2022 11:00), Max: 36.2 (08 Jul 2022 11:00)  T(F): 97.2 (08 Jul 2022 11:00), Max: 97.2 (08 Jul 2022 11:00)  HR: 80 (08 Jul 2022 13:15) (79 - 97)  BP: 110/48 (08 Jul 2022 13:00) (67/40 - 204/77)  BP(mean): 69 (08 Jul 2022 13:00) (48 - 111)  ABP: 18/14 (08 Jul 2022 13:15) (18/14 - 162/54)  ABP(mean): 16 (08 Jul 2022 13:15) (16 - 89)  RR: 30 (08 Jul 2022 12:00) (17 - 30)  SpO2: 77% (08 Jul 2022 13:15) (72% - 100%)    I&O's Summary    07 Jul 2022 07:01  -  08 Jul 2022 07:00  --------------------------------------------------------  IN: 1945 mL / OUT: 512 mL / NET: 1433 mL    08 Jul 2022 07:01  -  08 Jul 2022 14:43  --------------------------------------------------------  IN: 0 mL / OUT: 10 mL / NET: -10 mL      Mode: AC/ CMV (Assist Control/ Continuous Mandatory Ventilation)  RR (machine): 30  TV (machine): 450  FiO2: 100  PEEP: 8  ITime: 1  MAP: 16  PIP: 39    Allergies  cefepime (Loya-Zane)  Neupogen (Loya-Zane)  vancomycin ([employer])    Lab Results:                        8.3    53.97 )-----------( 130      ( 08 Jul 2022 08:50 )             28.8     07-08    132<L>  |  98  |  76<HH>  ----------------------------<  200<H>  5.3<H>   |  17  |  3.7<H>    Ca    8.9      08 Jul 2022 08:50  Phos  5.9     07-08  Mg     2.0     07-08    TPro  5.5<L>  /  Alb  2.4<L>  /  TBili  0.2  /  DBili  <0.2  /  AST  17  /  ALT  12  /  AlkPhos  122<H>  07-08  PT/INR - ( 08 Jul 2022 08:50 )   PT: 14.90 sec;   INR: 1.30 ratio    PTT - ( 08 Jul 2022 08:50 )  PTT:61.5 sec  LIVER FUNCTIONS - ( 08 Jul 2022 08:50 )  Alb: 2.4 g/dL / Pro: 5.5 g/dL / ALK PHOS: 122 U/L / ALT: 12 U/L / AST: 17 U/L / GGT: x           CAPILLARY BLOOD GLUCOSE  POCT Blood Glucose.: 214 mg/dL (08 Jul 2022 08:13)  ABG - ( 08 Jul 2022 11:29 )  pH: 7.23  /  pCO2: 58    /  pO2: 66    / HCO3: 24    / Base Excess: -3.8  /  SaO2: 94.7        Mode: AC/ CMV (Assist Control/ Continuous Mandatory Ventilation)  RR (machine): 30  TV (machine): 450  FiO2: 100  PEEP: 8  ITime: 1  MAP: 16  PIP: 39      MEDICATIONS  (STANDING):  ascorbic acid 500 milliGRAM(s) Oral daily  atorvastatin 20 milliGRAM(s) Oral at bedtime  cholecalciferol 1000 Unit(s) Oral every 24 hours  donepezil 10 milliGRAM(s) Oral at bedtime  DOPamine Infusion 2 MICROgram(s)/kG/Min (4.52 mL/Hr) IV Continuous <Continuous>  folic acid 1 milliGRAM(s) Oral daily  furosemide   Injectable 80 milliGRAM(s) IV Push every 12 hours  immune   globulin 10% (GAMMAGARD) IVPB 60 Gram(s) IV Intermittent daily  levothyroxine 25 MICROGram(s) Oral daily  midazolam Infusion 0.02 mG/kG/Hr (1.2 mL/Hr) IV Continuous <Continuous>  midodrine. 10 milliGRAM(s) Oral every 8 hours  multivitamin 1 Tablet(s) Oral daily  norepinephrine Infusion 0.05 MICROgram(s)/kG/Min (2.82 mL/Hr) IV Continuous <Continuous>  pantoprazole Infusion 8 mG/Hr (10 mL/Hr) IV Continuous <Continuous>  phenylephrine    Infusion 0.1 MICROgram(s)/kG/Min (1.13 mL/Hr) IV Continuous <Continuous>  sodium bicarbonate  Infusion 0.125 mEq/kG/Hr (50 mL/Hr) IV Continuous <Continuous>  tamsulosin 0.4 milliGRAM(s) Oral at bedtime  traZODone 100 milliGRAM(s) Oral at bedtime  vasopressin Infusion 0.02 Unit(s)/Min (1.2 mL/Hr) IV Continuous <Continuous>  vitamin A &amp; D Ointment 1 Application(s) Topical daily  zinc sulfate 220 milliGRAM(s) Oral daily    MEDICATIONS  (PRN):  acetaminophen     Tablet .. 650 milliGRAM(s) Oral every 6 hours PRN Mild Pain (1 - 3)  albuterol/ipratropium for Nebulization 3 milliLiter(s) Nebulizer every 6 hours PRN Shortness of Breath and/or Wheezing  morphine  - Injectable 2 milliGRAM(s) IV Push every 6 hours PRN Severe Pain (7 - 10)  morphine  - Injectable 2 milliGRAM(s) IV Push two times a day PRN wound care  ondansetron    Tablet 4 milliGRAM(s) Oral three times a day PRN Nausea and/or Vomiting  oxycodone    5 mG/acetaminophen 325 mG 1 Tablet(s) Oral every 4 hours PRN Moderate Pain (4 - 6)  senna 2 Tablet(s) Oral at bedtime PRN Constipation  zolpidem 5 milliGRAM(s) Oral at bedtime PRN Insomnia    PHYSICAL EXAM:  GENERAL: on vent support, responding to pain  EYES: EOMI, conjunctiva and sclera clear  CHEST/LUNG: rales b/l, on vent  HEART: Regular rate and rhythm;   ABDOMEN: Soft, nontender  SKIN/Wound: dressing in place on torso, b/l upper and lower extremities, no bleeding noted.   
Patient is a 89y old  Male who presented from [facility] with a chief complaint of for possible SJS. Pt seen at bedside, more skin sloughing noted today. Pt has no complains at that moment. No acute events overnight, pt afebrile. TBSA about 30%, new blisters noted.    / pt s/p skin biopsy and A-line placement    Allergies    cefepime (Loya-Zane)  Neupogen (Loya-Zane)  vancomycin (Loya-Zane)      INTERVAL HPI/OVERNIGHT EVENTS:  ICU Vital Signs Last 24 Hrs  T(C): 35.8 (2022 19:30), Max: 36.2 (2022 00:00)  T(F): 96.5 (2022 19:30), Max: 97.1 (2022 00:00)  HR: 80 (2022 19:30) (79 - 80)  BP: 133/67 (2022 19:30) (84/45 - 133/67)  BP(mean): 91 (2022 19:30) (58 - 91)  ABP: 131/56 (2022 19:30) (107/42 - 133/56)  ABP(mean): 80 (2022 19:30) (62 - 80)  RR: 18 (2022 19:30) (16 - 18)  SpO2: 99% (2022 19:30) (92% - 100%)    O2 Parameters below as of 2022 19:30  Patient On (Oxygen Delivery Method): nasal cannula  O2 Flow (L/min): 2        I&O's Summary    2022 07:  -  2022 07:00  --------------------------------------------------------  IN: 4267 mL / OUT: 360 mL / NET: 3907 mL    2022 07:01  -  2022 20:23  --------------------------------------------------------  IN: 1460 mL / OUT: 345 mL / NET: 1115 mL          LABS:                        8.8    12.99 )-----------( 99       ( 2022 11:43 )             29.4     07-    134<L>  |  100  |  69<HH>  ----------------------------<  125<H>  4.6   |  21  |  3.1<H>    Ca    9.2      2022 11:43  Phos  3.6       Mg     2.0         TPro  5.5<L>  /  Alb  3.0<L>  /  TBili  0.2  /  DBili  x   /  AST  16  /  ALT  13  /  AlkPhos  119<H>        Urinalysis Basic - ( 2022 10:30 )    Color: Yellow / Appearance: Slightly Turbid / S.016 / pH: x  Gluc: x / Ketone: Trace  / Bili: Negative / Urobili: <2 mg/dL   Blood: x / Protein: 100 mg/dL / Nitrite: Negative   Leuk Esterase: Large / RBC: 9 /HPF / WBC 75 /HPF   Sq Epi: x / Non Sq Epi: 13 /HPF / Bacteria: Negative        RADIOLOGY & ADDITIONAL TESTS:    Chest x-ray 7/3:   Frontal examination of the chest demonstrates cardiomegaly. Left   effusion. Discoid change left lung base. No interval change position   remaining support devices in comparison to prior examination of the chest   2022. Status post sternotomy.      Consultant(s) Notes Reviewed:  [x ] YES  [ ] NO    MEDICATIONS  (STANDING):  ascorbic acid 500 milliGRAM(s) Oral daily  atorvastatin 20 milliGRAM(s) Oral at bedtime  cholecalciferol 1000 Unit(s) Oral every 24 hours  donepezil 10 milliGRAM(s) Oral at bedtime  folic acid 1 milliGRAM(s) Oral daily  heparin   Injectable 5000 Unit(s) SubCutaneous every 8 hours  immune   globulin 10% (GAMMAGARD) IVPB 60 Gram(s) IV Intermittent daily  levothyroxine 25 MICROGram(s) Oral daily  midodrine. 10 milliGRAM(s) Oral every 8 hours  multivitamin 1 Tablet(s) Oral daily  pantoprazole    Tablet 40 milliGRAM(s) Oral before breakfast  sodium chloride 0.9%. 1000 milliLiter(s) (75 mL/Hr) IV Continuous <Continuous>  tamsulosin 0.4 milliGRAM(s) Oral at bedtime  traZODone 100 milliGRAM(s) Oral at bedtime  vitamin A &amp; D Ointment 1 Application(s) Topical daily  zinc sulfate 220 milliGRAM(s) Oral daily    MEDICATIONS  (PRN):  acetaminophen     Tablet .. 650 milliGRAM(s) Oral every 6 hours PRN Mild Pain (1 - 3)  morphine  - Injectable 2 milliGRAM(s) IV Push every 6 hours PRN Severe Pain (7 - 10)  morphine  - Injectable 2 milliGRAM(s) IV Push two times a day PRN wound care  ondansetron    Tablet 4 milliGRAM(s) Oral three times a day PRN Nausea and/or Vomiting  oxycodone    5 mG/acetaminophen 325 mG 1 Tablet(s) Oral every 4 hours PRN Moderate Pain (4 - 6)  senna 2 Tablet(s) Oral at bedtime PRN Constipation  zolpidem 5 milliGRAM(s) Oral at bedtime PRN Insomnia      PHYSICAL EXAM:  General: Lying on the bed in no acute distress, AAOx3  Pulm: Clear to auscultation bilaterally, upper airway secretion accumulation present   Cards: S1/S2 present, RRR   ABD: Soft, nontender, nondistended, +BS  Skin: partial thickness wounds throughout the body, sloughing off of the skin of left arm and shoulder, left thigh and buttock, right thigh and buttock, right back and arm. All blisters now open. TBSA at this time about 30%.All wound clean, moist, pink, no pus, no malodor, no erythema or edema. No mucosal involvement noted at this time. New blisters present on bilateral arms.    Care Discussed with Consultants/Other Providers [ x] YES  [ ] NO
LENGTH OF HOSPITAL STAY: 3d    CHIEF COMPLAINT:   Patient is a 89y old  Male who presents with a chief complaint of Transfer from [facility] for possible SJS (2022 07:48)      HISTORY OF PRESENTING ILLNESS:    HPI:  89 yo M with PMH HTN, HLD, CKD4 (baseline Cr 1.5), chronic HFrEF (LVEF 30% Dec 2021), CAD s/p PCI in , aortic stenosis s/p AVR in , chronic atrial fibrillation s/p PPM, hypothyroidism, BPH, insomnia, GIB (now off warfarin x 8 months), colon cancer s/p R hemicolectomy in , prostate cancer who presented on  from Dr. Chandler's office for pancytopenic/neutropenic WBC 1.4 . At the time patient was admitted and received a BM biopsy pending final path report. Patient remained in the hospital pending biopsy results to confirm whether he had leukemia or not and ensure he begins treatment if leukemic. He was placed on filgrastim to increase his WBC count. Patient was started on Neupogen on -  with current ANC >5k. Patient had a febrile episode on  for which he received vancomycin and cefepime, both of which were discontinued as blood cultures were negative and CXR had an effusion but no evidence of consolidation. The patient was no longer spiking fevers and was ready for discharge but his course was complicated by a trending Cr from his baseline of 2.2. His BRITT is of prerenal nature based on urine lytes (FeNA and FeUrea were calculated). Patient then developed skin sloughing of posterior right shoulder which started on , rapidly progressing over 24 hours to include back, bilateral UE and thighs, and worsening BRITT. [facility] reached out to [facility] for transfer to Burn ICU for suspected SJS.    Patient seen and examined at bedside. Denies f/c, n/v, HA, chest pain, SOB, abdominal pain, diarrhea, constipation, melena, hematochezia, hematuria, dysuria. Patient only complaint is pain on his back from the skin sloughing.      (2022 01:28)    PAST MEDICAL & SURGICAL HISTORY  PAST MEDICAL & SURGICAL HISTORY:  Aortic valve replaced      Atrial fibrillation      CAD (coronary artery disease)      HTN (hypertension)      PUD (peptic ulcer disease)      Constipation      Iron deficiency anemia      Diverticulosis      High cholesterol      Carotid arterial disease      Internal hemorrhoids      Endocarditis      Colon cancer      Hypothyroidism      S/P AVR  2013 @ [facility]  by Dr. Young      Cardiac pacemaker  3yrs ago      H/O inguinal hernia repair      H/O right hemicolectomy      History of appendectomy        SOCIAL HISTORY:    ALLERGIES:  cefepime ([employer])  Neupogen ([employer])  vancomycin ([employer])    MEDICATIONS:  STANDING MEDICATIONS  ascorbic acid 500 milliGRAM(s) Oral daily  atorvastatin 20 milliGRAM(s) Oral at bedtime  cholecalciferol 1000 Unit(s) Oral every 24 hours  donepezil 10 milliGRAM(s) Oral at bedtime  folic acid 1 milliGRAM(s) Oral daily  heparin   Injectable 5000 Unit(s) SubCutaneous every 8 hours  immune   globulin 10% (GAMMAGARD) IVPB 60 Gram(s) IV Intermittent daily  levothyroxine 25 MICROGram(s) Oral daily  midodrine. 10 milliGRAM(s) Oral every 8 hours  multivitamin 1 Tablet(s) Oral daily  pantoprazole    Tablet 40 milliGRAM(s) Oral before breakfast  phenylephrine    Infusion 0.1 MICROgram(s)/kG/Min IV Continuous <Continuous>  tamsulosin 0.4 milliGRAM(s) Oral at bedtime  traZODone 100 milliGRAM(s) Oral at bedtime  vitamin A &amp; D Ointment 1 Application(s) Topical daily  zinc sulfate 220 milliGRAM(s) Oral daily    PRN MEDICATIONS  acetaminophen     Tablet .. 650 milliGRAM(s) Oral every 6 hours PRN  albuterol/ipratropium for Nebulization 3 milliLiter(s) Nebulizer every 6 hours PRN  morphine  - Injectable 2 milliGRAM(s) IV Push every 6 hours PRN  morphine  - Injectable 2 milliGRAM(s) IV Push two times a day PRN  ondansetron    Tablet 4 milliGRAM(s) Oral three times a day PRN  oxycodone    5 mG/acetaminophen 325 mG 1 Tablet(s) Oral every 4 hours PRN  senna 2 Tablet(s) Oral at bedtime PRN  zolpidem 5 milliGRAM(s) Oral at bedtime PRN    VITALS:   T(F): 96.1  HR: 80  BP: 146/61  RR: 20  SpO2: 90%    LABS:                        8.4    17.22 )-----------( 96       ( 2022 04:40 )             28.0     07-08    132<L>  |  99  |  73<HH>  ----------------------------<  148<H>  4.6   |  22  |  3.5<H>    Ca    9.5      2022 04:40  Phos  4.9     07-08  Mg     1.9     07-08    TPro  6.3  /  Alb  2.9<L>  /  TBili  0.2  /  DBili  <0.2  /  AST  12  /  ALT  12  /  AlkPhos  104  07-08      Urinalysis Basic - ( 2022 10:30 )    Color: Yellow / Appearance: Slightly Turbid / S.016 / pH: x  Gluc: x / Ketone: Trace  / Bili: Negative / Urobili: <2 mg/dL   Blood: x / Protein: 100 mg/dL / Nitrite: Negative   Leuk Esterase: Large / RBC: 9 /HPF / WBC 75 /HPF   Sq Epi: x / Non Sq Epi: 13 /HPF / Bacteria: Negative      ABG - ( 2022 04:18 )  pH, Arterial: 7.25  pH, Blood: x     /  pCO2: 50    /  pO2: 138   / HCO3: 22    / Base Excess: -5.2  /  SaO2: 98.4                  Culture - Urine (collected 2022 10:30)  Source: Catheterized Catheterized  Preliminary Report (2022 20:24):    10,000 - 49,000 CFU/mL Gram positive organisms    <10,000 CFU/ml Normal Urogenital prasanth present    Culture - Ear, Nose and Throat (ENT) (collected 2022 19:13)  Source: .Aspirate Aspirate  Preliminary Report (2022 14:55):    Few Acinetobacter ursingii Susceptibility to follow.    Moderate Enterococcus faecalis Susceptibility to follow.    Culture - Nose (collected 2022 19:13)  Source: .Nose Nose  Preliminary Report (2022 12:58):    Rare Staphylococcus aureus Presumptive Methicillin susceptible    Confirmation to follow within 24 hrs.    Susceptibility to follow.    Culture - Other (collected 2022 19:13)  Source: Wound Wound  Gram Stain (2022 21:34):    No organisms seen    No WBC's seen.  Preliminary Report (2022 12:44):    Rare Staphylococcus epidermidis    Susceptibility to follow.          RADIOLOGY:    PHYSICAL EXAM:  GENERAL: NAD, well-developed  HEAD:  Atraumatic, Normocephalic  EYES: EOMI, PERRLA, conjunctiva and sclera clear  NECK: Supple, No JVD  CHEST/LUNG: Clear to auscultation bilaterally; No wheeze  HEART: Regular rate and rhythm; No murmurs, rubs, or gallops  ABDOMEN: Soft, Nontender, Nondistended; Bowel sounds present  EXTREMITIES:  2+ Peripheral Pulses, No clubbing, cyanosis, or edema  NEUROLOGY: non-focal  SKIN: unable to examine skin secondary to bandage covering legs and arms due to bullous lesions      Hematopathology Report - Auth (Verified)   Specimen(s) Submitted   1 Bone marrow biopsy   2 Bone marrow clot   3 Bone marrow aspirate slides   4 Bone marrow aspirate for Flow   Final Diagnosis   1, 2: Bone marrow biopsy and blood clot:   - Atypical T-cell infiltrate, 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   - The marrow is hypocellular.   - Cellularity consists predominately of mature and maturing erythroid   elements.   - There is significant myeloid hypoplasia with a leftward shift.   - Megakaryocytes are present and are not particularly atypical.   - An iron stain shows scant iron stores.   3. Bone marrow aspirate smear:   - Insufficient spicules present.   Note: There are several prominent lymphoid aggregates present that consist   predominately of CD3 and CD5 positive T-cells. These T-cells lack   expression of CD30, ALK1, PD1 and BCL6 and have a low proliferation rate   based on Ki67. Granzyme B highlights increased number of positive cells.   PAX5 and CD20 highlight rare B-cells.   Scant plasma cells are polytypic by kappa and lambda ORION. Although no   definitive loss of pan-T-cell marker is seen by immunohistochemistry   or flow cytometry, molecular studies demonstrate a clonal T-cell gene   rearrangement (see addendum report). 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   In addition, immunohistochemical stain (1A, 2A) for CD34 highlights   1-2% blasts and occasional megakaryocytes. 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   Cannot entirely rule out myelodyspalstic syndrome.   Verified by: Myah Devine M.D.   (Electronic Signature)   Reported on: 22 13:44 EDT, [facility], [address] [place] [address]   Phone: [phone] Fax: [phone]   _________________________

## 2022-07-08 NOTE — DISCHARGE NOTE FOR THE EXPIRED PATIENT - HOSPITAL COURSE
89 yo M with PMH HTN, HLD, CKD4 (baseline Cr 1.5), chronic HFrEF (LVEF 30% Dec 2021), CAD s/p PCI in 2007, aortic stenosis s/p AVR in 2013, chronic atrial fibrillation s/p PPM, hypothyroidism, BPH, insomnia, GIB (now off warfarin x 8 months), inguinal hernia repair, h/o appendectomy,  colon cancer s/p R hemicolectomy in 2016, prostate cancer who presented on 06/27 from Dr. Chandler's office for pancytopenic/neutropenic WBC 1.4,  s/p neupogen on 7/1 and neutropenic fever s/p vancomycin 7/2 and cefepime 7/3 now with rapidly progressing skin sloughing starting on 7/3, transferred to [facility] Burn ICU from [facility] for concern of SJS/TEN due to drug reaction. Patient was admitted to burn unit. While inpatient, patient was treated with IVF, IV antibitotics , GI/dvt ppx, pain management and wound care twice a day.  Patient was given IVIG on 7/6 7/7 and 7/8. Skin Biopsy was performed on 7/7, results pending. Wounds increasingly worsen with increased TBSA to ~50%. Patient also has a hx of CKD 4 with baseline Cr about 1.5, with worsening creatinine. Home medications of Lasix and allopurinol were held.  Nephro was consulted for BRITT - initially prerenal (by FeNA) likely an ATN, not responding to IVF or IV lasix with decreased urinary output. Patient become hypotensive overnight requiring pressors.  This morning, patient desaturated to low 90's- 80s while on high flow oxygen. Patient was found to have upper airway secretions, audible rales and bilateral crackles. Unable to place Bipap due to secretions. Patient become more tachypneic and then become pulseless. Code blue called- ACLS criteria performed. Patient intubated and ROSC achieved. CXR, ABG and stat labs. Patient was also found to have brown emesis suctioning to OG tube with 1L output. Aspiration suspected.  palliative care consulted whom contacted who spoked with HCP Esteban Day at [phone], who decided to make patient DNR.                             Problem/Plan - 8:  Stage 3 chronic kidney disease.   -Cr 1.68 on admission. Baseline approx 2.2. Now trending up 3.32  -UA resulted, urine lytes within normal limits  -Continue to monitor urine output  -Avoid nephrotoxic agents.     Problem/Plan - 9:  PUD (peptic ulcer disease).   -Continue protonix 40mg inpatient.     Problem/Plan - 10:  BPH (benign prostatic hyperplasia).   -Continue home flomax 0.4mg qhs.          Plan of care discussed with Health Care Proxy  consents obtained, in agreement with current treatment.  Esteban woodruff 7/17-7/22.   Patient is a 89 yo M with PMH HTN, HLD, CKD4 (baseline Cr 1.5), chronic HFrEF (LVEF 30% Dec 2021), CAD s/p PCI in , aortic stenosis s/p AVR in , chronic atrial fibrillation s/p PPM, hypothyroidism, BPH, insomnia, GIB (now off warfarin x 8 months), inguinal hernia repair, h/o appendectomy,  colon cancer s/p R hemicolectomy in , prostate cancer who presented on  from Dr. 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 Patient was admitted to burn unit. While inpatient, patient was treated with IVF,  GI/dvt ppx, pain management and wound care twice a day.  Patient was given IVIG on  and . Skin Biopsy was performed on , results pending. Wounds increasingly worsen with increased TBSA to ~50%. Patient also has a hx of CKD 4 with baseline Cr about 1.5, with worsening creatinine. Home medications of Lasix and allopurinol were held.  Nephro was consulted for BRITT - initially prerenal (by FeNA) likely an ATN, not responding to IVF or IV lasix with decreased urinary output. Patient become hypotensive overnight requiring pressors.  This morning, patient desaturated to low 90's- 80s while on high flow oxygen. Patient was found to have upper airway secretions, audible rales and bilateral crackles. Unable to place Bipap due to increased secretions. Patient become more tachypneic and then become pulseless. Code blue called at 8:06am- ACLS criteria performed. Patient intubated and ROSC achieved. Cards was at bedside for further input. CXR, ABG and stat labs. Patient was also found to have brown emesis suctioning to OG tube with 1L output. GI consulted and Protonix drip started. Aspiration suspected.  Palliative care consulted whom contacted who spoked with HCP Esteban Day at [phone], who made patient DNR. ABG showing respiratory acidiosis with low paO2. Pulm consulted who saw patient at bedside with this writer, patient was noted to be pale, unresponsive to painful stimuli, and pulseless. Patient  1:15pm. HCP Esteban notified, stated he will contact  home. Post mortem care rendered.                                   Patient is a 89 yo M with PMH HTN, HLD, CKD4 (baseline Cr 1.5), chronic HFrEF (LVEF 30% Dec 2021), CAD s/p PCI in , aortic stenosis s/p AVR in , chronic atrial fibrillation s/p PPM, hypothyroidism, BPH, insomnia, GIB (now off warfarin x 8 months), inguinal hernia repair, h/o appendectomy,  colon cancer s/p R hemicolectomy in , prostate cancer who presented on  from Dr. 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 Patient was admitted to burn unit. While inpatient, patient was treated with IVF,  GI/dvt ppx, pain management and wound care twice a day.  Patient was given IVIG on  and . Skin Biopsy was performed on , results pending. Wounds increasingly worsen with increased TBSA to ~50%. Patient also has a hx of CKD 4 with baseline Cr about 1.5, with worsening creatinine. Home medications of Lasix and allopurinol were held.  Nephro was consulted for BRITT - initially prerenal (by FeNA) likely an ATN, not responding to IVF or IV lasix with decreased urinary output. Patient become hypotensive overnight requiring pressors.  This morning, patient desaturated to low 90's- 80s while on high flow oxygen. Patient was found to have upper airway secretions, audible rales and bilateral crackles. Unable to place Bipap due to increased secretions. Patient become more tachypneic and then become pulseless. Code blue called at 8:06am- ACLS criteria performed. Patient intubated and ROSC achieved. Cards was at bedside for further input. CXR, ABG and stat labs. Patient was also found to have brown emesis suctioning to OG tube with 1L output. GI consult placed and Protonix drip started. Aspiration suspected.  Palliative care consulted whom contacted who spoked with HCP Esteban Day at [phone], who made patient DNR. ABG showing respiratory acidiosis with low paO2. Pulm consulted who saw patient at bedside with this writer, patient was noted to be pale, unresponsive to painful stimuli, and pulseless. Patient  1:15pm. HCP Esteban notified, stated he will contact  home. Post mortem care rendered.

## 2022-07-08 NOTE — CONSULT NOTE ADULT - NS ATTEND AMEND GEN_ALL_CORE FT
The assessment , plan, and GOC discussions above are written by me.  Patient  by the time of signing this note.

## 2022-07-09 LAB
-  AMIKACIN: SIGNIFICANT CHANGE UP
-  AMOXICILLIN/CLAVULANIC ACID: SIGNIFICANT CHANGE UP
-  AMPICILLIN/SULBACTAM: SIGNIFICANT CHANGE UP
-  AMPICILLIN: SIGNIFICANT CHANGE UP
-  AMPICILLIN: SIGNIFICANT CHANGE UP
-  AZTREONAM: SIGNIFICANT CHANGE UP
-  CEFAZOLIN: SIGNIFICANT CHANGE UP
-  CEFEPIME: SIGNIFICANT CHANGE UP
-  CEFOXITIN: SIGNIFICANT CHANGE UP
-  CEFTRIAXONE: SIGNIFICANT CHANGE UP
-  CIPROFLOXACIN: SIGNIFICANT CHANGE UP
-  ERTAPENEM: SIGNIFICANT CHANGE UP
-  GENTAMICIN: SIGNIFICANT CHANGE UP
-  IMIPENEM: SIGNIFICANT CHANGE UP
-  LEVOFLOXACIN: SIGNIFICANT CHANGE UP
-  MEROPENEM: SIGNIFICANT CHANGE UP
-  PIPERACILLIN/TAZOBACTAM: SIGNIFICANT CHANGE UP
-  TETRACYCLINE: SIGNIFICANT CHANGE UP
-  TOBRAMYCIN: SIGNIFICANT CHANGE UP
-  TRIMETHOPRIM/SULFAMETHOXAZOLE: SIGNIFICANT CHANGE UP
-  VANCOMYCIN: SIGNIFICANT CHANGE UP
METHOD TYPE: SIGNIFICANT CHANGE UP
METHOD TYPE: SIGNIFICANT CHANGE UP

## 2022-07-10 LAB
-  IMIPENEM: SIGNIFICANT CHANGE UP
-  PIPERACILLIN/TAZOBACTAM: SIGNIFICANT CHANGE UP
METHOD TYPE: SIGNIFICANT CHANGE UP

## 2022-07-11 LAB
CULTURE RESULTS: SIGNIFICANT CHANGE UP
ORGANISM # SPEC MICROSCOPIC CNT: SIGNIFICANT CHANGE UP
SPECIMEN SOURCE: SIGNIFICANT CHANGE UP

## 2022-07-13 DIAGNOSIS — K59.00 CONSTIPATION, UNSPECIFIED: ICD-10-CM

## 2022-07-13 DIAGNOSIS — N17.9 ACUTE KIDNEY FAILURE, UNSPECIFIED: ICD-10-CM

## 2022-07-13 DIAGNOSIS — F03.90 UNSPECIFIED DEMENTIA WITHOUT BEHAVIORAL DISTURBANCE: ICD-10-CM

## 2022-07-13 DIAGNOSIS — M10.9 GOUT, UNSPECIFIED: ICD-10-CM

## 2022-07-13 DIAGNOSIS — Z95.0 PRESENCE OF CARDIAC PACEMAKER: ICD-10-CM

## 2022-07-13 DIAGNOSIS — G47.00 INSOMNIA, UNSPECIFIED: ICD-10-CM

## 2022-07-13 DIAGNOSIS — R23.4 CHANGES IN SKIN TEXTURE: ICD-10-CM

## 2022-07-13 DIAGNOSIS — Z95.2 PRESENCE OF PROSTHETIC HEART VALVE: ICD-10-CM

## 2022-07-13 DIAGNOSIS — Z85.46 PERSONAL HISTORY OF MALIGNANT NEOPLASM OF PROSTATE: ICD-10-CM

## 2022-07-13 DIAGNOSIS — E03.9 HYPOTHYROIDISM, UNSPECIFIED: ICD-10-CM

## 2022-07-13 DIAGNOSIS — Z88.8 ALLERGY STATUS TO OTHER DRUGS, MEDICAMENTS AND BIOLOGICAL SUBSTANCES STATUS: ICD-10-CM

## 2022-07-13 DIAGNOSIS — Z95.5 PRESENCE OF CORONARY ANGIOPLASTY IMPLANT AND GRAFT: ICD-10-CM

## 2022-07-13 DIAGNOSIS — I13.0 HYPERTENSIVE HEART AND CHRONIC KIDNEY DISEASE WITH HEART FAILURE AND STAGE 1 THROUGH STAGE 4 CHRONIC KIDNEY DISEASE, OR UNSPECIFIED CHRONIC KIDNEY DISEASE: ICD-10-CM

## 2022-07-13 DIAGNOSIS — N18.4 CHRONIC KIDNEY DISEASE, STAGE 4 (SEVERE): ICD-10-CM

## 2022-07-13 DIAGNOSIS — K27.9 PEPTIC ULCER, SITE UNSPECIFIED, UNSPECIFIED AS ACUTE OR CHRONIC, WITHOUT HEMORRHAGE OR PERFORATION: ICD-10-CM

## 2022-07-13 DIAGNOSIS — D50.9 IRON DEFICIENCY ANEMIA, UNSPECIFIED: ICD-10-CM

## 2022-07-13 DIAGNOSIS — Z88.1 ALLERGY STATUS TO OTHER ANTIBIOTIC AGENTS STATUS: ICD-10-CM

## 2022-07-13 DIAGNOSIS — D66 HEREDITARY FACTOR VIII DEFICIENCY: ICD-10-CM

## 2022-07-13 DIAGNOSIS — D61.818 OTHER PANCYTOPENIA: ICD-10-CM

## 2022-07-13 DIAGNOSIS — I25.10 ATHEROSCLEROTIC HEART DISEASE OF NATIVE CORONARY ARTERY WITHOUT ANGINA PECTORIS: ICD-10-CM

## 2022-07-13 DIAGNOSIS — I50.22 CHRONIC SYSTOLIC (CONGESTIVE) HEART FAILURE: ICD-10-CM

## 2022-07-13 DIAGNOSIS — I48.20 CHRONIC ATRIAL FIBRILLATION, UNSPECIFIED: ICD-10-CM

## 2022-09-25 NOTE — ED PROVIDER NOTE - NS_EDPROVIDERDISPOUSERTYPE_ED_A_ED
Lab Results   Component Value Date    HGBA1C 6 9 (H) 09/16/2022       Recent Labs     09/25/22  0737 09/25/22  1111   POCGLU 160* 120       Blood Sugar Average: Last 72 hrs:  · (P) 140Continue PTA insulin regimen of 44 units of Lantus HS and 14 units of Lispro tid with meals  · Accu-checks and SSI  · Hypoglycemia protocol  Attending Attestation (For Attendings USE Only)...

## 2022-09-26 NOTE — ED ADULT NURSE NOTE - NSFALLRSKASSESSTYPE_ED_ALL_ED
Initial (On Arrival) Ivermectin Counseling:  Patient instructed to take medication on an empty stomach with a full glass of water.  Patient informed of potential adverse effects including but not limited to nausea, diarrhea, dizziness, itching, and swelling of the extremities or lymph nodes.  The patient verbalized understanding of the proper use and possible adverse effects of ivermectin.  All of the patient's questions and concerns were addressed.

## 2023-11-29 NOTE — ED ADULT NURSE NOTE - NSFALLRSKINDICATORS_ED_ALL_ED
Currently on IV antibiotics per podiatry  Wound appears to be improving  Patient without significant erythema, fevers or white count
PAST SURGICAL HISTORY:  H/O knee surgery     History of arthroscopy of right knee with lateral release 6/28/19    History of arthroscopy of right knee synovectomy with lateral release 10/20/21    
no

## 2024-06-24 NOTE — PROGRESS NOTE ADULT - PROBLEM SELECTOR PLAN 7
Please advise    Normotensive at time of exam  - hold home med Lasix 20mg, metoprolol 50mg in setting of suspected GIB  - restart home meds as appropriate  #HLD  - continue home med Lipitor 20mg daily  #CAD  - home meds ASA 81, Lipitor 20mg daily.  - hold ASA in the setting of suspected GIB, restart as appropriate

## 2024-07-22 NOTE — PATIENT PROFILE ADULT - CENTRAL VENOUS CATHETER/PICC LINE
Problem: PAIN - ADULT  Goal: Verbalizes/displays adequate comfort level or baseline comfort level  Description: Interventions:  - Encourage patient to monitor pain and request assistance  - Assess pain using appropriate pain scale  - Administer analgesics based on type and severity of pain and evaluate response  - Implement non-pharmacological measures as appropriate and evaluate response  - Consider cultural and social influences on pain and pain management  - Notify physician/advanced practitioner if interventions unsuccessful or patient reports new pain  Outcome: Progressing     Problem: SAFETY ADULT  Goal: Patient will remain free of falls  Description: INTERVENTIONS:  - Educate patient/family on patient safety including physical limitations  - Instruct patient to call for assistance with activity   - Consult OT/PT to assist with strengthening/mobility   - Keep Call bell within reach  - Keep bed low and locked with side rails adjusted as appropriate  - Keep care items and personal belongings within reach  - Initiate and maintain comfort rounds  - Make Fall Risk Sign visible to staff  - Offer Toileting every 2 Hours, in advance of need  - Initiate/Maintain bed alarm  - Obtain necessary fall risk management equipment: bed alarm  - Apply yellow socks and bracelet for high fall risk patients  - Consider moving patient to room near nurses station  Outcome: Progressing      no

## 2025-02-11 NOTE — ED ADULT NURSE NOTE - NSSISCREENINGQ5_ED_A_ED
Patient was calling in regards to paperwork sent over for release of medical information for her leave.   Writer advised her that we have nothing on file.  Patient will bring paperwork in personally and will fill out a release of information at that time.    No

## 2025-03-25 NOTE — DIETITIAN INITIAL EVALUATION ADULT - CALCULATED FROM (CAL/KG)
Papi Goins is a 69 y.o.   male patient, who presents for a 6 minute walk test ordered by BARBARA Dennison.  The diagnosis is Pre-operative Evaluation.  The patient's BMI is 26.5 kg/m2.  Predicted distance (lower limit of normal) is 359.48 meters.      Test Results:    The test was completed without stopping.  The total time walked was 360 seconds.  During walking, the patient reported:  Dyspnea.  The patient used no assistive devices during testing.     03/25/2025---------Distance: 293.22 meters (962 feet)     O2 Sat % Supplemental Oxygen Heart Rate Blood Pressure Nova Scale   Pre-exercise  (Resting) 94 % Room Air 67 bpm 86/51 mmHg 1   During Exercise 93 % Room Air 74 bpm 98/65 mmHg 3   Post-exercise  (Recovery) 98 % Room Air  69 bpm       Recovery Time: 165 seconds    Performing nurse/tech: Xu ORTEGA      PREVIOUS STUDY:   The patient has not had a previous study.      CLINICAL INTERPRETATION:  Six minute walk distance is 293.22 meters (962 feet) with moderate dyspnea.  During exercise, there was no significant desaturation while breathing room air.  Both blood pressure and heart rate remained stable with walking.  Hypotension was present prior to exercise.  The patient did not report non-pulmonary symptoms during exercise.  Significant exercise impairment is likely due to cardiovascular causes and subjective symptoms.  The patient did complete the study, walking 360 seconds of the 360 second test.  No previous study performed.  Based upon age and body mass index, exercise capacity is less than predicted.   1519

## 2025-03-28 NOTE — OCCUPATIONAL THERAPY INITIAL EVALUATION ADULT - LEVEL OF INDEPENDENCE: DRESS UPPER BODY, OT EVAL
Patient concerned and wishes to have mammogram order so it is available when due.  Last mammogram results note shows from pcp: No suspicious mammographic asymmetries or calcifications  within either breast  If she is still considering more investigation, I can refer her to a breast surgeon.    Do you wish to do mammogram order, or referral or both?  Future mammogram order pended.  
maximum assist (25% patients effort)